# Patient Record
Sex: MALE | Race: BLACK OR AFRICAN AMERICAN | NOT HISPANIC OR LATINO | Employment: OTHER | URBAN - METROPOLITAN AREA
[De-identification: names, ages, dates, MRNs, and addresses within clinical notes are randomized per-mention and may not be internally consistent; named-entity substitution may affect disease eponyms.]

---

## 2017-10-02 VITALS
WEIGHT: 218 LBS | HEART RATE: 73 BPM | SYSTOLIC BLOOD PRESSURE: 138 MMHG | BODY MASS INDEX: 29.53 KG/M2 | HEIGHT: 72 IN | DIASTOLIC BLOOD PRESSURE: 78 MMHG

## 2017-10-02 RX ORDER — CARBOXYMETHYLCELLULOSE SODIUM 5 MG/ML
1 SOLUTION/ DROPS OPHTHALMIC
COMMUNITY

## 2017-10-02 RX ORDER — UREA 500 MG/G
CREAM TOPICAL
COMMUNITY

## 2017-10-02 RX ORDER — DONEPEZIL HYDROCHLORIDE 10 MG/1
0.5 TABLET, FILM COATED ORAL DAILY
COMMUNITY
End: 2018-05-15

## 2017-10-02 RX ORDER — METFORMIN HYDROCHLORIDE 500 MG/1
2 TABLET, FILM COATED, EXTENDED RELEASE ORAL DAILY
COMMUNITY
End: 2018-05-15

## 2017-10-02 RX ORDER — INSULIN GLARGINE 100 [IU]/ML
25 INJECTION, SOLUTION SUBCUTANEOUS DAILY
COMMUNITY
End: 2022-04-14

## 2017-10-02 RX ORDER — LISINOPRIL 10 MG/1
0.5 TABLET ORAL DAILY
COMMUNITY

## 2017-10-03 ENCOUNTER — OFFICE VISIT (OUTPATIENT)
Dept: ORTHOPEDICS | Facility: CLINIC | Age: 72
End: 2017-10-03
Payer: COMMERCIAL

## 2017-10-03 VITALS
SYSTOLIC BLOOD PRESSURE: 110 MMHG | HEIGHT: 72 IN | WEIGHT: 218 LBS | DIASTOLIC BLOOD PRESSURE: 61 MMHG | HEART RATE: 58 BPM | BODY MASS INDEX: 29.53 KG/M2

## 2017-10-03 DIAGNOSIS — G56.03 CARPAL TUNNEL SYNDROME, BILATERAL: Primary | ICD-10-CM

## 2017-10-03 DIAGNOSIS — M77.12 LATERAL EPICONDYLITIS OF LEFT ELBOW: ICD-10-CM

## 2017-10-03 PROBLEM — G56.02 CARPAL TUNNEL SYNDROME ON LEFT: Status: ACTIVE | Noted: 2017-10-03

## 2017-10-03 PROCEDURE — 99213 OFFICE O/P EST LOW 20 MIN: CPT | Mod: ,,, | Performed by: ORTHOPAEDIC SURGERY

## 2017-10-03 NOTE — LETTER
October 3, 2017      [facility]  [address] [address]           [facility] - Orthopedic Surgery  [address]  [address]  [place] [address]  Phone: [phone]  Fax: [phone]          Patient: Senthil Chandler   MR Number: [id number]   YOB: 1945   Date of Visit: 10/3/2017       Dear [facility]:    Thank you for referring Senthil Chandler to me for evaluation. Attached you will find relevant portions of my assessment and plan of care.    If you have questions, please do not hesitate to call me. I look forward to following Senthil Chandler along with you.    Sincerely,    Anoop Tracy Jr., MD    Enclosure  CC:  No Recipients    If you would like to receive this communication electronically, please contact [email or web address] or [phone] to request more information on [employer] Link access.    For providers and/or their staff who would like to refer a patient to [facility], please contact us through our one-stop-shop provider referral line, [facility], at [phone].    If you feel you have received this communication in error or would no longer like to receive these types of communications, please e-mail [email or web address]
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
## 2017-10-05 DIAGNOSIS — G56.02 CARPAL TUNNEL SYNDROME OF LEFT WRIST: Primary | ICD-10-CM

## 2017-10-17 ENCOUNTER — OFFICE VISIT (OUTPATIENT)
Dept: ORTHOPEDICS | Facility: CLINIC | Age: 72
End: 2017-10-17
Payer: COMMERCIAL

## 2017-10-17 VITALS
BODY MASS INDEX: 29.53 KG/M2 | SYSTOLIC BLOOD PRESSURE: 123 MMHG | HEIGHT: 72 IN | DIASTOLIC BLOOD PRESSURE: 64 MMHG | HEART RATE: 58 BPM | WEIGHT: 218 LBS

## 2017-10-17 DIAGNOSIS — M65.312 TRIGGER THUMB OF LEFT HAND: ICD-10-CM

## 2017-10-17 DIAGNOSIS — G56.02 CARPAL TUNNEL SYNDROME OF LEFT WRIST: Primary | ICD-10-CM

## 2017-10-17 LAB
ALBUMIN SERPL-MCNC: 4.4 G/DL (ref 3.1–4.7)
ALP SERPL-CCNC: 102 IU/L (ref 40–104)
ALT (SGPT): 35 IU/L (ref 3–33)
AST SERPL-CCNC: 28 IU/L (ref 10–40)
BILIRUB SERPL-MCNC: 0.9 MG/DL (ref 0.3–1)
BUN SERPL-MCNC: 9 MG/DL (ref 8–20)
CALCIUM SERPL-MCNC: 10.6 MG/DL (ref 7.7–10.4)
CHLORIDE: 100 MMOL/L (ref 98–110)
CO2 SERPL-SCNC: 31 MMOL/L (ref 22.8–31.6)
CREATININE: 1.1 MG/DL (ref 0.6–1.4)
GLUCOSE: 288 MG/DL (ref 70–99)
HCT VFR BLD AUTO: 41.7 % (ref 39–55)
HGB BLD-MCNC: 13.9 G/DL (ref 14–16)
MCH RBC QN AUTO: 33.4 PG (ref 25–35)
MCHC RBC AUTO-ENTMCNC: 33.3 G/DL (ref 31–36)
MCV RBC AUTO: 100.2 FL (ref 80–100)
NUCLEATED RBCS: 0 %
PLATELET # BLD AUTO: 234 K/UL (ref 140–440)
POTASSIUM SERPL-SCNC: 5.6 MMOL/L (ref 3.5–5)
PROT SERPL-MCNC: 7.4 G/DL (ref 6–8.2)
RBC # BLD AUTO: 4.16 M/UL (ref 4.3–5.9)
SODIUM: 140 MMOL/L (ref 134–144)
WBC # BLD AUTO: 4.1 K/UL (ref 5–10)

## 2017-10-17 PROCEDURE — 99499 UNLISTED E&M SERVICE: CPT | Mod: ,,, | Performed by: ORTHOPAEDIC SURGERY

## 2017-10-17 RX ORDER — HYDROCODONE BITARTRATE AND ACETAMINOPHEN 7.5; 325 MG/1; MG/1
1 TABLET ORAL EVERY 6 HOURS PRN
Qty: 40 TABLET | Refills: 0 | Status: SHIPPED | OUTPATIENT
Start: 2017-10-17 | End: 2018-05-15

## 2017-10-17 NOTE — PROGRESS NOTES
Subjective:       Chief Complaint    Chief Complaint   Patient presents with    Pre-op Exam     DOS will be on 10/23/2017, left carpal tunnel syndrome.  The purpose is to relieve the pain of the left carpal tunnel syndrome.       HPI  Senthil Chandler is a 71 y.o.  male who presents Preop for carpal tunnel release, left wrist. He just revealed that he has bilateral trigger thumbs. We will try to get approval for release of his left thumb triggering before his scheduled surgery on 10/23/2017. If not, we will proceed with a carpal tunnel release      Past History  Past Medical History:   Diagnosis Date    Adjustment disorder with anxious mood     Angiodysplasia     Arthritis     Cancer 1990    prostate     Carcinoma in situ of prostate     Carpal tunnel syndrome on left     CKD (chronic kidney disease) stage 1, GFR 90 ml/min or greater     Cubital tunnel syndrome on left     Depressive disorder     Deviated nasal septum     Diabetes mellitus with ophthalmic complication     Diabetes mellitus, type 2     GERD (gastroesophageal reflux disease)     Hx of colonic polyp     Hypercholesterolemia     Hyperpotassemia     Hypertension     Iron deficiency anemia     Kidney disease     focal segmental glomerulosclerosis    Lumbago     Malignant neoplasm of colon     Nephrotic syndrome     Neuropathy     Primary osteoarthritis of left elbow     PTSD (post-traumatic stress disorder)     PVD (peripheral vascular disease)     Spinal stenosis, lumbar region without neurogenic claudication      Past Surgical History:   Procedure Laterality Date    COLONOSCOPY N/A 8/12/2016    Procedure: COLONOSCOPY;  Surgeon: Carlos Roman MD;  Location: [facility];  Service: Endoscopy;  Laterality: N/A;    NASAL SEPTUM SURGERY      PROSTATE SURGERY  1990's     Social History     Social History    Marital status: Single     Spouse name: N/A    Number of children: N/A    Years of education: N/A     Occupational  History    Not on file.     Social History Main Topics    Smoking status: Heavy Tobacco Smoker     Packs/day: 1.00     Types: Cigarettes    Smokeless tobacco: Never Used    Alcohol use No    Drug use: No    Sexual activity: Not on file     Other Topics Concern    Not on file     Social History Narrative    No narrative on file         Medications  Current Outpatient Prescriptions   Medication Sig    artificial saliva, cmce-lytes, SprP Take by mouth. 1 tsp. Daily prn dry mouth    atorvastatin (LIPITOR) 20 MG tablet Take 10 mg by mouth once daily.    bicalutamide (CASODEX) 50 MG Tab Take 50 mg by mouth once daily.    carboxymethylcellulose (REFRESH PLUS) 0.5 % Dpet Place 1 drop into both eyes 5 (five) times daily.    cholecalciferol, vitamin D3, 2,000 unit Tab Take by mouth once daily.    donepezil (ARICEPT) 10 MG tablet Take 0.5 tablets by mouth once daily.    fluticasone 50 mcg/actuation DsDv Inhale 2 sprays into the lungs once daily.    gabapentin (NEURONTIN) 400 MG capsule Take 400 mg by mouth 3 (three) times daily. 2 qa/ 2 qnoon/ 3 qpm    hydrochlorothiazide (HYDRODIURIL) 25 MG tablet Take 25 mg by mouth once daily.    insulin glargine (LANTUS) 100 unit/mL injection Inject 15 Units into the skin once daily.    labetalol (NORMODYNE) 300 MG tablet Take 300 mg by mouth 2 (two) times daily.    lisinopril 10 MG tablet Take 0.5 tablets by mouth once daily.    saxagliptin (ONGLYZA) 5 mg Tab tablet Take by mouth once daily.    trazodone (DESYREL) 100 MG tablet Take 150 mg by mouth every evening.     urea 50 % Crea Apply bid    venlafaxine (EFFEXOR) 100 MG Tab Take 100 mg by mouth 3 (three) times daily.    hydrocodone-acetaminophen 7.5-325mg (NORCO) 7.5-325 mg per tablet Take 1 tablet by mouth every 6 (six) hours as needed for Pain.    metformin (GLUMETZA) 500 MG (MOD) 24 hr tablet Take 2 tablets by mouth once daily.     No current facility-administered medications for this visit.         Allergies  Review of patient's allergies indicates:  No Known Allergies      Review of Systems     Constitutional: Negative    HENT: Negative  Eyes: Negative  Respiratory: Negative  Cardiovascular: Negative  Musculoskeletal: HPI  Skin: Negative  Neurological: Negative  Hematological: Negative  Endocrine: Negative      Physical Exam    Vitals:    10/17/17 0856   BP: 123/64   Pulse: (!) 58     Physical Examination:Left hand/wrist-markedly positive Tinel sign with tingling in the median nerve distribution fingers. Positive wrist flexion test. Tender MCP joint of the thumb. Patient states he wakes up with his thumb triggered or locked in flexion. He does have nodular tendinitis with popping. Not as painful as it usually is. According to the patient     Skin-Clear  General appearance -  well appearing, and in no distress  Mental status - awake  Neck - supple  Chest -  symmetric air entry  Heart - normal rate   Abdomen - soft      Assessment/Plan   Carpal tunnel syndrome of left wrist    Trigger thumb of left hand    Other orders  -     hydrocodone-acetaminophen 7.5-325mg (NORCO) 7.5-325 mg per tablet; Take 1 tablet by mouth every 6 (six) hours as needed for Pain.  Dispense: 40 tablet; Refill: 0    12 point review of system reveals shortness of breath and chronic cough for 50 years of smoking.        This note was dictated using voice recognition software and may contain grammatical errors.

## 2017-10-17 NOTE — LETTER
October 17, 2017      [facility]  [address] [address]           [facility] - Orthopedic Surgery  [address]  [address]  [place] [address]  Phone: [phone]  Fax: [phone]          Patient: Senthil Chandler   MR Number: [id number]   YOB: 1945   Date of Visit: 10/17/2017       Dear [facility]:    Thank you for referring Senthil Chandler to me for evaluation. Attached you will find relevant portions of my assessment and plan of care.    If you have questions, please do not hesitate to call me. I look forward to following Senthil Chandler along with you.    Sincerely,    Anoop Tracy Jr., MD    Enclosure  CC:  No Recipients    If you would like to receive this communication electronically, please contact [email or web address] or [phone] to request more information on [employer] Link access.    For providers and/or their staff who would like to refer a patient to [facility], please contact us through our one-stop-shop provider referral line, [facility], at [phone].    If you feel you have received this communication in error or would no longer like to receive these types of communications, please e-mail [email or web address]

## 2017-10-23 LAB
BUN SERPL-MCNC: 14 MG/DL (ref 8–20)
CALCIUM SERPL-MCNC: 10.5 MG/DL (ref 7.7–10.4)
CHLORIDE: 101 MMOL/L (ref 98–110)
CO2 SERPL-SCNC: 28 MMOL/L (ref 22.8–31.6)
CREATININE: 1.05 MG/DL (ref 0.6–1.4)
GLUCOSE SERPL-MCNC: 160 MG/DL (ref 70–99)
GLUCOSE: 242 MG/DL (ref 70–99)
POTASSIUM SERPL-SCNC: 3.8 MMOL/L (ref 3.5–5)
SODIUM: 138 MMOL/L (ref 134–144)

## 2017-10-25 ENCOUNTER — NURSE TRIAGE (OUTPATIENT)
Dept: ORTHOPEDICS | Facility: CLINIC | Age: 72
End: 2017-10-25

## 2017-10-25 NOTE — TELEPHONE ENCOUNTER
Patient's bandaging and splint was removed.  The bandaging and splint appeared to be tight due to the visible markings on the patient's skin.  A large bandaid was placed over the patient's incision area.  Patient was advised to keep the area dry and covered until his appointment on 10/31/17.  He verbalized understanding of these instructions.

## 2017-10-31 ENCOUNTER — OFFICE VISIT (OUTPATIENT)
Dept: ORTHOPEDICS | Facility: CLINIC | Age: 72
End: 2017-10-31
Payer: COMMERCIAL

## 2017-10-31 VITALS
SYSTOLIC BLOOD PRESSURE: 102 MMHG | BODY MASS INDEX: 29.53 KG/M2 | WEIGHT: 218 LBS | HEIGHT: 72 IN | HEART RATE: 64 BPM | DIASTOLIC BLOOD PRESSURE: 50 MMHG

## 2017-10-31 DIAGNOSIS — Z98.890 POST-OPERATIVE STATE: ICD-10-CM

## 2017-10-31 DIAGNOSIS — G56.02 CARPAL TUNNEL SYNDROME OF LEFT WRIST: Primary | ICD-10-CM

## 2017-10-31 PROCEDURE — 99024 POSTOP FOLLOW-UP VISIT: CPT | Mod: ,,, | Performed by: ORTHOPAEDIC SURGERY

## 2017-10-31 NOTE — PROGRESS NOTES
Subjective:       Chief Complaint    Chief Complaint   Patient presents with    Post-op Evaluation     8 days, left carpal tunnel release.  Patient has some pain and soreness but overall is doing well.  Still has decreased sensations.       HPI  Senthil Chandler is a 72 y.o.  male who presents Follow-up on carpal tunnel release. Doing very well. Took off his brace and is much more comfortable.      Past History  Past Medical History:   Diagnosis Date    Adjustment disorder with anxious mood     Angiodysplasia     Arthritis     Cancer 1990    prostate     Carcinoma in situ of prostate     Carpal tunnel syndrome on left     CKD (chronic kidney disease) stage 1, GFR 90 ml/min or greater     Cubital tunnel syndrome on left     Depressive disorder     Deviated nasal septum     Diabetes mellitus with ophthalmic complication     Diabetes mellitus, type 2     GERD (gastroesophageal reflux disease)     Hx of colonic polyp     Hypercholesterolemia     Hyperpotassemia     Hypertension     Iron deficiency anemia     Kidney disease     focal segmental glomerulosclerosis    Lumbago     Malignant neoplasm of colon     Nephrotic syndrome     Neuropathy     Primary osteoarthritis of left elbow     PTSD (post-traumatic stress disorder)     PVD (peripheral vascular disease)     Spinal stenosis, lumbar region without neurogenic claudication      Past Surgical History:   Procedure Laterality Date    CARPAL TUNNEL RELEASE Left 10/23/2017    COLONOSCOPY N/A 8/12/2016    Procedure: COLONOSCOPY;  Surgeon: Carlos Roman MD;  Location: [facility];  Service: Endoscopy;  Laterality: N/A;    NASAL SEPTUM SURGERY      PROSTATE SURGERY  1990's     Social History     Social History    Marital status: Single     Spouse name: N/A    Number of children: N/A    Years of education: N/A     Occupational History    Not on file.     Social History Main Topics    Smoking status: Heavy Tobacco Smoker     Packs/day:  1.00     Types: Cigarettes    Smokeless tobacco: Never Used    Alcohol use No    Drug use: No    Sexual activity: Not on file     Other Topics Concern    Not on file     Social History Narrative    No narrative on file         Medications  Current Outpatient Prescriptions   Medication Sig    artificial saliva, cmce-lytes, SprP Take by mouth. 1 tsp. Daily prn dry mouth    atorvastatin (LIPITOR) 20 MG tablet Take 10 mg by mouth once daily.    bicalutamide (CASODEX) 50 MG Tab Take 50 mg by mouth once daily.    carboxymethylcellulose (REFRESH PLUS) 0.5 % Dpet Place 1 drop into both eyes 5 (five) times daily.    cholecalciferol, vitamin D3, 2,000 unit Tab Take by mouth once daily.    donepezil (ARICEPT) 10 MG tablet Take 0.5 tablets by mouth once daily.    fluticasone 50 mcg/actuation DsDv Inhale 2 sprays into the lungs once daily.    gabapentin (NEURONTIN) 400 MG capsule Take 400 mg by mouth 3 (three) times daily. 2 qa/ 2 qnoon/ 3 qpm    hydrochlorothiazide (HYDRODIURIL) 25 MG tablet Take 25 mg by mouth once daily.    hydrocodone-acetaminophen 7.5-325mg (NORCO) 7.5-325 mg per tablet Take 1 tablet by mouth every 6 (six) hours as needed for Pain.    insulin glargine (LANTUS) 100 unit/mL injection Inject 15 Units into the skin once daily.    labetalol (NORMODYNE) 300 MG tablet Take 300 mg by mouth 2 (two) times daily.    lisinopril 10 MG tablet Take 0.5 tablets by mouth once daily.    metformin (GLUMETZA) 500 MG (MOD) 24 hr tablet Take 2 tablets by mouth once daily.    saxagliptin (ONGLYZA) 5 mg Tab tablet Take by mouth once daily.    trazodone (DESYREL) 100 MG tablet Take 150 mg by mouth every evening.     urea 50 % Crea Apply bid    venlafaxine (EFFEXOR) 100 MG Tab Take 100 mg by mouth 3 (three) times daily.     No current facility-administered medications for this visit.        Allergies  Review of patient's allergies indicates:  No Known Allergies      Review of Systems     Constitutional:  Negative    HENT: Negative  Eyes: Negative  Respiratory: Negative  Cardiovascular: Negative  Musculoskeletal: HPI  Skin: Negative  Neurological: Negative  Hematological: Negative  Endocrine: Negative      Physical Exam    Vitals:    10/31/17 1107   BP: (!) 102/50   Pulse: 64     Physical Examination:Left wrist shows it dry. Incision was sutures present. Mild swelling. Able to make a fist. Good motion at the wrist.     Skin-  General appearance -  well appearing, and in no distress  Mental status - awake  Neck - supple  Chest -  symmetric air entry  Heart - normal rate   Abdomen - soft      Assessment/Plan   Carpal tunnel syndrome of left wrist    Post-operative state       Suture removal next visit. Okay to wet. May use ice. Avoid pressure on the heel of the palm.      This note was dictated using voice recognition software and may contain grammatical errors.

## 2017-11-08 ENCOUNTER — OFFICE VISIT (OUTPATIENT)
Dept: ORTHOPEDICS | Facility: CLINIC | Age: 72
End: 2017-11-08
Payer: COMMERCIAL

## 2017-11-08 VITALS
BODY MASS INDEX: 29.53 KG/M2 | DIASTOLIC BLOOD PRESSURE: 58 MMHG | SYSTOLIC BLOOD PRESSURE: 100 MMHG | HEART RATE: 68 BPM | WEIGHT: 218 LBS | HEIGHT: 72 IN

## 2017-11-08 DIAGNOSIS — Z98.890 POST-OPERATIVE STATE: ICD-10-CM

## 2017-11-08 DIAGNOSIS — G56.02 CARPAL TUNNEL SYNDROME OF LEFT WRIST: Primary | ICD-10-CM

## 2017-11-08 PROCEDURE — 99024 POSTOP FOLLOW-UP VISIT: CPT | Mod: ,,, | Performed by: ORTHOPAEDIC SURGERY

## 2017-11-08 NOTE — PROGRESS NOTES
Subjective:       Chief Complaint    Chief Complaint   Patient presents with    Post-op Evaluation     2 weeks & 2 days, left wrist.  DOS: 10/23/17, left carpal tunnel release.  Patient reports his fingers are still numb and still has some soreness.         HPI  Senthil Chandler is a 72 y.o.  male who presents Postop carpal tunnel surgery, left wrist. He is doing very well.      Past History  Past Medical History:   Diagnosis Date    Adjustment disorder with anxious mood     Angiodysplasia     Arthritis     Cancer 1990    prostate     Carcinoma in situ of prostate     Carpal tunnel syndrome on left     CKD (chronic kidney disease) stage 1, GFR 90 ml/min or greater     Cubital tunnel syndrome on left     Depressive disorder     Deviated nasal septum     Diabetes mellitus with ophthalmic complication     Diabetes mellitus, type 2     GERD (gastroesophageal reflux disease)     Hx of colonic polyp     Hypercholesterolemia     Hyperpotassemia     Hypertension     Iron deficiency anemia     Kidney disease     focal segmental glomerulosclerosis    Lumbago     Malignant neoplasm of colon     Nephrotic syndrome     Neuropathy     Primary osteoarthritis of left elbow     PTSD (post-traumatic stress disorder)     PVD (peripheral vascular disease)     Spinal stenosis, lumbar region without neurogenic claudication      Past Surgical History:   Procedure Laterality Date    CARPAL TUNNEL RELEASE Left 10/23/2017    COLONOSCOPY N/A 8/12/2016    Procedure: COLONOSCOPY;  Surgeon: Carlos Roman MD;  Location: [facility];  Service: Endoscopy;  Laterality: N/A;    NASAL SEPTUM SURGERY      PROSTATE SURGERY  1990's     Social History     Social History    Marital status: Single     Spouse name: N/A    Number of children: N/A    Years of education: N/A     Occupational History    Not on file.     Social History Main Topics    Smoking status: Heavy Tobacco Smoker     Packs/day: 1.00     Types:  Cigarettes    Smokeless tobacco: Never Used    Alcohol use No    Drug use: No    Sexual activity: Not on file     Other Topics Concern    Not on file     Social History Narrative    No narrative on file         Medications  Current Outpatient Prescriptions   Medication Sig    artificial saliva, cmce-lytes, SprP Take by mouth. 1 tsp. Daily prn dry mouth    atorvastatin (LIPITOR) 20 MG tablet Take 10 mg by mouth once daily.    bicalutamide (CASODEX) 50 MG Tab Take 50 mg by mouth once daily.    carboxymethylcellulose (REFRESH PLUS) 0.5 % Dpet Place 1 drop into both eyes 5 (five) times daily.    cholecalciferol, vitamin D3, 2,000 unit Tab Take by mouth once daily.    donepezil (ARICEPT) 10 MG tablet Take 0.5 tablets by mouth once daily.    fluticasone 50 mcg/actuation DsDv Inhale 2 sprays into the lungs once daily.    gabapentin (NEURONTIN) 400 MG capsule Take 400 mg by mouth 3 (three) times daily. 2 qa/ 2 qnoon/ 3 qpm    hydrochlorothiazide (HYDRODIURIL) 25 MG tablet Take 25 mg by mouth once daily.    hydrocodone-acetaminophen 7.5-325mg (NORCO) 7.5-325 mg per tablet Take 1 tablet by mouth every 6 (six) hours as needed for Pain.    insulin glargine (LANTUS) 100 unit/mL injection Inject 15 Units into the skin once daily.    labetalol (NORMODYNE) 300 MG tablet Take 300 mg by mouth 2 (two) times daily.    lisinopril 10 MG tablet Take 0.5 tablets by mouth once daily.    metformin (GLUMETZA) 500 MG (MOD) 24 hr tablet Take 2 tablets by mouth once daily.    saxagliptin (ONGLYZA) 5 mg Tab tablet Take by mouth once daily.    trazodone (DESYREL) 100 MG tablet Take 150 mg by mouth every evening.     urea 50 % Crea Apply bid    venlafaxine (EFFEXOR) 100 MG Tab Take 100 mg by mouth 3 (three) times daily.     No current facility-administered medications for this visit.        Allergies  Review of patient's allergies indicates:  No Known Allergies      Review of Systems     Constitutional: Negative    HENT:  Negative  Eyes: Negative  Respiratory: Negative  Cardiovascular: Negative  Musculoskeletal: HPI  Skin: Negative  Neurological: Negative  Hematological: Negative  Endocrine: Negative      Physical Exam    Vitals:    11/08/17 1054   BP: (!) 100/58   Pulse: 68     Physical Examination:Left wrist examination reveals healed incision. Sutures removed. Range of motion in his wrist.     Skin-   General appearance -  well appearing, and in no distress  Mental status - awake  Neck - supple  Chest -  symmetric air entry  Heart - normal rate   Abdomen - soft      Assessment/Plan   Carpal tunnel syndrome of left wrist    Post-operative state      All questions answered. Patient advised that the soreness at the heel of his palm takes 4 months at least to clear up.      This note was dictated using voice recognition software and may contain grammatical errors.

## 2017-12-20 ENCOUNTER — OFFICE VISIT (OUTPATIENT)
Dept: ORTHOPEDICS | Facility: CLINIC | Age: 72
End: 2017-12-20
Payer: COMMERCIAL

## 2017-12-20 VITALS
HEIGHT: 72 IN | DIASTOLIC BLOOD PRESSURE: 60 MMHG | SYSTOLIC BLOOD PRESSURE: 122 MMHG | BODY MASS INDEX: 29.53 KG/M2 | WEIGHT: 218 LBS | HEART RATE: 76 BPM

## 2017-12-20 DIAGNOSIS — Z98.890 POST-OPERATIVE STATE: Primary | ICD-10-CM

## 2017-12-20 DIAGNOSIS — G56.22 CUBITAL TUNNEL SYNDROME ON LEFT: ICD-10-CM

## 2017-12-20 DIAGNOSIS — G56.02 CARPAL TUNNEL SYNDROME OF LEFT WRIST: ICD-10-CM

## 2017-12-20 PROCEDURE — 99024 POSTOP FOLLOW-UP VISIT: CPT | Mod: ,,, | Performed by: ORTHOPAEDIC SURGERY

## 2017-12-20 NOTE — PROGRESS NOTES
Subjective:       Chief Complaint    Chief Complaint   Patient presents with    Post-op Evaluation     8 weeks & 2 days, left wrist.  DOS: 10/23/17, left carpal tunnel release.  Patient states his hand doesn't feel any different than before surgery.  He believes the pain/issue is coming from his elbow.  He has a refferal on file for his left elbow.       JESSI Chandler is a 72 y.o.  male who presents With worsening of his ulnar neuropathy at the left elbow.      Past History  Past Medical History:   Diagnosis Date    Adjustment disorder with anxious mood     Angiodysplasia     Arthritis     Cancer 1990    prostate     Carcinoma in situ of prostate     Carpal tunnel syndrome on left     CKD (chronic kidney disease) stage 1, GFR 90 ml/min or greater     Cubital tunnel syndrome on left     Depressive disorder     Deviated nasal septum     Diabetes mellitus with ophthalmic complication     Diabetes mellitus, type 2     GERD (gastroesophageal reflux disease)     Hx of colonic polyp     Hypercholesterolemia     Hyperpotassemia     Hypertension     Iron deficiency anemia     Kidney disease     focal segmental glomerulosclerosis    Lumbago     Malignant neoplasm of colon     Nephrotic syndrome     Neuropathy     Primary osteoarthritis of left elbow     PTSD (post-traumatic stress disorder)     PVD (peripheral vascular disease)     Spinal stenosis, lumbar region without neurogenic claudication      Past Surgical History:   Procedure Laterality Date    CARPAL TUNNEL RELEASE Left 10/23/2017    COLONOSCOPY N/A 8/12/2016    Procedure: COLONOSCOPY;  Surgeon: Carlos Roman MD;  Location: [facility];  Service: Endoscopy;  Laterality: N/A;    NASAL SEPTUM SURGERY      PROSTATE SURGERY  1990's     Social History     Social History    Marital status: Single     Spouse name: N/A    Number of children: N/A    Years of education: N/A     Occupational History    Not on file.     Social  History Main Topics    Smoking status: Heavy Tobacco Smoker     Packs/day: 1.00     Types: Cigarettes    Smokeless tobacco: Never Used    Alcohol use No    Drug use: No    Sexual activity: Not on file     Other Topics Concern    Not on file     Social History Narrative    No narrative on file         Medications  Current Outpatient Prescriptions   Medication Sig    artificial saliva, cmce-lytes, SprP Take by mouth. 1 tsp. Daily prn dry mouth    atorvastatin (LIPITOR) 20 MG tablet Take 10 mg by mouth once daily.    bicalutamide (CASODEX) 50 MG Tab Take 50 mg by mouth once daily.    carboxymethylcellulose (REFRESH PLUS) 0.5 % Dpet Place 1 drop into both eyes 5 (five) times daily.    cholecalciferol, vitamin D3, 2,000 unit Tab Take by mouth once daily.    donepezil (ARICEPT) 10 MG tablet Take 0.5 tablets by mouth once daily.    fluticasone 50 mcg/actuation DsDv Inhale 2 sprays into the lungs once daily.    gabapentin (NEURONTIN) 400 MG capsule Take 400 mg by mouth 3 (three) times daily. 2 qa/ 2 qnoon/ 3 qpm    hydrochlorothiazide (HYDRODIURIL) 25 MG tablet Take 25 mg by mouth once daily.    hydrocodone-acetaminophen 7.5-325mg (NORCO) 7.5-325 mg per tablet Take 1 tablet by mouth every 6 (six) hours as needed for Pain.    insulin glargine (LANTUS) 100 unit/mL injection Inject 15 Units into the skin once daily.    labetalol (NORMODYNE) 300 MG tablet Take 300 mg by mouth 2 (two) times daily.    lisinopril 10 MG tablet Take 0.5 tablets by mouth once daily.    metformin (GLUMETZA) 500 MG (MOD) 24 hr tablet Take 2 tablets by mouth once daily.    saxagliptin (ONGLYZA) 5 mg Tab tablet Take by mouth once daily.    trazodone (DESYREL) 100 MG tablet Take 150 mg by mouth every evening.     urea 50 % Crea Apply bid    venlafaxine (EFFEXOR) 100 MG Tab Take 100 mg by mouth 3 (three) times daily.     No current facility-administered medications for this visit.        Allergies  Review of patient's allergies  indicates:  No Known Allergies      Review of Systems     Constitutional: Negative    HENT: Negative  Eyes: Negative  Respiratory: Negative  Cardiovascular: Negative  Musculoskeletal: HPI  Skin: Negative  Neurological: Negative  Hematological: Negative  Endocrine: Negative      Physical Exam    Vitals:    12/20/17 0800   BP: 122/60   Pulse: 76     Physical Examination:Examination left elbow reveals a full range of motion with tenderness at the cubital tunnel, positive Tinel sign. No bit of weakness at the abductor digiti quinti. First dorsal interosseous has excellent strength. Carpal tunnel incision is well-healed.     Skin-clear  General appearance -  well appearing, and in no distress  Mental status - awake  Neck - supple  Chest -  symmetric air entry  Heart - normal rate   Abdomen - soft      Assessment/Plan   Post-operative state    Cubital tunnel syndrome on left    Carpal tunnel syndrome of left wrist      Patient is going to need an anterior transposition of his ulnar nerve in the left elbow. Extensive explanation and discussion as to the causes of his neuropathy involving his median and elbow ulnar nerve. This was done preop, postop and we continued to remind him of the multiple factors involved in the numbness in his left hand.      This note was dictated using voice recognition software and may contain grammatical errors.

## 2018-01-19 ENCOUNTER — OFFICE VISIT (OUTPATIENT)
Dept: ORTHOPEDICS | Facility: CLINIC | Age: 73
End: 2018-01-19
Payer: COMMERCIAL

## 2018-01-19 VITALS
DIASTOLIC BLOOD PRESSURE: 86 MMHG | SYSTOLIC BLOOD PRESSURE: 150 MMHG | WEIGHT: 218 LBS | HEART RATE: 68 BPM | BODY MASS INDEX: 29.53 KG/M2 | HEIGHT: 72 IN

## 2018-01-19 DIAGNOSIS — G56.02 CARPAL TUNNEL SYNDROME OF LEFT WRIST: ICD-10-CM

## 2018-01-19 DIAGNOSIS — Z98.890 POST-OPERATIVE STATE: ICD-10-CM

## 2018-01-19 DIAGNOSIS — M65.312 TRIGGER THUMB OF LEFT HAND: ICD-10-CM

## 2018-01-19 DIAGNOSIS — G56.22 CUBITAL TUNNEL SYNDROME, LEFT: Primary | ICD-10-CM

## 2018-01-19 PROCEDURE — 99212 OFFICE O/P EST SF 10 MIN: CPT | Mod: 24,,, | Performed by: ORTHOPAEDIC SURGERY

## 2018-01-19 NOTE — PROGRESS NOTES
Subjective:       Chief Complaint    Chief Complaint   Patient presents with    Post-op Evaluation     Post op 12wks and 4 days. DOS: 10/23/17, left carpal tunnel release. Patient states that left wrist is better. He still does not have a good  with his hand. No pain.        HPI  Senthil Chandler is a 72 y.o.  male who presents  Follow-up on carpal tunnel release, left wrist. Overall improving. Another concern he has this ongoing problems with left trigger thumb. Surgical release is indicated. To be cleared for this. He also has an issue with the ulnar nerve, medial side, left elbow. He will need cubital tunnel studies in the left elbow. (Ulnar nerve conduction studies)      Past History  Past Medical History:   Diagnosis Date    Adjustment disorder with anxious mood     Angiodysplasia     Arthritis     Cancer 1990    prostate     Carcinoma in situ of prostate     Carpal tunnel syndrome on left     CKD (chronic kidney disease) stage 1, GFR 90 ml/min or greater     Cubital tunnel syndrome on left     Depressive disorder     Deviated nasal septum     Diabetes mellitus with ophthalmic complication     Diabetes mellitus, type 2     GERD (gastroesophageal reflux disease)     Hx of colonic polyp     Hypercholesterolemia     Hyperpotassemia     Hypertension     Iron deficiency anemia     Kidney disease     focal segmental glomerulosclerosis    Lumbago     Malignant neoplasm of colon     Nephrotic syndrome     Neuropathy     Primary osteoarthritis of left elbow     PTSD (post-traumatic stress disorder)     PVD (peripheral vascular disease)     Spinal stenosis, lumbar region without neurogenic claudication      Past Surgical History:   Procedure Laterality Date    CARPAL TUNNEL RELEASE Left 10/23/2017    COLONOSCOPY N/A 8/12/2016    Procedure: COLONOSCOPY;  Surgeon: Carlos Roman MD;  Location: [facility];  Service: Endoscopy;  Laterality: N/A;    NASAL SEPTUM SURGERY      PROSTATE  SURGERY  1990's     Social History     Social History    Marital status: Single     Spouse name: N/A    Number of children: N/A    Years of education: N/A     Occupational History    Not on file.     Social History Main Topics    Smoking status: Heavy Tobacco Smoker     Packs/day: 1.00     Types: Cigarettes    Smokeless tobacco: Never Used    Alcohol use No    Drug use: No    Sexual activity: Not on file     Other Topics Concern    Not on file     Social History Narrative    No narrative on file         Medications  Current Outpatient Prescriptions   Medication Sig    artificial saliva, cmce-lytes, SprP Take by mouth. 1 tsp. Daily prn dry mouth    atorvastatin (LIPITOR) 20 MG tablet Take 10 mg by mouth once daily.    bicalutamide (CASODEX) 50 MG Tab Take 50 mg by mouth once daily.    carboxymethylcellulose (REFRESH PLUS) 0.5 % Dpet Place 1 drop into both eyes 5 (five) times daily.    cholecalciferol, vitamin D3, 2,000 unit Tab Take by mouth once daily.    donepezil (ARICEPT) 10 MG tablet Take 0.5 tablets by mouth once daily.    fluticasone 50 mcg/actuation DsDv Inhale 2 sprays into the lungs once daily.    gabapentin (NEURONTIN) 400 MG capsule Take 400 mg by mouth 3 (three) times daily. 2 qa/ 2 qnoon/ 3 qpm    hydrochlorothiazide (HYDRODIURIL) 25 MG tablet Take 25 mg by mouth once daily.    hydrocodone-acetaminophen 7.5-325mg (NORCO) 7.5-325 mg per tablet Take 1 tablet by mouth every 6 (six) hours as needed for Pain.    insulin glargine (LANTUS) 100 unit/mL injection Inject 15 Units into the skin once daily.    labetalol (NORMODYNE) 300 MG tablet Take 300 mg by mouth 2 (two) times daily.    lisinopril 10 MG tablet Take 0.5 tablets by mouth once daily.    metformin (GLUMETZA) 500 MG (MOD) 24 hr tablet Take 2 tablets by mouth once daily.    saxagliptin (ONGLYZA) 5 mg Tab tablet Take by mouth once daily.    trazodone (DESYREL) 100 MG tablet Take 150 mg by mouth every evening.     urea 50 %  Crea Apply bid    venlafaxine (EFFEXOR) 100 MG Tab Take 100 mg by mouth 3 (three) times daily.     No current facility-administered medications for this visit.        Allergies  Review of patient's allergies indicates:  No Known Allergies      Review of Systems     Constitutional: Negative    HENT: Negative  Eyes: Negative  Respiratory: Negative  Cardiovascular: Negative  Musculoskeletal: HPI  Skin: Negative  Neurological: Negative  Hematological: Negative  Endocrine: Negative      Physical Exam    Vitals:    01/19/18 0818   BP: (!) 150/86   Pulse: 68     Physical Examination:Tinel sign is negative at the cubital tunnel, but the nerve is sensitive at the ulnar groove, left elbow. Cannot describe tingling in the fourth and fifth fingers, but he says discomfort is on the ulnar side of the elbow all the way into his hand on the ulnar side.     Skin-  General appearance -  well appearing, and in no distress  Mental status - awake  Neck - supple  Chest -  symmetric air entry  Heart - normal rate   Abdomen - soft      Assessment/Plan   Post-operative state    Carpal tunnel syndrome of left wrist      He needs clearance for trigger thumb release on the left and he needs to have nerve conduction studies of his ulnar nerve, left elbow.      This note was dictated using voice recognition software and may contain grammatical errors.

## 2018-01-19 NOTE — LETTER
January 19, 2018      [facility]  [address] [address]           [place] - Orthopedic Surgery  [address]  [address]  [facility] [address]  Phone: [phone]  Fax: [phone]          Patient: Senthil Chandler   MR Number: [id number]   YOB: 1945   Date of Visit: 1/19/2018       Dear [facility]:    Thank you for referring Senthil Chandler to me for evaluation. Attached you will find relevant portions of my assessment and plan of care.    If you have questions, please do not hesitate to call me. I look forward to following Senthil Chandler along with you.    Sincerely,    Anoop [place] Jr., MD    Enclosure  CC:  No Recipients    If you would like to receive this communication electronically, please contact [email or web address] or [phone] to request more information on [employer] Link access.    For providers and/or their staff who would like to refer a patient to [facility], please contact us through our one-stop-shop provider referral line, [facility], at [phone].    If you feel you have received this communication in error or would no longer like to receive these types of communications, please e-mail [email or web address]

## 2018-05-15 ENCOUNTER — OFFICE VISIT (OUTPATIENT)
Dept: ORTHOPEDICS | Facility: CLINIC | Age: 73
End: 2018-05-15
Payer: COMMERCIAL

## 2018-05-15 VITALS
SYSTOLIC BLOOD PRESSURE: 130 MMHG | DIASTOLIC BLOOD PRESSURE: 90 MMHG | HEIGHT: 72 IN | WEIGHT: 218 LBS | BODY MASS INDEX: 29.53 KG/M2

## 2018-05-15 DIAGNOSIS — G56.22 CUBITAL TUNNEL SYNDROME, LEFT: Primary | ICD-10-CM

## 2018-05-15 DIAGNOSIS — M19.022 PRIMARY OSTEOARTHRITIS OF LEFT ELBOW: ICD-10-CM

## 2018-05-15 PROCEDURE — 20605 DRAIN/INJ JOINT/BURSA W/O US: CPT | Mod: LT,,, | Performed by: ORTHOPAEDIC SURGERY

## 2018-05-15 PROCEDURE — 99214 OFFICE O/P EST MOD 30 MIN: CPT | Mod: 25,,, | Performed by: ORTHOPAEDIC SURGERY

## 2018-05-15 PROCEDURE — 73070 X-RAY EXAM OF ELBOW: CPT | Mod: FY,LT,, | Performed by: ORTHOPAEDIC SURGERY
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
## 2018-05-15 NOTE — LETTER
May 15, 2018      Marilu Thomas MD  [address]  [address]  [facility]  [place] [address]           [facility] - Orthopedic Surgery  [address]  [address]  [place] [address]  Phone: [phone]  Fax: [phone]          Patient: Senthil Chandler   MR Number: [id number]   YOB: 1945   Date of Visit: 5/15/2018       Dear Dr. Marilu Thomas:    Thank you for referring Senthil Chandler to me for evaluation. Attached you will find relevant portions of my assessment and plan of care.    If you have questions, please do not hesitate to call me. I look forward to following Senthil Chandler along with you.    Sincerely,    Anoop [place] Jr., MD    Enclosure  CC:  No Recipients    If you would like to receive this communication electronically, please contact [email or web address] or [phone] to request more information on [employer] Link access.    For providers and/or their staff who would like to refer a patient to [facility], please contact us through our one-stop-shop provider referral line, [facility], at [phone].    If you feel you have received this communication in error or would no longer like to receive these types of communications, please e-mail [email or web address]

## 2018-05-15 NOTE — PROCEDURES
Intermediate Joint Aspiration/Injection  Date/Time: 5/15/2018 4:04 PM  Performed by: PETER CESPEDES JR  Authorized by: PETER CESPEDES JR     Consent Done?:  Yes (Verbal)  Indications:  Joint swelling and pain  Site marked: The procedure site was marked      Location:  Elbow (Radiohumeral joint)  Site:  L elbow  Prep: Patient was prepped and draped in usual sterile fashion    Needle size:  25 G  Approach:  Posterolateral  Medications:  40 mg triamcinolone hexacetonide 20 mg/mL  Patient tolerance:  Patient tolerated the procedure well with no immediate complications

## 2018-06-26 ENCOUNTER — OFFICE VISIT (OUTPATIENT)
Dept: ORTHOPEDICS | Facility: CLINIC | Age: 73
End: 2018-06-26
Payer: COMMERCIAL

## 2018-06-26 VITALS
HEART RATE: 81 BPM | BODY MASS INDEX: 29.53 KG/M2 | WEIGHT: 218 LBS | DIASTOLIC BLOOD PRESSURE: 68 MMHG | HEIGHT: 72 IN | SYSTOLIC BLOOD PRESSURE: 118 MMHG

## 2018-06-26 DIAGNOSIS — G56.22 CUBITAL TUNNEL SYNDROME ON LEFT: ICD-10-CM

## 2018-06-26 DIAGNOSIS — M19.022 PRIMARY OSTEOARTHRITIS OF LEFT ELBOW: Primary | ICD-10-CM

## 2018-06-26 PROCEDURE — 99213 OFFICE O/P EST LOW 20 MIN: CPT | Mod: ,,, | Performed by: ORTHOPAEDIC SURGERY

## 2018-06-26 NOTE — LETTER
June 28, 2018      Marilu Thomas MD  [address]  [address]  [facility]  [place] [address]           [facility] - Orthopedic Surgery  [address]  [address]  [place] [address]  Phone: [phone]  Fax: [phone]          Patient: Senthil Chandler   MR Number: [id number]   YOB: 1945   Date of Visit: 6/26/2018       Dear Dr. Marilu Thomas:    Thank you for referring Senthil Chandler to me for evaluation. Attached you will find relevant portions of my assessment and plan of care.    If you have questions, please do not hesitate to call me. I look forward to following Senthil Chandler along with you.    Sincerely,    Anoop [place] Jr., MD    Enclosure  CC:  No Recipients    If you would like to receive this communication electronically, please contact [email or web address] or [phone] to request more information on [employer] Link access.    For providers and/or their staff who would like to refer a patient to [facility], please contact us through our one-stop-shop provider referral line, [facility], at [phone].    If you feel you have received this communication in error or would no longer like to receive these types of communications, please e-mail [email or web address]

## 2018-06-28 NOTE — PROGRESS NOTES
Subjective:       Chief Complaint    Chief Complaint   Patient presents with    Follow-up     Chronic left elbow pain.  Patient reports his elbow has been better since last visits which he attributes to the injection given at his last injection.  He noticed when sitting when sitting in a chair like the ones here he gets numbness from the elbow down.  [facility] sent him to a hand specialist since his last visit who told him he had arthritis.       HPI  Senthil Chandler is a 72 y.o.  male who presents Follow-up on carpal tunnel release, left wrist. He also has some osteoarthritis of the left elbow. Some good relief from the injection.      Past History  Past Medical History:   Diagnosis Date    Adjustment disorder with anxious mood     Angiodysplasia     Arthritis     Cancer 1990    prostate     Carcinoma in situ of prostate     Carpal tunnel syndrome on left     CKD (chronic kidney disease) stage 1, GFR 90 ml/min or greater     Cubital tunnel syndrome on left     Depressive disorder     Deviated nasal septum     Diabetes mellitus with ophthalmic complication     Diabetes mellitus, type 2     GERD (gastroesophageal reflux disease)     Hx of colonic polyp     Hypercholesterolemia     Hyperpotassemia     Hypertension     Iron deficiency anemia     Kidney disease     focal segmental glomerulosclerosis    Lumbago     Malignant neoplasm of colon     Nephrotic syndrome     Neuropathy     Primary osteoarthritis of left elbow     PTSD (post-traumatic stress disorder)     PVD (peripheral vascular disease)     Spinal stenosis, lumbar region without neurogenic claudication      Past Surgical History:   Procedure Laterality Date    CARPAL TUNNEL RELEASE Left 10/23/2017    COLONOSCOPY N/A 8/12/2016    Procedure: COLONOSCOPY;  Surgeon: Carlos Roman MD;  Location[facility];  Service: Endoscopy;  Laterality: N/A;    NASAL SEPTUM SURGERY      PROSTATE SURGERY  1990's     Social History     Social  History    Marital status: Single     Spouse name: N/A    Number of children: N/A    Years of education: N/A     Occupational History    Not on file.     Social History Main Topics    Smoking status: Heavy Tobacco Smoker     Packs/day: 1.00     Types: Cigarettes    Smokeless tobacco: Never Used    Alcohol use No    Drug use: No    Sexual activity: Not on file     Other Topics Concern    Not on file     Social History Narrative    No narrative on file         Medications  Current Outpatient Prescriptions   Medication Sig    artificial saliva, cmce-lytes, SprP Take by mouth. 1 tsp. Daily prn dry mouth    atorvastatin (LIPITOR) 20 MG tablet Take 10 mg by mouth once daily.    bicalutamide (CASODEX) 50 MG Tab Take 50 mg by mouth once daily.    carboxymethylcellulose (REFRESH PLUS) 0.5 % Dpet Place 1 drop into both eyes 5 (five) times daily.    cholecalciferol, vitamin D3, 2,000 unit Tab Take by mouth once daily.    fluticasone 50 mcg/actuation DsDv Inhale 2 sprays into the lungs once daily.    gabapentin (NEURONTIN) 400 MG capsule Take 400 mg by mouth 3 (three) times daily. 2 qa/ 2 qnoon/ 3 qpm    hydrochlorothiazide (HYDRODIURIL) 25 MG tablet Take 25 mg by mouth once daily.    insulin glargine (LANTUS) 100 unit/mL injection Inject into the skin once daily.     labetalol (NORMODYNE) 300 MG tablet Take 300 mg by mouth 2 (two) times daily.    lisinopril 10 MG tablet Take 0.5 tablets by mouth once daily.    saxagliptin (ONGLYZA) 5 mg Tab tablet Take by mouth once daily.    trazodone (DESYREL) 100 MG tablet Take 150 mg by mouth every evening.     urea 50 % Crea Apply bid    venlafaxine (EFFEXOR) 100 MG Tab Take 100 mg by mouth 3 (three) times daily.     No current facility-administered medications for this visit.        Allergies  Review of patient's allergies indicates:  No Known Allergies      Review of Systems     Constitutional: Negative    HENT: Negative  Eyes: Negative  Respiratory:  Negative  Cardiovascular: Negative  Musculoskeletal: HPI  Skin: Negative  Neurological: Negative  Hematological: Negative  Endocrine: Negative      Physical Exam    Vitals:    06/26/18 1017   BP: 118/68   Pulse: 81     Physical Examination:He has good  strength in the left hand. Good range of motion in the wrist. The radiohumeral joint at the elbows, low, but crepitance. Nontender. Is decreased forearm supination injury. Motion 0-120°. Is also nontender at the cubital tunnel at the left elbow. Ulnar intrinsics are working well.     Skin-clear  General appearance -  well appearing, and in no distress  Mental status - awake  Neck - supple  Chest -  symmetric air entry  Heart - normal rate   Abdomen - soft      Assessment/Plan   Primary osteoarthritis of left elbow    Cubital tunnel syndrome on left      Is to avoid total elbow arthroplasty as long as he can. Or exercises discussed. Advised to stay away from anti-inflammatory medication.      This note was dictated using voice recognition software and may contain grammatical errors.

## 2019-02-12 ENCOUNTER — OFFICE VISIT (OUTPATIENT)
Dept: GASTROENTEROLOGY | Facility: CLINIC | Age: 74
End: 2019-02-12
Payer: OTHER GOVERNMENT

## 2019-02-12 VITALS
WEIGHT: 239 LBS | SYSTOLIC BLOOD PRESSURE: 127 MMHG | HEART RATE: 65 BPM | DIASTOLIC BLOOD PRESSURE: 64 MMHG | BODY MASS INDEX: 32.41 KG/M2

## 2019-02-12 DIAGNOSIS — R05.9 COUGH: ICD-10-CM

## 2019-02-12 DIAGNOSIS — K92.1 HEMATOCHEZIA: Primary | ICD-10-CM

## 2019-02-12 DIAGNOSIS — Z87.19 HISTORY OF HEMORRHOIDS: ICD-10-CM

## 2019-02-12 DIAGNOSIS — R19.7 DIARRHEA, UNSPECIFIED TYPE: ICD-10-CM

## 2019-02-12 DIAGNOSIS — Z87.19 HISTORY OF HIATAL HERNIA: ICD-10-CM

## 2019-02-12 DIAGNOSIS — R15.9 INCONTINENCE OF FECES, UNSPECIFIED FECAL INCONTINENCE TYPE: ICD-10-CM

## 2019-02-12 DIAGNOSIS — R10.84 GENERALIZED ABDOMINAL PAIN: ICD-10-CM

## 2019-02-12 DIAGNOSIS — R06.2 WHEEZING: ICD-10-CM

## 2019-02-12 DIAGNOSIS — D50.9 IRON DEFICIENCY ANEMIA, UNSPECIFIED IRON DEFICIENCY ANEMIA TYPE: ICD-10-CM

## 2019-02-12 DIAGNOSIS — R14.2 ERUCTATION: ICD-10-CM

## 2019-02-12 PROCEDURE — 99214 OFFICE O/P EST MOD 30 MIN: CPT | Mod: PBBFAC,PO | Performed by: NURSE PRACTITIONER

## 2019-02-12 PROCEDURE — 99999 PR PBB SHADOW E&M-EST. PATIENT-LVL IV: CPT | Mod: PBBFAC,,, | Performed by: NURSE PRACTITIONER

## 2019-02-12 PROCEDURE — 99214 PR OFFICE/OUTPT VISIT, EST, LEVL IV, 30-39 MIN: ICD-10-PCS | Mod: S$PBB,,, | Performed by: NURSE PRACTITIONER

## 2019-02-12 PROCEDURE — 99214 OFFICE O/P EST MOD 30 MIN: CPT | Mod: S$PBB,,, | Performed by: NURSE PRACTITIONER

## 2019-02-12 PROCEDURE — 99999 PR PBB SHADOW E&M-EST. PATIENT-LVL IV: ICD-10-PCS | Mod: PBBFAC,,, | Performed by: NURSE PRACTITIONER

## 2019-02-12 RX ORDER — PANTOPRAZOLE SODIUM 40 MG/1
40 TABLET, DELAYED RELEASE ORAL DAILY
Qty: 30 TABLET | Refills: 6 | Status: SHIPPED | OUTPATIENT
Start: 2019-02-12 | End: 2022-04-14 | Stop reason: SDUPTHER

## 2019-02-12 NOTE — PATIENT INSTRUCTIONS
Anemia  Anemia is a condition that occurs when your body does not have enough healthy red blood cells (RBCs). RBCs are the parts of your blood that carry oxygen throughout your body. A protein called hemoglobin allows your RBCs to absorb and release oxygen. Without enough RBCs or hemoglobin, your body doesn't get enough oxygen. Symptoms of anemia may then occur.    What are the symptoms of anemia?  Some people with anemia have no symptoms. But most people have symptoms that range from mild to severe. These can include:  · Tiredness (fatigue)  · Weakness  · Pale skin  · Shortness of breath  · Dizziness or fainting  · Rapid heartbeat  · Trouble doing normal amounts of activity  · Jaundice (yellowing of your eyes, skin, or mouth; dark urine)  What causes anemia?  Anemia can occur when your body:  · Loses too much blood  · Does not make enough RBCs  · Destroys your RBCs at a faster rate than it can replace them  · Does not make a normal amount of hemoglobin in your RBCs  These problems can occur for many reasons, including:  · A condition that you are born with (congenital or inherited), such as sickle cell disease or thalassemia  · Heavy bleeding for any reason, including injury, surgery, childbirth, or even heavy menstrual periods  · Being low in certain nutrients, such as iron, folate, or vitamin B12, possibly from a poor diet or a condition like celiac disease or Crohn's disease  · Certain chronic conditions like diabetes, arthritis, or kidney disease  · Certain chronic infections like tuberculosis or HIV  · Exposure to certain medicines, such as those used for chemotherapy  There are different types of anemia. Your healthcare provider can tell you more about the type of anemia you have and what may have caused it.  How is anemia diagnosed?  To diagnose anemia, your healthcare provider orders blood tests. These can include:  · Complete blood cell count (CBC). This test measures the amounts of the different types  of blood cells.  · Blood smear. This test checks the size and shape of your blood cells. To do the test, a drop of your blood is viewed under a microscope. A stain is used to make the blood cells easier to see.  · Iron studies. These tests measure the amount of iron in your blood. Your body needs iron to make hemoglobin in your RBCs.  · Vitamin B12 and folate studies. These tests check for some of the components that help give RBCs a normal size and shape.  · Reticulocyte count. This test measures the amount of new RBCs that your bone marrow makes.  · Hemoglobin electrophoresis. This test checks for problems with your hemoglobin in RBCs.  How is anemia treated?  Treatment for anemia is based on the type of anemia, its cause, and the severity of your symptoms. Treatments may include:  · Diet changes. This involves increasing the amount of certain nutrients in your diet, such as iron, vitamin B12, or folate. Your healthcare provider may also prescribe nutrient supplements.  · Medicines. Certain medicines treat the cause of your anemia. Others help build new RBCs or relieve symptoms. If a medicine is the cause of your anemia, you may need to stop or change it.  · Blood transfusions. Replacing some of your blood can increase the number of healthy RBCs in your body.  · Surgery. In some cases, your doctor may do surgery to treat the underlying cause of anemia. If you need surgery, your healthcare provider will explain the procedure and outline the risks and benefits for you.  What are the long-term concerns?  If you have a certain type of anemia, you can expect a full recovery after treatment. If you have other types of anemia (especially a type you're born with), you will need to manage it for life. Your doctor can tell you more.  Date Last Reviewed: 12/1/2016  © 8582-8964 [employer]. [address] [place] [address], [place] [address]. All rights reserved. This information is not intended as a substitute for  professional medical care. Always follow your healthcare professional's instructions.        Discharge Instructions: Eating a High-Fiber Diet  Your health care provider has prescribed a high-fiber diet for you. Fiber is what gives strength and structure to plants. Most grains, beans, vegetables, and fruits contain fiber. Foods rich in fiber are often low in calories and fat, but they fill you up more. These foods may also reduce the risk of certain health problems.  There are two types of fiber:  · Insoluble fiber. This is found in whole grains, cereals, and certain fruits and vegetables (such as apple skins, corn, and beans). Insoluble fiber is made up mainly of plant cell walls. It may prevent constipation and reduce the risk of certain types of cancer.  · Soluble fiber. This type of fiber is found in oats, beans, nuts, and certain fruits and vegetables (such as strawberries and peas). Soluble fiber turns to gel in the digestive system, slowing the movement of the digestive tract. It helps control blood sugar levels and can reduce cholesterol, which may help lower the risk of heart disease. Soluble fiber can also help control appetite.     Home care  · Know how much fiber you need a day. The recommended daily amount of fiber is 25 grams for women and 38 grams for men. After age 50, daily fiber needs drop to 21 grams for women and 30 grams for men.  · Ask your doctor about a fiber supplement. (Always take fiber supplements with a large glass of water.)  · Keep track of how much fiber you eat.  · Eat a variety of foods high in fiber.  · Learn to read and understand food labels.  · Ask your healthcare provider how much water you should be drinking.  · Look for these high-fiber foods:  ¨ Whole-grain breads and cereals  § 6 ounces a day give you about 18 grams of fiber (1 ounce is equal to 1 slice of bread, 1 cup of dry cereal, or 1/2 cup of cooked rice).  § Include wheat and oat bran cereals, whole-wheat muffins or  toast, and corn tortillas in your meals.  ¨ Fruits   § 2 cups a day give you about 8 grams of fiber.  § Apples, oranges, strawberries, pears, and bananas are good sources.  § Fruit juice does not contain as much fiber as the fruit it was made from.  ¨ Vegetables  § 2½ cups a day give you about 11 grams of fiber. Add asparagus, carrots, broccoli, peas, and corn to your meals.  ¨ Legumes  § 1/4 cup a day (in place of meat) gives you about 4 grams of fiber. Try navy beans, lentils, chickpeas, and soybeans.  ¨ Seeds   § A small handful of seeds gives you about 3 grams of fiber. Try sunflower seeds.  Follow-up  Make a follow-up appointment with a nutritionist as directed by our staff.  Date Last Reviewed: 6/1/2015  © 5597-3656 "[employer]. [address] [place] [address]. All rights reserved. This information is not intended as a substitute for professional medical care. Always follow your healthcare professional's instructions.        Hemorrhoids    Hemorrhoids are swollen and inflamed veins inside the rectum and near the anus. The rectum is the last several inches of the colon. The anus is the passage between the rectum and the outside of the body.  Causes  The veins can become swollen due to increased pressure in them. This is most often caused by:  · Chronic constipation or diarrhea  · Straining when having a bowel movement  · Sitting too long on the toilet  · A low-fiber diet  · Pregnancy  Symptoms  · Bleeding from the rectum (this may be noticeable after bowel movements)  · Lump near the anus  · Itching around the anus  · Pain around the anus  There are different types of hemorrhoids. Depending on the type you have and the severity, you may be able to treat yourself at home. In some cases, a procedure may be the best treatment option. Your healthcare provider can tell you more about this, if needed.  Home care  General care  · To get relief from pain or itching, try:  ¨ Topical products.  Your healthcare provider may prescribe or recommend creams, ointments, or pads that can be applied to the hemorrhoid. Use these exactly as directed.  ¨ Medicines. Your healthcare provider may recommend stool softeners, suppositories, or laxatives to help manage constipation. Use these exactly as directed.  ¨ Sitz baths. A sitz bath involves sitting in a few inches of warm bath water. Be careful not to make the water so hot that you burn yourself--test it before sitting in it. Soak for about 10 to 15 minutes a few times a day. This may help relieve pain.  Tips to help prevent hemorrhoids  · Eat more fiber. Fiber adds bulk to stool and absorbs water as it moves through your colon. This makes stool softer and easier to pass.  ¨ Increase the fiber in your diet with more fiber-rich foods. These include fresh fruit, vegetables, and whole grains.  ¨ Take a fiber supplement or bulking agent, if advised to by your provider. These include products such as psyllium or methylcellulose.  · Drink plenty of water, if directed to by your provider. This can help keep stool soft.  · Be more active. Frequent exercise aids digestion and helps prevent constipation. It may also help make bowel movements more regular.  · Dont strain during bowel movements. This can make hemorrhoids more likely. Also, dont sit on the toilet for long periods of time.  Follow-up care  Follow up with your healthcare provider, or as advised. If a culture or imaging tests were done, you will be notified of the results when they are ready. This may take a few days or longer.  When to seek medical advice  Call your healthcare provider right away if any of these occur:  · Increased bleeding from the rectum  · Increased pain around the rectum or anus  · Weakness or dizziness  Call 911  Call 911 or return to the emergency department right away if any of these occur:  · Trouble breathing or swallowing  · Fainting or loss of consciousness  · Unusually fast heart  rate  · Vomiting blood  · Large amounts of blood in stool  Date Last Reviewed: 6/22/2015  © 5226-5838 [employer]. [address] [place] [address], [place] [address]. All rights reserved. This information is not intended as a substitute for professional medical care. Always follow your healthcare professional's instructions.        Excess Gas  Certain foods produce gas when digested. In some people, these foods make an excessive amount of gas. This may cause bloating, burping, or increased gas passing through the rectum (flatulence).     Foods that cause gas  The following foods are more likely to cause this problem. Limit them, or remove them from your diet:  · Broccoli  · Cauliflower  · [place] sprouts  · Cabbage  · Cooked dried beans  · Fizzy (carbonated) drinks, such as sparkling water, soda, beer, and champagne  Other causes  Other causes of excess gas include:  · Eating too fast or talking while you chew. This may cause you to swallow air. This increases the amount of gas in your stomach. And it may make your symptoms worse. Chew each mouthful completely before you swallow. Take your time.  · Chewing on gum or sucking on hard candy. These cause you to swallow more often. And some of what you are swallowing is air. This leads to more gas in your stomach. Avoid chewing gum and hard candy.  · Overeating. This may increase the feeling of being bloated and cause more gas. When you are full, stop eating.   · Being constipated. This can increase the amount of normal intestinal gas. Avoid constipation by getting more fiber in your diet. Good sources of fiber include whole-grain cereal, fresh vegetables (except those in the above list), and fresh fruits. High-fiber foods absorb water and carry it out of the body. When adding more fiber to your diet, you also need to drink more water. You should drink at least 8, 8-ounce glasses of water (2 quarts) per day.  Date Last Reviewed: 8/1/2016  © 4712-1342 [employer], [facility]. [address] [place] [address], [place] [address]. All rights reserved. This information is not intended as a substitute for professional medical care. Always follow your healthcare professional's instructions.

## 2019-02-12 NOTE — LETTER
February 12, 2019      [facility]  [address] [address]           [facility]  [address]  [facility] [address]  Phone: [phone]          Patient: Senthil Chandler   MR Number: [id number]   YOB: 1945   Date of Visit: 2/12/2019       Dear [facility]:    Thank you for referring Senthil Chandler to me for evaluation. Attached you will find relevant portions of my assessment and plan of care.    If you have questions, please do not hesitate to call me. I look forward to following Senthil Chandler along with you.    Sincerely,    Ann Wisdom, [facility]    Enclosure  CC:  No Recipients    If you would like to receive this communication electronically, please contact [email or web address] or [phone] to request more information on [facility] access.    For providers and/or their staff who would like to refer a patient to [facility], please contact us through our one-stop-shop provider referral line, [facility], at [phone].    If you feel you have received this communication in error or would no longer like to receive these types of communications, please e-mail [email or web address]

## 2019-02-12 NOTE — PROGRESS NOTES
Subjective:       Patient ID: Senthil Chandler is a 73 y.o. male Body mass index is 32.41 kg/m².    Chief Complaint: GI Problem (Rectal bleeding, Incontinence of feces, HECTOR, bloating/gas)    This patient is new to me. Established with Dr. Coleman.  Referring Provider: [facility] for EGD & colon[facility]    [facility]; prior medical records pertaining to consul not found (no lab results or visit note available).      GI Problem   The primary symptoms include abdominal pain, diarrhea (chronic at least for the past year if not longer; improved since they adjusted his diabetes medications) and hematochezia (CHIEF COMPLAINT). Primary symptoms do not include fever, weight loss, fatigue, nausea, vomiting, melena, hematemesis or dysuria. The illness began more than 7 days ago.   The abdominal pain began more than 2 days ago (started ~7/2018). The abdominal pain has been unchanged since its onset. The abdominal pain is generalized (described as pressure/bloating). The severity of the abdominal pain is 0/10 (currently). The abdominal pain is relieved by nothing (denies any aggravating factors).   The hematochezia began more than 1 week ago (started ~ late 2017). Frequency: almost daily, had 2 weeks without bleeding recently; bright to dark red blood of small to large amount on tissue, in bowl, in stool. The hematochezia is a chronic problem.   The illness is also significant for bloating. The illness does not include chills, anorexia, dysphagia, odynophagia or constipation. Associated symptoms comments: Bowel movements of occasional firm stool to mostly soft to watey stool, occurs 5-10 times a day, wakes him up from sleeping; FECAL INCONTINENCE; reports he gets iron infusions for history of iron deficiency anemia  Frequent flatulence. Significant associated medical issues include GERD (started ~1/2019, frequent belching, improving). Associated medical issues do not include  inflammatory bowel disease.     Review of Systems   Constitutional: Negative for appetite change, chills, fatigue, fever and weight loss.   HENT: Negative for sore throat and trouble swallowing.    Respiratory: Positive for cough (started last week). Negative for choking and shortness of breath.    Cardiovascular: Negative for chest pain.   Gastrointestinal: Positive for abdominal pain, bloating, diarrhea (chronic at least for the past year if not longer; improved since they adjusted his diabetes medications) and hematochezia (CHIEF COMPLAINT). Negative for anal bleeding, anorexia, blood in stool, constipation, dysphagia, hematemesis, melena, nausea, rectal pain and vomiting.   Genitourinary: Negative for difficulty urinating, dysuria and flank pain.   Neurological: Negative for weakness.       Past Medical History:   Diagnosis Date    Adjustment disorder with anxious mood     Angiodysplasia     Arthritis     Cancer 1990    prostate     Carcinoma in situ of prostate     Carpal tunnel syndrome on left     CKD (chronic kidney disease) stage 1, GFR 90 ml/min or greater     Colon polyp     Cubital tunnel syndrome on left     Depressive disorder     Deviated nasal septum     Diabetes mellitus with ophthalmic complication     Diabetes mellitus, type 2     GERD (gastroesophageal reflux disease)     Hypercholesterolemia     Hyperpotassemia     Hypertension     Iron deficiency anemia     Kidney disease     focal segmental glomerulosclerosis    Lumbago     Malignant neoplasm of colon     Nephrotic syndrome     Neuropathy     Primary osteoarthritis of left elbow     PTSD (post-traumatic stress disorder)     PVD (peripheral vascular disease)     Spinal stenosis, lumbar region without neurogenic claudication      Past Surgical History:   Procedure Laterality Date    CARPAL TUNNEL RELEASE Left 10/23/2017    COLONOSCOPY N/A 8/12/2016    Performed by [facility] at [facility]     ESOPHAGOGASTRODUODENOSCOPY (EGD) N/A 8/12/2016    [facility]    NASAL SEPTUM SURGERY      PROCTECTOMY      PROSTATE SURGERY  1990's    UPPER GASTROINTESTINAL ENDOSCOPY  08/12/2016    Dr. Roman     Family History   Problem Relation Age of Onset    Leukemia Mother     Leukemia Father     Colon cancer Neg Hx     Crohn's disease Neg Hx     Ulcerative colitis Neg Hx     Stomach cancer Neg Hx     Esophageal cancer Neg Hx      Wt Readings from Last 10 Encounters:   02/12/19 108.4 kg (238 lb 15.7 oz)   06/26/18 98.9 kg (218 lb)   05/15/18 98.9 kg (218 lb)   01/19/18 98.9 kg (218 lb)   12/20/17 98.9 kg (218 lb)   11/08/17 98.9 kg (218 lb)   10/31/17 98.9 kg (218 lb)   10/17/17 98.9 kg (218 lb)   10/03/17 98.9 kg (218 lb)   03/17/17 98.9 kg (218 lb)     Lab Results   Component Value Date    WBC 4.1 (L) 10/17/2017    HGB 13.9 (L) 10/17/2017    HCT 41.7 10/17/2017    .2 (H) 10/17/2017     10/17/2017     CMP  Sodium   Date Value Ref Range Status   10/23/2017 138 134 - 144 mmol/L      Potassium   Date Value Ref Range Status   10/23/2017 3.8 3.5 - 5.0 mmol/L      Chloride   Date Value Ref Range Status   10/23/2017 101 98 - 110 mmol/L      CO2   Date Value Ref Range Status   10/23/2017 28.0 22.8 - 31.6 mmol/L      Glucose   Date Value Ref Range Status   10/23/2017 242 (H) 70 - 99 mg/dL      BUN, Bld   Date Value Ref Range Status   10/23/2017 14 8 - 20 mg/dL      Creatinine   Date Value Ref Range Status   10/23/2017 1.05 0.60 - 1.40 mg/dL      Calcium   Date Value Ref Range Status   10/23/2017 10.5 (H) 7.7 - 10.4 mg/dL      Total Protein   Date Value Ref Range Status   10/17/2017 7.4 6.0 - 8.2 g/dL      Albumin   Date Value Ref Range Status   10/17/2017 4.4 3.1 - 4.7 g/dL      Total Bilirubin   Date Value Ref Range Status   10/17/2017 0.9 0.3 - 1.0 mg/dL      Alkaline Phosphatase   Date Value Ref Range Status   10/17/2017 102 40 - 104 IU/L      AST   Date Value Ref Range  "Status   10/17/2017 28 10 - 40 IU/L      ALT   Date Value Ref Range Status   07/15/2016 25 10 - 44 U/L Final     Anion Gap   Date Value Ref Range Status   07/15/2016 12 8 - 16 mmol/L Final     eGFR if    Date Value Ref Range Status   07/15/2016 50 (A) >60 mL/min/1.73 m^2 Final     eGFR if non    Date Value Ref Range Status   07/15/2016 43 (A) >60 mL/min/1.73 m^2 Final     Comment:     Calculation used to obtain the estimated glomerular filtration  rate (eGFR) is the CKD-EPI equation. Since race is unknown   in our information system, the eGFR values for   -American and Non--American patients are given   for each creatinine result.       8/12/16 EGD was reviewed and procedure report states:   "Findings:       The examined esophagus was normal.       The Z-line was regular and was found 40 cm from the incisors.       A medium-sized hiatus hernia was present.       Diffuse moderate inflammation with hemorrhage characterized by        adherent blood, congestion (edema), erosions and erythema was found        in the gastric body and in the gastric antrum. Biopsies were taken        with a cold forceps for histology.       The examined duodenum was normal. Biopsies for histology were taken        with a cold forceps for for evaluation of celiac disease.  Impression:          - Normal esophagus.                       - Z-line regular, 40 cm from the incisors.                       - Medium-sized hiatus hernia.                       - Gastritis with hemorrhage. Biopsied.                       - Normal examined duodenum. Biopsied.  Recommendation:      - Patient has a contact number available for                        emergencies. The signs and symptoms of potential                        delayed complications were discussed with the                        patient. Return to normal activities tomorrow.                        Written discharge instructions were provided to the " "                       patient.                       - Resume previous diet.                       - Continue present medications.                       - No aspirin, ibuprofen, naproxen, or other                        non-steroidal anti-inflammatory drugs.                       - Await pathology results.                       - Discharge patient to home (ambulatory).                       - Follow an antireflux regimen.                       - Use Protonix (pantoprazole) 40 mg PO daily.                       - Return to referring physician.                       - Perform a colonoscopy today.".  Biopsy results:   "FINAL PATHOLOGIC DIAGNOSIS  1. Duodenum, biopsy:  Unremarkable small bowel mucosa with adequate villi.  Negative for active inflammation.  Negative for dysplasia.  2. Stomach, antrum/body, biopsy:  Chronic nonactive gastritis.  NEGATIVE for H. pylori species by immunostain with appropriately reactive controls.  Negative for intestinal metaplasia and dysplasia."    8/12/16 Colonoscopy was reviewed and procedure report states:   "Findings:       The perianal examination was normal.       Non-bleeding internal hemorrhoids were found during retroflexion.        The hemorrhoids were medium-sized.       Multiple small and large-mouthed diverticula were found in the        entire colon.       A few ulcers were found in the cecum. No bleeding was present.        Biopsies were taken with a cold forceps for histology.       The rectum appeared normal. No evidence of gross mucosal lesion.       The terminal ileum appeared normal.  Impression:          - Non-bleeding internal hemorrhoids.                       - Diverticulosis in the entire examined colon.                       - A few ulcers in the cecum. Biopsied.                       - The rectum is normal.                       - The examined portion of the ileum was normal.  Recommendation:      - Patient has a contact number available for                    " "    emergencies. The signs and symptoms of potential                        delayed complications were discussed with the                        patient. Return to normal activities tomorrow.                        Written discharge instructions were provided to the                        patient.                       - High fiber diet.                       - Continue present medications.                       - No aspirin, ibuprofen, naproxen, or other                        non-steroidal anti-inflammatory drugs.                       - Await pathology results.                       - Repeat colonoscopy in 5 years for surveillance as                        patient had recent colonoscopy at outside facility                        without polyps noted per the patient.                       - Discharge patient to home (ambulatory).                       - Return to referring physician. Patient needs                        intervention by Urology for prostate lesion noted on                        CT.".  Biopsy results:   "3. Ulcer, cecum, biopsy:  Acute colitis and ulcer bed.  Negative for microorganisms and bile inclusions by routine stain.  Negative for dysplasia on multiple deeper levels."  Objective:      Physical Exam   Constitutional: He is oriented to person, place, and time. He appears well-developed and well-nourished. No distress.   HENT:   Mouth/Throat: Oropharynx is clear and moist and mucous membranes are normal. No oral lesions. No oropharyngeal exudate.   Eyes: Conjunctivae are normal. Pupils are equal, round, and reactive to light. No scleral icterus.   Cardiovascular: Normal rate.   Pulmonary/Chest: Effort normal. No respiratory distress. He has wheezes.   Abdominal: Soft. Normal appearance and bowel sounds are normal. He exhibits no distension, no abdominal bruit and no mass. There is generalized tenderness (mild). There is no rigidity, no rebound, no guarding, no tenderness at McBurney's point and " negative Rai's sign.   Patient declined rectal exam.   Neurological: He is alert and oriented to person, place, and time.   Skin: Skin is warm and dry. No rash noted. He is not diaphoretic. No erythema. No pallor.   Non-jaundiced   Psychiatric: He has a normal mood and affect. His behavior is normal. Judgment and thought content normal.   Nursing note and vitals reviewed.      Assessment:       1. Hematochezia    2. Iron deficiency anemia, unspecified iron deficiency anemia type    3. Incontinence of feces, unspecified fecal incontinence type    4. History of hemorrhoids    5. Diarrhea, unspecified type    6. Generalized abdominal pain    7. Cough    8. Eructation    9. History of hiatal hernia    10. Wheezing        Plan:       Hematochezia  -     CBC auto differential; Future; Expected date: 02/12/2019  - discussed about different etiologies that can cause rectal bleeding, such as diverticulosis, polyps, colon mass, colon inflammation or infection, anal fissure or hemorrhoids.   - schedule Colonoscopy, discussed procedure with the patient, verbalized understanding   - You may resume normal activity as long as you feel well.  - Avoid/minimize aspirin and anti-inflammatory drugs such as ibuprofen (Advil, Motrin) and naproxen (Aleve and Naprosyn).  - Avoid alcohol.    Iron deficiency anemia, unspecified iron deficiency anemia type  -     Iron and TIBC; Future; Expected date: 02/12/2019  -     Ferritin; Future; Expected date: 02/12/2019  -     CBC auto differential; Future; Expected date: 02/12/2019  - schedule EGD, discussed procedure with patient, patient verbalized understanding  - schedule Colonoscopy, discussed procedure with the patient, patient verbalized understanding  - discussed with patient the different ways that anemia occurs: blood loss (such as from the gi tract), the body is not making enough, or the body is breaking down the rbcs too quickly; recommend EGD and colonoscopy to further evaluate gi  tract for possible blood loss and pending results of endoscopies, possible UGI with Small Bowel Follow Through/video capsule study  -follow-up with PCP and/or hematology for continued evaluation and management    Incontinence of feces, unspecified fecal incontinence type  -     X-Ray Abdomen Flat And Erect; Future; Expected date: 02/12/2019  - recommend high fiber diet to help bulk up the stool, especially soluble fiber since this can help bulk up the stool consistency and may help to slow down how fast the stool goes through the colon and can prevent diarrhea  - schedule Colonoscopy, discussed procedure with the patient, patient verbalized understanding  - possible referral to general surgery, if symptoms persist despite use of above recommendations    History of hemorrhoids  - avoid constipation and straining with bowel movements; try using an OTC stool softener as directed and increase fiber in diet (20-30 grams daily)/OTC fiber supplement such as metamucil (take as directed)  - recommend SITZ baths  - possible referral to general surgery if symptoms persist    Diarrhea, unspecified type  -     X-Ray Abdomen Flat And Erect; Future; Expected date: 02/12/2019  -     IgA; Future; Expected date: 02/12/2019  -     Tissue transglutaminase, IgA; Future; Expected date: 02/12/2019  -     Stool Exam-Ova,Cysts,Parasites; Future; Expected date: 02/12/2019  -     Fecal fat, qualitative; Future; Expected date: 02/12/2019  -     Giardia / Cryptosporidum, EIA; Future; Expected date: 02/12/2019  -     Occult blood x 1, stool; Future; Expected date: 02/12/2019  -     pH, stool; Future; Expected date: 02/12/2019  -     Rotavirus antigen, stool; Future; Expected date: 02/12/2019  -     WBC, Stool; Future; Expected date: 02/12/2019  -     Stool culture; Future; Expected date: 02/12/2019  -     Clostridium difficile EIA; Future; Expected date: 02/12/2019  -     Adenovirus Molecular Detection, PCR, Non-Blood Stool; Future; Expected  date: 02/12/2019  - schedule Colonoscopy, discussed procedure with the patient, patient verbalized understanding  - recommended increase fiber in diet, especially soluble fiber since this can help bulk up the stool consistency and may help to slow down how fast the stool goes through the colon and can prevent diarrhea  - recommended OTC probiotic, such as FLORASTOR or Culturelle, as directed  - avoid lactose    Generalized abdominal pain  -     X-Ray Abdomen Flat And Erect; Future; Expected date: 02/12/2019  - schedule EGD, discussed procedure with patient, patient verbalized understanding  - schedule Colonoscopy, discussed procedure with the patient, patient verbalized understanding  - Possible CT scan pending results of testing and if symptoms persist    Cough & Wheezing  Recommend follow-up with Primary Care Provider/URGENT CARE for continued evaluation and management.    Eructation  -  START   pantoprazole (PROTONIX) 40 MG tablet; Take 1 tablet (40 mg total) by mouth once daily. Before breakfast  Dispense: 30 tablet; Refill: 6  - recommended OTC simethicone as directed, such as Phazyme or Gas-x  -discussed with patient about low gas diet: Reduce or eliminate these foods from your diet: Broccoli, Cauliflower, Biggers sprouts, Cabbage, Cooked dried beans, Carbonated beverages (sparkling water, soda, beer, champagne)  Other Causes Of Excess Gas Include:   1) EATING TOO FAST or TALKING WHILE YOU CHEW may cause you to swallow air. This increases the amount of gas in the stomach and may worsen your symptoms.  --> Chew each mouthful completely before swallowing. Take your time.  2) OVEREATING may increase the feeling of being bloated and cause more gas.  --> When you are full, stop eating.  3) CONSTIPATION can increase the amount of normal intestinal gas.  --> Avoid constipation by increasing the amount of fiber in your diet by including whole cereal grains, fresh vegetables (except those in the above list) and fresh  fruits. High-fiber foods absorb water and carry it out of the body. When increasing the amount of fiber in your diet, you also need to increase the amount of water that you drink. You should drink at least eight 8-ounce glasses of water (two quarts) per day.  - schedule EGD, discussed procedure with patient, patient verbalized understanding    History of hiatal hernia  -   START  pantoprazole (PROTONIX) 40 MG tablet; Take 1 tablet (40 mg total) by mouth once daily. Before breakfast  Dispense: 30 tablet; Refill: 6  - schedule EGD, discussed procedure with patient, patient verbalized understanding  -discussed diagnosis with patient & that it is usually managed by controlling reflux symptoms, surgery is an option, but usually performed if reflux is uncontrolled by medication management and lifestyle/dietary modifications; if symptoms persist despite medication management and lifestyle/dietary modifications, we can refer to general surgery to consult about surgical options, patient verbalized understanding    Follow-up in about 1 month (around 3/12/2019), or if symptoms worsen or fail to improve.      If no improvement in symptoms or symptoms worsen, call/follow-up at clinic or go to ER.

## 2019-02-12 NOTE — H&P (VIEW-ONLY)
Subjective:       Patient ID: Senthil Chandler is a 73 y.o. male Body mass index is 32.41 kg/m².    Chief Complaint: GI Problem (Rectal bleeding, Incontinence of feces, HECTOR, bloating/gas)    This patient is new to me. Established with Dr. Coleman.  Referring Provider: [facility] for EGD & colonoscopy    [facility]; prior medical records pertaining to consul not found (no lab results or visit note available).      GI Problem   The primary symptoms include abdominal pain, diarrhea (chronic at least for the past year if not longer; improved since they adjusted his diabetes medications) and hematochezia (CHIEF COMPLAINT). Primary symptoms do not include fever, weight loss, fatigue, nausea, vomiting, melena, hematemesis or dysuria. The illness began more than 7 days ago.   The abdominal pain began more than 2 days ago (started ~7/2018). The abdominal pain has been unchanged since its onset. The abdominal pain is generalized (described as pressure/bloating). The severity of the abdominal pain is 0/10 (currently). The abdominal pain is relieved by nothing (denies any aggravating factors).   The hematochezia began more than 1 week ago (started ~ late 2017). Frequency: almost daily, had 2 weeks without bleeding recently; bright to dark red blood of small to large amount on tissue, in bowl, in stool. The hematochezia is a chronic problem.   The illness is also significant for bloating. The illness does not include chills, anorexia, dysphagia, odynophagia or constipation. Associated symptoms comments: Bowel movements of occasional firm stool to mostly soft to watey stool, occurs 5-10 times a day, wakes him up from sleeping; FECAL INCONTINENCE; reports he gets iron infusions for history of iron deficiency anemia  Frequent flatulence. Significant associated medical issues include GERD (started ~1/2019, frequent belching, improving). Associated medical issues do not include  inflammatory bowel disease.     Review of Systems   Constitutional: Negative for appetite change, chills, fatigue, fever and weight loss.   HENT: Negative for sore throat and trouble swallowing.    Respiratory: Positive for cough (started last week). Negative for choking and shortness of breath.    Cardiovascular: Negative for chest pain.   Gastrointestinal: Positive for abdominal pain, bloating, diarrhea (chronic at least for the past year if not longer; improved since they adjusted his diabetes medications) and hematochezia (CHIEF COMPLAINT). Negative for anal bleeding, anorexia, blood in stool, constipation, dysphagia, hematemesis, melena, nausea, rectal pain and vomiting.   Genitourinary: Negative for difficulty urinating, dysuria and flank pain.   Neurological: Negative for weakness.       Past Medical History:   Diagnosis Date    Adjustment disorder with anxious mood     Angiodysplasia     Arthritis     Cancer 1990    prostate     Carcinoma in situ of prostate     Carpal tunnel syndrome on left     CKD (chronic kidney disease) stage 1, GFR 90 ml/min or greater     Colon polyp     Cubital tunnel syndrome on left     Depressive disorder     Deviated nasal septum     Diabetes mellitus with ophthalmic complication     Diabetes mellitus, type 2     GERD (gastroesophageal reflux disease)     Hypercholesterolemia     Hyperpotassemia     Hypertension     Iron deficiency anemia     Kidney disease     focal segmental glomerulosclerosis    Lumbago     Malignant neoplasm of colon     Nephrotic syndrome     Neuropathy     Primary osteoarthritis of left elbow     PTSD (post-traumatic stress disorder)     PVD (peripheral vascular disease)     Spinal stenosis, lumbar region without neurogenic claudication      Past Surgical History:   Procedure Laterality Date    CARPAL TUNNEL RELEASE Left 10/23/2017    COLONOSCOPY N/A 8/12/2016    [facility]     ESOPHAGOGASTRODUODENOSCOPY (EGD) N/A 8/12/2016    [facility]    NASAL SEPTUM SURGERY      PROCTECTOMY      PROSTATE SURGERY  1990's    UPPER GASTROINTESTINAL ENDOSCOPY  08/12/2016    Dr. Roman     Family History   Problem Relation Age of Onset    Leukemia Mother     Leukemia Father     Colon cancer Neg Hx     Crohn's disease Neg Hx     Ulcerative colitis Neg Hx     Stomach cancer Neg Hx     Esophageal cancer Neg Hx      Wt Readings from Last 10 Encounters:   02/12/19 108.4 kg (238 lb 15.7 oz)   06/26/18 98.9 kg (218 lb)   05/15/18 98.9 kg (218 lb)   01/19/18 98.9 kg (218 lb)   12/20/17 98.9 kg (218 lb)   11/08/17 98.9 kg (218 lb)   10/31/17 98.9 kg (218 lb)   10/17/17 98.9 kg (218 lb)   10/03/17 98.9 kg (218 lb)   03/17/17 98.9 kg (218 lb)     Lab Results   Component Value Date    WBC 4.1 (L) 10/17/2017    HGB 13.9 (L) 10/17/2017    HCT 41.7 10/17/2017    .2 (H) 10/17/2017     10/17/2017     CMP  Sodium   Date Value Ref Range Status   10/23/2017 138 134 - 144 mmol/L      Potassium   Date Value Ref Range Status   10/23/2017 3.8 3.5 - 5.0 mmol/L      Chloride   Date Value Ref Range Status   10/23/2017 101 98 - 110 mmol/L      CO2   Date Value Ref Range Status   10/23/2017 28.0 22.8 - 31.6 mmol/L      Glucose   Date Value Ref Range Status   10/23/2017 242 (H) 70 - 99 mg/dL      BUN, Bld   Date Value Ref Range Status   10/23/2017 14 8 - 20 mg/dL      Creatinine   Date Value Ref Range Status   10/23/2017 1.05 0.60 - 1.40 mg/dL      Calcium   Date Value Ref Range Status   10/23/2017 10.5 (H) 7.7 - 10.4 mg/dL      Total Protein   Date Value Ref Range Status   10/17/2017 7.4 6.0 - 8.2 g/dL      Albumin   Date Value Ref Range Status   10/17/2017 4.4 3.1 - 4.7 g/dL      Total Bilirubin   Date Value Ref Range Status   10/17/2017 0.9 0.3 - 1.0 mg/dL      Alkaline Phosphatase   Date Value Ref Range Status   10/17/2017 102 40 - 104 IU/L      AST   Date Value Ref Range  "Status   10/17/2017 28 10 - 40 IU/L      ALT   Date Value Ref Range Status   07/15/2016 25 10 - 44 U/L Final     Anion Gap   Date Value Ref Range Status   07/15/2016 12 8 - 16 mmol/L Final     eGFR if    Date Value Ref Range Status   07/15/2016 50 (A) >60 mL/min/1.73 m^2 Final     eGFR if non    Date Value Ref Range Status   07/15/2016 43 (A) >60 mL/min/1.73 m^2 Final     Comment:     Calculation used to obtain the estimated glomerular filtration  rate (eGFR) is the CKD-EPI equation. Since race is unknown   in our information system, the eGFR values for   -American and Non--American patients are given   for each creatinine result.       8/12/16 EGD was reviewed and procedure report states:   "Findings:       The examined esophagus was normal.       The Z-line was regular and was found 40 cm from the incisors.       A medium-sized hiatus hernia was present.       Diffuse moderate inflammation with hemorrhage characterized by        adherent blood, congestion (edema), erosions and erythema was found        in the gastric body and in the gastric antrum. Biopsies were taken        with a cold forceps for histology.       The examined duodenum was normal. Biopsies for histology were taken        with a cold forceps for for evaluation of celiac disease.  Impression:          - Normal esophagus.                       - Z-line regular, 40 cm from the incisors.                       - Medium-sized hiatus hernia.                       - Gastritis with hemorrhage. Biopsied.                       - Normal examined duodenum. Biopsied.  Recommendation:      - Patient has a contact number available for                        emergencies. The signs and symptoms of potential                        delayed complications were discussed with the                        patient. Return to normal activities tomorrow.                        Written discharge instructions were provided to the " "                       patient.                       - Resume previous diet.                       - Continue present medications.                       - No aspirin, ibuprofen, naproxen, or other                        non-steroidal anti-inflammatory drugs.                       - Await pathology results.                       - Discharge patient to home (ambulatory).                       - Follow an antireflux regimen.                       - Use Protonix (pantoprazole) 40 mg PO daily.                       - Return to referring physician.                       - Perform a colonoscopy today.".  Biopsy results:   "FINAL PATHOLOGIC DIAGNOSIS  1. Duodenum, biopsy:  Unremarkable small bowel mucosa with adequate villi.  Negative for active inflammation.  Negative for dysplasia.  2. Stomach, antrum/body, biopsy:  Chronic nonactive gastritis.  NEGATIVE for H. pylori species by immunostain with appropriately reactive controls.  Negative for intestinal metaplasia and dysplasia."    8/12/16 Colonoscopy was reviewed and procedure report states:   "Findings:       The perianal examination was normal.       Non-bleeding internal hemorrhoids were found during retroflexion.        The hemorrhoids were medium-sized.       Multiple small and large-mouthed diverticula were found in the        entire colon.       A few ulcers were found in the cecum. No bleeding was present.        Biopsies were taken with a cold forceps for histology.       The rectum appeared normal. No evidence of gross mucosal lesion.       The terminal ileum appeared normal.  Impression:          - Non-bleeding internal hemorrhoids.                       - Diverticulosis in the entire examined colon.                       - A few ulcers in the cecum. Biopsied.                       - The rectum is normal.                       - The examined portion of the ileum was normal.  Recommendation:      - Patient has a contact number available for                    " "    emergencies. The signs and symptoms of potential                        delayed complications were discussed with the                        patient. Return to normal activities tomorrow.                        Written discharge instructions were provided to the                        patient.                       - High fiber diet.                       - Continue present medications.                       - No aspirin, ibuprofen, naproxen, or other                        non-steroidal anti-inflammatory drugs.                       - Await pathology results.                       - Repeat colonoscopy in 5 years for surveillance as                        patient had recent colonoscopy at outside facility                        without polyps noted per the patient.                       - Discharge patient to home (ambulatory).                       - Return to referring physician. Patient needs                        intervention by Urology for prostate lesion noted on                        CT.".  Biopsy results:   "3. Ulcer, cecum, biopsy:  Acute colitis and ulcer bed.  Negative for microorganisms and bile inclusions by routine stain.  Negative for dysplasia on multiple deeper levels."  Objective:      Physical Exam   Constitutional: He is oriented to person, place, and time. He appears well-developed and well-nourished. No distress.   HENT:   Mouth/Throat: Oropharynx is clear and moist and mucous membranes are normal. No oral lesions. No oropharyngeal exudate.   Eyes: Conjunctivae are normal. Pupils are equal, round, and reactive to light. No scleral icterus.   Cardiovascular: Normal rate.   Pulmonary/Chest: Effort normal. No respiratory distress. He has wheezes.   Abdominal: Soft. Normal appearance and bowel sounds are normal. He exhibits no distension, no abdominal bruit and no mass. There is generalized tenderness (mild). There is no rigidity, no rebound, no guarding, no tenderness at McBurney's point and " negative Rai's sign.   Patient declined rectal exam.   Neurological: He is alert and oriented to person, place, and time.   Skin: Skin is warm and dry. No rash noted. He is not diaphoretic. No erythema. No pallor.   Non-jaundiced   Psychiatric: He has a normal mood and affect. His behavior is normal. Judgment and thought content normal.   Nursing note and vitals reviewed.      Assessment:       1. Hematochezia    2. Iron deficiency anemia, unspecified iron deficiency anemia type    3. Incontinence of feces, unspecified fecal incontinence type    4. History of hemorrhoids    5. Diarrhea, unspecified type    6. Generalized abdominal pain    7. Cough    8. Eructation    9. History of hiatal hernia    10. Wheezing        Plan:       Hematochezia  -     CBC auto differential; Future; Expected date: 02/12/2019  - discussed about different etiologies that can cause rectal bleeding, such as diverticulosis, polyps, colon mass, colon inflammation or infection, anal fissure or hemorrhoids.   - schedule Colonoscopy, discussed procedure with the patient, verbalized understanding   - You may resume normal activity as long as you feel well.  - Avoid/minimize aspirin and anti-inflammatory drugs such as ibuprofen (Advil, Motrin) and naproxen (Aleve and Naprosyn).  - Avoid alcohol.    Iron deficiency anemia, unspecified iron deficiency anemia type  -     Iron and TIBC; Future; Expected date: 02/12/2019  -     Ferritin; Future; Expected date: 02/12/2019  -     CBC auto differential; Future; Expected date: 02/12/2019  - schedule EGD, discussed procedure with patient, patient verbalized understanding  - schedule Colonoscopy, discussed procedure with the patient, patient verbalized understanding  - discussed with patient the different ways that anemia occurs: blood loss (such as from the gi tract), the body is not making enough, or the body is breaking down the rbcs too quickly; recommend EGD and colonoscopy to further evaluate gi  tract for possible blood loss and pending results of endoscopies, possible UGI with Small Bowel Follow Through/video capsule study  -follow-up with PCP and/or hematology for continued evaluation and management    Incontinence of feces, unspecified fecal incontinence type  -     X-Ray Abdomen Flat And Erect; Future; Expected date: 02/12/2019  - recommend high fiber diet to help bulk up the stool, especially soluble fiber since this can help bulk up the stool consistency and may help to slow down how fast the stool goes through the colon and can prevent diarrhea  - schedule Colonoscopy, discussed procedure with the patient, patient verbalized understanding  - possible referral to general surgery, if symptoms persist despite use of above recommendations    History of hemorrhoids  - avoid constipation and straining with bowel movements; try using an OTC stool softener as directed and increase fiber in diet (20-30 grams daily)/OTC fiber supplement such as metamucil (take as directed)  - recommend SITZ baths  - possible referral to general surgery if symptoms persist    Diarrhea, unspecified type  -     X-Ray Abdomen Flat And Erect; Future; Expected date: 02/12/2019  -     IgA; Future; Expected date: 02/12/2019  -     Tissue transglutaminase, IgA; Future; Expected date: 02/12/2019  -     Stool Exam-Ova,Cysts,Parasites; Future; Expected date: 02/12/2019  -     Fecal fat, qualitative; Future; Expected date: 02/12/2019  -     Giardia / Cryptosporidum, EIA; Future; Expected date: 02/12/2019  -     Occult blood x 1, stool; Future; Expected date: 02/12/2019  -     pH, stool; Future; Expected date: 02/12/2019  -     Rotavirus antigen, stool; Future; Expected date: 02/12/2019  -     WBC, Stool; Future; Expected date: 02/12/2019  -     Stool culture; Future; Expected date: 02/12/2019  -     Clostridium difficile EIA; Future; Expected date: 02/12/2019  -     Adenovirus Molecular Detection, PCR, Non-Blood Stool; Future; Expected  date: 02/12/2019  - schedule Colonoscopy, discussed procedure with the patient, patient verbalized understanding  - recommended increase fiber in diet, especially soluble fiber since this can help bulk up the stool consistency and may help to slow down how fast the stool goes through the colon and can prevent diarrhea  - recommended OTC probiotic, such as FLORASTOR or Culturelle, as directed  - avoid lactose    Generalized abdominal pain  -     X-Ray Abdomen Flat And Erect; Future; Expected date: 02/12/2019  - schedule EGD, discussed procedure with patient, patient verbalized understanding  - schedule Colonoscopy, discussed procedure with the patient, patient verbalized understanding  - Possible CT scan pending results of testing and if symptoms persist    Cough & Wheezing  Recommend follow-up with Primary Care Provider/URGENT CARE for continued evaluation and management.    Eructation  -  START   pantoprazole (PROTONIX) 40 MG tablet; Take 1 tablet (40 mg total) by mouth once daily. Before breakfast  Dispense: 30 tablet; Refill: 6  - recommended OTC simethicone as directed, such as Phazyme or Gas-x  -discussed with patient about low gas diet: Reduce or eliminate these foods from your diet: Broccoli, Cauliflower, [place] sprouts, Cabbage, Cooked dried beans, Carbonated beverages (sparkling water, soda, beer, champagne)  Other Causes Of Excess Gas Include:   1) EATING TOO FAST or TALKING WHILE YOU CHEW may cause you to swallow air. This increases the amount of gas in the stomach and may worsen your symptoms.  --> Chew each mouthful completely before swallowing. Take your time.  2) OVEREATING may increase the feeling of being bloated and cause more gas.  --> When you are full, stop eating.  3) CONSTIPATION can increase the amount of normal intestinal gas.  --> Avoid constipation by increasing the amount of fiber in your diet by including whole cereal grains, fresh vegetables (except those in the above list) and fresh  fruits. High-fiber foods absorb water and carry it out of the body. When increasing the amount of fiber in your diet, you also need to increase the amount of water that you drink. You should drink at least eight 8-ounce glasses of water (two quarts) per day.  - schedule EGD, discussed procedure with patient, patient verbalized understanding    History of hiatal hernia  -   START  pantoprazole (PROTONIX) 40 MG tablet; Take 1 tablet (40 mg total) by mouth once daily. Before breakfast  Dispense: 30 tablet; Refill: 6  - schedule EGD, discussed procedure with patient, patient verbalized understanding  -discussed diagnosis with patient & that it is usually managed by controlling reflux symptoms, surgery is an option, but usually performed if reflux is uncontrolled by medication management and lifestyle/dietary modifications; if symptoms persist despite medication management and lifestyle/dietary modifications, we can refer to general surgery to consult about surgical options, patient verbalized understanding    Follow-up in about 1 month (around 3/12/2019), or if symptoms worsen or fail to improve.      If no improvement in symptoms or symptoms worsen, call/follow-up at clinic or go to ER.

## 2019-02-14 ENCOUNTER — HOSPITAL ENCOUNTER (OUTPATIENT)
Dept: RADIOLOGY | Facility: HOSPITAL | Age: 74
Discharge: HOME | End: 2019-02-14
Attending: NURSE PRACTITIONER
Payer: OTHER GOVERNMENT

## 2019-02-14 DIAGNOSIS — R15.9 INCONTINENCE OF FECES, UNSPECIFIED FECAL INCONTINENCE TYPE: ICD-10-CM

## 2019-02-14 DIAGNOSIS — R10.84 GENERALIZED ABDOMINAL PAIN: ICD-10-CM

## 2019-02-14 DIAGNOSIS — R19.7 DIARRHEA, UNSPECIFIED TYPE: ICD-10-CM

## 2019-02-14 PROCEDURE — 74019 RADEX ABDOMEN 2 VIEWS: CPT | Mod: 26,,, | Performed by: RADIOLOGY

## 2019-02-14 PROCEDURE — 74019 RADEX ABDOMEN 2 VIEWS: CPT | Mod: TC,FY

## 2019-02-14 PROCEDURE — 74019 XR ABDOMEN FLAT AND ERECT: ICD-10-PCS | Mod: 26,,, | Performed by: RADIOLOGY

## 2019-02-15 ENCOUNTER — TELEPHONE (OUTPATIENT)
Dept: GASTROENTEROLOGY | Facility: CLINIC | Age: 74
End: 2019-02-15

## 2019-02-15 DIAGNOSIS — R93.5 ABNORMAL X-RAY OF ABDOMEN: Primary | ICD-10-CM

## 2019-02-15 NOTE — TELEPHONE ENCOUNTER
Spoke with pt and informed of results and recommendations per Renae Wisdom NP. Pt verbalized understanding.

## 2019-02-15 NOTE — TELEPHONE ENCOUNTER
Please call to inform & review the results with the patient- radiology report of the Abdominal x-ray showed increased bowel gas & liquid stool in the colon (suggestive of diarrhea). Other findings showed calcifications in RUQ which can indicate gallstones. Recommend ultrasound to further evaluate.    Continue with previous recommendations. If no improvement in symptoms or symptoms worsen, call/follow-up at clinic or go to ER.  Please release results to patient's mychart once you have discussed results and recommendations with patient.  Thanks,  Ann AGUAYO

## 2019-02-20 ENCOUNTER — ANESTHESIA (OUTPATIENT)
Dept: ENDOSCOPY | Facility: HOSPITAL | Age: 74
End: 2019-02-20
Payer: OTHER GOVERNMENT

## 2019-02-20 ENCOUNTER — ANESTHESIA EVENT (OUTPATIENT)
Dept: ENDOSCOPY | Facility: HOSPITAL | Age: 74
End: 2019-02-20
Payer: OTHER GOVERNMENT

## 2019-02-20 ENCOUNTER — HOSPITAL ENCOUNTER (OUTPATIENT)
Facility: HOSPITAL | Age: 74
Discharge: HOME | End: 2019-02-20
Attending: INTERNAL MEDICINE | Admitting: INTERNAL MEDICINE
Payer: OTHER GOVERNMENT

## 2019-02-20 ENCOUNTER — TELEPHONE (OUTPATIENT)
Dept: GASTROENTEROLOGY | Facility: CLINIC | Age: 74
End: 2019-02-20

## 2019-02-20 VITALS
SYSTOLIC BLOOD PRESSURE: 160 MMHG | OXYGEN SATURATION: 95 % | WEIGHT: 235 LBS | HEART RATE: 74 BPM | DIASTOLIC BLOOD PRESSURE: 85 MMHG | HEIGHT: 72 IN | TEMPERATURE: 98 F | RESPIRATION RATE: 16 BRPM | BODY MASS INDEX: 31.83 KG/M2

## 2019-02-20 DIAGNOSIS — D50.9 IDA (IRON DEFICIENCY ANEMIA): ICD-10-CM

## 2019-02-20 DIAGNOSIS — D49.0 COLORECTAL NEOPLASM: ICD-10-CM

## 2019-02-20 DIAGNOSIS — K63.89 COLONIC MASS: Primary | ICD-10-CM

## 2019-02-20 LAB
CEA SERPL-MCNC: 1.8 NG/ML
POCT GLUCOSE: 83 MG/DL (ref 70–110)

## 2019-02-20 PROCEDURE — 45381 PR COLONOSCPY,FLEX,W/DIR SUBMUC INJECT: ICD-10-PCS | Mod: 51,,, | Performed by: INTERNAL MEDICINE

## 2019-02-20 PROCEDURE — 43239 PR EGD, FLEX, W/BIOPSY, SGL/MULTI: ICD-10-PCS | Mod: 51,,, | Performed by: INTERNAL MEDICINE

## 2019-02-20 PROCEDURE — D9220A PRA ANESTHESIA: ICD-10-PCS | Mod: ,,, | Performed by: ANESTHESIOLOGY

## 2019-02-20 PROCEDURE — 25000003 PHARM REV CODE 250: Performed by: NURSE ANESTHETIST, CERTIFIED REGISTERED

## 2019-02-20 PROCEDURE — 88305 TISSUE EXAM BY PATHOLOGIST: CPT | Mod: 26,,, | Performed by: PATHOLOGY

## 2019-02-20 PROCEDURE — 88305 TISSUE SPECIMEN TO PATHOLOGY - SURGERY: ICD-10-PCS | Mod: 26,,, | Performed by: PATHOLOGY

## 2019-02-20 PROCEDURE — 45382 COLONOSCOPY W/CONTROL BLEED: CPT | Performed by: INTERNAL MEDICINE

## 2019-02-20 PROCEDURE — 45381 COLONOSCOPY SUBMUCOUS NJX: CPT | Mod: 51,,, | Performed by: INTERNAL MEDICINE

## 2019-02-20 PROCEDURE — 45380 COLONOSCOPY AND BIOPSY: CPT | Performed by: INTERNAL MEDICINE

## 2019-02-20 PROCEDURE — 27202087 HC PROBE, APC: Performed by: INTERNAL MEDICINE

## 2019-02-20 PROCEDURE — 82378 CARCINOEMBRYONIC ANTIGEN: CPT

## 2019-02-20 PROCEDURE — 00813 ANES UPR LWR GI NDSC PX: CPT | Performed by: INTERNAL MEDICINE

## 2019-02-20 PROCEDURE — 45380 PR COLONOSCOPY,BIOPSY: ICD-10-PCS | Mod: ,,, | Performed by: INTERNAL MEDICINE

## 2019-02-20 PROCEDURE — D9220A PRA ANESTHESIA: Mod: ,,, | Performed by: ANESTHESIOLOGY

## 2019-02-20 PROCEDURE — 37000008 HC ANESTHESIA 1ST 15 MINUTES: Performed by: INTERNAL MEDICINE

## 2019-02-20 PROCEDURE — 27201028 HC NEEDLE, SCLERO: Performed by: INTERNAL MEDICINE

## 2019-02-20 PROCEDURE — 63600175 PHARM REV CODE 636 W HCPCS: Performed by: NURSE ANESTHETIST, CERTIFIED REGISTERED

## 2019-02-20 PROCEDURE — 88305 TISSUE EXAM BY PATHOLOGIST: CPT | Performed by: PATHOLOGY

## 2019-02-20 PROCEDURE — 36415 COLL VENOUS BLD VENIPUNCTURE: CPT

## 2019-02-20 PROCEDURE — 45380 COLONOSCOPY AND BIOPSY: CPT | Mod: ,,, | Performed by: INTERNAL MEDICINE

## 2019-02-20 PROCEDURE — 82962 GLUCOSE BLOOD TEST: CPT | Performed by: INTERNAL MEDICINE

## 2019-02-20 PROCEDURE — 45382 PR COLONOSCOPY,FLEX,W/CONTROL, BLEEDING: ICD-10-PCS | Mod: 22,59,, | Performed by: INTERNAL MEDICINE

## 2019-02-20 PROCEDURE — 43239 EGD BIOPSY SINGLE/MULTIPLE: CPT | Mod: 51,,, | Performed by: INTERNAL MEDICINE

## 2019-02-20 PROCEDURE — 25000003 PHARM REV CODE 250: Performed by: INTERNAL MEDICINE

## 2019-02-20 PROCEDURE — 37000009 HC ANESTHESIA EA ADD 15 MINS: Performed by: INTERNAL MEDICINE

## 2019-02-20 PROCEDURE — 27201012 HC FORCEPS, HOT/COLD, DISP: Performed by: INTERNAL MEDICINE

## 2019-02-20 PROCEDURE — 45382 COLONOSCOPY W/CONTROL BLEED: CPT | Mod: 22,59,, | Performed by: INTERNAL MEDICINE

## 2019-02-20 PROCEDURE — 43239 EGD BIOPSY SINGLE/MULTIPLE: CPT | Performed by: INTERNAL MEDICINE

## 2019-02-20 PROCEDURE — 45381 COLONOSCOPY SUBMUCOUS NJX: CPT | Performed by: INTERNAL MEDICINE

## 2019-02-20 RX ORDER — PROPOFOL 10 MG/ML
INJECTION, EMULSION INTRAVENOUS
Status: COMPLETED
Start: 2019-02-20 | End: 2019-02-20

## 2019-02-20 RX ORDER — LIDOCAINE HCL/PF 100 MG/5ML
SYRINGE (ML) INTRAVENOUS
Status: DISCONTINUED | OUTPATIENT
Start: 2019-02-20 | End: 2019-02-20

## 2019-02-20 RX ORDER — GLYCOPYRROLATE 0.2 MG/ML
INJECTION INTRAMUSCULAR; INTRAVENOUS
Status: DISCONTINUED | OUTPATIENT
Start: 2019-02-20 | End: 2019-02-20

## 2019-02-20 RX ORDER — LIDOCAINE HYDROCHLORIDE 20 MG/ML
INJECTION, SOLUTION EPIDURAL; INFILTRATION; INTRACAUDAL; PERINEURAL
Status: DISCONTINUED
Start: 2019-02-20 | End: 2019-02-20 | Stop reason: HOSPADM

## 2019-02-20 RX ORDER — SODIUM CHLORIDE 9 MG/ML
INJECTION, SOLUTION INTRAVENOUS CONTINUOUS
Status: DISCONTINUED | OUTPATIENT
Start: 2019-02-20 | End: 2019-02-20 | Stop reason: HOSPADM

## 2019-02-20 RX ORDER — PROPOFOL 10 MG/ML
INJECTION, EMULSION INTRAVENOUS
Status: DISCONTINUED | OUTPATIENT
Start: 2019-02-20 | End: 2019-02-20

## 2019-02-20 RX ORDER — GLYCOPYRROLATE 0.2 MG/ML
INJECTION INTRAMUSCULAR; INTRAVENOUS
Status: COMPLETED
Start: 2019-02-20 | End: 2019-02-20

## 2019-02-20 RX ADMIN — PROPOFOL 50 MG: 10 INJECTION, EMULSION INTRAVENOUS at 10:02

## 2019-02-20 RX ADMIN — PROPOFOL 50 MG: 10 INJECTION, EMULSION INTRAVENOUS at 11:02

## 2019-02-20 RX ADMIN — PROPOFOL 100 MG: 10 INJECTION, EMULSION INTRAVENOUS at 10:02

## 2019-02-20 RX ADMIN — SODIUM CHLORIDE 1000 ML: 0.9 INJECTION, SOLUTION INTRAVENOUS at 09:02

## 2019-02-20 RX ADMIN — SODIUM CHLORIDE: 0.9 INJECTION, SOLUTION INTRAVENOUS at 10:02

## 2019-02-20 RX ADMIN — LIDOCAINE HYDROCHLORIDE 50 MG: 20 INJECTION, SOLUTION INTRAVENOUS at 10:02

## 2019-02-20 RX ADMIN — GLYCOPYRROLATE 0.2 MG: 0.2 INJECTION, SOLUTION INTRAMUSCULAR; INTRAVENOUS at 10:02

## 2019-02-20 NOTE — ANESTHESIA POSTPROCEDURE EVALUATION
Anesthesia Post Evaluation    Patient: Senthil Chandler    Procedure(s) Performed: Procedure(s) (LRB):  EGD (ESOPHAGOGASTRODUODENOSCOPY) (N/A)  COLONOSCOPY (N/A)    Final Anesthesia Type: general  Patient location during evaluation: PACU  Patient participation: Yes- Able to Participate  Level of consciousness: awake and alert and oriented  Post-procedure vital signs: reviewed and stable  Pain management: adequate  Airway patency: patent  PONV status at discharge: No PONV  Anesthetic complications: no      Cardiovascular status: blood pressure returned to baseline and stable  Respiratory status: unassisted and spontaneous ventilation  Hydration status: euvolemic  Follow-up not needed.        Visit Vitals  BP (!) 148/78   Pulse 76   Temp 36.6 °C (97.9 °F) (Oral)   Resp (!) 22   Ht 6' (1.829 m)   Wt 106.6 kg (235 lb)   SpO2 95%   BMI 31.87 kg/m²       Pain/Gilda Score: Gilda Score: 10 (2/20/2019 12:05 PM)

## 2019-02-20 NOTE — ANESTHESIA PREPROCEDURE EVALUATION
02/20/2019  Senthil Chandler is a 73 y.o., male.    Anesthesia Evaluation    I have reviewed the Patient Summary Reports.    I have reviewed the Nursing Notes.   I have reviewed the Medications.     Review of Systems  Anesthesia Hx:  No problems with previous Anesthesia    Social:  Smoker    Hematology/Oncology:         -- Cancer in past history:   Cardiovascular:   Hypertension, well controlled PVD hyperlipidemia    Pulmonary:  Pulmonary Normal    Renal/:   Chronic Renal Disease (nephrotic syndrome), CRI    Hepatic/GI:   GERD    Musculoskeletal:   Arthritis     Neurological:   Neuromuscular Disease,   Peripheral Neuropathy    Endocrine:   Diabetes, well controlled, type 2    Psych:   Psychiatric History (PTSD) depression          Physical Exam  General:  Well nourished    Airway/Jaw/Neck:  Airway Findings: Mouth Opening: Normal Tongue: Normal  General Airway Assessment: Adult  Oropharynx Findings:  Mallampati: II  Jaw/Neck Findings:  Neck ROM: Normal ROM     Eyes/Ears/Nose:  Eyes/Ears/Nose Findings:    Dental:  Dental Findings:   Chest/Lungs:  Chest/Lungs Findings: Normal Respiratory Rate     Heart/Vascular:  Heart Findings: Rate: Normal  Rhythm: Regular Rhythm        Mental Status:  Mental Status Findings:  Cooperative, Alert and Oriented         Anesthesia Plan  Type of Anesthesia, risks & benefits discussed:  Anesthesia Type:  general  Patient's Preference:   Intra-op Monitoring Plan: standard ASA monitors  Intra-op Monitoring Plan Comments:   Post Op Pain Control Plan: multimodal analgesia  Post Op Pain Control Plan Comments:   Induction:   IV  Beta Blocker:  Patient is on a Beta-Blocker and has received one dose within the past 24 hours (No further documentation required).       Informed Consent: Patient understands risks and agrees with Anesthesia plan.  Questions answered. Anesthesia consent signed  with patient.  ASA Score: 3     Day of Surgery Review of History & Physical:  There are no significant changes.   H&P completed by Anesthesiologist.       Ready For Surgery From Anesthesia Perspective.

## 2019-02-20 NOTE — OR NURSING
Sedation, oxygen delivery, and monitoring per anesthesia for endoscopic procedure.    Have you had a colonoscopy LESS THAN 3 years ago?   * If YES, answer these questions*:  Yes   1. Did patient have a prior colonic polyp in a previous surveillance/diagnostic colonoscopy and is 18 years or older on date of encounter?  No    2. Documentation of < 3 year interval since the patients last colonoscopy due to medical reasons (eg., last colonoscopy incomplete, last colonoscopy had inadequate prep, piecemeal removal of adenomas, or last colonoscopy found > 10 adenomas) ?   HECTOR

## 2019-02-20 NOTE — PLAN OF CARE
Spoke with Maru in scheduling to make an appt for a CT scan per Dr. Roman's order. Pt is scheduled for Feb 27 th at 1100 but pt needs to be here for 0930 and to fast 4 hours before. Updated pt on this and he verbalized understanding. Written on AVS.

## 2019-02-20 NOTE — [employer] PATIENT INSTRUCTIONS
Discharge Summary/Instructions after an Endoscopic Procedure  Patient Name: Senthil Chandler  Patient MRN: [id number]  Patient YOB: 1945  Wednesday, February 20, 2019  Carlos Coleman MD  RESTRICTIONS:  During your procedure today, you received medications for sedation.  These   medications may affect your judgment, balance and coordination.  Therefore,   for 24 hours, you have the following restrictions:   - DO NOT drive a car, operate machinery, make legal/financial decisions,   sign important papers or drink alcohol.    ACTIVITY:  Today: no heavy lifting, straining or running due to procedural   sedation/anesthesia.  The following day: return to full activity including work.  DIET:  Eat and drink normally unless instructed otherwise.     TREATMENT FOR COMMON SIDE EFFECTS:  - Mild abdominal pain, nausea, belching, bloating or excessive gas:  rest,   eat lightly and use a heating pad.  - Sore Throat: treat with throat lozenges and/or gargle with warm salt   water.  - Because air was used during the procedure, expelling large amounts of air   from your rectum or belching is normal.  - If a bowel prep was taken, you may not have a bowel movement for 1-3 days.    This is normal.  SYMPTOMS TO WATCH FOR AND REPORT TO YOUR PHYSICIAN:  1. Abdominal pain or bloating, other than gas cramps.  2. Chest pain.  3. Back pain.  4. Signs of infection such as: chills or fever occurring within 24 hours   after the procedure.  5. Rectal bleeding, which would show as bright red, maroon, or black stools.   (A tablespoon of blood from the rectum is not serious, especially if   hemorrhoids are present.)  6. Vomiting.  7. Weakness or dizziness.  GO DIRECTLY TO THE NEAREST EMERGENCY ROOM IF YOU HAVE ANY OF THE FOLLOWING:      Difficulty breathing              Chills and/or fever over 101 F   Persistent vomiting and/or vomiting blood   Severe abdominal pain   Severe chest pain   Black, tarry stools   Bleeding- more than one  tablespoon   Any other symptom or condition that you feel may need urgent attention  Your doctor recommends these additional instructions:  If any biopsies were taken, your doctors clinic will contact you in 1 to 2   weeks with any results.  - Patient has a contact number available for emergencies.  The signs and   symptoms of potential delayed complications were discussed with the   patient.  Return to normal activities tomorrow.  Written discharge   instructions were provided to the patient.   - High fiber diet.   - Continue present medications.   - Await pathology results.   - Repeat colonoscopy (date not yet determined) for surveillance.   - No aspirin, ibuprofen, naproxen, or other non-steroidal anti-inflammatory   drugs.   - Discharge patient to home (ambulatory).   - Check CEA today.   - Perform a CT scan (computed tomography) of abdomen with contrast and   pelvis with contrast at appointment to be scheduled.   - Return to my office in 2 weeks.  For questions, problems or results please call your physician - Carlos Coleman MD at Work:  [phone].  [facility], EMERGENCY ROOM PHONE NUMBER: [phone]  IF A COMPLICATION OR EMERGENCY SITUATION ARISES AND YOU ARE UNABLE TO REACH   YOUR PHYSICIAN - GO DIRECTLY TO THE EMERGENCY ROOM.  Carlos Coleman MD  2/20/2019 11:44:53 AM  This report has been verified and signed electronically.  [employer]

## 2019-02-20 NOTE — INTERVAL H&P NOTE
The patient has been examined and the H&P has been reviewed:    I concur with the findings and no changes have occurred since H&P was written.    Anesthesia/Surgery risks, benefits and alternative options discussed and understood by patient/family.          Active Hospital Problems    Diagnosis  POA    HECTOR (iron deficiency anemia) [D50.9]  Yes      Resolved Hospital Problems   No resolved problems to display.

## 2019-02-20 NOTE — DISCHARGE INSTRUCTIONS
Understanding Colon and Rectal Polyps    The colon (also called the large intestine) is a muscular tube that forms the last part of the digestive tract. It absorbs water and stores food waste. The colon is about 4 to 6 feet long. The rectum is the last 6 inches of the colon. The colon and rectum have a smooth lining composed of millions of cells. Changes in these cells can lead to growths in the colon that can become cancerous and should be removed. Multiple tests are available to screen for colon cancer, but the colonoscopy is the most recommended test. During colonoscopy, these polyps can be removed. How often you need this test depends on many things including your condition, your family history, symptoms, and what the findings were at the previous colonoscopy.   When the colon lining changes  Changes that happen in the cells that line the colon or rectum can lead to growths called polyps. Over a period of years, polyps can turn cancerous. Removing polyps early may prevent cancer from ever forming.  Polyps  Polyps are fleshy clumps of tissue that form on the lining of the colon or rectum. Small polyps are usually benign (not cancerous). However, over time, cells in a polyp can change and become cancerous. Certain types of polyps known as adenomatous polyps are premalignant. The risk for invasive cancer increases with the size of the polyp and certain cell and gene features. This means that they can become cancerous if they're not removed. Hyperplastic polyps are benign. They can grow quite large and not turn cancerous.   Cancer  Almost all colorectal cancers start when polyp cells begin growing abnormally. As a cancerous tumor grows, it may involve more and more of the colon or rectum. In time, cancer can also grow beyond the colon or rectum and spread to nearby organs or to glands called lymph nodes. The cells can also travel to other parts of the body. This is known as metastasis. The earlier a cancerous  tumor is removed, the better the chance of preventing its spread.    Date Last Reviewed: 8/1/2016  © 5652-0892 [employer]. [address] [place] [address], [place] [address]. All rights reserved. This information is not intended as a substitute for professional medical care. Always follow your healthcare professional's instructions.        Hemorrhoids    Hemorrhoids are swollen and inflamed veins inside the rectum and near the anus. The rectum is the last several inches of the colon. The anus is the passage between the rectum and the outside of the body.  Causes  The veins can become swollen due to increased pressure in them. This is most often caused by:  · Chronic constipation or diarrhea  · Straining when having a bowel movement  · Sitting too long on the toilet  · A low-fiber diet  · Pregnancy  Symptoms  · Bleeding from the rectum (this may be noticeable after bowel movements)  · Lump near the anus  · Itching around the anus  · Pain around the anus  There are different types of hemorrhoids. Depending on the type you have and the severity, you may be able to treat yourself at home. In some cases, a procedure may be the best treatment option. Your healthcare provider can tell you more about this, if needed.  Home care  General care  · To get relief from pain or itching, try:  ¨ Topical products. Your healthcare provider may prescribe or recommend creams, ointments, or pads that can be applied to the hemorrhoid. Use these exactly as directed.  ¨ Medicines. Your healthcare provider may recommend stool softeners, suppositories, or laxatives to help manage constipation. Use these exactly as directed.  ¨ Sitz baths. A sitz bath involves sitting in a few inches of warm bath water. Be careful not to make the water so hot that you burn yourself--test it before sitting in it. Soak for about 10 to 15 minutes a few times a day. This may help relieve pain.  Tips to help prevent hemorrhoids  · Eat more fiber. Fiber adds  bulk to stool and absorbs water as it moves through your colon. This makes stool softer and easier to pass.  ¨ Increase the fiber in your diet with more fiber-rich foods. These include fresh fruit, vegetables, and whole grains.  ¨ Take a fiber supplement or bulking agent, if advised to by your provider. These include products such as psyllium or methylcellulose.  · Drink plenty of water, if directed to by your provider. This can help keep stool soft.  · Be more active. Frequent exercise aids digestion and helps prevent constipation. It may also help make bowel movements more regular.  · Dont strain during bowel movements. This can make hemorrhoids more likely. Also, dont sit on the toilet for long periods of time.  Follow-up care  Follow up with your healthcare provider, or as advised. If a culture or imaging tests were done, you will be notified of the results when they are ready. This may take a few days or longer.  When to seek medical advice  Call your healthcare provider right away if any of these occur:  · Increased bleeding from the rectum  · Increased pain around the rectum or anus  · Weakness or dizziness  Call 911  Call 911 or return to the emergency department right away if any of these occur:  · Trouble breathing or swallowing  · Fainting or loss of consciousness  · Unusually fast heart rate  · Vomiting blood  · Large amounts of blood in stool  Date Last Reviewed: 6/22/2015 © 2000-2017 [employer]. [address] [place] [address]. All rights reserved. This information is not intended as a substitute for professional medical care. Always follow your healthcare professional's instructions.        What Is a Hiatal Hernia?    Hiatal hernia is when the area where the stomach and esophagus meet bulges up through the diaphragm into the chest cavity. In some cases, part of the stomach may bulge above the diaphragm. Stomach acid may move up into the esophagus and cause symptoms. The  symptoms are often blamed on gastroesophageal reflux disease (GERD). You may only know about the hernia when it shows up on an X-ray taken for other reasons.   What you may feel  The hiatus is a normal hole in the diaphragm. The esophagus passes through this hole and leads to the stomach. In some cases, part of the stomach may bulge above the diaphragm. This bulge is called a hernia. Stomach acid may move up into the esophagus and cause symptoms.  When you eat, the muscle at the hiatus relaxes to allow food to pass into the stomach. It tightens again to keep food and digestive acids in the stomach.  Many people with hiatal hernias have mild symptoms. You may notice the following GERD symptoms:  · Heartburn or other chest discomfort  · A feeling of chest fullness after a meal  · Frequent burping  · Acid taste in the mouth  · Trouble swallowing  Treating symptoms  If you have been diagnosed with hiatal hernia, these suggestions may help improve symptoms:  · Lose excess weight. Extra weight puts pressure on the stomach and esophagus.  · Dont lie down after eating. Sit up for at least an hour after eating. Lying down after eating can increase symptoms.  · Avoid certain foods and drinks. These include fatty foods, chocolate, coffee, mint, and other foods that cause symptoms for you.  · Dont smoke or drink alcohol. These can worsen symptoms.  · Look at your medicines. Discuss your medicines with your healthcare provider. Many medicines can cause symptoms.  · Consider an antacid medicine. Ask your healthcare provider about over-the-counter and prescription medicines that may help.  · Ask about surgery, if needed. Surgery is a treatment choice for some people. Your healthcare provider can determine if surgery is an option for you.    Date Last Reviewed: 10/1/2016  © 8782-0812 [employer]. [address] [place] [address], [place] [address]. All rights reserved. This information is not intended as a substitute for  professional medical care. Always follow your healthcare professional's instructions.        Gastritis (Adult)    Gastritis is inflammation and irritation of the stomach lining. It can be present for a short time (acute) or be long lasting (chronic). Gastritis is often caused by infection with bacteria called H pylori. More than a third of people in the [place] have this bacteria in their bodies. In many cases, H pylori causes no problems or symptoms. In some people, though, the infection irritates the stomach lining and causes gastritis. Other causes of stomach irritation include drinking alcohol or taking pain-relieving medicines called NSAIDs (such as aspirin or ibuprofen).   Symptoms of gastritis can include:  · Abdominal pain or bloating  · Loss of appetite  · Nausea or vomiting  · Vomiting blood or having black stools  · Feeling more tired than usual  An inflamed and irritated stomach lining is more likely to develop a sore called an ulcer. To help prevent this, gastritis should be treated.  Home care  If needed, medicines may be prescribed. If you have H pylori infection, treating it will likely relieve your symptoms. Other changes can help reduce stomach irritation and help it heal.  · If you have been prescribed medicines for H pylori infection, take them as directed. Take all of the medicine until it is finished or your healthcare provider tells you to stop, even if you feel better.  · Your healthcare provider may recommend avoiding NSAIDs. If you take daily aspirin for your heart or other medical reasons, do not stop without talking to your healthcare provider first.  · Avoid drinking alcohol.  · Stop smoking. Smoking can irritate the stomach and delay healing. As much as possible, stay away from second hand smoke.  Follow-up care  Follow up with your healthcare provider, or as advised by our staff. Testing may be needed to check for inflammation or an ulcer.  When to seek medical advice  Call your healthcare  provider for any of the following:  · Stomach pain that gets worse or moves to the lower right abdomen (appendix area)  · Chest pain that appears or gets worse, or spreads to the back, neck, shoulder, or arm  · Frequent vomiting (cant keep down liquids)  · Blood in the stool or vomit (red or black in color)  · Feeling weak or dizzy  · Fever of 100.4ºF (38ºC) or higher, or as directed by your healthcare provider  Date Last Reviewed: 6/22/2015  © 9732-5214 "[employer]. [address] [place] [address]. All rights reserved. This information is not intended as a substitute for professional medical care. Always follow your healthcare professional's instructions.        Upper GI Endoscopy     During endoscopy, a long, flexible tube is used to view the inside of your upper GI tract.      Upper GI endoscopy allows your healthcare provider to look directly into the beginning of your gastrointestinal (GI) tract. The esophagus, stomach, and duodenum (the first part of the small intestine) make up the upper GI tract.   Before the exam  Follow these and any other instructions you are given before your endoscopy. If you dont follow the healthcare providers instructions carefully, the test may need to be canceled or done over:  · Don't eat or drink anything after midnight the night before your exam. If your exam is in the afternoon, drink only clear liquids in the morning. Don't eat or drink anything for 8 hours before the exam. In some cases, you may be able to take medicines with sips of water until 2 hours before the procedure. Speak with your healthcare provider about this.   · Bring your X-rays and any other test results you have.  · Because you will be sedated, arrange for an adult to drive you home after the exam.  · Tell your healthcare provider before the exam if you are taking any medicines or have any medical problems.  The procedure  Here is what to expect:  · You will lie on the endoscopy  table. Usually patients lie on the left side.  · You will be monitored and given oxygen.  · Your throat may be numbed with a spray or gargle. You are given medicine through an intravenous (IV) line that will help you relax and remain comfortable. You may be awake or asleep during the procedure.  · The healthcare provider will put the endoscope in your mouth and down your esophagus. It is thinner than most pieces of food that you swallow. It will not affect your breathing. The medicine helps keep you from gagging.  · Air is put into your GI tract to expand it. It can make you burp.  · During the procedure, the healthcare provider can take biopsies (tissue samples), remove abnormalities, such as polyps, or treat abnormalities through a variety of devices placed through the endoscope. You will not feel this.   · The endoscope carries images of your upper GI tract to a video screen. If you are awake, you may be able to look at the images.  · After the procedure is done, you will rest for a time. An adult must drive you home.  When to call your healthcare provider  Contact your healthcare provider if you have:  · Black or tarry stools, or blood in your stool  · Fever  · Pain in your belly that does not go away  · Nausea and vomiting, or vomiting blood   Date Last Reviewed: 7/1/2016  © 3824-9323 [employer]. [address] [place] [address], [place] [address]. All rights reserved. This information is not intended as a substitute for professional medical care. Always follow your healthcare professional's instructions.        Colonoscopy     A camera attached to a flexible tube with a viewing lens is used to take video pictures.     Colonoscopy is a test to view the inside of your lower digestive tract (colon and rectum). Sometimes it can show the last part of the small intestine (ileum). During the test, small pieces of tissue may be removed for testing. This is called a biopsy. Small growths, such as polyps, may also  be removed.   Why is colonoscopy done?  The test is done to help look for colon cancer. And it can help find the source of abdominal pain, bleeding, and changes in bowel habits. It may be needed once a year, depending on factors such as your:  · Age  · Health history  · Family health history  · Symptoms  · Results from any prior colonoscopy  Risks and possible complications  These include:  · Bleeding               · A puncture or tear in the colon   · Risks of anesthesia  · A cancer lesion not being seen  Getting ready   To prepare for the test:  · Talk with your healthcare provider about the risks of the test (see below). Also ask your healthcare provider about alternatives to the test.  · Tell your healthcare provider about any medicines you take. Also tell him or her about any health conditions you may have.  · Make sure your rectum and colon are empty for the test. Follow the diet and bowel prep instructions exactly. If you dont, the test may need to be rescheduled.  · Plan for a friend or family member to drive you home after the test.     Colonoscopy provides an inside view of the entire colon.     You may discuss the results with your doctor right away or at a future visit.  During the test   The test is usually done in the hospital on an outpatient basis. This means you go home the same day. The procedure takes about 30 minutes. During that time:  · You are given relaxing (sedating) medicine through an IV line. You may be drowsy, or fully asleep.  · The healthcare provider will first give you a physical exam to check for anal and rectal problems.  · Then the anus is lubricated and the scope inserted.  · If you are awake, you may have a feeling similar to needing to have a bowel movement. You may also feel pressure as air is pumped into the colon. Its OK to pass gas during the procedure.  · Biopsy, polyp removal, or other treatments may be done during the test.  After the test   You may have gas right  "after the test. It can help to try to pass it to help prevent later bloating. Your healthcare provider may discuss the results with you right away. Or you may need to schedule a follow-up visit to talk about the results. After the test, you can go back to your normal eating and other activities. You may be tired from the sedation and need to rest for a few hours.  Date Last Reviewed: 11/1/2016  © 3513-7373 [employer]. [address] [place] [address], [place] [address]. All rights reserved. This information is not intended as a substitute for professional medical care. Always follow your healthcare professional's instructions.      Discharge Instructions: After Your Surgery/Procedure  Youve just had surgery. During surgery you were given medicine called anesthesia to keep you relaxed and free of pain. After surgery you may have some pain or nausea. This is common. Here are some tips for feeling better and getting well after surgery.     Stay on schedule with your medication.   Going home  Your doctor or nurse will show you how to take care of yourself when you go home. He or she will also answer your questions. Have an adult family member or friend drive you home.      For your safety we recommend these precaution for the first 24 hours after your procedure:  · Do not drive or use heavy equipment.  · Do not make important decisions or sign legal papers.  · Do not drink alcohol.  · Have someone stay with you, if needed. He or she can watch for problems and help keep you safe.  · Your concentration, balance, coordination, and judgement may be impaired for many hours after anesthesia.  Use caution when ambulating or standing up.     · You may feel weak and "washed out" after anesthesia and surgery.      Subtle residual effects of general anesthesia or sedation with regional / local anesthesia can last more than 24 hours.  Rest for the remainder of the day or longer if your Doctor/Surgeon has advised you to do so. "  Although you may feel normal within the first 24 hours, your reflexes and mental ability may be impaired without you realizing it.  You may feel dizzy, lightheaded or sleepy for 24 hours or longer.      Be sure to go to all follow-up visits with your doctor. And rest after your surgery for as long as your doctor tells you to.  Coping with pain  If you have pain after surgery, pain medicine will help you feel better. Take it as told, before pain becomes severe. Also, ask your doctor or pharmacist about other ways to control pain. This might be with heat, ice, or relaxation. And follow any other instructions your surgeon or nurse gives you.  Tips for taking pain medicine  To get the best relief possible, remember these points:  · Pain medicines can upset your stomach. Taking them with a little food may help.  · Most pain relievers taken by mouth need at least 20 to 30 minutes to start to work.  · Taking medicine on a schedule can help you remember to take it. Try to time your medicine so that you can take it before starting an activity. This might be before you get dressed, go for a walk, or sit down for dinner.  · Constipation is a common side effect of pain medicines. Call your doctor before taking any medicines such as laxatives or stool softeners to help ease constipation. Also ask if you should skip any foods. Drinking lots of fluids and eating foods such as fruits and vegetables that are high in fiber can also help. Remember, do not take laxatives unless your surgeon has prescribed them.  · Drinking alcohol and taking pain medicine can cause dizziness and slow your breathing. It can even be deadly. Do not drink alcohol while taking pain medicine.  · Pain medicine can make you react more slowly to things. Do not drive or run machinery while taking pain medicine.  Your health care provider may tell you to take acetaminophen to help ease your pain. Ask him or her how much you are supposed to take each day.  Acetaminophen or other pain relievers may interact with your prescription medicines or other over-the-counter (OTC) drugs. Some prescription medicines have acetaminophen and other ingredients. Using both prescription and OTC acetaminophen for pain can cause you to overdose. Read the labels on your OTC medicines with care. This will help you to clearly know the list of ingredients, how much to take, and any warnings. It may also help you not take too much acetaminophen. If you have questions or do not understand the information, ask your pharmacist or health care provider to explain it to you before you take the OTC medicine.  Managing nausea  Some people have an upset stomach after surgery. This is often because of anesthesia, pain, or pain medicine, or the stress of surgery. These tips will help you handle nausea and eat healthy foods as you get better. If you were on a special food plan before surgery, ask your doctor if you should follow it while you get better. These tips may help:  · Do not push yourself to eat. Your body will tell you when to eat and how much.  · Start off with clear liquids and soup. They are easier to digest.  · Next try semi-solid foods, such as mashed potatoes, applesauce, and gelatin, as you feel ready.  · Slowly move to solid foods. Dont eat fatty, rich, or spicy foods at first.  · Do not force yourself to have 3 large meals a day. Instead eat smaller amounts more often.  · Take pain medicines with a small amount of solid food, such as crackers or toast, to avoid nausea.     Call your surgeon if  · You still have pain an hour after taking medicine. The medicine may not be strong enough.  · You feel too sleepy, dizzy, or groggy. The medicine may be too strong.  · You have side effects like nausea, vomiting, or skin changes, such as rash, itching, or hives.       If you have obstructive sleep apnea  You were given anesthesia medicine during surgery to keep you comfortable and free of  pain. After surgery, you may have more apnea spells because of this medicine and other medicines you were given. The spells may last longer than usual.   At home:  · Keep using the continuous positive airway pressure (CPAP) device when you sleep. Unless your health care provider tells you not to, use it when you sleep, day or night. CPAP is a common device used to treat obstructive sleep apnea.  · Talk with your provider before taking any pain medicine, muscle relaxants, or sedatives. Your provider will tell you about the possible dangers of taking these medicines.  © 2001-4541 [employer]. [address] [place] [address], [place] [address]. All rights reserved. This information is not intended as a substitute for professional medical care. Always follow your healthcare professional's instructions.

## 2019-02-20 NOTE — [employer]ATION PATIENT INSTRUCTIONS
Discharge Summary/Instructions after an Endoscopic Procedure  Patient Name: Senthil Chandler  Patient MRN: [id number]  Patient YOB: 1945  Wednesday, February 20, 2019  Carlos Coleman MD  RESTRICTIONS:  During your procedure today, you received medications for sedation.  These   medications may affect your judgment, balance and coordination.  Therefore,   for 24 hours, you have the following restrictions:   - DO NOT drive a car, operate machinery, make legal/financial decisions,   sign important papers or drink alcohol.    ACTIVITY:  Today: no heavy lifting, straining or running due to procedural   sedation/anesthesia.  The following day: return to full activity including work.  DIET:  Eat and drink normally unless instructed otherwise.     TREATMENT FOR COMMON SIDE EFFECTS:  - Mild abdominal pain, nausea, belching, bloating or excessive gas:  rest,   eat lightly and use a heating pad.  - Sore Throat: treat with throat lozenges and/or gargle with warm salt   water.  - Because air was used during the procedure, expelling large amounts of air   from your rectum or belching is normal.  - If a bowel prep was taken, you may not have a bowel movement for 1-3 days.    This is normal.  SYMPTOMS TO WATCH FOR AND REPORT TO YOUR PHYSICIAN:  1. Abdominal pain or bloating, other than gas cramps.  2. Chest pain.  3. Back pain.  4. Signs of infection such as: chills or fever occurring within 24 hours   after the procedure.  5. Rectal bleeding, which would show as bright red, maroon, or black stools.   (A tablespoon of blood from the rectum is not serious, especially if   hemorrhoids are present.)  6. Vomiting.  7. Weakness or dizziness.  GO DIRECTLY TO THE NEAREST EMERGENCY ROOM IF YOU HAVE ANY OF THE FOLLOWING:      Difficulty breathing              Chills and/or fever over 101 F   Persistent vomiting and/or vomiting blood   Severe abdominal pain   Severe chest pain   Black, tarry stools   Bleeding- more than one  tablespoon   Any other symptom or condition that you feel may need urgent attention  Your doctor recommends these additional instructions:  If any biopsies were taken, your doctors clinic will contact you in 1 to 2   weeks with any results.  - Patient has a contact number available for emergencies.  The signs and   symptoms of potential delayed complications were discussed with the   patient.  Return to normal activities tomorrow.  Written discharge   instructions were provided to the patient.   - Resume previous diet.   - Continue present medications.   - No aspirin, ibuprofen, naproxen, or other non-steroidal anti-inflammatory   drugs.   - Await pathology results.   - Discharge patient to home (ambulatory).   - Perform a colonoscopy today.   - Return to nurse practitioner in 6 weeks.  For questions, problems or results please call your physician - Carlos Coleman MD at Work:  [phone].  [facility], EMERGENCY ROOM PHONE NUMBER: [phone]  IF A COMPLICATION OR EMERGENCY SITUATION ARISES AND YOU ARE UNABLE TO REACH   YOUR PHYSICIAN - GO DIRECTLY TO THE EMERGENCY ROOM.  Carlos Coleman MD  2/20/2019 10:27:40 AM  This report has been verified and signed electronically.  [employer]

## 2019-02-20 NOTE — TRANSFER OF CARE
Anesthesia Transfer of Care Note    Patient: Senthil Chandler    Procedure(s) Performed: Procedure(s) (LRB):  EGD (ESOPHAGOGASTRODUODENOSCOPY) (N/A)  COLONOSCOPY (N/A)    Patient location: GI    Anesthesia Type: general    Transport from OR: Transported from OR on 2-3 L/min O2 by NC with adequate spontaneous ventilation    Post pain: adequate analgesia    Post assessment: no apparent anesthetic complications and tolerated procedure well    Post vital signs: stable    Level of consciousness: sedated and responds to stimulation    Nausea/Vomiting: no nausea/vomiting    Complications: none    Transfer of care protocol was followed      Last vitals:   Visit Vitals  BP (!) 143/76 (BP Location: Left arm, Patient Position: Lying)   Pulse 72   Temp 36.6 °C (97.9 °F) (Oral)   Resp 15   Ht 6' (1.829 m)   Wt 106.6 kg (235 lb)   SpO2 96%   BMI 31.87 kg/m²

## 2019-02-20 NOTE — PLAN OF CARE
Dr. Roman speaking to pt and pt's niece. Discharge instructions given to pt and pt's niece. Both verbalized understanding. Ready for d/c after labs have been drawn.

## 2019-02-21 ENCOUNTER — TELEPHONE (OUTPATIENT)
Dept: GASTROENTEROLOGY | Facility: CLINIC | Age: 74
End: 2019-02-21

## 2019-02-21 ENCOUNTER — HOSPITAL ENCOUNTER (EMERGENCY)
Facility: HOSPITAL | Age: 74
Discharge: HOME | End: 2019-02-21
Attending: EMERGENCY MEDICINE
Payer: OTHER GOVERNMENT

## 2019-02-21 VITALS — DIASTOLIC BLOOD PRESSURE: 86 MMHG | SYSTOLIC BLOOD PRESSURE: 195 MMHG | HEART RATE: 68 BPM | OXYGEN SATURATION: 93 %

## 2019-02-21 DIAGNOSIS — E16.2 HYPOGLYCEMIA: Primary | ICD-10-CM

## 2019-02-21 LAB
ALBUMIN SERPL BCP-MCNC: 3.8 G/DL
ALP SERPL-CCNC: 120 U/L
ALT SERPL W/O P-5'-P-CCNC: 45 U/L
ANION GAP SERPL CALC-SCNC: 9 MMOL/L
AST SERPL-CCNC: 28 U/L
BILIRUB SERPL-MCNC: 0.8 MG/DL
BUN SERPL-MCNC: 11 MG/DL
CALCIUM SERPL-MCNC: 9.8 MG/DL
CHLORIDE SERPL-SCNC: 105 MMOL/L
CO2 SERPL-SCNC: 25 MMOL/L
CREAT SERPL-MCNC: 1.1 MG/DL
EST. GFR  (AFRICAN AMERICAN): >60 ML/MIN/1.73 M^2
EST. GFR  (NON AFRICAN AMERICAN): >60 ML/MIN/1.73 M^2
GLUCOSE SERPL-MCNC: 154 MG/DL (ref 70–110)
GLUCOSE SERPL-MCNC: 156 MG/DL
POCT GLUCOSE: 154 MG/DL (ref 70–110)
POTASSIUM SERPL-SCNC: 4.5 MMOL/L
PROT SERPL-MCNC: 6.5 G/DL
SODIUM SERPL-SCNC: 139 MMOL/L

## 2019-02-21 PROCEDURE — 99283 EMERGENCY DEPT VISIT LOW MDM: CPT | Mod: 25

## 2019-02-21 PROCEDURE — 82962 GLUCOSE BLOOD TEST: CPT

## 2019-02-21 PROCEDURE — 36415 COLL VENOUS BLD VENIPUNCTURE: CPT

## 2019-02-21 PROCEDURE — 80053 COMPREHEN METABOLIC PANEL: CPT

## 2019-02-21 NOTE — ED NOTES
Care assumed. Pt in room 8 for evaluation of hypoglycemia.  Pt currently awake, alert and oriented eating lunch tray. Awaiting further orders and dispo. Will continue to monitor.

## 2019-02-21 NOTE — TELEPHONE ENCOUNTER
Please call to inform & review the results with the patient- blood work showed negative screening for celiac; normal iron level; blood counts showed mild decrease in RBCs (4.39) and normal hemoglobin & hematocrit levels. Recommend follow-up with Primary Care Provider for continued evaluation and management of history of anemia.  Stool studies showed positive occult blood; otherwise, negative/normal results. Patient had EGD & colonoscopy done yesterday.    Continue with previous recommendations. If no improvement in symptoms or symptoms worsen, call/follow-up at clinic or go to ER.  Please release results to patient's mychart once you have discussed results and recommendations with patient.  Thanks,  Ann AGUAYO

## 2019-02-21 NOTE — TELEPHONE ENCOUNTER
"Spoke with pt and informed of results and recommendations per Renae Wisdom NP. Pt seemed very agitated in stating he "didn't know what to do anymore." I spoke with pt until he was calm and then pt verbalized understanding.   "

## 2019-02-21 NOTE — ED PROVIDER NOTES
"Encounter Date: 2/21/2019    SCRIBE #1 NOTE: I, Benja Villanueva, am scribing for, and in the presence of, Dr. Santiago.       History     Chief Complaint   Patient presents with    Hypoglycemia     EMS found pt under the table aftertaking insulin with cbg 56.  EMS admin glucagon and cbg 90. Pt nowawake and alert.       Time seen by provider: 1:02 PM on 02/21/2019    Senthil Chandler is a 73 y.o. male with known colon cancer and a PMHx of IDDM and CKD who presents to the ED via EMS with the onset of low blood sugar. The pt states that he woke up feeling fine, but shortly after he became increasingly stressed out and noticed himself "getting out of it." Associated sx include confusion. He explains that he realized something was wrong when he started knocking things over. The pt adds that when he took his morning blood sugar it was 80 and that he did not eat breakfast. He recently had a colonoscopy done yesterday. The patient denies any other symptoms at this time. No known drug allergies noted.      The history is provided by the patient.     Review of patient's allergies indicates:  No Known Allergies  Past Medical History:   Diagnosis Date    Adjustment disorder with anxious mood     Angiodysplasia     Arthritis     Cancer 1990    prostate     Carcinoma in situ of prostate     Carpal tunnel syndrome on left     CKD (chronic kidney disease) stage 1, GFR 90 ml/min or greater     Colon polyp     Cubital tunnel syndrome on left     Depressive disorder     Deviated nasal septum     Diabetes mellitus with ophthalmic complication     Diabetes mellitus, type 2     GERD (gastroesophageal reflux disease)     Hypercholesterolemia     Hyperpotassemia     Hypertension     Iron deficiency anemia     Kidney disease     focal segmental glomerulosclerosis    Lumbago     Malignant neoplasm of colon     Nephrotic syndrome     Neuropathy     Primary osteoarthritis of left elbow     PTSD (post-traumatic stress " disorder)     PVD (peripheral vascular disease)     Spinal stenosis, lumbar region without neurogenic claudication      Past Surgical History:   Procedure Laterality Date    CARPAL TUNNEL RELEASE Left 10/23/2017    COLONOSCOPY N/A 2/20/2019    [facility]    COLONOSCOPY N/A 8/12/2016    [facility]    EGD (ESOPHAGOGASTRODUODENOSCOPY) N/A 2/20/2019    [facility]    ESOPHAGOGASTRODUODENOSCOPY (EGD) N/A 8/12/2016    [facility] by [facility] at [facility] END[facility]    NASAL SEPTUM SURGERY      PROCTECTOMY      PROSTATE SURGERY  1990's    UPPER GASTROINTESTINAL ENDOSCOPY  08/12/2016    Dr. Roman     Family History   Problem Relation Age of Onset    Leukemia Mother     Leukemia Father     Colon cancer Neg Hx     Crohn's disease Neg Hx     Ulcerative colitis Neg Hx     Stomach cancer Neg Hx     Esophageal cancer Neg Hx      Social History     Tobacco Use    Smoking status: Heavy Tobacco Smoker     Packs/day: 1.00     Types: Cigarettes    Smokeless tobacco: Never Used   Substance Use Topics    Alcohol use: Yes     Comment: occasional- maybe one glass of wine a month    Drug use: No     Review of Systems   Constitutional: Negative for fever.        Positive for low blood sugar.    HENT: Negative for sore throat.    Respiratory: Negative for shortness of breath.    Cardiovascular: Negative for chest pain.   Gastrointestinal: Negative for nausea.   Genitourinary: Negative for dysuria.   Musculoskeletal: Negative for back pain.   Skin: Negative for rash.   Neurological: Negative for weakness.   Hematological: Does not bruise/bleed easily.   Psychiatric/Behavioral: Positive for confusion.       Physical Exam     Initial Vitals   BP Pulse Resp Temp SpO2   -- -- -- -- --      MAP       --         Physical Exam    Nursing note and vitals reviewed.  Constitutional: He appears well-developed and  well-nourished. He is not diaphoretic. No distress.   HENT:   Head: Normocephalic and atraumatic.   Mouth/Throat: Oropharynx is clear and moist.   Eyes: Conjunctivae are normal.   Neck: Neck supple.   Cardiovascular: Normal rate, regular rhythm, normal heart sounds and intact distal pulses. Exam reveals no gallop and no friction rub.    No murmur heard.  Pulmonary/Chest: Breath sounds normal. He has no wheezes. He has no rhonchi. He has no rales.   Abdominal: Soft. He exhibits no distension. There is no tenderness. There is no guarding.   Musculoskeletal: Normal range of motion.   Neurological: He is alert and oriented to person, place, and time. He has normal strength. No cranial nerve deficit or sensory deficit.   Skin: Capillary refill takes less than 2 seconds. No rash noted. No erythema.   Psychiatric: He has a normal mood and affect. Thought content normal.         ED Course   Procedures  Labs Reviewed   COMPREHENSIVE METABOLIC PANEL          Imaging Results    None          Medical Decision Making:   History:   Old Medical Records: I decided to obtain old medical records.  Clinical Tests:   Lab Tests: Ordered and Reviewed            Scribe Attestation:   Scribe #1: I performed the above scribed service and the documentation accurately describes the services I performed. I attest to the accuracy of the note.    I, Dr. Nate Santiago, personally performed the services described in this documentation. All medical record entries made by the scribe were at my direction and in my presence.  I have reviewed the chart and agree that the record reflects my personal performance and is accurate and complete. Nate Santiago MD.  3:29 PM 02/21/2019    Senthil Chandler is a 73 y.o. male presenting with transient hypoglycemia corrected by EMS and not recurrent here after eating.  Patient reports he took his normal insulin but then became distracted by looking at his bills rather than eating his normal breakfast.   This likely lead to hypoglycemia that was ultimately corrected.  I doubt other emergent, life-threatening precipitating processes.  CMP reviewed.  No sign of other precipitating infectious metabolic process.  I do not think he requires prolonged observation.  He remains normal here.  He has no complaints with low suspicion for acute intracranial process such as hemorrhage.  I do not think brain imaging or other workup is necessary.  He is instructed to have family or friends observe at home and frequently check glucose with close outpatient follow-up.  Importance of regular meals with taking insulin also discussed.  Return precautions reviewed.        ED Course as of Feb 21 1517   Thu Feb 21, 2019   1500 Patient remains lucid and asymptomatic.  Glucose 154 per Jaymie Haddad RN.  [MR]      ED Course User Index  [MR] Nate Santiago MD     Clinical Impression:   No diagnosis found.      Disposition:   Disposition: Discharged  Condition: Stable                        Nate Santiago MD  02/21/19 1530

## 2019-02-27 ENCOUNTER — HOSPITAL ENCOUNTER (OUTPATIENT)
Dept: RADIOLOGY | Facility: HOSPITAL | Age: 74
Discharge: HOME | End: 2019-02-27
Attending: INTERNAL MEDICINE
Payer: OTHER GOVERNMENT

## 2019-02-27 DIAGNOSIS — D49.0 COLORECTAL NEOPLASM: ICD-10-CM

## 2019-02-27 PROCEDURE — 74177 CT ABD & PELVIS W/CONTRAST: CPT | Mod: 26,,, | Performed by: RADIOLOGY

## 2019-02-27 PROCEDURE — 74177 CT ABD & PELVIS W/CONTRAST: CPT | Mod: TC

## 2019-02-27 PROCEDURE — 74177 CT ABDOMEN PELVIS WITH CONTRAST: ICD-10-PCS | Mod: 26,,, | Performed by: RADIOLOGY

## 2019-02-27 PROCEDURE — 25500020 PHARM REV CODE 255: Performed by: INTERNAL MEDICINE

## 2019-02-27 RX ADMIN — IOHEXOL 30 ML: 350 INJECTION, SOLUTION INTRAVENOUS at 10:02

## 2019-02-27 RX ADMIN — IOHEXOL 100 ML: 350 INJECTION, SOLUTION INTRAVENOUS at 10:02

## 2019-03-01 ENCOUNTER — TELEPHONE (OUTPATIENT)
Dept: GASTROENTEROLOGY | Facility: CLINIC | Age: 74
End: 2019-03-01

## 2019-03-01 NOTE — TELEPHONE ENCOUNTER
Called patient to discuss results of pathology.  He states that he understands and that he has appointment scheduled with colorectal surgery.  Encouraged him to keep follow up.

## 2019-03-12 ENCOUNTER — OFFICE VISIT (OUTPATIENT)
Dept: SURGERY | Facility: CLINIC | Age: 74
End: 2019-03-12
Payer: OTHER GOVERNMENT

## 2019-03-12 VITALS
WEIGHT: 233.69 LBS | DIASTOLIC BLOOD PRESSURE: 59 MMHG | SYSTOLIC BLOOD PRESSURE: 129 MMHG | TEMPERATURE: 98 F | BODY MASS INDEX: 31.69 KG/M2 | HEART RATE: 75 BPM

## 2019-03-12 DIAGNOSIS — C18.3 MALIGNANT NEOPLASM OF HEPATIC FLEXURE: Primary | ICD-10-CM

## 2019-03-12 PROCEDURE — 99213 OFFICE O/P EST LOW 20 MIN: CPT | Mod: PBBFAC,PO | Performed by: SURGERY

## 2019-03-12 PROCEDURE — 99999 PR PBB SHADOW E&M-EST. PATIENT-LVL III: ICD-10-PCS | Mod: PBBFAC,,, | Performed by: SURGERY

## 2019-03-12 PROCEDURE — 99204 OFFICE O/P NEW MOD 45 MIN: CPT | Mod: S$PBB,,, | Performed by: SURGERY

## 2019-03-12 PROCEDURE — 99204 PR OFFICE/OUTPT VISIT, NEW, LEVL IV, 45-59 MIN: ICD-10-PCS | Mod: S$PBB,,, | Performed by: SURGERY

## 2019-03-12 PROCEDURE — 99999 PR PBB SHADOW E&M-EST. PATIENT-LVL III: CPT | Mod: PBBFAC,,, | Performed by: SURGERY

## 2019-03-12 RX ORDER — POLYETHYLENE GLYCOL-3350 AND ELECTROLYTES 236; 6.74; 5.86; 2.97; 22.74 G/274.31G; G/274.31G; G/274.31G; G/274.31G; G/274.31G
POWDER, FOR SOLUTION ORAL
Refills: 0 | COMMUNITY
Start: 2019-02-12

## 2019-03-12 NOTE — Clinical Note
I saw your patient, Senthil Chandler in the office.  Attached are my findings and plan.  Thank you for referring him to my office and if you have any questions please do not hesitate to call my cell [phone].Shashank Renee

## 2019-03-12 NOTE — LETTER
March 13, 2019      Carlos Roman MD  [address]  [address]  [place] [address]           [facility] - General Surgery  [address]  [facility] [address]  Phone: [phone]          Patient: Senthil Chandler   MR Number: [id number]   YOB: 1945   Date of Visit: 3/12/2019       Dear Dr. Carlos Roman:    Thank you for referring Senthil Chandler to me for evaluation. Attached you will find relevant portions of my assessment and plan of care.    If you have questions, please do not hesitate to call me. I look forward to following Senthil Chandler along with you.    Sincerely,    Mark Renee MD    Enclosure  CC:  No Recipients    If you would like to receive this communication electronically, please contact [email or web address] or [phone] to request more information on [employer] Link access.    For providers and/or their staff who would like to refer a patient to [facility], please contact us through our one-stop-shop provider referral line, [facility], at [phone].    If you feel you have received this communication in error or would no longer like to receive these types of communications, please e-mail [email or web address]

## 2019-03-12 NOTE — PATIENT INSTRUCTIONS
Surgery is scheduled for 03/25/19 arrival time per the preop nurse.  Preop will call from [phone]  Fasting after midnight  Someone to drive you home      THE PREOP NURSE WILL CALL, SOMETIMES AS LATE AS 4 PM IN THE AFTERNOON THE DAY BEFORE SURGERY.    Bathe the night before and the morning of your procedure with a Chlorhexidine wash such as Hibiclens, can be purchased at most Pharmacy's.    Special Instruction: Bowel Prep is included with this letter, call [facility] if you have any questions or concerns or if you need to reschedule your procedure at [phone].  Colonoscopy is at the [facility].

## 2019-03-13 NOTE — H&P (VIEW-ONLY)
Subjective:       Patient ID: Senthil Chandler is a 73 y.o. male.    Chief Complaint: Consult (colon mass )    HPI  Pleasant 72 yo M referred to my office in consultation from Dr Roman for evaluation of colon mass.  Pt noted to have iron deficiency anemia and had a colonoscopy.  At colonoscopy pt found to have a mass at the hepatic flexure which ha been biopsied and found to be an adenocarcinoma.  Pt denies any abd pain or changes in bowel habits. He notes occasionally seeing some blood with Bm.  No changes in appetite or in weight.  Pt suffers with HTn but is otherwise healthy.  He has no significant past abdominal surgical history. +  Review of Systems   Constitutional: Negative for activity change, appetite change, diaphoresis, fever and unexpected weight change.   HENT: Negative for congestion and ear pain.    Respiratory: Negative for cough, chest tightness and wheezing.    Cardiovascular: Negative for chest pain and palpitations.   Gastrointestinal: Negative for abdominal distention, abdominal pain, anal bleeding, blood in stool, constipation, diarrhea, nausea, rectal pain and vomiting.   Genitourinary: Negative for difficulty urinating, dysuria and hematuria.   Musculoskeletal: Negative for back pain and gait problem.   Skin: Negative for color change and wound.   Neurological: Negative for tremors and seizures.   Hematological: Negative for adenopathy. Does not bruise/bleed easily.   Psychiatric/Behavioral: Negative for agitation and dysphoric mood.       Objective:      Physical Exam   Constitutional: He is oriented to person, place, and time. He appears well-developed and well-nourished.   HENT:   Head: Normocephalic and atraumatic.   Eyes: Pupils are equal, round, and reactive to light. Right eye exhibits no discharge. Left eye exhibits no discharge.   Neck: Normal range of motion. No tracheal deviation present. No thyromegaly present.   Cardiovascular: Normal rate, regular rhythm and normal heart  sounds. Exam reveals no gallop and no friction rub.   No murmur heard.  Pulmonary/Chest: Effort normal and breath sounds normal. He has no wheezes. He exhibits no tenderness.   Abdominal: Soft. He exhibits no distension and no mass. There is no tenderness. There is no rebound and no guarding. No hernia.   Musculoskeletal: Normal range of motion. He exhibits no edema, tenderness or deformity.   Lymphadenopathy:     He has no cervical adenopathy.   Neurological: He is alert and oriented to person, place, and time. Coordination normal.   Skin: Skin is warm. No erythema. No pallor.   Psychiatric: He has a normal mood and affect. His behavior is normal.   Vitals reviewed.        Cscope 2/20                Path:  Adenocarcinoma    CT scan reviewed by me.  No evidence of metastatic disease.  Mass noted at hepatic flexure.  Cholelithiasis    Lab Results   Component Value Date    WBC 4.20 02/14/2019    HGB 14.2 02/14/2019    HCT 43.1 02/14/2019    MCV 98 02/14/2019     02/14/2019     Lab Results   Component Value Date    CEA 1.8 02/20/2019       Assessment:     Colon cancer  No diagnosis found.    Plan:        Lengthy dw/ pt.  I have reviewed with him the staging criteria for colon cancer. I have recommended colon resection to remove the cancer. I have d/w him risks of surgery including but not limited to bleeding, infection, abscess, anastomotic leak, need for further surgery.  He expresses understanding and desires to proceed with surgery.  I have also suggested cholecystectomy at time of surgery given presence of gallstones.  He desires to proceed with this as well.  Informed consent obtained.  Surgery offerd for next week but he desires to delay until 3/25.

## 2019-03-14 RX ORDER — LIDOCAINE HYDROCHLORIDE 10 MG/ML
1 INJECTION, SOLUTION EPIDURAL; INFILTRATION; INTRACAUDAL; PERINEURAL ONCE
Status: CANCELLED | OUTPATIENT
Start: 2019-03-14 | End: 2019-03-14

## 2019-03-14 RX ORDER — METRONIDAZOLE 500 MG/100ML
500 INJECTION, SOLUTION INTRAVENOUS
Status: CANCELLED | OUTPATIENT
Start: 2019-03-14

## 2019-03-14 RX ORDER — SODIUM CHLORIDE 9 MG/ML
INJECTION, SOLUTION INTRAVENOUS CONTINUOUS
Status: CANCELLED | OUTPATIENT
Start: 2019-03-14

## 2019-03-19 ENCOUNTER — TELEPHONE (OUTPATIENT)
Dept: SURGERY | Facility: CLINIC | Age: 74
End: 2019-03-19

## 2019-03-19 NOTE — TELEPHONE ENCOUNTER
----- Message from Aline Mayorga sent at 3/19/2019  3:27 PM CDT -----  Contact: self  Patient is insisting that he find out what time his surgery is on Monday 3/25, he has relatives coming in from [place] so he can't clara to Friday to find out this information.  Please call this kind gentleman back at [phone] or [phone] (Home).  Thanks

## 2019-03-22 ENCOUNTER — HOSPITAL ENCOUNTER (OUTPATIENT)
Dept: PREADMISSION TESTING | Facility: HOSPITAL | Age: 74
Discharge: HOME | DRG: 329 | End: 2019-03-22
Attending: SURGERY
Payer: OTHER GOVERNMENT

## 2019-03-22 ENCOUNTER — ANESTHESIA EVENT (OUTPATIENT)
Dept: SURGERY | Facility: HOSPITAL | Age: 74
DRG: 329 | End: 2019-03-22
Payer: MEDICARE

## 2019-03-22 ENCOUNTER — HOSPITAL ENCOUNTER (OUTPATIENT)
Dept: RADIOLOGY | Facility: HOSPITAL | Age: 74
Discharge: HOME | DRG: 329 | End: 2019-03-22
Attending: SURGERY
Payer: OTHER GOVERNMENT

## 2019-03-22 VITALS — BODY MASS INDEX: 31.83 KG/M2 | HEIGHT: 72 IN | WEIGHT: 235 LBS

## 2019-03-22 DIAGNOSIS — C18.3 MALIGNANT NEOPLASM OF HEPATIC FLEXURE: ICD-10-CM

## 2019-03-22 LAB
ABO + RH BLD: NORMAL
ALBUMIN SERPL BCP-MCNC: 3.8 G/DL
ALP SERPL-CCNC: 117 U/L
ALT SERPL W/O P-5'-P-CCNC: 38 U/L
ANION GAP SERPL CALC-SCNC: 6 MMOL/L
AST SERPL-CCNC: 25 U/L
BASOPHILS # BLD AUTO: 0 K/UL
BASOPHILS NFR BLD: 0.2 %
BILIRUB SERPL-MCNC: 0.5 MG/DL
BLD GP AB SCN CELLS X3 SERPL QL: NORMAL
BUN SERPL-MCNC: 13 MG/DL
CALCIUM SERPL-MCNC: 10.1 MG/DL
CHLORIDE SERPL-SCNC: 105 MMOL/L
CO2 SERPL-SCNC: 27 MMOL/L
CREAT SERPL-MCNC: 1.1 MG/DL
DIFFERENTIAL METHOD: ABNORMAL
EOSINOPHIL # BLD AUTO: 0.1 K/UL
EOSINOPHIL NFR BLD: 5.1 %
ERYTHROCYTE [DISTWIDTH] IN BLOOD BY AUTOMATED COUNT: 13.9 %
EST. GFR  (AFRICAN AMERICAN): >60 ML/MIN/1.73 M^2
EST. GFR  (NON AFRICAN AMERICAN): >60 ML/MIN/1.73 M^2
GLUCOSE SERPL-MCNC: 116 MG/DL
HCT VFR BLD AUTO: 40.4 %
HGB BLD-MCNC: 13.3 G/DL
LYMPHOCYTES # BLD AUTO: 0.9 K/UL
LYMPHOCYTES NFR BLD: 29.7 %
MCH RBC QN AUTO: 32.7 PG
MCHC RBC AUTO-ENTMCNC: 32.9 G/DL
MCV RBC AUTO: 99 FL
MONOCYTES # BLD AUTO: 0.3 K/UL
MONOCYTES NFR BLD: 9.6 %
NEUTROPHILS # BLD AUTO: 1.6 K/UL
NEUTROPHILS NFR BLD: 55.4 %
PLATELET # BLD AUTO: 267 K/UL
PMV BLD AUTO: 6.5 FL
POTASSIUM SERPL-SCNC: 4 MMOL/L
PROT SERPL-MCNC: 6.5 G/DL
RBC # BLD AUTO: 4.07 M/UL
SODIUM SERPL-SCNC: 138 MMOL/L
WBC # BLD AUTO: 2.9 K/UL

## 2019-03-22 PROCEDURE — 93005 ELECTROCARDIOGRAM TRACING: CPT

## 2019-03-22 PROCEDURE — 71046 X-RAY EXAM CHEST 2 VIEWS: CPT | Mod: 26,,, | Performed by: RADIOLOGY

## 2019-03-22 PROCEDURE — 99900104 DSU ONLY-NO CHARGE-EA ADD'L HR (STAT)

## 2019-03-22 PROCEDURE — 93010 EKG 12-LEAD: ICD-10-PCS | Mod: ,,, | Performed by: INTERNAL MEDICINE

## 2019-03-22 PROCEDURE — 71046 XR CHEST PA AND LATERAL PRE-OP: ICD-10-PCS | Mod: 26,,, | Performed by: RADIOLOGY

## 2019-03-22 PROCEDURE — 71046 X-RAY EXAM CHEST 2 VIEWS: CPT | Mod: TC,FY

## 2019-03-22 PROCEDURE — 36415 COLL VENOUS BLD VENIPUNCTURE: CPT

## 2019-03-22 PROCEDURE — 85025 COMPLETE CBC W/AUTO DIFF WBC: CPT

## 2019-03-22 PROCEDURE — 93010 ELECTROCARDIOGRAM REPORT: CPT | Mod: ,,, | Performed by: INTERNAL MEDICINE

## 2019-03-22 PROCEDURE — 80053 COMPREHEN METABOLIC PANEL: CPT

## 2019-03-22 PROCEDURE — 86850 RBC ANTIBODY SCREEN: CPT

## 2019-03-22 PROCEDURE — 99900103 DSU ONLY-NO CHARGE-INITIAL HR (STAT)

## 2019-03-22 NOTE — DISCHARGE INSTRUCTIONS
To confirm, Your doctor has instructed you that surgery is scheduled for:     Please report to [facility], Registration the morning of surgery. You must check-in and receive a wristband before going to your procedure.    Pre-Op will call the afternoon prior to surgery between 1:00 and 6:00 PM with the final arrival time.  Phone number: [phone]    PLEASE NOTE:  The surgery schedule has many variables which may affect the time of your surgery case.  Family members should be available if your surgery time changes.  Plan to be here the day of your procedure between 4-6 hours.    MEDICATIONS:  TAKE ONLY THESE MEDICATIONS WITH A SMALL SIP OF WATER THE MORNING OF YOUR PROCEDURE:  GABAPENTIN, LABETALOL, PANTOPRAZOLE, VENLAFAXINE      DO NOT TAKE THESE MEDICATIONS 5-7 DAYS PRIOR to your procedure or per your surgeon's request: ASPIRIN, ALEVE, ADVIL, IBUPROFEN, FISH OIL VITAMIN E, HERBALS  (May take Tylenol)    ONLY if you are prescribed any types of blood thinners such as:  Aspirin, Coumadin, Plavix, Pradaxa, Xarelto, Aggrenox, Effient, Eliquis, Savasya, Brilinta, or any other, ask your surgeon whether you should stop taking them and how long before surgery you should stop.  You may also need to verify with the prescribing physician if it is ok to stop your medication.      INSTRUCTIONS IMPORTANT!!  · Do not eat or drink anything between midnight and the time of your procedure- this includes gum, mints, and candy.  · Do not smoke or drink alcoholic beverages 24 hours prior to your procedure.  · Shower the night before AND the morning of your procedure with a Chlorhexidine wash such as Hibiclens or Dial antibacterial soap from the neck down.  Do not get it on your face or in your eyes.  You may use your own shampoo and face wash. This helps your skin to be as bacteria free as possible.    · If you wear contact lenses, dentures, hearing aids or glasses, bring a container to put them in during  surgery and give to a family member for safe keeping.  Please leave all jewelry, piercing's and valuables at home.   · DO NOT remove hair from the surgery site.  Do not shave the incision site unless you are given specific instructions to do so.    · ONLY if you have been diagnosed with sleep apnea please bring your C-PAP machine.  · ONLY if you wear home oxygen please bring your portable oxygen tank the day of your procedure.  · ONLY if you have a history of OPEN HEART SURGERY you will need a clearance from your Cardiologist per Anesthesia.      · ONLY for patients requiring bowel prep, written instructions will be given by your doctor's office.  · ONLY if you have a neuro stimulator, please bring the controller with you the morning of surgery  · ONLY if a type and screen test is needed before surgery, please return:  · If your doctor has scheduled you for an overnight stay, bring a small overnight bag with any personal items you need.  · Make arrangements in advance for transportation home by a responsible adult.  It is not safe to drive a vehicle during the 24 hours after anesthesia.      · Visiting hours are 10:00AM to 8:30PM.  For the safety of all patients, visitors under the age of 12 are not allowed above the first floor of the hospital.    · All [facility] facilities and properties are tobacco free.  Smoking is NOT allowed.       If you have any questions about these instructions, call Pre-Op Admit  Nursing at [phone] or the Pre-Op Day Surgery Unit at [phone].

## 2019-03-25 ENCOUNTER — ANESTHESIA (OUTPATIENT)
Dept: SURGERY | Facility: HOSPITAL | Age: 74
DRG: 329 | End: 2019-03-25
Payer: OTHER GOVERNMENT

## 2019-03-25 ENCOUNTER — HOSPITAL ENCOUNTER (INPATIENT)
Facility: HOSPITAL | Age: 74
LOS: 3 days | Discharge: HOME | DRG: 329 | End: 2019-03-28
Attending: SURGERY | Admitting: SURGERY
Payer: OTHER GOVERNMENT

## 2019-03-25 DIAGNOSIS — E11.65 UNCONTROLLED TYPE 2 DIABETES MELLITUS WITH HYPERGLYCEMIA: Primary | ICD-10-CM

## 2019-03-25 DIAGNOSIS — C18.3 MALIGNANT NEOPLASM OF HEPATIC FLEXURE: ICD-10-CM

## 2019-03-25 PROBLEM — E78.5 HYPERLIPEMIA: Status: ACTIVE | Noted: 2019-03-25

## 2019-03-25 PROBLEM — F33.1 MODERATE EPISODE OF RECURRENT MAJOR DEPRESSIVE DISORDER: Status: ACTIVE | Noted: 2019-03-25

## 2019-03-25 PROBLEM — I10 ESSENTIAL HYPERTENSION: Status: ACTIVE | Noted: 2019-03-25

## 2019-03-25 LAB
POCT GLUCOSE: 134 MG/DL (ref 70–110)
POCT GLUCOSE: 166 MG/DL (ref 70–110)

## 2019-03-25 PROCEDURE — 94799 UNLISTED PULMONARY SVC/PX: CPT

## 2019-03-25 PROCEDURE — 44160 PR REMVL COLON & TERM ILEUM W/ILEOCOLOSTOMY: ICD-10-PCS | Mod: ,,, | Performed by: SURGERY

## 2019-03-25 PROCEDURE — 36000712 HC OR TIME LEV V 1ST 15 MIN: Performed by: SURGERY

## 2019-03-25 PROCEDURE — 94761 N-INVAS EAR/PLS OXIMETRY MLT: CPT

## 2019-03-25 PROCEDURE — 63600175 PHARM REV CODE 636 W HCPCS: Performed by: SURGERY

## 2019-03-25 PROCEDURE — 88304 TISSUE SPECIMEN TO PATHOLOGY - SURGERY: ICD-10-PCS | Mod: 26,,, | Performed by: PATHOLOGY

## 2019-03-25 PROCEDURE — 37000009 HC ANESTHESIA EA ADD 15 MINS: Performed by: SURGERY

## 2019-03-25 PROCEDURE — 88309 TISSUE EXAM BY PATHOLOGIST: CPT | Performed by: PATHOLOGY

## 2019-03-25 PROCEDURE — 71000033 HC RECOVERY, INTIAL HOUR: Performed by: SURGERY

## 2019-03-25 PROCEDURE — 88304 TISSUE EXAM BY PATHOLOGIST: CPT | Mod: 26,,, | Performed by: PATHOLOGY

## 2019-03-25 PROCEDURE — 25000003 PHARM REV CODE 250: Performed by: SURGERY

## 2019-03-25 PROCEDURE — D9220A PRA ANESTHESIA: ICD-10-PCS | Mod: ,,, | Performed by: ANESTHESIOLOGY

## 2019-03-25 PROCEDURE — C9290 INJ, BUPIVACAINE LIPOSOME: HCPCS | Performed by: SURGERY

## 2019-03-25 PROCEDURE — 25000003 PHARM REV CODE 250: Performed by: HOSPITALIST

## 2019-03-25 PROCEDURE — 63600175 PHARM REV CODE 636 W HCPCS: Performed by: HOSPITALIST

## 2019-03-25 PROCEDURE — S0030 INJECTION, METRONIDAZOLE: HCPCS | Performed by: SURGERY

## 2019-03-25 PROCEDURE — S4991 NICOTINE PATCH NONLEGEND: HCPCS | Performed by: HOSPITALIST

## 2019-03-25 PROCEDURE — 25000003 PHARM REV CODE 250: Performed by: NURSE ANESTHETIST, CERTIFIED REGISTERED

## 2019-03-25 PROCEDURE — 99900104 DSU ONLY-NO CHARGE-EA ADD'L HR (STAT): Performed by: SURGERY

## 2019-03-25 PROCEDURE — 25000003 PHARM REV CODE 250: Performed by: ANESTHESIOLOGY

## 2019-03-25 PROCEDURE — C1765 ADHESION BARRIER: HCPCS | Performed by: SURGERY

## 2019-03-25 PROCEDURE — 63600175 PHARM REV CODE 636 W HCPCS: Performed by: ANESTHESIOLOGY

## 2019-03-25 PROCEDURE — C1894 INTRO/SHEATH, NON-LASER: HCPCS | Performed by: SURGERY

## 2019-03-25 PROCEDURE — 27201423 OPTIME MED/SURG SUP & DEVICES STERILE SUPPLY: Performed by: SURGERY

## 2019-03-25 PROCEDURE — 27000221 HC OXYGEN, UP TO 24 HOURS

## 2019-03-25 PROCEDURE — 47562 PR LAP,CHOLECYSTECTOMY: ICD-10-PCS | Mod: 51,,, | Performed by: SURGERY

## 2019-03-25 PROCEDURE — 44160 REMOVAL OF COLON: CPT | Mod: ,,, | Performed by: SURGERY

## 2019-03-25 PROCEDURE — 37000008 HC ANESTHESIA 1ST 15 MINUTES: Performed by: SURGERY

## 2019-03-25 PROCEDURE — D9220A PRA ANESTHESIA: Mod: ,,, | Performed by: ANESTHESIOLOGY

## 2019-03-25 PROCEDURE — 12000002 HC ACUTE/MED SURGE SEMI-PRIVATE ROOM

## 2019-03-25 PROCEDURE — 63600175 PHARM REV CODE 636 W HCPCS: Performed by: NURSE ANESTHETIST, CERTIFIED REGISTERED

## 2019-03-25 PROCEDURE — 88309 TISSUE SPECIMEN TO PATHOLOGY - SURGERY: ICD-10-PCS | Mod: 26,,, | Performed by: PATHOLOGY

## 2019-03-25 PROCEDURE — S5571 INSULIN DISPOS PEN 3 ML: HCPCS | Performed by: HOSPITALIST

## 2019-03-25 PROCEDURE — 71000039 HC RECOVERY, EACH ADD'L HOUR: Performed by: SURGERY

## 2019-03-25 PROCEDURE — 99900103 DSU ONLY-NO CHARGE-INITIAL HR (STAT): Performed by: SURGERY

## 2019-03-25 PROCEDURE — 47562 LAPAROSCOPIC CHOLECYSTECTOMY: CPT | Mod: 51,,, | Performed by: SURGERY

## 2019-03-25 PROCEDURE — 36000713 HC OR TIME LEV V EA ADD 15 MIN: Performed by: SURGERY

## 2019-03-25 DEVICE — MEMBRANE SEPRAFILM 5 X 6: Type: IMPLANTABLE DEVICE | Site: ABDOMEN | Status: FUNCTIONAL

## 2019-03-25 RX ORDER — EPHEDRINE SULFATE 50 MG/ML
INJECTION, SOLUTION INTRAVENOUS
Status: DISCONTINUED | OUTPATIENT
Start: 2019-03-25 | End: 2019-03-25

## 2019-03-25 RX ORDER — LIDOCAINE HCL/PF 100 MG/5ML
SYRINGE (ML) INTRAVENOUS
Status: DISCONTINUED | OUTPATIENT
Start: 2019-03-25 | End: 2019-03-25

## 2019-03-25 RX ORDER — HYDROMORPHONE HYDROCHLORIDE 2 MG/ML
0.5 INJECTION, SOLUTION INTRAMUSCULAR; INTRAVENOUS; SUBCUTANEOUS EVERY 4 HOURS PRN
Status: DISCONTINUED | OUTPATIENT
Start: 2019-03-25 | End: 2019-03-26

## 2019-03-25 RX ORDER — BICALUTAMIDE 50 MG/1
50 TABLET, FILM COATED ORAL DAILY
Status: DISCONTINUED | OUTPATIENT
Start: 2019-03-26 | End: 2019-03-28 | Stop reason: HOSPADM

## 2019-03-25 RX ORDER — ONDANSETRON 2 MG/ML
4 INJECTION INTRAMUSCULAR; INTRAVENOUS EVERY 12 HOURS PRN
Status: DISCONTINUED | OUTPATIENT
Start: 2019-03-25 | End: 2019-03-28 | Stop reason: HOSPADM

## 2019-03-25 RX ORDER — DIPHENHYDRAMINE HYDROCHLORIDE 50 MG/ML
25 INJECTION INTRAMUSCULAR; INTRAVENOUS EVERY 6 HOURS PRN
Status: DISCONTINUED | OUTPATIENT
Start: 2019-03-25 | End: 2019-03-25 | Stop reason: HOSPADM

## 2019-03-25 RX ORDER — ACETAMINOPHEN 10 MG/ML
INJECTION, SOLUTION INTRAVENOUS
Status: DISCONTINUED | OUTPATIENT
Start: 2019-03-25 | End: 2019-03-25

## 2019-03-25 RX ORDER — OXYCODONE HYDROCHLORIDE 5 MG/1
5 TABLET ORAL EVERY 6 HOURS PRN
Status: DISCONTINUED | OUTPATIENT
Start: 2019-03-25 | End: 2019-03-28 | Stop reason: HOSPADM

## 2019-03-25 RX ORDER — IBUPROFEN 200 MG
1 TABLET ORAL DAILY
Status: DISCONTINUED | OUTPATIENT
Start: 2019-03-25 | End: 2019-03-28 | Stop reason: HOSPADM

## 2019-03-25 RX ORDER — SODIUM CHLORIDE 0.9 % (FLUSH) 0.9 %
3 SYRINGE (ML) INJECTION
Status: DISCONTINUED | OUTPATIENT
Start: 2019-03-25 | End: 2019-03-25 | Stop reason: HOSPADM

## 2019-03-25 RX ORDER — IBUPROFEN 200 MG
1 TABLET ORAL DAILY
Status: DISCONTINUED | OUTPATIENT
Start: 2019-03-26 | End: 2019-03-25

## 2019-03-25 RX ORDER — ONDANSETRON 2 MG/ML
INJECTION INTRAMUSCULAR; INTRAVENOUS
Status: DISCONTINUED | OUTPATIENT
Start: 2019-03-25 | End: 2019-03-25

## 2019-03-25 RX ORDER — KETOROLAC TROMETHAMINE 30 MG/ML
15 INJECTION, SOLUTION INTRAMUSCULAR; INTRAVENOUS EVERY 6 HOURS PRN
Status: DISPENSED | OUTPATIENT
Start: 2019-03-25 | End: 2019-03-28

## 2019-03-25 RX ORDER — ONDANSETRON 2 MG/ML
4 INJECTION INTRAMUSCULAR; INTRAVENOUS ONCE
Status: DISCONTINUED | OUTPATIENT
Start: 2019-03-25 | End: 2019-03-25 | Stop reason: HOSPADM

## 2019-03-25 RX ORDER — VENLAFAXINE 100 MG/1
100 TABLET ORAL 3 TIMES DAILY
Status: DISCONTINUED | OUTPATIENT
Start: 2019-03-25 | End: 2019-03-25 | Stop reason: SDUPTHER

## 2019-03-25 RX ORDER — HYDROCHLOROTHIAZIDE 25 MG/1
25 TABLET ORAL DAILY
Status: DISCONTINUED | OUTPATIENT
Start: 2019-03-26 | End: 2019-03-26

## 2019-03-25 RX ORDER — CEFAZOLIN SODIUM 2 G/50ML
2 SOLUTION INTRAVENOUS
Status: COMPLETED | OUTPATIENT
Start: 2019-03-25 | End: 2019-03-26

## 2019-03-25 RX ORDER — GLUCAGON 1 MG
1 KIT INJECTION
Status: DISCONTINUED | OUTPATIENT
Start: 2019-03-25 | End: 2019-03-28 | Stop reason: HOSPADM

## 2019-03-25 RX ORDER — MEPERIDINE HYDROCHLORIDE 50 MG/ML
12.5 INJECTION INTRAMUSCULAR; INTRAVENOUS; SUBCUTANEOUS ONCE AS NEEDED
Status: DISCONTINUED | OUTPATIENT
Start: 2019-03-25 | End: 2019-03-25 | Stop reason: HOSPADM

## 2019-03-25 RX ORDER — OXYCODONE HYDROCHLORIDE 5 MG/1
5 TABLET ORAL
Status: DISCONTINUED | OUTPATIENT
Start: 2019-03-25 | End: 2019-03-25 | Stop reason: HOSPADM

## 2019-03-25 RX ORDER — ACETAMINOPHEN 10 MG/ML
1000 INJECTION, SOLUTION INTRAVENOUS EVERY 6 HOURS
Status: DISCONTINUED | OUTPATIENT
Start: 2019-03-25 | End: 2019-03-26

## 2019-03-25 RX ORDER — PANTOPRAZOLE SODIUM 40 MG/1
40 TABLET, DELAYED RELEASE ORAL
Status: DISCONTINUED | OUTPATIENT
Start: 2019-03-26 | End: 2019-03-28 | Stop reason: HOSPADM

## 2019-03-25 RX ORDER — SODIUM CHLORIDE 9 MG/ML
INJECTION, SOLUTION INTRAVENOUS CONTINUOUS
Status: DISCONTINUED | OUTPATIENT
Start: 2019-03-25 | End: 2019-03-26

## 2019-03-25 RX ORDER — MIDAZOLAM HYDROCHLORIDE 1 MG/ML
INJECTION, SOLUTION INTRAMUSCULAR; INTRAVENOUS
Status: DISCONTINUED | OUTPATIENT
Start: 2019-03-25 | End: 2019-03-25

## 2019-03-25 RX ORDER — ENOXAPARIN SODIUM 100 MG/ML
40 INJECTION SUBCUTANEOUS EVERY 24 HOURS
Status: DISCONTINUED | OUTPATIENT
Start: 2019-03-26 | End: 2019-03-28 | Stop reason: HOSPADM

## 2019-03-25 RX ORDER — ATORVASTATIN CALCIUM 10 MG/1
10 TABLET, FILM COATED ORAL DAILY
Status: DISCONTINUED | OUTPATIENT
Start: 2019-03-26 | End: 2019-03-28 | Stop reason: HOSPADM

## 2019-03-25 RX ORDER — HYDROMORPHONE HYDROCHLORIDE 2 MG/ML
1 INJECTION, SOLUTION INTRAMUSCULAR; INTRAVENOUS; SUBCUTANEOUS EVERY 4 HOURS PRN
Status: DISCONTINUED | OUTPATIENT
Start: 2019-03-25 | End: 2019-03-25

## 2019-03-25 RX ORDER — METRONIDAZOLE 500 MG/100ML
500 INJECTION, SOLUTION INTRAVENOUS
Status: COMPLETED | OUTPATIENT
Start: 2019-03-25 | End: 2019-03-25

## 2019-03-25 RX ORDER — BUPIVACAINE HYDROCHLORIDE AND EPINEPHRINE 2.5; 5 MG/ML; UG/ML
INJECTION, SOLUTION EPIDURAL; INFILTRATION; INTRACAUDAL; PERINEURAL
Status: DISCONTINUED | OUTPATIENT
Start: 2019-03-25 | End: 2019-03-25 | Stop reason: HOSPADM

## 2019-03-25 RX ORDER — HYDROMORPHONE HYDROCHLORIDE 2 MG/ML
INJECTION, SOLUTION INTRAMUSCULAR; INTRAVENOUS; SUBCUTANEOUS
Status: DISCONTINUED | OUTPATIENT
Start: 2019-03-25 | End: 2019-03-25

## 2019-03-25 RX ORDER — PROPOFOL 10 MG/ML
VIAL (ML) INTRAVENOUS
Status: DISCONTINUED | OUTPATIENT
Start: 2019-03-25 | End: 2019-03-25

## 2019-03-25 RX ORDER — LISINOPRIL 2.5 MG/1
5 TABLET ORAL DAILY
Status: DISCONTINUED | OUTPATIENT
Start: 2019-03-26 | End: 2019-03-28 | Stop reason: HOSPADM

## 2019-03-25 RX ORDER — SODIUM CHLORIDE, SODIUM LACTATE, POTASSIUM CHLORIDE, CALCIUM CHLORIDE 600; 310; 30; 20 MG/100ML; MG/100ML; MG/100ML; MG/100ML
INJECTION, SOLUTION INTRAVENOUS CONTINUOUS PRN
Status: DISCONTINUED | OUTPATIENT
Start: 2019-03-25 | End: 2019-03-25

## 2019-03-25 RX ORDER — SODIUM CHLORIDE, SODIUM LACTATE, POTASSIUM CHLORIDE, CALCIUM CHLORIDE 600; 310; 30; 20 MG/100ML; MG/100ML; MG/100ML; MG/100ML
75 INJECTION, SOLUTION INTRAVENOUS CONTINUOUS
Status: DISCONTINUED | OUTPATIENT
Start: 2019-03-25 | End: 2019-03-25

## 2019-03-25 RX ORDER — LIDOCAINE HYDROCHLORIDE 10 MG/ML
1 INJECTION, SOLUTION EPIDURAL; INFILTRATION; INTRACAUDAL; PERINEURAL ONCE
Status: DISCONTINUED | OUTPATIENT
Start: 2019-03-25 | End: 2019-03-25 | Stop reason: HOSPADM

## 2019-03-25 RX ORDER — FENTANYL CITRATE 50 UG/ML
INJECTION, SOLUTION INTRAMUSCULAR; INTRAVENOUS
Status: DISCONTINUED | OUTPATIENT
Start: 2019-03-25 | End: 2019-03-25

## 2019-03-25 RX ORDER — HYDROMORPHONE HYDROCHLORIDE 2 MG/ML
0.2 INJECTION, SOLUTION INTRAMUSCULAR; INTRAVENOUS; SUBCUTANEOUS EVERY 5 MIN PRN
Status: DISCONTINUED | OUTPATIENT
Start: 2019-03-25 | End: 2019-03-25 | Stop reason: HOSPADM

## 2019-03-25 RX ORDER — INSULIN ASPART 100 [IU]/ML
1-10 INJECTION, SOLUTION INTRAVENOUS; SUBCUTANEOUS EVERY 6 HOURS PRN
Status: DISCONTINUED | OUTPATIENT
Start: 2019-03-25 | End: 2019-03-28 | Stop reason: HOSPADM

## 2019-03-25 RX ORDER — GLYCOPYRROLATE 0.2 MG/ML
INJECTION INTRAMUSCULAR; INTRAVENOUS
Status: DISCONTINUED | OUTPATIENT
Start: 2019-03-25 | End: 2019-03-25

## 2019-03-25 RX ORDER — LABETALOL 100 MG/1
300 TABLET, FILM COATED ORAL ONCE
Status: DISCONTINUED | OUTPATIENT
Start: 2019-03-25 | End: 2019-03-28 | Stop reason: HOSPADM

## 2019-03-25 RX ORDER — OXYCODONE HYDROCHLORIDE 5 MG/1
10 TABLET ORAL EVERY 6 HOURS PRN
Status: DISCONTINUED | OUTPATIENT
Start: 2019-03-25 | End: 2019-03-28 | Stop reason: HOSPADM

## 2019-03-25 RX ORDER — VENLAFAXINE 50 MG/1
100 TABLET ORAL 3 TIMES DAILY
Status: DISCONTINUED | OUTPATIENT
Start: 2019-03-25 | End: 2019-03-28 | Stop reason: HOSPADM

## 2019-03-25 RX ORDER — SODIUM CHLORIDE, SODIUM LACTATE, POTASSIUM CHLORIDE, CALCIUM CHLORIDE 600; 310; 30; 20 MG/100ML; MG/100ML; MG/100ML; MG/100ML
INJECTION, SOLUTION INTRAVENOUS CONTINUOUS
Status: DISCONTINUED | OUTPATIENT
Start: 2019-03-25 | End: 2019-03-25

## 2019-03-25 RX ORDER — GABAPENTIN 400 MG/1
400 CAPSULE ORAL 3 TIMES DAILY
Status: DISCONTINUED | OUTPATIENT
Start: 2019-03-25 | End: 2019-03-28 | Stop reason: HOSPADM

## 2019-03-25 RX ORDER — METRONIDAZOLE 500 MG/100ML
500 INJECTION, SOLUTION INTRAVENOUS
Status: COMPLETED | OUTPATIENT
Start: 2019-03-25 | End: 2019-03-26

## 2019-03-25 RX ORDER — FENTANYL CITRATE 50 UG/ML
25 INJECTION, SOLUTION INTRAMUSCULAR; INTRAVENOUS EVERY 5 MIN PRN
Status: DISCONTINUED | OUTPATIENT
Start: 2019-03-25 | End: 2019-03-25 | Stop reason: HOSPADM

## 2019-03-25 RX ORDER — ROCURONIUM BROMIDE 10 MG/ML
INJECTION, SOLUTION INTRAVENOUS
Status: DISCONTINUED | OUTPATIENT
Start: 2019-03-25 | End: 2019-03-25

## 2019-03-25 RX ORDER — IBUPROFEN 600 MG/1
600 TABLET ORAL 4 TIMES DAILY
Status: DISCONTINUED | OUTPATIENT
Start: 2019-03-25 | End: 2019-03-28 | Stop reason: HOSPADM

## 2019-03-25 RX ORDER — NEOSTIGMINE METHYLSULFATE 1 MG/ML
INJECTION, SOLUTION INTRAVENOUS
Status: DISCONTINUED | OUTPATIENT
Start: 2019-03-25 | End: 2019-03-25

## 2019-03-25 RX ADMIN — SODIUM CHLORIDE, SODIUM LACTATE, POTASSIUM CHLORIDE, AND CALCIUM CHLORIDE 10 ML: .6; .31; .03; .02 INJECTION, SOLUTION INTRAVENOUS at 08:03

## 2019-03-25 RX ADMIN — ROCURONIUM BROMIDE 20 MG: 10 INJECTION, SOLUTION INTRAVENOUS at 11:03

## 2019-03-25 RX ADMIN — IBUPROFEN 600 MG: 600 TABLET ORAL at 09:03

## 2019-03-25 RX ADMIN — HYDROMORPHONE HYDROCHLORIDE 0.2 MG: 2 INJECTION INTRAMUSCULAR; INTRAVENOUS; SUBCUTANEOUS at 01:03

## 2019-03-25 RX ADMIN — VENLAFAXINE 100 MG: 50 TABLET ORAL at 04:03

## 2019-03-25 RX ADMIN — METRONIDAZOLE 500 MG: 500 INJECTION, SOLUTION INTRAVENOUS at 09:03

## 2019-03-25 RX ADMIN — VENLAFAXINE 100 MG: 50 TABLET ORAL at 09:03

## 2019-03-25 RX ADMIN — FENTANYL CITRATE 100 MCG: 50 INJECTION, SOLUTION INTRAMUSCULAR; INTRAVENOUS at 09:03

## 2019-03-25 RX ADMIN — PROPOFOL 150 MG: 10 INJECTION, EMULSION INTRAVENOUS at 09:03

## 2019-03-25 RX ADMIN — MIDAZOLAM 2 MG: 1 INJECTION INTRAMUSCULAR; INTRAVENOUS at 09:03

## 2019-03-25 RX ADMIN — ROCURONIUM BROMIDE 20 MG: 10 INJECTION, SOLUTION INTRAVENOUS at 10:03

## 2019-03-25 RX ADMIN — HYDROMORPHONE HYDROCHLORIDE 0.6 MG: 2 INJECTION INTRAMUSCULAR; INTRAVENOUS; SUBCUTANEOUS at 10:03

## 2019-03-25 RX ADMIN — ACETAMINOPHEN 1000 MG: 10 INJECTION, SOLUTION INTRAVENOUS at 10:03

## 2019-03-25 RX ADMIN — NEOSTIGMINE METHYLSULFATE 5 MG: 1 INJECTION INTRAVENOUS at 12:03

## 2019-03-25 RX ADMIN — INSULIN ASPART 2 UNITS: 100 INJECTION, SOLUTION INTRAVENOUS; SUBCUTANEOUS at 09:03

## 2019-03-25 RX ADMIN — GLYCOPYRROLATE 0.4 MG: 0.2 INJECTION, SOLUTION INTRAMUSCULAR; INTRAVENOUS at 12:03

## 2019-03-25 RX ADMIN — ROCURONIUM BROMIDE 40 MG: 10 INJECTION, SOLUTION INTRAVENOUS at 09:03

## 2019-03-25 RX ADMIN — GABAPENTIN 400 MG: 400 CAPSULE ORAL at 03:03

## 2019-03-25 RX ADMIN — KETOROLAC TROMETHAMINE 15 MG: 30 INJECTION, SOLUTION INTRAMUSCULAR at 04:03

## 2019-03-25 RX ADMIN — GABAPENTIN 400 MG: 400 CAPSULE ORAL at 09:03

## 2019-03-25 RX ADMIN — ROCURONIUM BROMIDE 20 MG: 10 INJECTION, SOLUTION INTRAVENOUS at 09:03

## 2019-03-25 RX ADMIN — SODIUM CHLORIDE: 0.9 INJECTION, SOLUTION INTRAVENOUS at 02:03

## 2019-03-25 RX ADMIN — INSULIN DETEMIR 10 UNITS: 100 INJECTION, SOLUTION SUBCUTANEOUS at 09:03

## 2019-03-25 RX ADMIN — GLYCOPYRROLATE 0.2 MG: 0.2 INJECTION, SOLUTION INTRAMUSCULAR; INTRAVENOUS at 10:03

## 2019-03-25 RX ADMIN — LIDOCAINE HYDROCHLORIDE 100 MG: 20 INJECTION, SOLUTION INTRAVENOUS at 09:03

## 2019-03-25 RX ADMIN — ONDANSETRON 4 MG: 2 INJECTION, SOLUTION INTRAMUSCULAR; INTRAVENOUS at 11:03

## 2019-03-25 RX ADMIN — SUGAMMADEX 250 MG: 100 INJECTION, SOLUTION INTRAVENOUS at 12:03

## 2019-03-25 RX ADMIN — NICOTINE 1 PATCH: 14 PATCH, EXTENDED RELEASE TRANSDERMAL at 06:03

## 2019-03-25 RX ADMIN — HYDROMORPHONE HYDROCHLORIDE 0.2 MG: 2 INJECTION INTRAMUSCULAR; INTRAVENOUS; SUBCUTANEOUS at 02:03

## 2019-03-25 RX ADMIN — EPHEDRINE SULFATE 10 MG: 50 INJECTION, SOLUTION INTRAMUSCULAR; INTRAVENOUS; SUBCUTANEOUS at 11:03

## 2019-03-25 RX ADMIN — IBUPROFEN 600 MG: 600 TABLET ORAL at 04:03

## 2019-03-25 RX ADMIN — CEFTRIAXONE 2 G: 2 INJECTION, SOLUTION INTRAVENOUS at 09:03

## 2019-03-25 RX ADMIN — HYDROMORPHONE HYDROCHLORIDE 0.4 MG: 2 INJECTION INTRAMUSCULAR; INTRAVENOUS; SUBCUTANEOUS at 11:03

## 2019-03-25 RX ADMIN — TRAZODONE HYDROCHLORIDE 100 MG: 50 TABLET ORAL at 09:03

## 2019-03-25 RX ADMIN — METRONIDAZOLE 500 MG: 500 INJECTION, SOLUTION INTRAVENOUS at 05:03

## 2019-03-25 RX ADMIN — ACETAMINOPHEN 1000 MG: 10 INJECTION, SOLUTION INTRAVENOUS at 06:03

## 2019-03-25 RX ADMIN — EPHEDRINE SULFATE 25 MG: 50 INJECTION, SOLUTION INTRAMUSCULAR; INTRAVENOUS; SUBCUTANEOUS at 10:03

## 2019-03-25 RX ADMIN — CEFAZOLIN SODIUM 2 G: 2 SOLUTION INTRAVENOUS at 04:03

## 2019-03-25 RX ADMIN — SODIUM CHLORIDE: 0.9 INJECTION, SOLUTION INTRAVENOUS at 09:03

## 2019-03-25 RX ADMIN — EPHEDRINE SULFATE 15 MG: 50 INJECTION, SOLUTION INTRAMUSCULAR; INTRAVENOUS; SUBCUTANEOUS at 10:03

## 2019-03-25 RX ADMIN — SODIUM CHLORIDE, SODIUM LACTATE, POTASSIUM CHLORIDE, AND CALCIUM CHLORIDE: .6; .31; .03; .02 INJECTION, SOLUTION INTRAVENOUS at 08:03

## 2019-03-25 NOTE — OP NOTE
DATE OF PROCEDURE:  03/25/2019.    STAFF SURGEON:  Mark Renee M.D.    ASSISTANT:  AWAIS Prince.    PREOPERATIVE DIAGNOSIS:  Colon cancer.    POSTOPERATIVE DIAGNOSES:  Colon cancer as well as cholelithiasis.    PROCEDURES PERFORMED:  1.  Laparoscopic cholecystectomy.  2.  Robotic-assisted R hemicolectomy with ileocolic anastomosis.    ANESTHESIA:  General endotracheal anesthesia.    INDICATION FOR PROCEDURE:  A pleasant 73-year-old gentleman who presented to my   office following colonoscopy, which already had a mass in the hepatic flexure.    This was biopsied and found to be adenocarcinoma.  Preoperative CT scan   demonstrated findings suggestive of hepatic masses or metastatic disease;   however he did have significant cholelithiasis.  He was scheduled for   laparoscopic cholecystectomy with a right hemicolectomy.    DESCRIPTION OF PROCEDURE:  Following signing of informed consent, he received   preoperative antibiotics, taken to the OR and placed in supine position.  SCDs   were placed.  General anesthesia was administered without event.  Burkett catheter   was placed and abdomen was prepped and draped in standard fashion.  Following   prepping and draping the patient, appropriate timeout procedure was then   performed.  Having performed timeout procedure, I began performing a   laparoscopic cholecystectomy.  I picked a point just below the umbilicus into   the left of midline in the mid clavicular line.  I made a transverse incision   and placed a 12 mm long trocar under direct visualization.  I then evaluated the   abdominal cavity.  The patient has hepatic flexure significant just beneath the   rib cage in the right upper quadrant.  The liver was evaluated.  There was   evidence of metastatic disease.  I placed a 5 mm epigastric trocar, two in the   right subcostal margin under direct visualization.  I identified the   gallbladder.  I grasped the fundus of the gallbladder holding it up over the    liver.  This exposed the infundibulum, which I grasped and held towards the   patient's feet.  I dissected the triangle of Calot, identifying the cystic duct   and cystic artery in the usual anatomic locations.  I placed 2 clips distally on   the cystic duct, 1 clip proximally, 2 clips on the cystic artery.  I cut   between clips and used hook cautery to remove the gallbladder from the hepatic   fossa.  I then removed the laparoscopic instrumentation.  I then placed another 12 mm   stapling trocar in the epigastric region just to the left midline under direct   visualization, I placed a stapling trocar.  I placed an 8 mm robotic trocar   laterally in the left upper abdomen and another one just to the right of midline   just medial to the ASIS.  These were all placed under direct visualization.  I   then docked the robot and commenced the robotic portion of the case.  I   mobilized the small bowel and identified the ileocolic vessel.  I dissected   posterior to this, sweeping the duodenum posterior.  I then ligated the ileocolic   vessel at its base and sweeping the retroperitoneal, sweeping the duodenum   posterior and left towards the hepatic flexure.  The patient as mentioned   previously, the hepatic flexure is very high riding and my trocars were too low   To reach all the  way across.  I then mobilized the terminal ileum from its   retroperitoneal attachment in the right lower quadrant.  I mobilized the   entirety of the TI so that it will reach to make an anastomosis.  I then mobilized along the white line of Toldt,   but was unable to fully take down the hepatic flexure.  Multiple attempts were   made and position changes were made; however, unable to progress.  I therefore   converted to a local, making approximately 8 cm incision in the upper midline,   carried down through deep dermal tissue.  I then dissected the fascia, opening   the peritoneal cavity in the midline.  I then  the omentum from  the   transverse colon, I am able to fully take down the hepatic flexure and mobilize   the colon.  I picked my point of proximal resection in the terminal ileum just   proximal to the fold of Treves.  I stapled across the terminal ileum and ligated   the mesentery with silk ties.  I mobilized the remaining portion of the colon.    I picked my point of distal transection 6 cm distal to the palpable mass.  This   was just proximal to the takeoff of the right branch coming off the middle   colic vessel.  I stapled across the colon and removed the specimen.  I then   fashioned and created a stapled side-to-side ileocolic anastomosis, making a   colotomy and enterotomy.  I then placed the stapler through that.  Having   created common ostomy, I stapled across the common enterotomy closing the   defect.  I then oversewed the staple lines and placed stitch in the crotch and   placed vascularized omentum over the anastomosis.  I irrigated and ensured   adequate hemostasis.  I placed Seprafilm, I closed the fascia with a running   loop PDS suture.  I closed all skin incision with 4-0 Monocryl.  The patient was   extubated, taken to Recovery Room in stable condition.  There were no immediate   complications.  Blood loss was approximately 100 mL.      ANGELINA/IN  dd: 03/25/2019 13:23:48 (CDT)  td: 03/25/2019 14:48:00 (VALDEMAR)  Doc ID   #[id number]  Job ID #[id number]    CC:

## 2019-03-25 NOTE — ASSESSMENT & PLAN NOTE
Patient's anemia is currently controlled. S/p 0 units of PRBCs.   Current CBC reviewed-   Lab Results   Component Value Date    HGB 13.3 (L) 03/22/2019    HCT 40.4 03/22/2019     Monitor serial CBC and transfuse if patient becomes hemodynamically unstable, symptomatic or H/H drops below 7/21.

## 2019-03-25 NOTE — TRANSFER OF CARE
Anesthesia Transfer of Care Note    Patient: Senthil Chandler    Procedure(s) Performed: Procedure(s) (LRB):  ROBOTIC COLECTOMY possible conversion to open (Right)  COLECTOMY, PARTIAL (Right)  CHOLECYSTECTOMY, LAPAROSCOPIC (Right)    Patient location: PACU    Anesthesia Type: general    Transport from OR: Transported from OR on 2-3 L/min O2 by NC with adequate spontaneous ventilation    Post pain: adequate analgesia    Post assessment: no apparent anesthetic complications and tolerated procedure well    Post vital signs: stable    Level of consciousness: awake    Nausea/Vomiting: no nausea/vomiting    Complications: none    Transfer of care protocol was followed      Last vitals:   Visit Vitals  /63   Pulse 78   Temp 36.8 °C (98.3 °F) (Temporal)   Resp 16   Ht 6' (1.829 m)   Wt 106.6 kg (235 lb)   SpO2 97%   BMI 31.87 kg/m²

## 2019-03-25 NOTE — PLAN OF CARE
Patient is stable at this time.  VSS. Anesthesiologist at patient's bedside and is ok for patient to transfer to the floor.  Dressings to midline abdomen with scant amount of drainage marked.  Pain is a 5/10.  No complaints of nausea or vomiting.  Patient tolerating po intake well.

## 2019-03-25 NOTE — NURSING
Pt arrived to room 312 from PACU. Burkett to gravity. IV fluids infusing. Bed alarm set. Denies pain. Lap sites noted x 6. Pt resting. Call light in reach. Will monitor.

## 2019-03-25 NOTE — ASSESSMENT & PLAN NOTE
Chronic problem, controlled. Will continue chronic medication(s), Effexor, trazodone and monitor for any changes, adjusting as needed.

## 2019-03-25 NOTE — ASSESSMENT & PLAN NOTE
Patient's FSGs are controlled on current hypoglycemics.   Last A1c reviewed- No results found for: LABA1C, HGBA1C  Most recent fingerstick glucose reviewed- No results for input(s): POCTGLUCOSE in the last 24 hours.  Current correctional scale  Medium  Maintain anti-hyperglycemic dose as follows-   Antihyperglycemics (From admission, onward)    Start     Stop Route Frequency Ordered    03/25/19 2100  insulin detemir U-100 pen 10 Units      -- SubQ Nightly 03/25/19 1522    03/25/19 1621  insulin aspart U-100 pen 1-10 Units      -- SubQ Every 6 hours PRN 03/25/19 1522

## 2019-03-25 NOTE — ASSESSMENT & PLAN NOTE
The patient appears to be doing well postoperatively.  Will defer diet advancement and wound management to General surgery.  Continue pain control and mobilization with physical therapy and occupational therapy.  Will check CBC and CMP daily.

## 2019-03-25 NOTE — INTERVAL H&P NOTE
The patient has been examined and the H&P has been reviewed:    I concur with the findings and changes have been noted since the H&P was written: Pt with cholelithiasis on ct scan and as such will proceed with lap effie in addition to colectomy    Anesthesia/Surgery risks, benefits and alternative options discussed and understood by patient/family.          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

## 2019-03-25 NOTE — ANESTHESIA PREPROCEDURE EVALUATION
03/25/2019  Senthil Chandler is a 73 y.o., male.    Anesthesia Evaluation    I have reviewed the Patient Summary Reports.    I have reviewed the Nursing Notes.   I have reviewed the Medications.     Review of Systems  Social:  Smoker Smoking Status: Heavy Tobacco Smoker - 52 pack years  Smokeless Tobacco Status: Never Used  Alcohol use: Yes, unspecified volume  Drug use: No       Cardiovascular:   Hypertension    Pulmonary:   COPD, moderate    Renal/:   Chronic Renal Disease CKD (chronic kidney disease) stage 1, GFR 90 ml/min or greater Hyperpotassemia  Colon polyp      Hepatic/GI:   PUD, GERD, poorly controlled    Musculoskeletal:   Arthritis     Neurological:   Neuromuscular Disease,    Endocrine:   Diabetes, poorly controlled, type 2, using insulin    Psych:   Psychiatric History          Physical Exam  General:  Well nourished    Airway/Jaw/Neck:  Airway Findings: Mouth Opening: Normal Tongue: Normal  General Airway Assessment: Adult, Good  Mallampati: II  Improves to II with phonation.  TM Distance: 4-6 cm      Dental:  Dental Findings: In tact   Chest/Lungs:  Chest/Lungs Findings: Clear to auscultation, Normal Respiratory Rate     Heart/Vascular:  Heart Findings: Rate: Normal  Rhythm: Regular Rhythm  Sounds: Normal  Heart murmur: negative       Mental Status:  Mental Status Findings:  Cooperative, Alert and Oriented         Anesthesia Plan  Type of Anesthesia, risks & benefits discussed:  Anesthesia Type:  general  Patient's Preference:   Intra-op Monitoring Plan: standard ASA monitors  Intra-op Monitoring Plan Comments:   Post Op Pain Control Plan:   Post Op Pain Control Plan Comments:   Induction:   IV  Beta Blocker:  Patient is on a Beta-Blocker and has received one dose within the past 24 hours (No further documentation required).       Informed Consent: Patient understands risks and agrees with  Anesthesia plan.  Questions answered. Anesthesia consent signed with patient.  ASA Score: 3     Day of Surgery Review of History & Physical: I have interviewed and examined the patient. I have reviewed the patient's H&P dated:  There are no significant changes.  H&P update referred to the surgeon.         Ready For Surgery From Anesthesia Perspective.

## 2019-03-25 NOTE — ASSESSMENT & PLAN NOTE
Chronic, controlled.  Latest blood pressure and vitals reviewed-   Temp:  [97.3 °F (36.3 °C)-98.3 °F (36.8 °C)]   Pulse:  [64-78]   Resp:  [12-35]   BP: ()/(49-93)   SpO2:  [81 %-100 %] .   Current meds for hypertension include HCTZ, lisinopril, labetolol.  Will continue BP medications as needed for sustained BP control.

## 2019-03-25 NOTE — PLAN OF CARE
Pt in pre op assessment complete. Pt very anxious about surgery. Pt took bowel prep and states he was clear this am.  Nad. Denies pain  Will contimue to monitor  Pt couldn't take ring off of right ring finger. Taped with adhesive

## 2019-03-25 NOTE — H&P
[facility] Hospital Medicine  History & Physical    Patient Name: Senthil Chandler  MRN: [id number]  Admission Date: 3/25/2019  Attending Physician: Mann Holliday MD  Primary Care Provider: Laly Jaimes PA-C         Patient information was obtained from patient and past medical records.     Subjective:     Principal Problem:Malignant neoplasm of hepatic flexure    Chief Complaint: No chief complaint on file.       HPI:   Patient is a 73-year-old  male with a mass in the hepatic flexure of his colon the who was admitted postoperatively after a partial colectomy and reanastomosis.  The patient was initially attempted as robotic procedure in converted to open procedure with exploratory laparotomy.  He remains sedated postoperatively but is otherwise in no acute distress on exam.    Past Medical History:   Diagnosis Date    Adjustment disorder with anxious mood     Angiodysplasia     Arthritis     Cancer 1990    prostate     Carcinoma in situ of prostate     Carpal tunnel syndrome on left     CKD (chronic kidney disease) stage 1, GFR 90 ml/min or greater     Colon polyp     Cubital tunnel syndrome on left     Depressive disorder     Deviated nasal septum     Diabetes mellitus with ophthalmic complication     Diabetes mellitus, type 2     GERD (gastroesophageal reflux disease)     Hypercholesterolemia     Hyperpotassemia     Hypertension     Iron deficiency anemia     Kidney disease     focal segmental glomerulosclerosis    Lumbago     Malignant neoplasm of colon     Nephrotic syndrome     Neuropathy     Primary osteoarthritis of left elbow     PTSD (post-traumatic stress disorder)     PVD (peripheral vascular disease)     Spinal stenosis, lumbar region without neurogenic claudication     Stomach ulcer     Wears glasses        Past Surgical History:   Procedure Laterality Date    CARPAL TUNNEL RELEASE Left 10/23/2017    COLONOSCOPY N/A 2/20/2019     [facility]    COLONOSCOPY N/A 8/12/2016    Performed by [facility]    EGD (ESOPHAGOGASTRODUODENOSCOPY) N/A 2/20/2019    [facility]    ESOPHAGOGASTRODUODENOSCOPY (EGD) N/A 8/12/2016    [facility] ENDO    NASAL SEPTUM SURGERY      PROCTECTOMY      PROSTATE SURGERY  1990's    UPPER GASTROINTESTINAL ENDOSCOPY  08/12/2016    Dr. Roman       Review of patient's allergies indicates:  No Known Allergies    No current facility-administered medications on file prior to encounter.      Current Outpatient Medications on File Prior to Encounter   Medication Sig    atorvastatin (LIPITOR) 20 MG tablet Take 10 mg by mouth once daily.    bicalutamide (CASODEX) 50 MG Tab Take 50 mg by mouth once daily.    carboxymethylcellulose (REFRESH PLUS) 0.5 % Dpet Place 1 drop into both eyes 5 (five) times daily.    cholecalciferol, vitamin D3, 2,000 unit Tab Take by mouth once daily.    fluticasone 50 mcg/actuation DsDv Inhale 2 sprays into the lungs once daily.    gabapentin (NEURONTIN) 400 MG capsule Take 400 mg by mouth 3 (three) times daily. 2 qa/ 2 qnoon/ 3 qpm    GAVILYTE-G 236-22.74-6.74 -5.86 gram suspension USE AS DIRECTED    hydrochlorothiazide (HYDRODIURIL) 25 MG tablet Take 25 mg by mouth once daily.    insulin glargine (LANTUS) 100 unit/mL injection Inject into the skin once daily.     labetalol (NORMODYNE) 300 MG tablet Take 300 mg by mouth once.     lisinopril 10 MG tablet Take 0.5 tablets by mouth once daily.    trazodone (DESYREL) 100 MG tablet Take 150 mg by mouth every evening.     urea 50 % Crea Apply bid    venlafaxine (EFFEXOR) 100 MG Tab Take 100 mg by mouth 3 (three) times daily.    artificial saliva, cmce-lytes, SprP Take by mouth. 1 tsp. Daily prn dry mouth    liraglutide 0.6 mg/0.1 mL, 18 mg/3 mL, subq PNIJ (VICTOZA 2-IVAN) 0.6 mg/0.1 mL (18 mg/3 mL) PnIj once daily.     pantoprazole (PROTONIX) 40 MG tablet Take 1 tablet (40 mg total) by mouth once daily. Before breakfast     Family History     Problem Relation (Age of Onset)    Leukemia Mother, Father        Tobacco Use    Smoking status: Heavy Tobacco Smoker     Packs/day: 1.00     Years: 52.00     Pack years: 52.00     Types: Cigarettes    Smokeless tobacco: Never Used   Substance and Sexual Activity    Alcohol use: Yes     Comment: occasional- maybe one glass of wine a month    Drug use: No    Sexual activity: Not on file     Review of Systems   Constitutional: Negative for activity change and fever.   HENT: Negative for ear discharge, facial swelling and sore throat.    Eyes: Negative for photophobia, pain and visual disturbance.   Respiratory: Negative for apnea, cough and shortness of breath.    Cardiovascular: Negative for chest pain and leg swelling.   Gastrointestinal: Positive for abdominal pain and constipation. Negative for blood in stool.   Endocrine: Negative for cold intolerance and heat intolerance.   Musculoskeletal: Negative for back pain and gait problem.   Skin: Negative for pallor and rash.   Neurological: Negative for speech difficulty and headaches.   Psychiatric/Behavioral: Negative for confusion, hallucinations and suicidal ideas.   All other systems reviewed and are negative.    Objective:     Vital Signs (Most Recent):  Temp: 97.7 °F (36.5 °C) (03/25/19 1459)  Pulse: 69 (03/25/19 1459)  Resp: 18 (03/25/19 1459)  BP: (!) 110/55 (03/25/19 1459)  SpO2: (!) 94 % (03/25/19 1459) Vital Signs (24h Range):  Temp:  [97.3 °F (36.3 °C)-98.3 °F (36.8 °C)] 97.7 °F (36.5 °C)  Pulse:  [64-78] 69  Resp:  [12-35] 18  SpO2:  [81 %-100 %] 94 %  BP: ()/(49-93) 110/55     Weight: 106.6 kg (235 lb)  Body mass index is 31.87 kg/m².    Physical Exam   Constitutional: He is oriented to person, place, and time. He appears well-developed and well-nourished. No distress.   HENT:   Head: Normocephalic.   Mouth/Throat:  Oropharynx is clear and moist.   Eyes: Conjunctivae are normal. Right eye exhibits no discharge. Left eye exhibits no discharge. No scleral icterus.   Neck: No JVD present.   Cardiovascular: Normal rate, regular rhythm, normal heart sounds and intact distal pulses. Exam reveals no friction rub.   No murmur heard.  Pulmonary/Chest: Effort normal and breath sounds normal. No respiratory distress. He has no wheezes.   Abdominal: He exhibits no distension. There is tenderness. There is guarding.   Midline exploratory laparotomy incision with small amount of bloody drainage noted at the inferior pole.  Appropriate tenderness noted.  Hypoactive bowel sounds.   Musculoskeletal: Normal range of motion. He exhibits no edema.   Lymphadenopathy:     He has no cervical adenopathy.   Neurological: He is alert and oriented to person, place, and time. He has normal reflexes.   Skin: No rash noted. He is not diaphoretic. No erythema.   Psychiatric: He has a normal mood and affect. His behavior is normal.   Nursing note and vitals reviewed.          Significant Labs: All pertinent labs within the past 24 hours have been reviewed.    Significant Imaging: I have reviewed all pertinent imaging results/findings within the past 24 hours.    Assessment/Plan:     Malignant neoplasm of hepatic flexure    The patient appears to be doing well postoperatively.  Will defer diet advancement and wound management to General surgery.  Continue pain control and mobilization with physical therapy and occupational therapy.  Will check CBC and CMP daily.      HECTOR (iron deficiency anemia)  Patient's anemia is currently controlled. S/p 0 units of PRBCs.   Current CBC reviewed-   Lab Results   Component Value Date    HGB 13.3 (L) 03/22/2019    HCT 40.4 03/22/2019     Monitor serial CBC and transfuse if patient becomes hemodynamically unstable, symptomatic or H/H drops below 7/21.         Uncontrolled type 2 diabetes mellitus with hyperglycemia  Patient's  FSGs are controlled on current hypoglycemics.   Last A1c reviewed- No results found for: LABA1C, HGBA1C  Most recent fingerstick glucose reviewed- No results for input(s): POCTGLUCOSE in the last 24 hours.  Current correctional scale  Medium  Maintain anti-hyperglycemic dose as follows-   Antihyperglycemics (From admission, onward)    Start     Stop Route Frequency Ordered    03/25/19 2100  insulin detemir U-100 pen 10 Units      -- SubQ Nightly 03/25/19 1522    03/25/19 1621  insulin aspart U-100 pen 1-10 Units      -- SubQ Every 6 hours PRN 03/25/19 1522              Essential hypertension  Chronic, controlled.  Latest blood pressure and vitals reviewed-   Temp:  [97.3 °F (36.3 °C)-98.3 °F (36.8 °C)]   Pulse:  [64-78]   Resp:  [12-35]   BP: ()/(49-93)   SpO2:  [81 %-100 %] .   Current meds for hypertension include HCTZ, lisinopril, labetolol.  Will continue BP medications as needed for sustained BP control.          Hyperlipemia   Patient is chronically on statin. Will continue for now. Monitor clinically.        Moderate episode of recurrent major depressive disorder  Chronic problem, controlled. Will continue chronic medication(s), Effexor, trazodone and monitor for any changes, adjusting as needed.           VTE Risk Mitigation (From admission, onward)        Ordered     enoxaparin injection 40 mg  Daily      03/25/19 1510     IP VTE HIGH RISK PATIENT  Once      03/25/19 1510             Mann Holliday MD  Department of Hospital Medicine   [facility]

## 2019-03-26 PROBLEM — J44.9 COPD (CHRONIC OBSTRUCTIVE PULMONARY DISEASE): Status: ACTIVE | Noted: 2019-03-26

## 2019-03-26 LAB
ALBUMIN SERPL BCP-MCNC: 3 G/DL (ref 3.5–5.2)
ALP SERPL-CCNC: 95 U/L (ref 55–135)
ALT SERPL W/O P-5'-P-CCNC: 42 U/L (ref 10–44)
ANION GAP SERPL CALC-SCNC: 4 MMOL/L (ref 8–16)
AST SERPL-CCNC: 60 U/L (ref 10–40)
BASOPHILS # BLD AUTO: 0 K/UL (ref 0–0.2)
BASOPHILS NFR BLD: 0 % (ref 0–1.9)
BILIRUB SERPL-MCNC: 0.5 MG/DL (ref 0.1–1)
BUN SERPL-MCNC: 15 MG/DL (ref 8–23)
CALCIUM SERPL-MCNC: 9.1 MG/DL (ref 8.7–10.5)
CHLORIDE SERPL-SCNC: 107 MMOL/L (ref 95–110)
CO2 SERPL-SCNC: 25 MMOL/L (ref 23–29)
CREAT SERPL-MCNC: 1.1 MG/DL (ref 0.5–1.4)
DIFFERENTIAL METHOD: ABNORMAL
EOSINOPHIL # BLD AUTO: 0.1 K/UL (ref 0–0.5)
EOSINOPHIL NFR BLD: 1.8 % (ref 0–8)
ERYTHROCYTE [DISTWIDTH] IN BLOOD BY AUTOMATED COUNT: 14.4 % (ref 11.5–14.5)
EST. GFR  (AFRICAN AMERICAN): >60 ML/MIN/1.73 M^2
EST. GFR  (NON AFRICAN AMERICAN): >60 ML/MIN/1.73 M^2
ESTIMATED AVG GLUCOSE: 120 MG/DL (ref 68–131)
GLUCOSE SERPL-MCNC: 103 MG/DL (ref 70–110)
HBA1C MFR BLD HPLC: 5.8 % (ref 4–5.6)
HCT VFR BLD AUTO: 39 % (ref 40–54)
HGB BLD-MCNC: 12.6 G/DL (ref 14–18)
LYMPHOCYTES # BLD AUTO: 0.3 K/UL (ref 1–4.8)
LYMPHOCYTES NFR BLD: 5.1 % (ref 18–48)
MAGNESIUM SERPL-MCNC: 2.2 MG/DL (ref 1.6–2.6)
MCH RBC QN AUTO: 32.4 PG (ref 27–31)
MCHC RBC AUTO-ENTMCNC: 32.2 G/DL (ref 32–36)
MCV RBC AUTO: 101 FL (ref 82–98)
MONOCYTES # BLD AUTO: 0.3 K/UL (ref 0.3–1)
MONOCYTES NFR BLD: 4.9 % (ref 4–15)
NEUTROPHILS # BLD AUTO: 4.8 K/UL (ref 1.8–7.7)
NEUTROPHILS NFR BLD: 88.2 % (ref 38–73)
PHOSPHATE SERPL-MCNC: 3.5 MG/DL (ref 2.7–4.5)
PLATELET # BLD AUTO: 253 K/UL (ref 150–350)
PMV BLD AUTO: 6.4 FL (ref 9.2–12.9)
POCT GLUCOSE: 105 MG/DL (ref 70–110)
POCT GLUCOSE: 108 MG/DL (ref 70–110)
POCT GLUCOSE: 109 MG/DL (ref 70–110)
POCT GLUCOSE: 84 MG/DL (ref 70–110)
POTASSIUM SERPL-SCNC: 4.4 MMOL/L (ref 3.5–5.1)
PROT SERPL-MCNC: 5.1 G/DL (ref 6–8.4)
RBC # BLD AUTO: 3.88 M/UL (ref 4.6–6.2)
SODIUM SERPL-SCNC: 136 MMOL/L (ref 136–145)
WBC # BLD AUTO: 5.5 K/UL (ref 3.9–12.7)

## 2019-03-26 PROCEDURE — 25000003 PHARM REV CODE 250: Performed by: HOSPITALIST

## 2019-03-26 PROCEDURE — G8978 MOBILITY CURRENT STATUS: HCPCS | Mod: CK

## 2019-03-26 PROCEDURE — 97535 SELF CARE MNGMENT TRAINING: CPT

## 2019-03-26 PROCEDURE — 83036 HEMOGLOBIN GLYCOSYLATED A1C: CPT

## 2019-03-26 PROCEDURE — 97162 PT EVAL MOD COMPLEX 30 MIN: CPT

## 2019-03-26 PROCEDURE — G8979 MOBILITY GOAL STATUS: HCPCS | Mod: CJ

## 2019-03-26 PROCEDURE — 94799 UNLISTED PULMONARY SVC/PX: CPT

## 2019-03-26 PROCEDURE — 63600175 PHARM REV CODE 636 W HCPCS: Performed by: HOSPITALIST

## 2019-03-26 PROCEDURE — 63600175 PHARM REV CODE 636 W HCPCS: Performed by: SURGERY

## 2019-03-26 PROCEDURE — 94640 AIRWAY INHALATION TREATMENT: CPT

## 2019-03-26 PROCEDURE — 80053 COMPREHEN METABOLIC PANEL: CPT

## 2019-03-26 PROCEDURE — 36415 COLL VENOUS BLD VENIPUNCTURE: CPT

## 2019-03-26 PROCEDURE — 83735 ASSAY OF MAGNESIUM: CPT

## 2019-03-26 PROCEDURE — 97165 OT EVAL LOW COMPLEX 30 MIN: CPT

## 2019-03-26 PROCEDURE — 97530 THERAPEUTIC ACTIVITIES: CPT

## 2019-03-26 PROCEDURE — 84100 ASSAY OF PHOSPHORUS: CPT

## 2019-03-26 PROCEDURE — 25000242 PHARM REV CODE 250 ALT 637 W/ HCPCS: Performed by: HOSPITALIST

## 2019-03-26 PROCEDURE — 94761 N-INVAS EAR/PLS OXIMETRY MLT: CPT

## 2019-03-26 PROCEDURE — S4991 NICOTINE PATCH NONLEGEND: HCPCS | Performed by: HOSPITALIST

## 2019-03-26 PROCEDURE — G8987 SELF CARE CURRENT STATUS: HCPCS | Mod: CJ

## 2019-03-26 PROCEDURE — 85025 COMPLETE CBC W/AUTO DIFF WBC: CPT

## 2019-03-26 PROCEDURE — 25000003 PHARM REV CODE 250: Performed by: SURGERY

## 2019-03-26 PROCEDURE — 97116 GAIT TRAINING THERAPY: CPT

## 2019-03-26 PROCEDURE — G8988 SELF CARE GOAL STATUS: HCPCS | Mod: CI

## 2019-03-26 PROCEDURE — 12000002 HC ACUTE/MED SURGE SEMI-PRIVATE ROOM

## 2019-03-26 PROCEDURE — S0030 INJECTION, METRONIDAZOLE: HCPCS | Performed by: SURGERY

## 2019-03-26 RX ORDER — ACETAMINOPHEN 500 MG
1000 TABLET ORAL EVERY 6 HOURS PRN
Status: DISCONTINUED | OUTPATIENT
Start: 2019-03-26 | End: 2019-03-28 | Stop reason: HOSPADM

## 2019-03-26 RX ORDER — ACETAMINOPHEN 10 MG/ML
1000 INJECTION, SOLUTION INTRAVENOUS EVERY 8 HOURS
Status: DISCONTINUED | OUTPATIENT
Start: 2019-03-26 | End: 2019-03-27

## 2019-03-26 RX ORDER — METOCLOPRAMIDE HYDROCHLORIDE 5 MG/ML
10 INJECTION INTRAMUSCULAR; INTRAVENOUS EVERY 6 HOURS
Status: DISCONTINUED | OUTPATIENT
Start: 2019-03-26 | End: 2019-03-28 | Stop reason: HOSPADM

## 2019-03-26 RX ORDER — IPRATROPIUM BROMIDE AND ALBUTEROL SULFATE 2.5; .5 MG/3ML; MG/3ML
3 SOLUTION RESPIRATORY (INHALATION) EVERY 6 HOURS
Status: DISCONTINUED | OUTPATIENT
Start: 2019-03-26 | End: 2019-03-28 | Stop reason: HOSPADM

## 2019-03-26 RX ADMIN — GABAPENTIN 400 MG: 400 CAPSULE ORAL at 01:03

## 2019-03-26 RX ADMIN — ENOXAPARIN SODIUM 40 MG: 100 INJECTION SUBCUTANEOUS at 06:03

## 2019-03-26 RX ADMIN — VENLAFAXINE 100 MG: 50 TABLET ORAL at 09:03

## 2019-03-26 RX ADMIN — GABAPENTIN 400 MG: 400 CAPSULE ORAL at 08:03

## 2019-03-26 RX ADMIN — ACETAMINOPHEN 1000 MG: 10 INJECTION, SOLUTION INTRAVENOUS at 01:03

## 2019-03-26 RX ADMIN — VENLAFAXINE 100 MG: 50 TABLET ORAL at 08:03

## 2019-03-26 RX ADMIN — SODIUM CHLORIDE: 0.9 INJECTION, SOLUTION INTRAVENOUS at 08:03

## 2019-03-26 RX ADMIN — CEFAZOLIN SODIUM 2 G: 2 SOLUTION INTRAVENOUS at 12:03

## 2019-03-26 RX ADMIN — TRAZODONE HYDROCHLORIDE 100 MG: 50 TABLET ORAL at 09:03

## 2019-03-26 RX ADMIN — NICOTINE 1 PATCH: 14 PATCH, EXTENDED RELEASE TRANSDERMAL at 08:03

## 2019-03-26 RX ADMIN — ACETAMINOPHEN 1000 MG: 10 INJECTION, SOLUTION INTRAVENOUS at 12:03

## 2019-03-26 RX ADMIN — IBUPROFEN 600 MG: 600 TABLET ORAL at 09:03

## 2019-03-26 RX ADMIN — GABAPENTIN 400 MG: 400 CAPSULE ORAL at 09:03

## 2019-03-26 RX ADMIN — ACETAMINOPHEN 1000 MG: 10 INJECTION, SOLUTION INTRAVENOUS at 09:03

## 2019-03-26 RX ADMIN — ATORVASTATIN CALCIUM 10 MG: 10 TABLET, FILM COATED ORAL at 08:03

## 2019-03-26 RX ADMIN — IPRATROPIUM BROMIDE AND ALBUTEROL SULFATE 3 ML: .5; 3 SOLUTION RESPIRATORY (INHALATION) at 01:03

## 2019-03-26 RX ADMIN — HYDROCHLOROTHIAZIDE 25 MG: 25 TABLET ORAL at 09:03

## 2019-03-26 RX ADMIN — METRONIDAZOLE 500 MG: 500 INJECTION, SOLUTION INTRAVENOUS at 12:03

## 2019-03-26 RX ADMIN — PANTOPRAZOLE SODIUM 40 MG: 40 TABLET, DELAYED RELEASE ORAL at 06:03

## 2019-03-26 RX ADMIN — IBUPROFEN 600 MG: 600 TABLET ORAL at 08:03

## 2019-03-26 RX ADMIN — METOCLOPRAMIDE 10 MG: 5 INJECTION, SOLUTION INTRAMUSCULAR; INTRAVENOUS at 06:03

## 2019-03-26 RX ADMIN — IBUPROFEN 600 MG: 600 TABLET ORAL at 01:03

## 2019-03-26 RX ADMIN — ACETAMINOPHEN 1000 MG: 10 INJECTION, SOLUTION INTRAVENOUS at 06:03

## 2019-03-26 RX ADMIN — IBUPROFEN 600 MG: 600 TABLET ORAL at 06:03

## 2019-03-26 RX ADMIN — INSULIN DETEMIR 10 UNITS: 100 INJECTION, SOLUTION SUBCUTANEOUS at 09:03

## 2019-03-26 RX ADMIN — IPRATROPIUM BROMIDE AND ALBUTEROL SULFATE 3 ML: .5; 3 SOLUTION RESPIRATORY (INHALATION) at 07:03

## 2019-03-26 RX ADMIN — LISINOPRIL 5 MG: 2.5 TABLET ORAL at 08:03

## 2019-03-26 NOTE — PROGRESS NOTES
POD 1 s/p lap effie and R jennifer.  Pt resting comfortably.  Denies n/v.  No fever/chills.    Denies flatus.    Wt Readings from Last 3 Encounters:   03/25/19 106.6 kg (235 lb)   03/22/19 106.6 kg (235 lb)   03/12/19 106 kg (233 lb 11 oz)     Temp Readings from Last 3 Encounters:   03/26/19 99.7 °F (37.6 °C)   03/12/19 98.3 °F (36.8 °C)   02/20/19 97.9 °F (36.6 °C) (Oral)     BP Readings from Last 3 Encounters:   03/26/19 (!) 112/59   03/12/19 (!) 129/59   02/21/19 (!) 195/86     Pulse Readings from Last 3 Encounters:   03/26/19 90   03/12/19 75   02/21/19 68     AAox3  CTA B  Sinus  Soft/nd/appt ttp  2+ pulses B    Lab Results   Component Value Date    WBC 5.50 03/26/2019    HGB 12.6 (L) 03/26/2019    HCT 39.0 (L) 03/26/2019     (H) 03/26/2019     03/26/2019   .lastb    BMP  Lab Results   Component Value Date     03/26/2019    K 4.4 03/26/2019     03/26/2019    CO2 25 03/26/2019    BUN 15 03/26/2019    CREATININE 1.1 03/26/2019    CALCIUM 9.1 03/26/2019    ANIONGAP 4 (L) 03/26/2019    ESTGFRAFRICA >60 03/26/2019    EGFRNONAA >60 03/26/2019     A/P: POD1 s/p R jennifer  Ambulate  Await bowel function

## 2019-03-26 NOTE — PROGRESS NOTES
03/26/19 0913   Patient Assessment/Suction   Level of Consciousness (AVPU) alert   PRE-TX-O2   O2 Device (Oxygen Therapy) nasal cannula   Flow (L/min) 3   Oxygen Concentration (%) 32   SpO2 95 %   Pulse Oximetry Type Intermittent   $ Pulse Oximetry - Multiple Charge Pulse Oximetry - Multiple   Pulse 70   Resp 18   Incentive Spirometer   $ Incentive Spirometer Charges done with encouragement   Administration (IS) instruction provided, follow-up   Number of Repetitions (IS) 10   Level Incentive Spirometer (mL) 1800   Patient Tolerance (IS) good

## 2019-03-26 NOTE — PLAN OF CARE
Problem: Occupational Therapy Goal  Goal: Occupational Therapy Goal  Goals to be met by: 4/5/2019     Patient will increase functional independence with ADLs by performing:    Grooming while standing at sink with Modified [place].  Toileting from toilet with [place] for hygiene and clothing management.   Supine to sit with Modified [place].  Toilet transfer to toilet with Modified [place].    Outcome: Ongoing (interventions implemented as appropriate)  OT evaluation completed today. Goals & care plan established.    Wilmer Matos, OT  3/26/2019

## 2019-03-26 NOTE — PT/OT/SLP EVAL
Occupational Therapy   Evaluation    Name: Senthil Chandler  MRN: [id number]  Admitting Diagnosis:  Malignant neoplasm of hepatic flexure 1 Day Post-Op    Recommendations:     Discharge Recommendations: home health PT  Discharge Equipment Recommendations:  shower chair, other (see comments)(rollator)  Barriers to discharge:  Decreased caregiver support    Assessment:     Senthil Chandler is a 73 y.o. male with a medical diagnosis of Malignant neoplasm of hepatic flexure. Performance deficits affecting function: weakness, impaired endurance, impaired self care skills, impaired functional mobilty, gait instability, impaired cardiopulmonary response to activity. Presents with decreased activity tolerance when performing tasks while in standing and when ambulating with RW, requiring frequent sitting rest breaks. Pt c/o frequent dizziness during ambulation as well. Pt's dizziness resolved after sitting for several minutes. Recommend HHPT to increase activity endurance and independence with function mobility.    Rehab Prognosis: Good; patient would benefit from acute skilled OT services to address these deficits and reach maximum level of function.       Plan:     Patient to be seen 3 x/week to address the above listed problems via self-care/home management, therapeutic activities, therapeutic exercises  · Plan of Care Expires: 04/05/19  · Plan of Care Reviewed with: patient    Subjective     Chief Complaint: dizziness & stomach pain  Patient/Family Comments/goals: To decrease stomach pain and dizziness.    Occupational Profile:  Living Environment: Pt lives alone in [facility] with 1 [address].   Previous level of function: Pt was independent with ADLs and functional mobility.  Equipment Used at Home:  cane, straight, walker, rolling  Assistance upon Discharge: Pt will not have assistance at home upon discharge.     Pain/Comfort:  · Pain Rating 1: 8/10  · Location - Orientation 1: generalized  · Location 1: abdomen  · Pain Addressed  1: Distraction, Reposition, Nurse notified  · Pain Rating Post-Intervention 1: 8/10    Patients cultural, spiritual, Quaker conflicts given the current situation:      Objective:     Communicated with: nurse  prior to session.  Patient found up in chair with oxygen, telemetry upon OT entry to room.    General Precautions: Standard, fall   Orthopedic Precautions:N/A   Braces: N/A     Occupational Performance:    Bed Mobility:    · Not assessed; pt seated in chair upon arrival. See PT notes.     Functional Mobility/Transfers:  · Patient completed Sit <> Stand Transfer with stand by assistance  with  rolling walker   · Patient completed Toilet Transfer Stand Pivot technique with stand by assistance with  rolling walker  · Functional Mobility: Pt ambulated in room with SBA and RW from chair>bathroom>sink>chair again. Pt required 2 sitting rest breaks during OT evaluation (one sitting rest break when ambulating and one sitting rest break at sink when performing hygiene tasks).    Activities of Daily Living:  · Grooming: stand by assistance with oral and facial hygiene. Pt perform oral hygiene while standing but performed facial hygiene while seated 2/2 decreased standing tolerance and fatigue.  · Lower Body Dressing: supervision to don/doff socks while seated.  · Toileting: stand by assistance when voiding on toilet.     Cognitive/Visual Perceptual:  Cognitive/Psychosocial Skills:     -       Oriented to: x4   -       Follows Commands/attention:Follows multistep  commands  -       Communication: clear/fluent  -       Safety awareness/insight to disability: intact   -       Mood/Affect/Coping skills/emotional control: Appropriate to situation  Visual/Perceptual:      -Intact     Physical Exam:  Postural examination/scapula alignment:    -       Rounded shoulders  -       Forward head  Upper Extremity Range of Motion:     -       Right Upper Extremity: WFL  -       Left Upper Extremity: WFL  Upper Extremity Strength:     -       Right Upper Extremity: WFL  -       Left Upper Extremity: WFL   Strength:    -       Right Upper Extremity: WFL  -       Left Upper Extremity: WFL  Fine Motor Coordination:    -       Intact  Gross motor coordination:   WFL    AMPAC 6 Click ADL:  AMPAC Total Score: 21    Treatment & Education:  OT ed patient on safety with walker use for functional mobility with cues for hand placement & sequencing.   OT educated patient on energy conservation techniques to reduce demand on cardiovascular system with performance of ADL tasks.     Education:    Patient left up in chair with all lines intact, call button in reach and nurse notified    GOALS:   Multidisciplinary Problems     Occupational Therapy Goals        Problem: Occupational Therapy Goal    Goal Priority Disciplines Outcome Interventions   Occupational Therapy Goal     OT, PT/OT Ongoing (interventions implemented as appropriate)    Description:  Goals to be met by: 4/5/2019     Patient will increase functional independence with ADLs by performing:    Grooming while standing at sink with Modified [place].  Toileting from toilet with [place] for hygiene and clothing management.   Supine to sit with Modified [place].  Toilet transfer to toilet with Modified [place].                      History:     Past Medical History:   Diagnosis Date    Adjustment disorder with anxious mood     Angiodysplasia     Arthritis     Cancer 1990    prostate     Carcinoma in situ of prostate     Carpal tunnel syndrome on left     CKD (chronic kidney disease) stage 1, GFR 90 ml/min or greater     Colon polyp     Cubital tunnel syndrome on left     Depressive disorder     Deviated nasal septum     Diabetes mellitus with ophthalmic complication     Diabetes mellitus, type 2     GERD (gastroesophageal reflux disease)     Hypercholesterolemia     Hyperpotassemia     Hypertension     Iron deficiency anemia     Kidney disease     focal  segmental glomerulosclerosis    Lumbago     Malignant neoplasm of colon     Nephrotic syndrome     Neuropathy     Primary osteoarthritis of left elbow     PTSD (post-traumatic stress disorder)     PVD (peripheral vascular disease)     Spinal stenosis, lumbar region without neurogenic claudication     Stomach ulcer     Wears glasses        Past Surgical History:   Procedure Laterality Date    CARPAL TUNNEL RELEASE Left 10/23/2017    COLONOSCOPY N/A 2/20/2019    [facility]    COLONOSCOPY N/A 8/12/2016    [facility]    EGD (ESOPHAGOGASTRODUODENOSCOPY) N/A 2/20/2019    [facility]    ESOPHAGOGASTRODUODENOSCOPY (EGD) N/A 8/12/2016    [facility]    NASAL SEPTUM SURGERY      PROCTECTOMY      PROSTATE SURGERY  1990's    UPPER GASTROINTESTINAL ENDOSCOPY  08/12/2016    Dr. Roman       Time Tracking:     OT Date of Treatment: 03/26/19  OT Start Time: 1150  OT Stop Time: 1246  OT Total Time (min): 56 min    Billable Minutes:Evaluation 10  Self Care/Home Management 30  Therapeutic Activity 16    Wilmer Matos, OT  3/26/2019

## 2019-03-26 NOTE — PT/OT/SLP EVAL
Physical Therapy Evaluation    Patient Name:  Senthil Chandler   MRN:  [id number]    Recommendations:     Discharge Recommendations:  home health PT   Discharge Equipment Recommendations: walker, rolling   Barriers to discharge: Decreased caregiver support    Assessment:     Senthil Chandler is a 73 y.o. male admitted with a medical diagnosis of Malignant neoplasm of hepatic flexure.  He presents with the following impairments/functional limitations:  weakness, impaired endurance, impaired self care skills, impaired functional mobilty, gait instability, impaired cardiopulmonary response to activity . Pt presents with post op weakness, slow to mobilize with c/o dizziness while ambulating, requiring several standing rests and 1 sitting rest.. SAT 90% on 4 liters while ambulating.    Rehab Prognosis: Good; patient would benefit from acute skilled PT services to address these deficits and reach maximum level of function.    Recent Surgery: Procedure(s) (LRB):  ROBOTIC COLECTOMY possible conversion to open (Right)  COLECTOMY, PARTIAL (Right)  CHOLECYSTECTOMY, LAPAROSCOPIC (Right) 1 Day Post-Op    Plan:     During this hospitalization, patient to be seen 6 x/week to address the identified rehab impairments via gait training, therapeutic activities, therapeutic exercises and progress toward the following goals:    · Plan of Care Expires:  04/05/19    Subjective   Stated of dizziness while ambulating requiring chair  Pt requiring frequent cueing for correct breathing technique  Chief Complaint: cold- denies pain  Patient/Family Comments/goals: get well  Pain/Comfort:  · Pain Rating 1: 0/10    Patients cultural, spiritual, Denominational conflicts given the current situation:      Living Environment:  Home alone- will have family/nieces to come  Prior to admission, patients level of function was independent.  Equipment used at home: none.  DME owned (not currently used): none.  Upon discharge, patient will have assistance from  family.    Objective:     Communicated with nurse Zaidi prior to session.  Patient found HOB elevated with peripheral IV, oxygen  upon PT entry to room.    General Precautions: Standard, fall   Orthopedic Precautions:N/A   Braces: N/A     Exams:  · RLE ROM: WFL  · RLE Strength: WFL  · LLE ROM: WFL  · LLE Strength: WFL    Functional Mobility:  · Bed Mobility:     · Rolling Left:  minimum assistance  · Scooting: minimum assistance  · Supine to Sit: minimum assistance  · Transfers:     · Sit to Stand:  minimum assistance with rolling walker  · Bed to Chair: minimum assistance with  rolling walker  using  Stand Pivot  · Gait: 150ft with RW min assist/ O2 at 4 liters, several rests tands and requiring 1 seated rest due to dizziness      Therapeutic Activities and Exercises:   extra time with each pposition changes  EOB sitting, gait with RW to hallways with O2  OOB to chair post PT  Encouraged ankle pumping exercises    AM-PAC 6 CLICK MOBILITY  Total Score:16     Patient left OOB to chair with all lines intact, call button in reach and nurse Dyan notified.    GOALS:   Multidisciplinary Problems     Physical Therapy Goals        Problem: Physical Therapy Goal    Goal Priority Disciplines Outcome Goal Variances Interventions   Physical Therapy Goal     PT, PT/OT Ongoing (interventions implemented as appropriate)     Description:  Goals to be met by: 2019     Patient will increase functional independence with mobility by performin. Supine to sit with Contact Guard Assistance  2. Sit to stand transfer with Contact Guard Assistance  3. Bed to chair transfer with Contact Guard Assistance using Standard Walker  4. Gait  x 250 feet with Contact Guard Assistance using Rolling Walker.   5. Lower extremity exercise program x20 reps                       History:     Past Medical History:   Diagnosis Date    Adjustment disorder with anxious mood     Angiodysplasia     Arthritis     Cancer 1990    prostate      Carcinoma in situ of prostate     Carpal tunnel syndrome on left     CKD (chronic kidney disease) stage 1, GFR 90 ml/min or greater     Colon polyp     Cubital tunnel syndrome on left     Depressive disorder     Deviated nasal septum     Diabetes mellitus with ophthalmic complication     Diabetes mellitus, type 2     GERD (gastroesophageal reflux disease)     Hypercholesterolemia     Hyperpotassemia     Hypertension     Iron deficiency anemia     Kidney disease     focal segmental glomerulosclerosis    Lumbago     Malignant neoplasm of colon     Nephrotic syndrome     Neuropathy     Primary osteoarthritis of left elbow     PTSD (post-traumatic stress disorder)     PVD (peripheral vascular disease)     Spinal stenosis, lumbar region without neurogenic claudication     Stomach ulcer     Wears glasses        Past Surgical History:   Procedure Laterality Date    CARPAL TUNNEL RELEASE Left 10/23/2017    COLONOSCOPY N/A 2/20/2019    [facility]    COLONOSCOPY N/A 8/12/2016    [facility]    EGD (ESOPHAGOGASTRODUODENOSCOPY) N/A 2/20/2019    [facility]    ESOPHAGOGASTRODUODENOSCOPY (EGD) N/A 8/12/2016    [facility]    NASAL SEPTUM SURGERY      PROCTECTOMY      PROSTATE SURGERY  1990's    UPPER GASTROINTESTINAL ENDOSCOPY  08/12/2016    Dr. Roman       Time Tracking:     PT Received On: 03/26/19  PT Start Time: 1028     PT Stop Time: 1108  PT Total Time (min): 40 min     Billable Minutes: Evaluation 10, Gait Training 20 and Therapeutic Activity 10      Malka العراقي, PT  03/26/2019

## 2019-03-26 NOTE — PLAN OF CARE
Problem: Physical Therapy Goal  Goal: Physical Therapy Goal  Goals to be met by: 2019     Patient will increase functional independence with mobility by performin. Supine to sit with Contact Guard Assistance  2. Sit to stand transfer with Contact Guard Assistance  3. Bed to chair transfer with Contact Guard Assistance using Standard Walker  4. Gait  x 250 feet with Contact Guard Assistance using Rolling Walker.   5. Lower extremity exercise program x20 reps     Outcome: Ongoing (interventions implemented as appropriate)  PT eval and treat completed. Gait 150ft with RW O2 at 4 liters SAT 90%. Several rest stands in standing, 1 seated rest. OOB to chair. Pt slow to mobilize

## 2019-03-26 NOTE — PLAN OF CARE
03/25/19 2100   Patient Assessment/Suction   Level of Consciousness (AVPU) alert   Respiratory Effort Unlabored   Expansion/Accessory Muscles/Retractions no use of accessory muscles   All Lung Fields Breath Sounds clear   PRE-TX-O2   O2 Device (Oxygen Therapy) nasal cannula   Flow (L/min) 3   SpO2 96 %   Pulse 66   Resp 16   Incentive Spirometer   $ Incentive Spirometer Charges done with encouragement   Administration (IS) instruction provided, follow-up   Number of Repetitions (IS) 10   Level Incentive Spirometer (mL) 2000   Patient Tolerance (IS) good

## 2019-03-26 NOTE — PROGRESS NOTES
smoking cessation education given to patient patient states he will think about joining the program information left with patient about program at this time

## 2019-03-27 PROBLEM — I95.1 ORTHOSTATIC HYPOTENSION: Status: ACTIVE | Noted: 2019-03-27

## 2019-03-27 PROBLEM — J18.9 RLL PNEUMONIA: Status: ACTIVE | Noted: 2019-03-27

## 2019-03-27 LAB
ALBUMIN SERPL BCP-MCNC: 3 G/DL (ref 3.5–5.2)
ALP SERPL-CCNC: 100 U/L (ref 55–135)
ALT SERPL W/O P-5'-P-CCNC: 30 U/L (ref 10–44)
ANION GAP SERPL CALC-SCNC: 7 MMOL/L (ref 8–16)
AST SERPL-CCNC: 47 U/L (ref 10–40)
BASOPHILS # BLD AUTO: 0 K/UL (ref 0–0.2)
BASOPHILS NFR BLD: 0 % (ref 0–1.9)
BILIRUB SERPL-MCNC: 0.7 MG/DL (ref 0.1–1)
BUN SERPL-MCNC: 12 MG/DL (ref 8–23)
CALCIUM SERPL-MCNC: 9.7 MG/DL (ref 8.7–10.5)
CHLORIDE SERPL-SCNC: 105 MMOL/L (ref 95–110)
CO2 SERPL-SCNC: 22 MMOL/L (ref 23–29)
CREAT SERPL-MCNC: 1 MG/DL (ref 0.5–1.4)
DIFFERENTIAL METHOD: ABNORMAL
EOSINOPHIL # BLD AUTO: 0.3 K/UL (ref 0–0.5)
EOSINOPHIL NFR BLD: 3.5 % (ref 0–8)
ERYTHROCYTE [DISTWIDTH] IN BLOOD BY AUTOMATED COUNT: 13.8 % (ref 11.5–14.5)
EST. GFR  (AFRICAN AMERICAN): >60 ML/MIN/1.73 M^2
EST. GFR  (NON AFRICAN AMERICAN): >60 ML/MIN/1.73 M^2
GLUCOSE SERPL-MCNC: 102 MG/DL (ref 70–110)
HCT VFR BLD AUTO: 36.7 % (ref 40–54)
HGB BLD-MCNC: 12.1 G/DL (ref 14–18)
LYMPHOCYTES # BLD AUTO: 0.2 K/UL (ref 1–4.8)
LYMPHOCYTES NFR BLD: 3 % (ref 18–48)
MAGNESIUM SERPL-MCNC: 2 MG/DL (ref 1.6–2.6)
MCH RBC QN AUTO: 32.8 PG (ref 27–31)
MCHC RBC AUTO-ENTMCNC: 32.9 G/DL (ref 32–36)
MCV RBC AUTO: 100 FL (ref 82–98)
MONOCYTES # BLD AUTO: 0.4 K/UL (ref 0.3–1)
MONOCYTES NFR BLD: 5.4 % (ref 4–15)
NEUTROPHILS # BLD AUTO: 6.7 K/UL (ref 1.8–7.7)
NEUTROPHILS NFR BLD: 88.1 % (ref 38–73)
PHOSPHATE SERPL-MCNC: 2.3 MG/DL (ref 2.7–4.5)
PLATELET # BLD AUTO: 245 K/UL (ref 150–350)
PMV BLD AUTO: 6.6 FL (ref 9.2–12.9)
POCT GLUCOSE: 101 MG/DL (ref 70–110)
POCT GLUCOSE: 209 MG/DL (ref 70–110)
POCT GLUCOSE: 238 MG/DL (ref 70–110)
POTASSIUM SERPL-SCNC: 4.1 MMOL/L (ref 3.5–5.1)
PROT SERPL-MCNC: 5.6 G/DL (ref 6–8.4)
RBC # BLD AUTO: 3.68 M/UL (ref 4.6–6.2)
SODIUM SERPL-SCNC: 134 MMOL/L (ref 136–145)
WBC # BLD AUTO: 7.6 K/UL (ref 3.9–12.7)

## 2019-03-27 PROCEDURE — 25000242 PHARM REV CODE 250 ALT 637 W/ HCPCS: Performed by: HOSPITALIST

## 2019-03-27 PROCEDURE — 85025 COMPLETE CBC W/AUTO DIFF WBC: CPT

## 2019-03-27 PROCEDURE — 25000003 PHARM REV CODE 250: Performed by: HOSPITALIST

## 2019-03-27 PROCEDURE — 83735 ASSAY OF MAGNESIUM: CPT

## 2019-03-27 PROCEDURE — 97116 GAIT TRAINING THERAPY: CPT

## 2019-03-27 PROCEDURE — 97530 THERAPEUTIC ACTIVITIES: CPT

## 2019-03-27 PROCEDURE — 27000221 HC OXYGEN, UP TO 24 HOURS

## 2019-03-27 PROCEDURE — 84100 ASSAY OF PHOSPHORUS: CPT

## 2019-03-27 PROCEDURE — 94640 AIRWAY INHALATION TREATMENT: CPT

## 2019-03-27 PROCEDURE — 94799 UNLISTED PULMONARY SVC/PX: CPT

## 2019-03-27 PROCEDURE — 36415 COLL VENOUS BLD VENIPUNCTURE: CPT

## 2019-03-27 PROCEDURE — S4991 NICOTINE PATCH NONLEGEND: HCPCS | Performed by: HOSPITALIST

## 2019-03-27 PROCEDURE — 80053 COMPREHEN METABOLIC PANEL: CPT

## 2019-03-27 PROCEDURE — 63600175 PHARM REV CODE 636 W HCPCS: Performed by: SURGERY

## 2019-03-27 PROCEDURE — 12000002 HC ACUTE/MED SURGE SEMI-PRIVATE ROOM

## 2019-03-27 PROCEDURE — 94761 N-INVAS EAR/PLS OXIMETRY MLT: CPT

## 2019-03-27 PROCEDURE — 25000003 PHARM REV CODE 250: Performed by: SURGERY

## 2019-03-27 RX ADMIN — INSULIN DETEMIR 10 UNITS: 100 INJECTION, SOLUTION SUBCUTANEOUS at 09:03

## 2019-03-27 RX ADMIN — VENLAFAXINE 100 MG: 50 TABLET ORAL at 09:03

## 2019-03-27 RX ADMIN — ENOXAPARIN SODIUM 40 MG: 100 INJECTION SUBCUTANEOUS at 06:03

## 2019-03-27 RX ADMIN — GABAPENTIN 400 MG: 400 CAPSULE ORAL at 08:03

## 2019-03-27 RX ADMIN — LISINOPRIL 5 MG: 2.5 TABLET ORAL at 08:03

## 2019-03-27 RX ADMIN — IBUPROFEN 600 MG: 600 TABLET ORAL at 08:03

## 2019-03-27 RX ADMIN — METOCLOPRAMIDE 10 MG: 5 INJECTION, SOLUTION INTRAMUSCULAR; INTRAVENOUS at 06:03

## 2019-03-27 RX ADMIN — IPRATROPIUM BROMIDE AND ALBUTEROL SULFATE 3 ML: .5; 3 SOLUTION RESPIRATORY (INHALATION) at 07:03

## 2019-03-27 RX ADMIN — TRAZODONE HYDROCHLORIDE 100 MG: 50 TABLET ORAL at 09:03

## 2019-03-27 RX ADMIN — IBUPROFEN 600 MG: 600 TABLET ORAL at 06:03

## 2019-03-27 RX ADMIN — ATORVASTATIN CALCIUM 10 MG: 10 TABLET, FILM COATED ORAL at 08:03

## 2019-03-27 RX ADMIN — OXYCODONE HYDROCHLORIDE 10 MG: 5 TABLET ORAL at 06:03

## 2019-03-27 RX ADMIN — BICALUTAMIDE 50 MG: 50 TABLET, FILM COATED ORAL at 11:03

## 2019-03-27 RX ADMIN — METOCLOPRAMIDE 10 MG: 5 INJECTION, SOLUTION INTRAMUSCULAR; INTRAVENOUS at 12:03

## 2019-03-27 RX ADMIN — METOCLOPRAMIDE 10 MG: 5 INJECTION, SOLUTION INTRAMUSCULAR; INTRAVENOUS at 05:03

## 2019-03-27 RX ADMIN — PANTOPRAZOLE SODIUM 40 MG: 40 TABLET, DELAYED RELEASE ORAL at 05:03

## 2019-03-27 RX ADMIN — NICOTINE 1 PATCH: 14 PATCH, EXTENDED RELEASE TRANSDERMAL at 08:03

## 2019-03-27 RX ADMIN — IPRATROPIUM BROMIDE AND ALBUTEROL SULFATE 3 ML: .5; 3 SOLUTION RESPIRATORY (INHALATION) at 01:03

## 2019-03-27 RX ADMIN — GABAPENTIN 400 MG: 400 CAPSULE ORAL at 03:03

## 2019-03-27 RX ADMIN — METOCLOPRAMIDE 10 MG: 5 INJECTION, SOLUTION INTRAMUSCULAR; INTRAVENOUS at 11:03

## 2019-03-27 RX ADMIN — INSULIN ASPART 4 UNITS: 100 INJECTION, SOLUTION INTRAVENOUS; SUBCUTANEOUS at 09:03

## 2019-03-27 RX ADMIN — IBUPROFEN 600 MG: 600 TABLET ORAL at 09:03

## 2019-03-27 RX ADMIN — IPRATROPIUM BROMIDE AND ALBUTEROL SULFATE 3 ML: .5; 3 SOLUTION RESPIRATORY (INHALATION) at 12:03

## 2019-03-27 RX ADMIN — GABAPENTIN 400 MG: 400 CAPSULE ORAL at 09:03

## 2019-03-27 RX ADMIN — OXYCODONE HYDROCHLORIDE 5 MG: 5 TABLET ORAL at 08:03

## 2019-03-27 NOTE — PLAN OF CARE
03/27/19 0737   Patient Assessment/Suction   Level of Consciousness (AVPU) alert   All Lung Fields Breath Sounds coarse   PRE-TX-O2   O2 Device (Oxygen Therapy) nasal cannula   $ Is the patient on Low Flow Oxygen? Yes   Flow (L/min) 3   SpO2 98 %   Pulse Oximetry Type Intermittent   $ Pulse Oximetry - Multiple Charge Pulse Oximetry - Multiple   Pulse 94   Resp 18   Aerosol Therapy   $ Aerosol Therapy Charges Aerosol Treatment   Respiratory Treatment Status (SVN) given   Treatment Route (SVN) mask   Patient Position (SVN) HOB elevated   Post Treatment Assessment (SVN) breath sounds unchanged   Signs of Intolerance (SVN) none   Breath Sounds Post-Respiratory Treatment   Throughout All Fields Post-Treatment All Fields   Throughout All Fields Post-Treatment aeration increased   Post-treatment Heart Rate (beats/min) 96   Post-treatment Resp Rate (breaths/min) 18   Incentive Spirometer   $ Incentive Spirometer Charges done with encouragement   Administration (IS) instruction provided, follow-up   Number of Repetitions (IS) 8   Level Incentive Spirometer (mL) 1700   Patient Tolerance (IS) good

## 2019-03-27 NOTE — PROGRESS NOTES
POD 2 s/p lap effie and R jennifer    Pt resting comfortably.  Pain controlled.  Denies n/v.  Report BM x2    Wt Readings from Last 3 Encounters:   03/25/19 106.6 kg (235 lb)   03/22/19 106.6 kg (235 lb)   03/12/19 106 kg (233 lb 11 oz)     Temp Readings from Last 3 Encounters:   03/27/19 98.3 °F (36.8 °C) (Oral)   03/12/19 98.3 °F (36.8 °C)   02/20/19 97.9 °F (36.6 °C) (Oral)     BP Readings from Last 3 Encounters:   03/27/19 (!) 144/69   03/12/19 (!) 129/59   02/21/19 (!) 195/86     Pulse Readings from Last 3 Encounters:   03/27/19 102   03/12/19 75   02/21/19 68     AAOx3  Crackles bilaterally  Sinus  Soft/nd/appt ttp  BS are present  2+ pulses B    Lab Results   Component Value Date    WBC 7.60 03/27/2019    HGB 12.1 (L) 03/27/2019    HCT 36.7 (L) 03/27/2019     (H) 03/27/2019     03/27/2019     BMP  Lab Results   Component Value Date     (L) 03/27/2019    K 4.1 03/27/2019     03/27/2019    CO2 22 (L) 03/27/2019    BUN 12 03/27/2019    CREATININE 1.0 03/27/2019    CALCIUM 9.7 03/27/2019    ANIONGAP 7 (L) 03/27/2019    ESTGFRAFRICA >60 03/27/2019    EGFRNONAA >60 03/27/2019       CxR: atelectasis    A/P POD 2 s/p open R jennifer  Ambulate  Aggressive IS  Adv diet

## 2019-03-27 NOTE — PLAN OF CARE
Problem: Physical Therapy Goal  Goal: Physical Therapy Goal  Goals to be met by: 2019     Patient will increase functional independence with mobility by performin. Supine to sit with Contact Guard Assistance  2. Sit to stand transfer with Contact Guard Assistance  3. Bed to chair transfer with Contact Guard Assistance using Standard Walker  4. Gait  x 250 feet with Contact Guard Assistance using Rolling Walker.   5. Lower extremity exercise program x20 reps      Outcome: Ongoing (interventions implemented as appropriate)  PT for functional mob training and safety educ

## 2019-03-27 NOTE — SUBJECTIVE & OBJECTIVE
Interval History:  Patient seen and examined.  No acute events overnight.  Pain appears to be well controlled.    Review of Systems   Constitutional: Negative for chills, fatigue and fever.   Respiratory: Negative for cough and shortness of breath.    Cardiovascular: Negative for chest pain and leg swelling.   Gastrointestinal: Positive for abdominal pain. Negative for nausea and vomiting.   Musculoskeletal: Negative for back pain.   Neurological: Negative for weakness.   Psychiatric/Behavioral: Negative for confusion. The patient is not nervous/anxious.    All other systems reviewed and are negative.    Objective:     Vital Signs (Most Recent):  Temp: 98.6 °F (37 °C) (03/26/19 2109)  Pulse: 94 (03/26/19 2111)  Resp: 18 (03/26/19 1924)  BP: (!) 84/50 (03/26/19 2111)  SpO2: (!) 91 % (03/26/19 2111) Vital Signs (24h Range):  Temp:  [97.2 °F (36.2 °C)-99.7 °F (37.6 °C)] 98.6 °F (37 °C)  Pulse:  [65-94] 94  Resp:  [18] 18  SpO2:  [91 %-99 %] 91 %  BP: ()/(50-65) 84/50     Weight: 106.6 kg (235 lb)  Body mass index is 31.87 kg/m².    Intake/Output Summary (Last 24 hours) at 3/26/2019 2150  Last data filed at 3/26/2019 1830  Gross per 24 hour   Intake 1000 ml   Output 400 ml   Net 600 ml      Physical Exam   Constitutional: He appears well-developed and well-nourished.   Eyes: Pupils are equal, round, and reactive to light. EOM are normal.   Neck: No JVD present.   Cardiovascular: Normal rate, regular rhythm, normal heart sounds and intact distal pulses.   Pulmonary/Chest: Effort normal.   Noted mild wheezing bilaterally.  Noted bibasilar crackles.   Abdominal: Soft. Bowel sounds are normal. There is tenderness.   Midline abdominal incision noted.  Appropriate tenderness noted.   Musculoskeletal: He exhibits no edema or deformity.   Lymphadenopathy:     He has no cervical adenopathy.   Skin: No rash noted. No pallor.   Nursing note and vitals reviewed.      Significant Labs: All pertinent labs within the past 24  hours have been reviewed.    Significant Imaging: I have reviewed all pertinent imaging results/findings within the past 24 hours.

## 2019-03-27 NOTE — PLAN OF CARE
Cm completed the assessment at pt's bedside.  Pt is independent.  [facility]  [facility].  Pt is a diabetic.  Disposition:  Pt will discharge to home with family.  No needs verbalized at this time.       03/27/19 1005   Discharge Assessment   Assessment Type Discharge Planning Assessment   Confirmed/corrected address and phone number on facesheet? Yes   Assessment information obtained from? Patient   Prior to hospitilization cognitive status: Alert/Oriented   Prior to hospitalization functional status: Independent   Current cognitive status: Alert/Oriented   Current Functional Status: Independent   Facility Arrived From: home   Lives With alone   Able to Return to Prior Arrangements yes   Is patient able to care for self after discharge? Yes   Who are your caregiver(s) and their phone number(s)? corrina Everett - [phone]/ tosha Dobbins - [phone]   Patient's perception of discharge disposition home or selfcare   Readmission Within the Last 30 Days no previous admission in last 30 days   Patient currently being followed by outpatient case management? No   Patient currently receives any other outside agency services? No   Equipment Currently Used at Home cane, straight;walker, rolling;glucometer   Do you have any problems affording any of your prescribed medications? No   Is the patient taking medications as prescribed? [facility]   Does the patient have transportation home? Yes   Transportation Anticipated family or friend will provide   Dialysis Name and Scheduled days na   Does the patient receive services at the [facility]madin Clinic? No   Discharge Plan A Home   DME Needed Upon Discharge  none   Patient/Family in Agreement with Plan yes

## 2019-03-27 NOTE — ASSESSMENT & PLAN NOTE
Patient with chronic COPD without evidence of acute exacerbation.  Will start scheduled bronchodilators, and encourage incentive spirometry along with Acapella.  Continue supplemental oxygen for O2 saturation greater than 92%.

## 2019-03-27 NOTE — PLAN OF CARE
Problem: Adult Inpatient Plan of Care  Goal: Plan of Care Review  Outcome: Ongoing (interventions implemented as appropriate)  Plan of care reviewed with patient. Patient verbalized understanding. AAO with intermediate confusion noted. VSS/NADN. PIV intact. No complaints of pain throughout shift. Patient had 2 small BM during shift. Surgical dressing CDI. SR up x 2, bed in lowest position, call light in reach. Will continue to monitor.

## 2019-03-27 NOTE — PT/OT/SLP PROGRESS
Physical Therapy Treatment    Patient Name:  Senthil Chandler   MRN:  [id number]    Recommendations:     Discharge Recommendations:  home health PT   Discharge Equipment Recommendations: walker, rolling   Barriers to discharge: None    Assessment:     Senthil Chandler is a 73 y.o. male admitted with a medical diagnosis of Malignant neoplasm of hepatic flexure.  He presents with the following impairments/functional limitations:  weakness, impaired self care skills, impaired balance, decreased coordination, impaired endurance, impaired functional mobilty, gait instability, decreased lower extremity function, impaired cardiopulmonary response to activity; pt has very good motivation to walk and get better; steady gait with min kyphosis and slow-paced; had no bouts of dizziness though was afraid he might have some initially; progressing well with PT Tx.    Rehab Prognosis: Good; patient would benefit from acute skilled PT services to address these deficits and reach maximum level of function.    Recent Surgery: Procedure(s) (LRB):  ROBOTIC COLECTOMY possible conversion to open (Right)  COLECTOMY, PARTIAL (Right)  CHOLECYSTECTOMY, LAPAROSCOPIC (Right) 2 Days Post-Op    Plan:     During this hospitalization, patient to be seen 6 x/week to address the identified rehab impairments via gait training, therapeutic activities, therapeutic exercises and progress toward the following goals:    · Plan of Care Expires:  04/05/19    Subjective     Chief Complaint: abd pain  Patient/Family Comments/goals: wants to walk with PT  Pain/Comfort:  · Pain Rating 1: 5/10  · Location - Side 1: Bilateral  · Location 1: abdomen  · Pain Addressed 1: Distraction, Reposition, Nurse notified  · Pain Rating Post-Intervention 1: 4/10      Objective:     Communicated with RN prior to session.  Patient found HOB elevated with oxygen upon PT entry to room.     General Precautions: Standard, fall   Orthopedic Precautions:N/A   Braces: N/A     Functional  Mobility:  · Bed Mobility:     · Rolling Right: modified independence  · Supine to Sit: stand by assistance  · Transfers:     · Sit to Stand:  minimum assistance with rolling walker  · Gait: 250 ft with a RW, slow-paced, min kyphotic posture      AM-PAC 6 CLICK MOBILITY  Turning over in bed (including adjusting bedclothes, sheets and blankets)?: 4  Sitting down on and standing up from a chair with arms (e.g., wheelchair, bedside commode, etc.): 3  Moving from lying on back to sitting on the side of the bed?: 3  Moving to and from a bed to a chair (including a wheelchair)?: 3  Need to walk in hospital room?: 3  Climbing 3-5 steps with a railing?: 2  Basic Mobility Total Score: 18       Therapeutic Activities and Exercises:   functional mob training as above; safety and mobility techniques instructed to pt.    Patient left up in chair with all lines intact, call button in reach and RN notified.PT instructed pt to call RN staff when going back to bed, pt verbalized understanding    GOALS:   Multidisciplinary Problems     Physical Therapy Goals        Problem: Physical Therapy Goal    Goal Priority Disciplines Outcome Goal Variances Interventions   Physical Therapy Goal     PT, PT/OT Ongoing (interventions implemented as appropriate)     Description:  Goals to be met by: 2019     Patient will increase functional independence with mobility by performin. Supine to sit with Contact Guard Assistance  2. Sit to stand transfer with Contact Guard Assistance  3. Bed to chair transfer with Contact Guard Assistance using Standard Walker  4. Gait  x 250 feet with Contact Guard Assistance using Rolling Walker.   5. Lower extremity exercise program x20 reps                       Time Tracking:     PT Received On: 19  PT Start Time: 1618     PT Stop Time: 1647  PT Total Time (min): 29 min     Billable Minutes: Gait Training 14 and Therapeutic Activity 14    Treatment Type: Treatment  PT/PTA: PT           Neftaly GAXIOLA  [place], PT  03/27/2019

## 2019-03-27 NOTE — ASSESSMENT & PLAN NOTE
Patient's anemia is currently controlled. S/p 0 units of PRBCs.   Current CBC reviewed-   Lab Results   Component Value Date    HGB 12.6 (L) 03/26/2019    HCT 39.0 (L) 03/26/2019     Monitor serial CBC and transfuse if patient becomes hemodynamically unstable, symptomatic or H/H drops below 7/21.

## 2019-03-27 NOTE — PLAN OF CARE
03/27/19 0737   Patient Assessment/Suction   Level of Consciousness (AVPU) alert   All Lung Fields Breath Sounds coarse   PRE-TX-O2   O2 Device (Oxygen Therapy) nasal cannula   $ Is the patient on Low Flow Oxygen? Yes   Flow (L/min) 3   SpO2 98 %   Pulse Oximetry Type Intermittent   $ Pulse Oximetry - Multiple Charge Pulse Oximetry - Multiple   Pulse 94   Resp 18   Aerosol Therapy   $ Aerosol Therapy Charges Aerosol Treatment   Respiratory Treatment Status (SVN) given   Treatment Route (SVN) mask   Patient Position (SVN) HOB elevated   Post Treatment Assessment (SVN) breath sounds unchanged   Signs of Intolerance (SVN) none   Breath Sounds Post-Respiratory Treatment   Throughout All Fields Post-Treatment All Fields   Throughout All Fields Post-Treatment aeration increased   Post-treatment Heart Rate (beats/min) 96   Post-treatment Resp Rate (breaths/min) 18

## 2019-03-27 NOTE — PROGRESS NOTES
[facility] Hospital Medicine  Progress Note    Patient Name: Senthil Chandler  MRN: [id number]  Patient Class: IP- Inpatient   Admission Date: 3/25/2019  Length of Stay: 1 days  Attending Physician: Mann Holliday MD  Primary Care Provider: Laly Jaimes PA-C        Subjective:     Principal Problem:Malignant neoplasm of hepatic flexure    HPI:    Patient is a 73-year-old  male with a mass in the hepatic flexure of his colon the who was admitted postoperatively after a partial colectomy and reanastomosis.  The patient was initially attempted as robotic procedure in converted to open procedure with exploratory laparotomy.  He remains sedated postoperatively but is otherwise in no acute distress on exam.    Hospital Course:  No notes on file    Interval History:  Patient seen and examined.  No acute events overnight.  Pain appears to be well controlled.    Review of Systems   Constitutional: Negative for chills, fatigue and fever.   Respiratory: Negative for cough and shortness of breath.    Cardiovascular: Negative for chest pain and leg swelling.   Gastrointestinal: Positive for abdominal pain. Negative for nausea and vomiting.   Musculoskeletal: Negative for back pain.   Neurological: Negative for weakness.   Psychiatric/Behavioral: Negative for confusion. The patient is not nervous/anxious.    All other systems reviewed and are negative.    Objective:     Vital Signs (Most Recent):  Temp: 98.6 °F (37 °C) (03/26/19 2109)  Pulse: 94 (03/26/19 2111)  Resp: 18 (03/26/19 1924)  BP: (!) 84/50 (03/26/19 2111)  SpO2: (!) 91 % (03/26/19 2111) Vital Signs (24h Range):  Temp:  [97.2 °F (36.2 °C)-99.7 °F (37.6 °C)] 98.6 °F (37 °C)  Pulse:  [65-94] 94  Resp:  [18] 18  SpO2:  [91 %-99 %] 91 %  BP: ()/(50-65) 84/50     Weight: 106.6 kg (235 lb)  Body mass index is 31.87 kg/m².    Intake/Output Summary (Last 24 hours) at 3/26/2019 2150  Last data filed at 3/26/2019 1830  Gross per 24  hour   Intake 1000 ml   Output 400 ml   Net 600 ml      Physical Exam   Constitutional: He appears well-developed and well-nourished.   Eyes: Pupils are equal, round, and reactive to light. EOM are normal.   Neck: No JVD present.   Cardiovascular: Normal rate, regular rhythm, normal heart sounds and intact distal pulses.   Pulmonary/Chest: Effort normal.   Noted mild wheezing bilaterally.  Noted bibasilar crackles.   Abdominal: Soft. Bowel sounds are normal. There is tenderness.   Midline abdominal incision noted.  Appropriate tenderness noted.   Musculoskeletal: He exhibits no edema or deformity.   Lymphadenopathy:     He has no cervical adenopathy.   Skin: No rash noted. No pallor.   Nursing note and vitals reviewed.      Significant Labs: All pertinent labs within the past 24 hours have been reviewed.    Significant Imaging: I have reviewed all pertinent imaging results/findings within the past 24 hours.    Assessment/Plan:      * Malignant neoplasm of hepatic flexure    The patient appears to be doing well postoperatively.  Will defer diet advancement and wound management to General surgery.  Continue pain control and mobilization with physical therapy and occupational therapy.  Will check CBC and CMP daily.      COPD (chronic obstructive pulmonary disease)   Patient with chronic COPD without evidence of acute exacerbation.  Will start scheduled bronchodilators, and encourage incentive spirometry along with Acapella.  Continue supplemental oxygen for O2 saturation greater than 92%.      Hyperlipemia   Patient is chronically on statin. Will continue for now. Monitor clinically.        Moderate episode of recurrent major depressive disorder  Chronic problem, controlled. Will continue chronic medication(s), Effexor, trazodone and monitor for any changes, adjusting as needed.         Essential hypertension  Chronic, controlled.  Latest blood pressure and vitals reviewed-   Temp:  [97.3 °F (36.3 °C)-98.3 °F (36.8  °C)]   Pulse:  [64-78]   Resp:  [12-35]   BP: ()/(49-93)   SpO2:  [81 %-100 %] .   Current meds for hypertension include HCTZ, lisinopril, labetolol.  Will continue BP medications as needed for sustained BP control.          Uncontrolled type 2 diabetes mellitus with hyperglycemia  Patient's FSGs are controlled on current hypoglycemics.   Last A1c reviewed- No results found for: LABA1C, HGBA1C  Most recent fingerstick glucose reviewed- No results for input(s): POCTGLUCOSE in the last 24 hours.  Current correctional scale  Medium  Maintain anti-hyperglycemic dose as follows-   Antihyperglycemics (From admission, onward)    Start     Stop Route Frequency Ordered    03/25/19 2100  insulin detemir U-100 pen 10 Units      -- SubQ Nightly 03/25/19 1522    03/25/19 1621  insulin aspart U-100 pen 1-10 Units      -- SubQ Every 6 hours PRN 03/25/19 1522              HECTOR (iron deficiency anemia)  Patient's anemia is currently controlled. S/p 0 units of PRBCs.   Current CBC reviewed-   Lab Results   Component Value Date    HGB 12.6 (L) 03/26/2019    HCT 39.0 (L) 03/26/2019     Monitor serial CBC and transfuse if patient becomes hemodynamically unstable, symptomatic or H/H drops below 7/21.           VTE Risk Mitigation (From admission, onward)        Ordered     enoxaparin injection 40 mg  Daily      03/25/19 1510     IP VTE HIGH RISK PATIENT  Once      03/25/19 1510              Mann Holliday MD  Department of Hospital Medicine   [facility]

## 2019-03-27 NOTE — PROGRESS NOTES
03/26/19 1924   Patient Assessment/Suction   Level of Consciousness (AVPU) alert   Respiratory Effort Normal;Unlabored   All Lung Fields Breath Sounds rhonchi;coarse   PRE-TX-O2   O2 Device (Oxygen Therapy) nasal cannula   Flow (L/min) 3   Oxygen Concentration (%) 32   SpO2 (!) 94 %   Pulse Oximetry Type Intermittent   Pulse 83   Resp 18   Aerosol Therapy   $ Aerosol Therapy Charges Aerosol Treatment   Respiratory Treatment Status (SVN) given   Treatment Route (SVN) mask   Patient Position (SVN) HOB elevated   Post Treatment Assessment (SVN) breath sounds improved   Signs of Intolerance (SVN) none   Breath Sounds Post-Respiratory Treatment   Throughout All Fields Post-Treatment All Fields   Throughout All Fields Post-Treatment aeration increased   Post-treatment Heart Rate (beats/min) 84   Post-treatment Resp Rate (breaths/min) 18   Incentive Spirometer   $ Incentive Spirometer Charges done with encouragement   Administration (IS) instruction provided, follow-up   Number of Repetitions (IS) 10   Level Incentive Spirometer (mL) 2000   Patient Tolerance (IS) good   Ready to Wean/Extubation Screen   FIO2<=50 (chart decimal) 0.32

## 2019-03-28 ENCOUNTER — TELEPHONE (OUTPATIENT)
Dept: SURGERY | Facility: CLINIC | Age: 74
End: 2019-03-28

## 2019-03-28 VITALS
WEIGHT: 235 LBS | OXYGEN SATURATION: 95 % | BODY MASS INDEX: 31.83 KG/M2 | DIASTOLIC BLOOD PRESSURE: 88 MMHG | RESPIRATION RATE: 18 BRPM | TEMPERATURE: 100 F | SYSTOLIC BLOOD PRESSURE: 193 MMHG | HEIGHT: 72 IN | HEART RATE: 102 BPM

## 2019-03-28 LAB
ALBUMIN SERPL BCP-MCNC: 2.8 G/DL (ref 3.5–5.2)
ALP SERPL-CCNC: 89 U/L (ref 55–135)
ALT SERPL W/O P-5'-P-CCNC: 27 U/L (ref 10–44)
ANION GAP SERPL CALC-SCNC: 8 MMOL/L (ref 8–16)
AST SERPL-CCNC: 33 U/L (ref 10–40)
BASOPHILS # BLD AUTO: 0 K/UL (ref 0–0.2)
BASOPHILS NFR BLD: 0.1 % (ref 0–1.9)
BILIRUB SERPL-MCNC: 0.8 MG/DL (ref 0.1–1)
BUN SERPL-MCNC: 11 MG/DL (ref 8–23)
CALCIUM SERPL-MCNC: 9.5 MG/DL (ref 8.7–10.5)
CHLORIDE SERPL-SCNC: 106 MMOL/L (ref 95–110)
CO2 SERPL-SCNC: 22 MMOL/L (ref 23–29)
CREAT SERPL-MCNC: 1 MG/DL (ref 0.5–1.4)
CRP SERPL-MCNC: 195.4 MG/L (ref 0–8.2)
DIFFERENTIAL METHOD: ABNORMAL
EOSINOPHIL # BLD AUTO: 0.3 K/UL (ref 0–0.5)
EOSINOPHIL NFR BLD: 4.1 % (ref 0–8)
ERYTHROCYTE [DISTWIDTH] IN BLOOD BY AUTOMATED COUNT: 14.2 % (ref 11.5–14.5)
EST. GFR  (AFRICAN AMERICAN): >60 ML/MIN/1.73 M^2
EST. GFR  (NON AFRICAN AMERICAN): >60 ML/MIN/1.73 M^2
GLUCOSE SERPL-MCNC: 158 MG/DL (ref 70–110)
HCT VFR BLD AUTO: 35.9 % (ref 40–54)
HGB BLD-MCNC: 11.9 G/DL (ref 14–18)
LYMPHOCYTES # BLD AUTO: 0.3 K/UL (ref 1–4.8)
LYMPHOCYTES NFR BLD: 4.9 % (ref 18–48)
MAGNESIUM SERPL-MCNC: 2 MG/DL (ref 1.6–2.6)
MCH RBC QN AUTO: 32.9 PG (ref 27–31)
MCHC RBC AUTO-ENTMCNC: 33.1 G/DL (ref 32–36)
MCV RBC AUTO: 99 FL (ref 82–98)
MONOCYTES # BLD AUTO: 0.5 K/UL (ref 0.3–1)
MONOCYTES NFR BLD: 7 % (ref 4–15)
NEUTROPHILS # BLD AUTO: 5.7 K/UL (ref 1.8–7.7)
NEUTROPHILS NFR BLD: 83.9 % (ref 38–73)
PHOSPHATE SERPL-MCNC: 2.1 MG/DL (ref 2.7–4.5)
PLATELET # BLD AUTO: 268 K/UL (ref 150–350)
PMV BLD AUTO: 6.4 FL (ref 9.2–12.9)
POCT GLUCOSE: 175 MG/DL (ref 70–110)
POCT GLUCOSE: 221 MG/DL (ref 70–110)
POTASSIUM SERPL-SCNC: 4.1 MMOL/L (ref 3.5–5.1)
PROT SERPL-MCNC: 5.5 G/DL (ref 6–8.4)
RBC # BLD AUTO: 3.62 M/UL (ref 4.6–6.2)
SODIUM SERPL-SCNC: 136 MMOL/L (ref 136–145)
WBC # BLD AUTO: 6.8 K/UL (ref 3.9–12.7)

## 2019-03-28 PROCEDURE — 80053 COMPREHEN METABOLIC PANEL: CPT

## 2019-03-28 PROCEDURE — 36415 COLL VENOUS BLD VENIPUNCTURE: CPT

## 2019-03-28 PROCEDURE — 97116 GAIT TRAINING THERAPY: CPT

## 2019-03-28 PROCEDURE — 94761 N-INVAS EAR/PLS OXIMETRY MLT: CPT

## 2019-03-28 PROCEDURE — S4991 NICOTINE PATCH NONLEGEND: HCPCS | Performed by: HOSPITALIST

## 2019-03-28 PROCEDURE — 25000242 PHARM REV CODE 250 ALT 637 W/ HCPCS: Performed by: HOSPITALIST

## 2019-03-28 PROCEDURE — 83735 ASSAY OF MAGNESIUM: CPT

## 2019-03-28 PROCEDURE — 94799 UNLISTED PULMONARY SVC/PX: CPT

## 2019-03-28 PROCEDURE — 84100 ASSAY OF PHOSPHORUS: CPT

## 2019-03-28 PROCEDURE — 86140 C-REACTIVE PROTEIN: CPT

## 2019-03-28 PROCEDURE — 25000003 PHARM REV CODE 250: Performed by: SURGERY

## 2019-03-28 PROCEDURE — 94640 AIRWAY INHALATION TREATMENT: CPT

## 2019-03-28 PROCEDURE — 25000003 PHARM REV CODE 250: Performed by: HOSPITALIST

## 2019-03-28 PROCEDURE — 85025 COMPLETE CBC W/AUTO DIFF WBC: CPT

## 2019-03-28 PROCEDURE — 63600175 PHARM REV CODE 636 W HCPCS: Performed by: HOSPITALIST

## 2019-03-28 PROCEDURE — 63600175 PHARM REV CODE 636 W HCPCS: Performed by: SURGERY

## 2019-03-28 RX ORDER — IBUPROFEN 600 MG/1
600 TABLET ORAL 4 TIMES DAILY
Qty: 20 TABLET | Refills: 0 | Status: SHIPPED | OUTPATIENT
Start: 2019-03-28 | End: 2019-04-02

## 2019-03-28 RX ORDER — OXYCODONE HYDROCHLORIDE 5 MG/1
5 TABLET ORAL EVERY 6 HOURS PRN
Qty: 20 TABLET | Refills: 0 | Status: SHIPPED | OUTPATIENT
Start: 2019-03-28 | End: 2022-03-30

## 2019-03-28 RX ORDER — LEVOFLOXACIN 500 MG/1
500 TABLET, FILM COATED ORAL DAILY
Qty: 5 TABLET | Refills: 0 | Status: SHIPPED | OUTPATIENT
Start: 2019-03-28 | End: 2019-04-02

## 2019-03-28 RX ADMIN — LISINOPRIL 5 MG: 2.5 TABLET ORAL at 08:03

## 2019-03-28 RX ADMIN — NICOTINE 1 PATCH: 14 PATCH, EXTENDED RELEASE TRANSDERMAL at 08:03

## 2019-03-28 RX ADMIN — METOCLOPRAMIDE 10 MG: 5 INJECTION, SOLUTION INTRAMUSCULAR; INTRAVENOUS at 12:03

## 2019-03-28 RX ADMIN — IPRATROPIUM BROMIDE AND ALBUTEROL SULFATE 3 ML: .5; 3 SOLUTION RESPIRATORY (INHALATION) at 07:03

## 2019-03-28 RX ADMIN — PANTOPRAZOLE SODIUM 40 MG: 40 TABLET, DELAYED RELEASE ORAL at 06:03

## 2019-03-28 RX ADMIN — ATORVASTATIN CALCIUM 10 MG: 10 TABLET, FILM COATED ORAL at 08:03

## 2019-03-28 RX ADMIN — BICALUTAMIDE 50 MG: 50 TABLET, FILM COATED ORAL at 08:03

## 2019-03-28 RX ADMIN — VENLAFAXINE 100 MG: 50 TABLET ORAL at 08:03

## 2019-03-28 RX ADMIN — GABAPENTIN 400 MG: 400 CAPSULE ORAL at 08:03

## 2019-03-28 RX ADMIN — INSULIN ASPART 4 UNITS: 100 INJECTION, SOLUTION INTRAVENOUS; SUBCUTANEOUS at 12:03

## 2019-03-28 RX ADMIN — CEFTRIAXONE 1 G: 1 INJECTION, SOLUTION INTRAVENOUS at 01:03

## 2019-03-28 RX ADMIN — IPRATROPIUM BROMIDE AND ALBUTEROL SULFATE 3 ML: .5; 3 SOLUTION RESPIRATORY (INHALATION) at 12:03

## 2019-03-28 RX ADMIN — IBUPROFEN 600 MG: 600 TABLET ORAL at 08:03

## 2019-03-28 RX ADMIN — IBUPROFEN 600 MG: 600 TABLET ORAL at 12:03

## 2019-03-28 RX ADMIN — IPRATROPIUM BROMIDE AND ALBUTEROL SULFATE 3 ML: .5; 3 SOLUTION RESPIRATORY (INHALATION) at 01:03

## 2019-03-28 NOTE — PLAN OF CARE
03/27/19 1949   Patient Assessment/Suction   Level of Consciousness (AVPU) alert   Respiratory Effort Unlabored   Expansion/Accessory Muscles/Retractions no use of accessory muscles   All Lung Fields Breath Sounds coarse   Rhythm/Pattern, Respiratory unlabored   Cough Frequency infrequent   Cough Type nonproductive   PRE-TX-O2   O2 Device (Oxygen Therapy) room air   SpO2 95 %   Pulse Oximetry Type Intermittent   Pulse 99   Resp 16   Aerosol Therapy   $ Aerosol Therapy Charges Aerosol Treatment   Respiratory Treatment Status (SVN) given   Treatment Route (SVN) mask   Patient Position (SVN) HOB elevated   Post Treatment Assessment (SVN) breath sounds improved   Signs of Intolerance (SVN) none   Breath Sounds Post-Respiratory Treatment   Throughout All Fields Post-Treatment All Fields   Throughout All Fields Post-Treatment aeration increased   Post-treatment Heart Rate (beats/min) 101   Post-treatment Resp Rate (breaths/min) 16   Incentive Spirometer   $ Incentive Spirometer Charges done with encouragement   Incentive Spirometer Predicted Level (mL) 2750   Administration (IS) proper technique demonstrated   Number of Repetitions (IS) 10   Level Incentive Spirometer (mL) 1750   Patient Tolerance (IS) good

## 2019-03-28 NOTE — NURSING
Discharge instructions given to patient he verbalized understanding all questions and concerns answered. Medications faxed to pharmacy and follow up appts scheduled. Removed piv and pt left via wheelchair with all of his belongings. Relinquished care.

## 2019-03-28 NOTE — PHYSICIAN QUERY
PT Name: Senthil Chandler  MR #: [id number]     Physician Query Form - Documentation Clarification      CDS/: Julianna lElis Rn          Contact information:[email or web address]    This form is a permanent document in the medical record.     Query Date: March 28, 2019    By submitting this query, we are merely seeking further clarification of documentation. Please utilize your independent clinical judgment when addressing the question(s) below.    The Medical record reflects the following:    Supporting Clinical Findings Location in Medical Record   PROCEDURES PERFORMED:  1.  Laparoscopic cholecystectomy.  2.  Robotic-assisted sigmoid colectomy with ileocolic anastomosis.     Op note 3/25   I then  the omentum from the   transverse colon, I am able to fully take down the hepatic flexure and mobilize   the colon.  I picked my point of proximal resection in the terminal ileum just   proximal to the fold of Treves.  I stapled across the terminal ileum and ligated   the mesentery with silk ties.  I mobilized the remaining portion of the colon.    I picked my point of distal transection 6 cm distal to the palpable mass.  This   was just proximal to the takeoff of the right branch coming off the middle   colic vessel.  I stapled across the colon and removed the specimen.  I then   fashioned and created a stapled side-to-side ileocolic anastomosis, making a   colotomy and enterotomy.  I then placed the stapler through that.  Having   created common ostomy, I stapled across the common enterotomy closing the   defect.  I then oversewed the staple lines and placed stitch in the crotch and   placed vascularized omentum over the anastomosis.         Op note 3/25                                                                            Doctor, Please specify all areas of extraction/removal during ___Robotic assisted sigmoid colectomy with ileocolic anastomosis___    Provider Use Only      [  ]  Hepatic  flexure is considered transverse colon and not sigmoid colon    [  ]  Sigmoid colon (included rectosigmoid junction, flexure)    [  ]  Transverse colon (includes hepatic and splenic flexure)    [  ]  Large intestine, right (hemicolectomy)    [  ]  Other (please explain_________________________                                                                                                                       [ {Click F2; select 'X' if Clinically Undetermined:28644} ] Clinically Undetermined

## 2019-03-28 NOTE — PHYSICIAN QUERY
PT Name: Senthil Chandler  MR #: [id number]     Physician Query Form - Documentation Clarification      CDS/: Julianna Ellis RN          Contact information:[email or web address]    This form is a permanent document in the medical record.     Query Date: March 28, 2019    By submitting this query, we are merely seeking further clarification of documentation. Please utilize your independent clinical judgment when addressing the question(s) below.    The Medical record reflects the following:    Supporting Clinical Findings Location in Medical Record   PROCEDURES PERFORMED:  1.  Laparoscopic cholecystectomy.  2.  Robotic-assisted sigmoid colectomy with ileocolic anastomosis.     Op note 3/25   I then  the omentum from the   transverse colon, I am able to fully take down the hepatic flexure and mobilize   the colon.  I picked my point of proximal resection in the terminal ileum just   proximal to the fold of Treves.  I stapled across the terminal ileum and ligated   the mesentery with silk ties.  I mobilized the remaining portion of the colon.    I picked my point of distal transection 6 cm distal to the palpable mass.  This   was just proximal to the takeoff of the right branch coming off the middle   colic vessel.  I stapled across the colon and removed the specimen.  I then   fashioned and created a stapled side-to-side ileocolic anastomosis, making a   colotomy and enterotomy.  I then placed the stapler through that.  Having   created common ostomy, I stapled across the common enterotomy closing the   defect.  I then oversewed the staple lines and placed stitch in the crotch and   placed vascularized omentum over the anastomosis.         Op note 3/25                                                                            Doctor, Please specify all areas of extraction/removal during ___Robotic assisted sigmoid colectomy with ileocolic anastomosis___    Provider Use Only      [  ]  Hepatic  flexure is considered transverse colon and not sigmoid colon    [  ]  Sigmoid colon (included rectosigmoid junction, flexure)    [  ]  Transverse colon (includes hepatic and splenic flexure)    [  ]  Large intestine, right (hemicolectomy)    [  ]  Other (please explain___Op note has been addened this was a R hemicolectomy______________________                                                                                                                       [  ] Clinically Undetermined

## 2019-03-28 NOTE — ASSESSMENT & PLAN NOTE
Patient's FSGs are controlled on current hypoglycemics.   Last A1c reviewed-   Lab Results   Component Value Date    HGBA1C 5.8 (H) 03/26/2019     Most recent fingerstick glucose reviewed-   Recent Labs   Lab 03/27/19  0546 03/27/19  1649 03/27/19  2114   POCTGLUCOSE 101 209* 238*     Current correctional scale  Medium  Maintain anti-hyperglycemic dose as follows-   Antihyperglycemics (From admission, onward)    Start     Stop Route Frequency Ordered    03/25/19 2100  insulin detemir U-100 pen 10 Units      -- SubQ Nightly 03/25/19 1522    03/25/19 1621  insulin aspart U-100 pen 1-10 Units      -- SubQ Every 6 hours PRN 03/25/19 1522

## 2019-03-28 NOTE — PLAN OF CARE
03/28/19 0900   Home Oxygen Qualification   Room Air SpO2 At Rest 94 %   Room Air SpO2 on Exertion 95 %

## 2019-03-28 NOTE — PROGRESS NOTES
[facility] Hospital Medicine  Progress Note    Patient Name: Senthil Chandler  MRN: [id number]  Patient Class: IP- Inpatient   Admission Date: 3/25/2019  Length of Stay: 2 days  Attending Physician: Mann Holliday MD  Primary Care Provider: Laly Jaimes PA-C        Subjective:     Principal Problem:Malignant neoplasm of hepatic flexure    HPI:    Patient is a 73-year-old  male with a mass in the hepatic flexure of his colon the who was admitted postoperatively after a partial colectomy and reanastomosis.  The patient was initially attempted as robotic procedure in converted to open procedure with exploratory laparotomy.  He remains sedated postoperatively but is otherwise in no acute distress on exam.    Hospital Course:  No notes on file    Interval History:  Patient seen and examined.  No acute events overnight.  Pain appears to be well controlled. RLL pneumonia suspected.    Review of Systems   Constitutional: Negative for chills, fatigue and fever.   Respiratory: Negative for cough and shortness of breath.    Cardiovascular: Negative for chest pain and leg swelling.   Gastrointestinal: Positive for abdominal pain. Negative for nausea and vomiting.   Musculoskeletal: Negative for back pain.   Neurological: Negative for weakness.   Psychiatric/Behavioral: Negative for confusion. The patient is not nervous/anxious.    All other systems reviewed and are negative.    Objective:     Vital Signs (Most Recent):  Temp: 98.2 °F (36.8 °C) (03/27/19 2002)  Pulse: 107 (03/27/19 2002)  Resp: 16 (03/27/19 2002)  BP: (!) 143/68 (03/27/19 2002)  SpO2: (!) 90 % (03/27/19 2002) Vital Signs (24h Range):  Temp:  [98.2 °F (36.8 °C)-99.9 °F (37.7 °C)] 98.2 °F (36.8 °C)  Pulse:  [] 107  Resp:  [16-18] 16  SpO2:  [90 %-98 %] 90 %  BP: (103-174)/(56-81) 143/68     Weight: 106.6 kg (235 lb)  Body mass index is 31.87 kg/m².    Intake/Output Summary (Last 24 hours) at 3/27/2019 2352  Last data  filed at 3/27/2019 1820  Gross per 24 hour   Intake 300 ml   Output 2100 ml   Net -1800 ml      Physical Exam   Constitutional: He appears well-developed and well-nourished.   Eyes: Pupils are equal, round, and reactive to light. EOM are normal.   Neck: No JVD present.   Cardiovascular: Normal rate, regular rhythm, normal heart sounds and intact distal pulses.   Pulmonary/Chest: Effort normal.   Noted mild wheezing bilaterally.  Noted bibasilar crackles and RLL rales   Abdominal: Soft. Bowel sounds are normal. There is tenderness.   Midline abdominal incision noted.  Appropriate tenderness noted.   Musculoskeletal: He exhibits no edema or deformity.   Lymphadenopathy:     He has no cervical adenopathy.   Skin: No rash noted. No pallor.   Nursing note and vitals reviewed.      Significant Labs: All pertinent labs within the past 24 hours have been reviewed.    Significant Imaging: I have reviewed all pertinent imaging results/findings within the past 24 hours.    Assessment/Plan:      * Malignant neoplasm of hepatic flexure    The patient appears to be doing well postoperatively.  Will defer diet advancement and wound management to General surgery.  Continue pain control and mobilization with physical therapy and occupational therapy.  Will check CBC and CMP daily.      COPD (chronic obstructive pulmonary disease)   Patient with chronic COPD without evidence of acute exacerbation.  Will start scheduled bronchodilators, and encourage incentive spirometry along with Acapella.  Continue supplemental oxygen for O2 saturation greater than 92%. Start ABX for PNA.      RLL pneumonia  Start treatment with rocephin. Continue Meds for COPD. Follow clinically.      Orthostatic hypotension    Severe, avoid medications that cause further hypotension and instruct patient on avoidant measures for syncopal events.      Hyperlipemia   Patient is chronically on statin. Will continue for now. Monitor clinically.        Moderate episode  of recurrent major depressive disorder  Chronic problem, controlled. Will continue chronic medication(s), Effexor, trazodone and monitor for any changes, adjusting as needed.         Essential hypertension  Chronic, controlled.  Latest blood pressure and vitals reviewed-   Temp:  [97.3 °F (36.3 °C)-98.3 °F (36.8 °C)]   Pulse:  [64-78]   Resp:  [12-35]   BP: ()/(49-93)   SpO2:  [81 %-100 %] .   Current meds for hypertension include HCTZ, lisinopril, labetolol.  Will continue BP medications as needed for sustained BP control.          Uncontrolled type 2 diabetes mellitus with hyperglycemia  Patient's FSGs are controlled on current hypoglycemics.   Last A1c reviewed-   Lab Results   Component Value Date    HGBA1C 5.8 (H) 03/26/2019     Most recent fingerstick glucose reviewed-   Recent Labs   Lab 03/27/19  0546 03/27/19  1649 03/27/19  2114   POCTGLUCOSE 101 209* 238*     Current correctional scale  Medium  Maintain anti-hyperglycemic dose as follows-   Antihyperglycemics (From admission, onward)    Start     Stop Route Frequency Ordered    03/25/19 2100  insulin detemir U-100 pen 10 Units      -- SubQ Nightly 03/25/19 1522    03/25/19 1621  insulin aspart U-100 pen 1-10 Units      -- SubQ Every 6 hours PRN 03/25/19 1522              HECTOR (iron deficiency anemia)  Patient's anemia is currently controlled. S/p 0 units of PRBCs.   Current CBC reviewed-   Lab Results   Component Value Date    HGB 12.1 (L) 03/27/2019    HCT 36.7 (L) 03/27/2019     Monitor serial CBC and transfuse if patient becomes hemodynamically unstable, symptomatic or H/H drops below 7/21.           VTE Risk Mitigation (From admission, onward)        Ordered     enoxaparin injection 40 mg  Daily      03/25/19 1510     IP VTE HIGH RISK PATIENT  Once      03/25/19 1510              Mann Holliday MD  Department of Hospital Medicine   [facility]

## 2019-03-28 NOTE — PLAN OF CARE
03/28/19 0727   Patient Assessment/Suction   Level of Consciousness (AVPU) alert   All Lung Fields Breath Sounds clear;diminished   PRE-TX-O2   O2 Device (Oxygen Therapy) room air   SpO2 95 %   Pulse Oximetry Type Intermittent   $ Pulse Oximetry - Multiple Charge Pulse Oximetry - Multiple   Pulse 88   Resp 16   Aerosol Therapy   $ Aerosol Therapy Charges Aerosol Treatment   Respiratory Treatment Status (SVN) given   Treatment Route (SVN) mask   Patient Position (SVN) HOB elevated   Post Treatment Assessment (SVN) breath sounds unchanged   Signs of Intolerance (SVN) none   Breath Sounds Post-Respiratory Treatment   Throughout All Fields Post-Treatment All Fields   Throughout All Fields Post-Treatment aeration increased   Post-treatment Heart Rate (beats/min) 92   Post-treatment Resp Rate (breaths/min) 18   Incentive Spirometer   $ Incentive Spirometer Charges done with encouragement   Administration (IS) proper technique demonstrated   Number of Repetitions (IS) 8   Level Incentive Spirometer (mL) 1750   Patient Tolerance (IS) good

## 2019-03-28 NOTE — PLAN OF CARE
Problem: Physical Therapy Goal  Goal: Physical Therapy Goal  Goals to be met by: 2019     Patient will increase functional independence with mobility by performin. Supine to sit with Contact Guard Assistance  2. Sit to stand transfer with Contact Guard Assistance  3. Bed to chair transfer with Contact Guard Assistance using Standard Walker  4. Gait  x 250 feet with Contact Guard Assistance using Rolling Walker.   5. Lower extremity exercise program x20 reps      Outcome: Ongoing (interventions implemented as appropriate)  PT for gait training

## 2019-03-28 NOTE — ASSESSMENT & PLAN NOTE
Patient with chronic COPD without evidence of acute exacerbation.  Will start scheduled bronchodilators, and encourage incentive spirometry along with Acapella.  Continue supplemental oxygen for O2 saturation greater than 92%. Start ABX for PNA.

## 2019-03-28 NOTE — TELEPHONE ENCOUNTER
----- Message from Jennifer Styles sent at 3/28/2019 11:20 AM CDT -----  Contact: Addison with [facility]  Type:  Sooner Apoointment Request    Caller is requesting a sooner appointment.  Caller declined first available appointment listed below.  Caller will not accept being placed on the waitlist and is requesting a message be sent to doctor.    Name of Caller:  Addison  When is the first available appointment?4/23/19  Symptoms:  Needs 1 week follow up on 4/4/19 hosp follow from colectomy,partial right Cholecystectomy,laparoscopic right  Best Call Back Number:  [phone] or [phone]

## 2019-03-28 NOTE — PLAN OF CARE
03/28/19 1125   Final Note   Assessment Type Final Discharge Note   Anticipated Discharge Disposition Home

## 2019-03-28 NOTE — SUBJECTIVE & OBJECTIVE
Interval History:  Patient seen and examined.  No acute events overnight.  Pain appears to be well controlled. RLL pneumonia suspected.    Review of Systems   Constitutional: Negative for chills, fatigue and fever.   Respiratory: Negative for cough and shortness of breath.    Cardiovascular: Negative for chest pain and leg swelling.   Gastrointestinal: Positive for abdominal pain. Negative for nausea and vomiting.   Musculoskeletal: Negative for back pain.   Neurological: Negative for weakness.   Psychiatric/Behavioral: Negative for confusion. The patient is not nervous/anxious.    All other systems reviewed and are negative.    Objective:     Vital Signs (Most Recent):  Temp: 98.2 °F (36.8 °C) (03/27/19 2002)  Pulse: 107 (03/27/19 2002)  Resp: 16 (03/27/19 2002)  BP: (!) 143/68 (03/27/19 2002)  SpO2: (!) 90 % (03/27/19 2002) Vital Signs (24h Range):  Temp:  [98.2 °F (36.8 °C)-99.9 °F (37.7 °C)] 98.2 °F (36.8 °C)  Pulse:  [] 107  Resp:  [16-18] 16  SpO2:  [90 %-98 %] 90 %  BP: (103-174)/(56-81) 143/68     Weight: 106.6 kg (235 lb)  Body mass index is 31.87 kg/m².    Intake/Output Summary (Last 24 hours) at 3/27/2019 2352  Last data filed at 3/27/2019 1820  Gross per 24 hour   Intake 300 ml   Output 2100 ml   Net -1800 ml      Physical Exam   Constitutional: He appears well-developed and well-nourished.   Eyes: Pupils are equal, round, and reactive to light. EOM are normal.   Neck: No JVD present.   Cardiovascular: Normal rate, regular rhythm, normal heart sounds and intact distal pulses.   Pulmonary/Chest: Effort normal.   Noted mild wheezing bilaterally.  Noted bibasilar crackles and RLL rales   Abdominal: Soft. Bowel sounds are normal. There is tenderness.   Midline abdominal incision noted.  Appropriate tenderness noted.   Musculoskeletal: He exhibits no edema or deformity.   Lymphadenopathy:     He has no cervical adenopathy.   Skin: No rash noted. No pallor.   Nursing note and vitals  reviewed.      Significant Labs: All pertinent labs within the past 24 hours have been reviewed.    Significant Imaging: I have reviewed all pertinent imaging results/findings within the past 24 hours.

## 2019-03-28 NOTE — ASSESSMENT & PLAN NOTE
Patient's anemia is currently controlled. S/p 0 units of PRBCs.   Current CBC reviewed-   Lab Results   Component Value Date    HGB 12.1 (L) 03/27/2019    HCT 36.7 (L) 03/27/2019     Monitor serial CBC and transfuse if patient becomes hemodynamically unstable, symptomatic or H/H drops below 7/21.

## 2019-03-28 NOTE — TELEPHONE ENCOUNTER
I called and spoke to patient to inform him of his post op appointment on Tuesday, 4/2/19 at 12:30pm in [place].  Hima

## 2019-03-28 NOTE — DISCHARGE INSTRUCTIONS
Thank you for choosing [facility] for your medical care. The primary doctor who is taking care of you at the time of your discharge is Mann Holliday MD.     You were admitted to the hospital with Malignant neoplasm of hepatic flexure.     Please note your discharge instructions, including diet/activity restrictions, follow-up appointments, and medication changes.  If you have any questions about your medical issues, prescriptions, or any other questions, please feel free to contact the [facility] Medicine Dept at [phone] and we will help.    If you are previously with Home health, outpatient PT/OT or under a therapy program, you are cleared to return to those programs.    Please direct all long term medication refills and follow up to your primary care provider, Laly Jaimes PA-C. Thank you again for letting us take care of your health care needs.

## 2019-03-28 NOTE — PLAN OF CARE
Problem: Adult Inpatient Plan of Care  Goal: Plan of Care Review  Outcome: Ongoing (interventions implemented as appropriate)  Plan of care reviewed with patient. Pt verbalized understanding. Safety maintained throughout the shift. Bed locked, in lowest position, and call light within reach. Pt remained free of falls throughout shift. VS stable. Pt afebrile. POC glucose monitored. Sliding scale insulin administered per order. Tele monitored normal sinus rhythm throughout shift. PRN pain medications given as requested by patient with moderate relief. IV antibiotics maintained as ordered. Will continue to monitor.

## 2019-03-28 NOTE — ASSESSMENT & PLAN NOTE
Severe, avoid medications that cause further hypotension and instruct patient on avoidant measures for syncopal events.

## 2019-03-28 NOTE — PT/OT/SLP PROGRESS
Physical Therapy Treatment    Patient Name:  Senthil Chandler   MRN:  [id number]    Recommendations:     Discharge Recommendations:  home health PT       Assessment:     Senthil Chandler is a 73 y.o. male admitted with a medical diagnosis of Malignant neoplasm of hepatic flexure.  He presents with the following impairments/functional limitations:  weakness, impaired endurance, gait instability .    Rehab Prognosis: Good; patient would benefit from acute skilled PT services to address these deficits and reach maximum level of function.    Recent Surgery: Procedure(s) (LRB):  ROBOTIC COLECTOMY possible conversion to open (Right)  COLECTOMY, PARTIAL (Right)  CHOLECYSTECTOMY, LAPAROSCOPIC (Right) 3 Days Post-Op    Plan:     During this hospitalization, patient to be seen 6 x/week to address the identified rehab impairments via gait training, therapeutic activities, therapeutic exercises and progress toward the following goals:    · Plan of Care Expires:  04/05/19    Subjective     Chief Complaint: none expressed  Patient/Family Comments/goals: reported already walking with RT today but agreeable to walk with PT also  Pain/Comfort:  · Pain Rating 1: 0/10      Objective:     Communicated with nurse Guajardo prior to session.  Patient found seated EOB with telemetry upon PT entry to room.     General Precautions: Standard, fall   Orthopedic Precautions:N/A   Braces: N/A     Functional Mobility:  · Transfers:     · Sit to Stand:  stand by assistance with rolling walker    · Gait: 250' with CGA-SBA using RW    Therapeutic Activities and Exercises:   pt assisted back to sitting EOB    Patient left sitting EOB with all lines intact, call button in reach and nurse notified..    GOALS:   Multidisciplinary Problems     Physical Therapy Goals        Problem: Physical Therapy Goal    Goal Priority Disciplines Outcome Goal Variances Interventions   Physical Therapy Goal     PT, PT/OT Ongoing (interventions implemented as appropriate)      Description:  Goals to be met by: 2019     Patient will increase functional independence with mobility by performin. Supine to sit with Contact Guard Assistance  2. Sit to stand transfer with Contact Guard Assistance  3. Bed to chair transfer with Contact Guard Assistance using Standard Walker  4. Gait  x 250 feet with Contact Guard Assistance using Rolling Walker.   5. Lower extremity exercise program x20 reps                       Time Tracking:     PT Received On: 19  PT Start Time: 1032     PT Stop Time: 1042  PT Total Time (min): 10 min     Billable Minutes: Gait Training 10    Treatment Type: Treatment  PT/PTA: PTA     PTA Visit Number: 1     Na Rai, ANTHONY  2019

## 2019-03-29 ENCOUNTER — TELEPHONE (OUTPATIENT)
Dept: MEDSURG UNIT | Facility: HOSPITAL | Age: 74
End: 2019-03-29

## 2019-03-29 NOTE — HOSPITAL COURSE
Patient is a 73-year-old  male with a past medical history significant for COPD, hypertension, hyperlipidemia who presents the hospital for scheduled partial colectomy secondary to colon mass.  Patient required conversion to open colectomy intraoperatively.  Postoperatively, patient did well with adequate pain control.  He was noted to have increasing O2 requirement on postop day 2 and further workup showed evidence of right lower lobe infiltrate consistent with acute pneumonia.   Patient was started on treatment for  COPD exacerbation and community-acquired pneumonia improved symptomatically.  Home O2 evaluation was performed on day of discharge and patient was found to have no evidence of hypoxemia at rest or on ambulation.  Patient was discharged home to complete a course of 5 days of oral antibiotics for pneumonia and to follow up with General surgery and his primary care physician.  Patient was seen examined on the day of discharge and deemed medically stable for discharge home.

## 2019-03-29 NOTE — DISCHARGE SUMMARY
[facility] Hospital Medicine  Discharge Summary      Patient Name: Senthil Chandler  MRN: [id number]  Admission Date: 3/25/2019  Hospital Length of Stay: 3 days  Discharge Date and Time: 3/28/2019  5:22 PM  Attending Physician: No att. providers found   Discharging Provider: Mann Holliday MD  Primary Care Provider: Laly Jaimes PA-C      HPI:     Patient is a 73-year-old  male with a mass in the hepatic flexure of his colon the who was admitted postoperatively after a partial colectomy and reanastomosis.  The patient was initially attempted as robotic procedure in converted to open procedure with exploratory laparotomy.  He remains sedated postoperatively but is otherwise in no acute distress on exam.    Procedure(s) (LRB):  ROBOTIC COLECTOMY possible conversion to open (Right)  COLECTOMY, PARTIAL (Right)  CHOLECYSTECTOMY, LAPAROSCOPIC (Right)      Hospital Course:     Patient is a 73-year-old  male with a past medical history significant for COPD, hypertension, hyperlipidemia who presents the hospital for scheduled partial colectomy secondary to colon mass.  Patient required conversion to open colectomy intraoperatively.  Postoperatively, patient did well with adequate pain control.  He was noted to have increasing O2 requirement on postop day 2 and further workup showed evidence of right lower lobe infiltrate consistent with acute pneumonia.   Patient was started on treatment for  COPD exacerbation and community-acquired pneumonia improved symptomatically.  Home O2 evaluation was performed on day of discharge and patient was found to have no evidence of hypoxemia at rest or on ambulation.  Patient was discharged home to complete a course of 5 days of oral antibiotics for pneumonia and to follow up with General surgery and his primary care physician.  Patient was seen examined on the day of discharge and deemed medically stable for discharge home.     Consults:        Final Active Diagnoses:    Diagnosis Date Noted POA    PRINCIPAL PROBLEM:  Malignant neoplasm of hepatic flexure [C18.3] 03/25/2019 Yes    COPD (chronic obstructive pulmonary disease) [J44.9] 03/26/2019 Yes    Orthostatic hypotension [I95.1] 03/27/2019 Yes    RLL pneumonia [J18.1] 03/27/2019 No    Uncontrolled type 2 diabetes mellitus with hyperglycemia [E11.65] 03/25/2019 Yes    Essential hypertension [I10] 03/25/2019 Yes    Moderate episode of recurrent major depressive disorder [F33.1] 03/25/2019 Yes    Hyperlipemia [E78.5] 03/25/2019 Yes    HECTOR (iron deficiency anemia) [D50.9] 02/20/2019 Yes      Problems Resolved During this Admission:       Discharged Condition: stable    Disposition: Home or Self Care    Follow Up:  Follow-up Information     Laly Jaimes PA-C In 2 weeks.    Specialty:  Family Medicine  Why:  Cm left message on answering machine to please call pt with appointment  Contact information:  [address]  [place] [id number] [phone]             Mark Renee MD.    Specialties:  General Surgery, Colon and Rectal Surgery, Surgery  Why:  Stalin spoke with Jennifer in scheduling, she sent a m essage to the nurse to schedule and notify pt of appointment  Contact information:  [address]  [address]  [place] [address] [phone]                 Patient Instructions:      Diet Adult Regular     Notify your health care provider if you experience any of the following:  temperature >100.4     Notify your health care provider if you experience any of the following:  persistent nausea and vomiting or diarrhea     Notify your health care provider if you experience any of the following:  severe uncontrolled pain     No dressing needed     Activity as tolerated       Significant Diagnostic Studies:     Pending Diagnostic Studies:     None         Medications:  Reconciled Home Medications:      Medication List      START taking these medications    ibuprofen 600 MG tablet  Commonly  known as:  ADVIL,MOTRIN  Take 1 tablet (600 mg total) by mouth 4 (four) times daily. for 5 days     levoFLOXacin 500 MG tablet  Commonly known as:  LEVAQUIN  Take 1 tablet (500 mg total) by mouth once daily. for 5 days     oxyCODONE 5 MG immediate release tablet  Commonly known as:  ROXICODONE  Take 1 tablet (5 mg total) by mouth every 6 (six) hours as needed for Pain (take 2 as needed for severe pain).        CONTINUE taking these medications    artificial saliva (cmce-lytes) Sprp  Take by mouth. 1 tsp. Daily prn dry mouth     atorvastatin 20 MG tablet  Commonly known as:  LIPITOR  Take 10 mg by mouth once daily.     bicalutamide 50 MG Tab  Commonly known as:  CASODEX  Take 50 mg by mouth once daily.     carboxymethylcellulose 0.5 % Dpet  Commonly known as:  REFRESH PLUS  Place 1 drop into both eyes 5 (five) times daily.     cholecalciferol (vitamin D3) 2,000 unit Tab  Commonly known as:  VITAMIN D3  Take by mouth once daily.     fluticasone 50 mcg/actuation Dsdv  Commonly known as:  FLOVENT DISKUS  Inhale 2 sprays into the lungs once daily.     gabapentin 400 MG capsule  Commonly known as:  NEURONTIN  Take 400 mg by mouth 3 (three) times daily. 2 qa/ 2 qnoon/ 3 qpm     GAVILYTE-G 236-22.74-6.74 -5.86 gram suspension  Generic drug:  polyethylene glycol  USE AS DIRECTED     hydroCHLOROthiazide 25 MG tablet  Commonly known as:  HYDRODIURIL  Take 25 mg by mouth once daily.     insulin glargine 100 unit/mL injection  Commonly known as:  LANTUS  Inject into the skin once daily.     liraglutide 0.6 mg/0.1 mL (18 mg/3 mL) subq PNIJ 0.6 mg/0.1 mL (18 mg/3 mL) Pnij  Commonly known as:  VICTOZA 2-IVAN  once daily.     lisinopril 10 MG tablet  Take 0.5 tablets by mouth once daily.     pantoprazole 40 MG tablet  Commonly known as:  PROTONIX  Take 1 tablet (40 mg total) by mouth once daily. Before breakfast     traZODone 100 MG tablet  Commonly known as:  DESYREL  Take 150 mg by mouth every evening.     urea 50 % Crea  Apply  bid     venlafaxine 100 MG Tab  Commonly known as:  EFFEXOR  Take 100 mg by mouth 3 (three) times daily.        STOP taking these medications    labetalol 300 MG tablet  Commonly known as:  NORMODYNE            Indwelling Lines/Drains at time of discharge:   Lines/Drains/Airways          None          Time spent on the discharge of patient:   32 a minutes  Patient was seen and examined on the date of discharge and determined to be suitable for discharge.         Mann Holliday MD  Department of Hospital Medicine  [facility]

## 2019-04-02 ENCOUNTER — OFFICE VISIT (OUTPATIENT)
Dept: SURGERY | Facility: CLINIC | Age: 74
End: 2019-04-02
Payer: OTHER GOVERNMENT

## 2019-04-02 VITALS
SYSTOLIC BLOOD PRESSURE: 183 MMHG | HEART RATE: 65 BPM | HEIGHT: 72 IN | BODY MASS INDEX: 31.59 KG/M2 | TEMPERATURE: 98 F | DIASTOLIC BLOOD PRESSURE: 83 MMHG | WEIGHT: 233.25 LBS

## 2019-04-02 DIAGNOSIS — Z09 POSTOP CHECK: Primary | ICD-10-CM

## 2019-04-02 PROCEDURE — 99213 OFFICE O/P EST LOW 20 MIN: CPT | Mod: PBBFAC,PO | Performed by: SURGERY

## 2019-04-02 PROCEDURE — 99024 PR POST-OP FOLLOW-UP VISIT: ICD-10-PCS | Mod: ,,, | Performed by: SURGERY

## 2019-04-02 PROCEDURE — 99999 PR PBB SHADOW E&M-EST. PATIENT-LVL III: ICD-10-PCS | Mod: PBBFAC,,, | Performed by: SURGERY

## 2019-04-02 PROCEDURE — 99024 POSTOP FOLLOW-UP VISIT: CPT | Mod: ,,, | Performed by: SURGERY

## 2019-04-02 PROCEDURE — 99999 PR PBB SHADOW E&M-EST. PATIENT-LVL III: CPT | Mod: PBBFAC,,, | Performed by: SURGERY

## 2019-04-02 NOTE — PROGRESS NOTES
Cc: post op    HPI: 73 y.o.  male  1 weeks s/p R jennifer.   Pt notes that he has been feeling well.  Denies n/v.  Reports flatus and BM.  Pt does note that over the weekend he had a brief period where he had slurred speech.  This resolved spontaneously and he has not noted any recurrence    PE: AFVSS    AAOx3  CTA  Soft/NT/nd  Inc: c/d/i        Path: pending     A/P:   Pt doing well post surgery.   D/w pt.  Recommended f/u with his pcp Dr Theodore KEITH   Recommended proceeding to ER if another bout of these symptoms returns  Pt HTN in office today recommended restarting home meds  RTC in 2 weeks

## 2019-04-16 ENCOUNTER — OFFICE VISIT (OUTPATIENT)
Dept: SURGERY | Facility: CLINIC | Age: 74
End: 2019-04-16
Payer: OTHER GOVERNMENT

## 2019-04-16 VITALS
HEART RATE: 78 BPM | BODY MASS INDEX: 30.04 KG/M2 | WEIGHT: 221.81 LBS | TEMPERATURE: 98 F | DIASTOLIC BLOOD PRESSURE: 61 MMHG | SYSTOLIC BLOOD PRESSURE: 121 MMHG | HEIGHT: 72 IN

## 2019-04-16 DIAGNOSIS — Z09 POSTOP CHECK: Primary | ICD-10-CM

## 2019-04-16 PROCEDURE — 99213 OFFICE O/P EST LOW 20 MIN: CPT | Mod: PBBFAC,PO | Performed by: SURGERY

## 2019-04-16 PROCEDURE — 99024 PR POST-OP FOLLOW-UP VISIT: ICD-10-PCS | Mod: ,,, | Performed by: SURGERY

## 2019-04-16 PROCEDURE — 99999 PR PBB SHADOW E&M-EST. PATIENT-LVL III: CPT | Mod: PBBFAC,,, | Performed by: SURGERY

## 2019-04-16 PROCEDURE — 99024 POSTOP FOLLOW-UP VISIT: CPT | Mod: ,,, | Performed by: SURGERY

## 2019-04-16 PROCEDURE — 99999 PR PBB SHADOW E&M-EST. PATIENT-LVL III: ICD-10-PCS | Mod: PBBFAC,,, | Performed by: SURGERY

## 2019-04-17 NOTE — PROGRESS NOTES
Cc: post op    HPI: 73 y.o.  male  3 weeks s/p R hemia and lap effie.   Pt notes that he is feeling well.  Denies pain.  NO n/v.  No fever/chills.  Reports good appetite.  Normal bowel habits.        PE: AFVSS    AAOx3  CTA  Soft/NT/nd  Inc: c/d/i        Path:     FINAL PATHOLOGIC DIAGNOSIS  1. GALLBLADDER, CHOLECYSTECTOMY:  - Chronic cholecystitis with mild mucosal hyperplasia.  - Cholelithiasis.  - No evidence of dysplasia or malignancy.  2. RIGHT COLON WITH APPENDIX AND SEGMENT OF TERMINAL ILEUM, PARTIAL COLECTOMY:  - Invasive moderately differentiated adenocarcinoma. The tumor invades the muscularis propria but does not  extend into pericolonic tissue. The proximal ileal, distal colonic and mesenteric margins are free of tumor.  - Cecal lipoma.  - Diverticulosis.  - Tattoo site.  - Adhesions.  APPENDIX:  - No significant histopathologic abnormality.  REGIONAL LYMPH NODES (12):  - 12 out of 12 lymph nodes are negative for malignancy.  - Focal pigmented macrophages consistent with drainage of a tattoo site.  SEPARATE SEGMENT OF SMALL BOWEL:  - No significant histopathologic abnormality.  COLON, CANCER CASE SUMMARY:  PROCEDURE: Partial colectomy.  TUMOR SITE: Hepatic flexure.    TUMOR SIZE, GREATEST DIMENSION: 2.5 cm.  MACROSCOPIC TUMOR PERFORATION: Not identified.  HISTOLOGIC TYPE: Adenocarcinoma.  HISTOLOGIC GRADE: Grade 2: Moderately differentiated.  TUMOR EXTENSION: Tumor invades muscularis propria.  MARGINS: All margins are uninvolved by invasive carcinoma, high-grade dysplasia, intramucosal adenocarcinoma  and adenoma.  Margins examined: Proximal ileal, distal colonic and mesenteric.  TREATMENT EFFECT: No known presurgical therapy.  LYMPHOVASCULAR INVASION: Present.  PERINEURAL INVASION: Not identified.  TUMOR DEPOSITS: Not identified.  REGIONAL LYMPH NODES:  Number of lymph nodes involved: 0.  Number of lymph nodes examined: 12.  PATHOLOGIC STAGE CLASSIFICATION (pTNM, AJCC 8TH EDITION):  pT2: Tumor invades  the muscularis propria.  pN0: No regional lymph node metastasis.  pM: Not applicable  ANCILLARY STUDIES: A section of tumor is submitted for MSI/MMR and CONNIE/BRAF studies; a separate report  will follow after completion of the studies.    A/P:  St I Colon cancer   Pt doing well post surgery.   F/U with me in 3 months with CEA  Repeat colonoscopy in 1 year  [facility]

## 2019-05-07 ENCOUNTER — OFFICE VISIT (OUTPATIENT)
Dept: SURGERY | Facility: CLINIC | Age: 74
End: 2019-05-07
Payer: OTHER GOVERNMENT

## 2019-05-07 VITALS
DIASTOLIC BLOOD PRESSURE: 70 MMHG | SYSTOLIC BLOOD PRESSURE: 136 MMHG | TEMPERATURE: 98 F | WEIGHT: 220.44 LBS | HEIGHT: 72 IN | HEART RATE: 75 BPM | RESPIRATION RATE: 16 BRPM | BODY MASS INDEX: 29.86 KG/M2

## 2019-05-07 DIAGNOSIS — Z85.038 HISTORY OF COLON CANCER, STAGE I: Primary | ICD-10-CM

## 2019-05-07 DIAGNOSIS — Z09 POSTOP CHECK: ICD-10-CM

## 2019-05-07 PROCEDURE — 99999 PR PBB SHADOW E&M-EST. PATIENT-LVL III: CPT | Mod: PBBFAC,,, | Performed by: SURGERY

## 2019-05-07 PROCEDURE — 99999 PR PBB SHADOW E&M-EST. PATIENT-LVL III: ICD-10-PCS | Mod: PBBFAC,,, | Performed by: SURGERY

## 2019-05-07 PROCEDURE — 99024 PR POST-OP FOLLOW-UP VISIT: ICD-10-PCS | Mod: ,,, | Performed by: SURGERY

## 2019-05-07 PROCEDURE — 99024 POSTOP FOLLOW-UP VISIT: CPT | Mod: ,,, | Performed by: SURGERY

## 2019-05-07 PROCEDURE — 99213 OFFICE O/P EST LOW 20 MIN: CPT | Mod: PBBFAC,PO | Performed by: SURGERY

## 2019-05-07 NOTE — PROGRESS NOTES
Cc: post op    HPI: 73 y.o.  male  6 weeks s/p  R hemicolectomy for a T2NO colon cancer. .   Pt notes that he is feeling well.  Denies pain.  Non /v.  Having normal bowel function.    PE: AFVSS    AAOx3  CTA  Soft/NT/nd  Inc: c/d/i        Path: T2N0 colon cancer    A/P:   Pt doing well post surgery.   F/U with me in 3 months with CEA

## 2019-05-30 ENCOUNTER — TELEPHONE (OUTPATIENT)
Dept: SURGERY | Facility: CLINIC | Age: 74
End: 2019-05-30

## 2019-05-30 NOTE — TELEPHONE ENCOUNTER
----- Message from Tsering Mcdermott sent at 5/30/2019 10:25 AM CDT -----  Contact: Valeriy from [facility]  Calling in to get patient surgical pathology report.  Fax # [phone]    Please contact to advise [phone] ext. [phone]

## 2019-08-13 ENCOUNTER — OFFICE VISIT (OUTPATIENT)
Dept: SURGERY | Facility: CLINIC | Age: 74
End: 2019-08-13
Payer: OTHER GOVERNMENT

## 2019-08-13 ENCOUNTER — LAB VISIT (OUTPATIENT)
Dept: LAB | Facility: HOSPITAL | Age: 74
End: 2019-08-13
Attending: SURGERY
Payer: COMMERCIAL

## 2019-08-13 VITALS
HEIGHT: 72 IN | DIASTOLIC BLOOD PRESSURE: 68 MMHG | BODY MASS INDEX: 30.25 KG/M2 | SYSTOLIC BLOOD PRESSURE: 139 MMHG | WEIGHT: 223.31 LBS | HEART RATE: 71 BPM | TEMPERATURE: 98 F

## 2019-08-13 DIAGNOSIS — Z85.038 HISTORY OF COLON CANCER, STAGE I: Primary | ICD-10-CM

## 2019-08-13 DIAGNOSIS — Z85.038 HISTORY OF COLON CANCER, STAGE I: ICD-10-CM

## 2019-08-13 PROCEDURE — 82378 CARCINOEMBRYONIC ANTIGEN: CPT

## 2019-08-13 PROCEDURE — 99999 PR PBB SHADOW E&M-EST. PATIENT-LVL III: CPT | Mod: PBBFAC,,, | Performed by: SURGERY

## 2019-08-13 PROCEDURE — 99211 OFF/OP EST MAY X REQ PHY/QHP: CPT | Mod: S$PBB,,, | Performed by: SURGERY

## 2019-08-13 PROCEDURE — 99999 PR PBB SHADOW E&M-EST. PATIENT-LVL III: ICD-10-PCS | Mod: PBBFAC,,, | Performed by: SURGERY

## 2019-08-13 PROCEDURE — 99211 PR OFFICE/OUTPT VISIT, EST, LEVL I: ICD-10-PCS | Mod: S$PBB,,, | Performed by: SURGERY

## 2019-08-13 PROCEDURE — 36415 COLL VENOUS BLD VENIPUNCTURE: CPT

## 2019-08-13 PROCEDURE — 99213 OFFICE O/P EST LOW 20 MIN: CPT | Mod: PBBFAC,PO | Performed by: SURGERY

## 2019-08-13 NOTE — PROGRESS NOTES
74 yo M who is 5 months s/p  R hemicolectomy for a T2NO colon cancer.  Pt has been feeling well.  Complains of back pains and pains throughout his joints.  Denies abd pain.  No n/v.  No fever/chills.  No other complaints.  NO changes in bowel habits.       AFVSS  AAOx3  Soft/nt/nd  2+ pulses B        Lab Results   Component Value Date    CEA 1.8 02/20/2019     A/P: History of St I colon cancer  Pt doing well.   Will repeat cea today  Pt will need f/u cscope in 2/20  He will RTC in 3 months  Regarding joint pains and back pain have recommended f/u with his PCP

## 2019-08-14 LAB — CEA SERPL-MCNC: 2.5 NG/ML (ref 0–5)

## 2019-10-14 ENCOUNTER — TELEPHONE (OUTPATIENT)
Dept: SURGERY | Facility: CLINIC | Age: 74
End: 2019-10-14

## 2019-10-14 NOTE — TELEPHONE ENCOUNTER
I called patient to reschedule his appointment on Tuesday, 11/12/19 at 12pm to Tuesday, 11/5/19 at 1:30pm in [place]. [place]

## 2019-11-05 ENCOUNTER — OFFICE VISIT (OUTPATIENT)
Dept: SURGERY | Facility: CLINIC | Age: 74
End: 2019-11-05
Payer: OTHER GOVERNMENT

## 2019-11-05 ENCOUNTER — LAB VISIT (OUTPATIENT)
Dept: LAB | Facility: HOSPITAL | Age: 74
End: 2019-11-05
Attending: SURGERY
Payer: OTHER GOVERNMENT

## 2019-11-05 VITALS
WEIGHT: 223.13 LBS | HEIGHT: 72 IN | BODY MASS INDEX: 30.22 KG/M2 | SYSTOLIC BLOOD PRESSURE: 142 MMHG | DIASTOLIC BLOOD PRESSURE: 70 MMHG | HEART RATE: 66 BPM | TEMPERATURE: 98 F

## 2019-11-05 DIAGNOSIS — Z85.038 HISTORY OF COLON CANCER: ICD-10-CM

## 2019-11-05 DIAGNOSIS — Z85.038 HISTORY OF COLON CANCER: Primary | ICD-10-CM

## 2019-11-05 LAB — CEA SERPL-MCNC: 2.5 NG/ML (ref 0–5)

## 2019-11-05 PROCEDURE — 99212 OFFICE O/P EST SF 10 MIN: CPT | Mod: S$PBB,,, | Performed by: SURGERY

## 2019-11-05 PROCEDURE — 99212 PR OFFICE/OUTPT VISIT, EST, LEVL II, 10-19 MIN: ICD-10-PCS | Mod: S$PBB,,, | Performed by: SURGERY

## 2019-11-05 PROCEDURE — 99999 PR PBB SHADOW E&M-EST. PATIENT-LVL III: CPT | Mod: PBBFAC,,, | Performed by: SURGERY

## 2019-11-05 PROCEDURE — 99999 PR PBB SHADOW E&M-EST. PATIENT-LVL III: ICD-10-PCS | Mod: PBBFAC,,, | Performed by: SURGERY

## 2019-11-05 PROCEDURE — 82378 CARCINOEMBRYONIC ANTIGEN: CPT

## 2019-11-05 PROCEDURE — 99213 OFFICE O/P EST LOW 20 MIN: CPT | Mod: PBBFAC,PO | Performed by: SURGERY

## 2019-11-05 PROCEDURE — 36415 COLL VENOUS BLD VENIPUNCTURE: CPT

## 2019-11-05 NOTE — PROGRESS NOTES
89yo M who is 8 months s/p  R hemicolectomy for a T2NO colon cancer.  Pt has been feeling well.   Denies abd pain.  No n/v.  No fever/chills.  No other com plaints.  NO changes in bowel habits. Reports feeling well    Wt Readings from Last 3 Encounters:   11/05/19 101.2 kg (223 lb 1.7 oz)   08/13/19 101.3 kg (223 lb 5.2 oz)   05/07/19 100 kg (220 lb 7.4 oz)     Temp Readings from Last 3 Encounters:   11/05/19 98.4 °F (36.9 °C) (Oral)   08/13/19 98.3 °F (36.8 °C) (Oral)   05/07/19 98.2 °F (36.8 °C) (Oral)     BP Readings from Last 3 Encounters:   11/05/19 (!) 142/70   08/13/19 139/68   05/07/19 136/70     Pulse Readings from Last 3 Encounters:   11/05/19 66   08/13/19 71   05/07/19 75     AAox3  CTA B  Soft/nt/nd  2+ pulses B    Lab Results   Component Value Date    CEA 2.5 08/13/2019     A/P: history of ST 1 Colon cancer    CHeck cea  RTC in 3 months.  WIll need repeat cscope in 3/20

## 2020-02-04 ENCOUNTER — OFFICE VISIT (OUTPATIENT)
Dept: SURGERY | Facility: CLINIC | Age: 75
End: 2020-02-04
Payer: OTHER GOVERNMENT

## 2020-02-04 ENCOUNTER — TELEPHONE (OUTPATIENT)
Dept: GASTROENTEROLOGY | Facility: CLINIC | Age: 75
End: 2020-02-04

## 2020-02-04 ENCOUNTER — LAB VISIT (OUTPATIENT)
Dept: LAB | Facility: HOSPITAL | Age: 75
End: 2020-02-04
Attending: SURGERY
Payer: OTHER GOVERNMENT

## 2020-02-04 VITALS
DIASTOLIC BLOOD PRESSURE: 77 MMHG | HEIGHT: 72 IN | SYSTOLIC BLOOD PRESSURE: 166 MMHG | TEMPERATURE: 99 F | BODY MASS INDEX: 30.22 KG/M2 | WEIGHT: 223.13 LBS | HEART RATE: 72 BPM

## 2020-02-04 DIAGNOSIS — Z85.038 HISTORY OF COLON CANCER: Primary | ICD-10-CM

## 2020-02-04 DIAGNOSIS — Z85.038 HISTORY OF COLON CANCER: ICD-10-CM

## 2020-02-04 PROCEDURE — 99999 PR PBB SHADOW E&M-EST. PATIENT-LVL II: ICD-10-PCS | Mod: PBBFAC,,, | Performed by: SURGERY

## 2020-02-04 PROCEDURE — 82378 CARCINOEMBRYONIC ANTIGEN: CPT

## 2020-02-04 PROCEDURE — 99212 OFFICE O/P EST SF 10 MIN: CPT | Mod: PBBFAC,PO | Performed by: SURGERY

## 2020-02-04 PROCEDURE — 99212 OFFICE O/P EST SF 10 MIN: CPT | Mod: S$PBB,,, | Performed by: SURGERY

## 2020-02-04 PROCEDURE — 99212 PR OFFICE/OUTPT VISIT, EST, LEVL II, 10-19 MIN: ICD-10-PCS | Mod: S$PBB,,, | Performed by: SURGERY

## 2020-02-04 PROCEDURE — 36415 COLL VENOUS BLD VENIPUNCTURE: CPT

## 2020-02-04 PROCEDURE — 99999 PR PBB SHADOW E&M-EST. PATIENT-LVL II: CPT | Mod: PBBFAC,,, | Performed by: SURGERY

## 2020-02-04 RX ORDER — HYDROCODONE BITARTRATE AND ACETAMINOPHEN 5; 325 MG/1; MG/1
1 TABLET ORAL
Status: ON HOLD | COMMUNITY
Start: 2020-01-28 | End: 2022-04-02

## 2020-02-04 RX ORDER — CHOLECALCIFEROL (VITAMIN D3) 50 MCG
1 TABLET ORAL DAILY
COMMUNITY

## 2020-02-04 NOTE — PROGRESS NOTES
75yo M who is 8 months s/p  R hemicolectomy in 3/19 for a T2NO colon cancer.  Pt has been feeling well.   Denies abd pain.  No n/v.  No fever/chills.  No other com plaints.  NO changes in bowel habits.  He has been well.  Did have surgeyr on L arm 2 weeks ago but otherwise without complaint  Pt is due for surveillance colonoscopy      AFVSS  AAOx3  CTA B  Soft/nt/nd  2+ pulses B    Lab Results   Component Value Date    CEA 2.5 11/05/2019     A/P: history of st I colon cancer  Arrange f/u cscope with Dr Roman  Repeat cea today  RTC in 3 months

## 2020-02-05 ENCOUNTER — TELEPHONE (OUTPATIENT)
Dept: GASTROENTEROLOGY | Facility: CLINIC | Age: 75
End: 2020-02-05

## 2020-02-05 LAB — CEA SERPL-MCNC: 3.5 NG/ML (ref 0–5)

## 2020-02-05 NOTE — TELEPHONE ENCOUNTER
Patient notified and understands needs colonoscopy patient [facility] to get [facility] and call back to schedule.

## 2020-06-05 ENCOUNTER — TELEPHONE (OUTPATIENT)
Dept: GASTROENTEROLOGY | Facility: CLINIC | Age: 75
End: 2020-06-05

## 2020-06-05 NOTE — TELEPHONE ENCOUNTER
----- Message from Ama Cuevas sent at 6/5/2020  2:20 PM CDT -----  Contact: Lisa [facility] at [phone]  Type:  Needs Medical Advice    Who Called: Lisa  Symptoms (please be specific): Colon Check up  How long has patient had these symptoms: since May  Would the patient rather a call back or a response via [facility]? Callback  Best Call Back Number:  [phone]  Additional Information: Lisa is requesting a callback from office need to know if pt can get a colonoscopy scheduled says she see in the notes where it says pt need to have a procedure done. Pt has no order so schedule an office visit

## 2020-06-16 ENCOUNTER — TELEPHONE (OUTPATIENT)
Dept: GASTROENTEROLOGY | Facility: CLINIC | Age: 75
End: 2020-06-16

## 2020-06-16 DIAGNOSIS — Z85.038 HISTORY OF COLON CANCER: Primary | ICD-10-CM

## 2020-06-16 NOTE — TELEPHONE ENCOUNTER
Colonoscopy scheduled 6/30 at 9am arrive for 8am with covid screening 6/27 instructions reviewed and patient states understanding. Patient will  copy from office .

## 2020-06-27 ENCOUNTER — LAB VISIT (OUTPATIENT)
Dept: PRIMARY CARE CLINIC | Facility: CLINIC | Age: 75
End: 2020-06-27
Payer: OTHER GOVERNMENT

## 2020-06-27 PROCEDURE — U0003 INFECTIOUS AGENT DETECTION BY NUCLEIC ACID (DNA OR RNA); SEVERE ACUTE RESPIRATORY SYNDROME CORONAVIRUS 2 (SARS-COV-2) (CORONAVIRUS DISEASE [COVID-19]), AMPLIFIED PROBE TECHNIQUE, MAKING USE OF HIGH THROUGHPUT TECHNOLOGIES AS DESCRIBED BY CMS-2020-01-R: HCPCS

## 2020-06-28 LAB — SARS-COV-2 RNA RESP QL NAA+PROBE: NOT DETECTED

## 2020-06-30 ENCOUNTER — ANESTHESIA EVENT (OUTPATIENT)
Dept: ENDOSCOPY | Facility: HOSPITAL | Age: 75
End: 2020-06-30
Payer: COMMERCIAL

## 2020-06-30 ENCOUNTER — ANESTHESIA (OUTPATIENT)
Dept: ENDOSCOPY | Facility: HOSPITAL | Age: 75
End: 2020-06-30
Payer: OTHER GOVERNMENT

## 2020-06-30 ENCOUNTER — HOSPITAL ENCOUNTER (OUTPATIENT)
Facility: HOSPITAL | Age: 75
Discharge: HOME | End: 2020-06-30
Attending: INTERNAL MEDICINE | Admitting: INTERNAL MEDICINE
Payer: COMMERCIAL

## 2020-06-30 DIAGNOSIS — K57.90 DIVERTICULOSIS: ICD-10-CM

## 2020-06-30 DIAGNOSIS — K63.5 POLYP OF COLON, UNSPECIFIED PART OF COLON, UNSPECIFIED TYPE: Primary | ICD-10-CM

## 2020-06-30 DIAGNOSIS — Z85.038 HX OF MALIGNANT NEOPLASM OF COLON: ICD-10-CM

## 2020-06-30 DIAGNOSIS — K64.8 INTERNAL HEMORRHOIDS: ICD-10-CM

## 2020-06-30 LAB — POCT GLUCOSE: 117 MG/DL (ref 70–110)

## 2020-06-30 PROCEDURE — 25000003 PHARM REV CODE 250: Performed by: INTERNAL MEDICINE

## 2020-06-30 PROCEDURE — 63600175 PHARM REV CODE 636 W HCPCS: Performed by: NURSE ANESTHETIST, CERTIFIED REGISTERED

## 2020-06-30 PROCEDURE — 45380 COLONOSCOPY AND BIOPSY: CPT | Performed by: INTERNAL MEDICINE

## 2020-06-30 PROCEDURE — 37000009 HC ANESTHESIA EA ADD 15 MINS: Performed by: INTERNAL MEDICINE

## 2020-06-30 PROCEDURE — D9220A PRA ANESTHESIA: ICD-10-PCS | Mod: PT,,, | Performed by: ANESTHESIOLOGY

## 2020-06-30 PROCEDURE — 45385 COLONOSCOPY W/LESION REMOVAL: CPT | Performed by: INTERNAL MEDICINE

## 2020-06-30 PROCEDURE — 37000008 HC ANESTHESIA 1ST 15 MINUTES: Performed by: INTERNAL MEDICINE

## 2020-06-30 PROCEDURE — 27201012 HC FORCEPS, HOT/COLD, DISP: Performed by: INTERNAL MEDICINE

## 2020-06-30 PROCEDURE — 88305 TISSUE EXAM BY PATHOLOGIST: CPT | Mod: 26,,, | Performed by: PATHOLOGY

## 2020-06-30 PROCEDURE — 82962 GLUCOSE BLOOD TEST: CPT | Performed by: INTERNAL MEDICINE

## 2020-06-30 PROCEDURE — 88305 TISSUE EXAM BY PATHOLOGIST: ICD-10-PCS | Mod: 26,,, | Performed by: PATHOLOGY

## 2020-06-30 PROCEDURE — 45380 PR COLONOSCOPY,BIOPSY: ICD-10-PCS | Mod: 59,,, | Performed by: INTERNAL MEDICINE

## 2020-06-30 PROCEDURE — 45385 PR COLONOSCOPY,REMV LESN,SNARE: ICD-10-PCS | Mod: ,,, | Performed by: INTERNAL MEDICINE

## 2020-06-30 PROCEDURE — 45380 COLONOSCOPY AND BIOPSY: CPT | Mod: 59,,, | Performed by: INTERNAL MEDICINE

## 2020-06-30 PROCEDURE — 27201089 HC SNARE, DISP (ANY): Performed by: INTERNAL MEDICINE

## 2020-06-30 PROCEDURE — 45385 COLONOSCOPY W/LESION REMOVAL: CPT | Mod: ,,, | Performed by: INTERNAL MEDICINE

## 2020-06-30 PROCEDURE — D9220A PRA ANESTHESIA: Mod: PT,,, | Performed by: ANESTHESIOLOGY

## 2020-06-30 PROCEDURE — 88305 TISSUE EXAM BY PATHOLOGIST: CPT | Mod: 59 | Performed by: PATHOLOGY

## 2020-06-30 RX ORDER — PROPOFOL 10 MG/ML
VIAL (ML) INTRAVENOUS
Status: DISCONTINUED | OUTPATIENT
Start: 2020-06-30 | End: 2020-06-30

## 2020-06-30 RX ORDER — SODIUM CHLORIDE 9 MG/ML
INJECTION, SOLUTION INTRAVENOUS CONTINUOUS
Status: DISCONTINUED | OUTPATIENT
Start: 2020-06-30 | End: 2020-06-30 | Stop reason: HOSPADM

## 2020-06-30 RX ORDER — LIDOCAINE HYDROCHLORIDE 20 MG/ML
INJECTION INTRAVENOUS
Status: DISCONTINUED | OUTPATIENT
Start: 2020-06-30 | End: 2020-06-30

## 2020-06-30 RX ADMIN — PROPOFOL 50 MG: 10 INJECTION, EMULSION INTRAVENOUS at 09:06

## 2020-06-30 RX ADMIN — SODIUM CHLORIDE: 0.9 INJECTION, SOLUTION INTRAVENOUS at 08:06

## 2020-06-30 RX ADMIN — LIDOCAINE HYDROCHLORIDE 100 MG: 20 INJECTION, SOLUTION INTRAVENOUS at 09:06

## 2020-06-30 RX ADMIN — PROPOFOL 100 MG: 10 INJECTION, EMULSION INTRAVENOUS at 09:06

## 2020-06-30 NOTE — ANESTHESIA POSTPROCEDURE EVALUATION
Anesthesia Post Evaluation    Patient: Senthil Chandler    Procedure(s) Performed: Procedure(s) (LRB):  COLONOSCOPY (N/A)    Final Anesthesia Type: general    Patient location during evaluation: PACU  Patient participation: Yes- Able to Participate  Level of consciousness: awake and alert and oriented  Post-procedure vital signs: reviewed and stable  Pain management: adequate  Airway patency: patent    PONV status at discharge: No PONV  Anesthetic complications: no      Cardiovascular status: blood pressure returned to baseline  Respiratory status: unassisted, spontaneous ventilation and room air  Hydration status: euvolemic  Follow-up not needed.          Vitals Value Taken Time   /55 06/30/20 0933   Temp 36.7 °C (98 °F) 06/30/20 0500   Pulse 49 06/30/20 0933   Resp 16 06/30/20 0933   SpO2 97 % 06/30/20 0933         No case tracking events are documented in the log.      Pain/Gilda Score: No data recorded

## 2020-06-30 NOTE — DISCHARGE INSTRUCTIONS
Colonoscopy     A camera attached to a flexible tube with a viewing lens is used to take video pictures.     Colonoscopy is a test to view the inside of your lower digestive tract (colon and rectum). Sometimes it can show the last part of the small intestine (ileum). During the test, small pieces of tissue may be removed for testing. This is called a biopsy. Small growths, such as polyps, may also be removed.   Why is colonoscopy done?  The test is done to help look for colon cancer. And it can help find the source of abdominal pain, bleeding, and changes in bowel habits. It may be needed once a year, depending on factors such as your:  · Age  · Health history  · Family health history  · Symptoms  · Results from any prior colonoscopy  Risks and possible complications  These include:  · Bleeding               · A puncture or tear in the colon   · Risks of anesthesia  · A cancer lesion not being seen  Getting ready   To prepare for the test:  · Talk with your healthcare provider about the risks of the test (see below). Also ask your healthcare provider about alternatives to the test.  · Tell your healthcare provider about any medicines you take. Also tell him or her about any health conditions you may have.  · Make sure your rectum and colon are empty for the test. Follow the diet and bowel prep instructions exactly. If you dont, the test may need to be rescheduled.  · Plan for a friend or family member to drive you home after the test.     Colonoscopy provides an inside view of the entire colon.     You may discuss the results with your doctor right away or at a future visit.  During the test   The test is usually done in the hospital on an outpatient basis. This means you go home the same day. The procedure takes about 30 minutes. During that time:  · You are given relaxing (sedating) medicine through an IV line. You may be drowsy, or fully asleep.  · The healthcare provider will first give you a physical exam to  check for anal and rectal problems.  · Then the anus is lubricated and the scope inserted.  · If you are awake, you may have a feeling similar to needing to have a bowel movement. You may also feel pressure as air is pumped into the colon. Its OK to pass gas during the procedure.  · Biopsy, polyp removal, or other treatments may be done during the test.  After the test   You may have gas right after the test. It can help to try to pass it to help prevent later bloating. Your healthcare provider may discuss the results with you right away. Or you may need to schedule a follow-up visit to talk about the results. After the test, you can go back to your normal eating and other activities. You may be tired from the sedation and need to rest for a few hours.  Date Last Reviewed: 11/1/2016 © 2000-2017 [employer]. [address] [place] [address]. All rights reserved. This information is not intended as a substitute for professional medical care. Always follow your healthcare professional's instructions.        Understanding Colon and Rectal Polyps    The colon (also called the large intestine) is a muscular tube that forms the last part of the digestive tract. It absorbs water and stores food waste. The colon is about 4 to 6 feet long. The rectum is the last 6 inches of the colon. The colon and rectum have a smooth lining composed of millions of cells. Changes in these cells can lead to growths in the colon that can become cancerous and should be removed. Multiple tests are available to screen for colon cancer, but the colonoscopy is the most recommended test. During colonoscopy, these polyps can be removed. How often you need this test depends on many things including your condition, your family history, symptoms, and what the findings were at the previous colonoscopy.   When the colon lining changes  Changes that happen in the cells that line the colon or rectum can lead to growths called  polyps. Over a period of years, polyps can turn cancerous. Removing polyps early may prevent cancer from ever forming.  Polyps  Polyps are fleshy clumps of tissue that form on the lining of the colon or rectum. Small polyps are usually benign (not cancerous). However, over time, cells in a polyp can change and become cancerous. Certain types of polyps known as adenomatous polyps are premalignant. The risk for invasive cancer increases with the size of the polyp and certain cell and gene features. This means that they can become cancerous if they're not removed. Hyperplastic polyps are benign. They can grow quite large and not turn cancerous.   Cancer  Almost all colorectal cancers start when polyp cells begin growing abnormally. As a cancerous tumor grows, it may involve more and more of the colon or rectum. In time, cancer can also grow beyond the colon or rectum and spread to nearby organs or to glands called lymph nodes. The cells can also travel to other parts of the body. This is known as metastasis. The earlier a cancerous tumor is removed, the better the chance of preventing its spread.    Date Last Reviewed: 8/1/2016 © 2000-2017 [employer]. [address] [place] [address]. All rights reserved. This information is not intended as a substitute for professional medical care. Always follow your healthcare professional's instructions.    Discharge Instructions: After Your Surgery  Youve just had surgery. During surgery, you were given medicine called anesthesia to keep you relaxed and free of pain. After surgery, you may have some pain or nausea. This is common. Here are some tips for feeling better and getting well after surgery.     Stay on schedule with your medicine.   Going home  Your healthcare provider will show you how to take care of yourself when you go home. He or she will also answer your questions. Have an adult family member or friend drive you home. For the first 24 hours  after your surgery:  · Do not drive or use heavy equipment.  · Do not make important decisions or sign legal papers.  · Do not drink alcohol.  · Have someone stay with you, if needed. He or she can watch for problems and help keep you safe.  Be sure to go to all follow-up visits with your healthcare provider. And rest after your surgery for as long as your healthcare provider tells you to.  Coping with pain  If you have pain after surgery, pain medicine will help you feel better. Take it as told, before pain becomes severe. Also, ask your healthcare provider or pharmacist about other ways to control pain. This might be with heat, ice, or relaxation. And follow any other instructions your surgeon or nurse gives you.  Tips for taking pain medicine  To get the best relief possible, remember these points:  · Pain medicines can upset your stomach. Taking them with a little food may help.  · Most pain relievers taken by mouth need at least 20 to 30 minutes to start to work.  · Taking medicine on a schedule can help you remember to take it. Try to time your medicine so that you can take it before starting an activity. This might be before you get dressed, go for a walk, or sit down for dinner.  · Constipation is a common side effect of pain medicines. Call your healthcare provider before taking any medicines such as laxatives or stool softeners to help ease constipation. Also ask if you should skip any foods. Drinking lots of fluids and eating foods such as fruits and vegetables that are high in fiber can also help. Remember, do not take laxatives unless your surgeon has prescribed them.  · Drinking alcohol and taking pain medicine can cause dizziness and slow your breathing. It can even be deadly. Do not drink alcohol while taking pain medicine.  · Pain medicine can make you react more slowly to things. Do not drive or run machinery while taking pain medicine.  Your healthcare provider may tell you to take acetaminophen  to help ease your pain. Ask him or her how much you are supposed to take each day. Acetaminophen or other pain relievers may interact with your prescription medicines or other over-the-counter (OTC) medicines. Some prescription medicines have acetaminophen and other ingredients. Using both prescription and OTC acetaminophen for pain can cause you to overdose. Read the labels on your OTC medicines with care. This will help you to clearly know the list of ingredients, how much to take, and any warnings. It may also help you not take too much acetaminophen. If you have questions or do not understand the information, ask your pharmacist or healthcare provider to explain it to you before you take the OTC medicine.  Managing nausea  Some people have an upset stomach after surgery. This is often because of anesthesia, pain, or pain medicine, or the stress of surgery. These tips will help you handle nausea and eat healthy foods as you get better. If you were on a special food plan before surgery, ask your healthcare provider if you should follow it while you get better. These tips may help:  · Do not push yourself to eat. Your body will tell you when to eat and how much.  · Start off with clear liquids and soup. They are easier to digest.  · Next try semi-solid foods, such as mashed potatoes, applesauce, and gelatin, as you feel ready.  · Slowly move to solid foods. Dont eat fatty, rich, or spicy foods at first.  · Do not force yourself to have 3 large meals a day. Instead eat smaller amounts more often.  · Take pain medicines with a small amount of solid food, such as crackers or toast, to avoid nausea.     Call your surgeon if  · You still have pain an hour after taking medicine. The medicine may not be strong enough.  · You feel too sleepy, dizzy, or groggy. The medicine may be too strong.  · You have side effects like nausea, vomiting, or skin changes, such as rash, itching, or hives.       If you have obstructive  sleep apnea  You were given anesthesia medicine during surgery to keep you comfortable and free of pain. After surgery, you may have more apnea spells because of this medicine and other medicines you were given. The spells may last longer than usual.   At home:  · Keep using the continuous positive airway pressure (CPAP) device when you sleep. Unless your healthcare provider tells you not to, use it when you sleep, day or night. CPAP is a common device used to treat obstructive sleep apnea.  · Talk with your provider before taking any pain medicine, muscle relaxants, or sedatives. Your provider will tell you about the possible dangers of taking these medicines.  Date Last Reviewed: 12/1/2016  © 0058-5105 [employer]. [address] [place] [address], [place] [address]. All rights reserved. This information is not intended as a substitute for professional medical care. Always follow your healthcare professional's instructions.

## 2020-06-30 NOTE — TRANSFER OF CARE
Anesthesia Transfer of Care Note    Patient: Senthil Chandler    Procedure(s) Performed: Procedure(s) (LRB):  COLONOSCOPY (N/A)    Patient location: PACU    Anesthesia Type: general    Transport from OR: Transported from OR on 2-3 L/min O2 by NC with adequate spontaneous ventilation    Post pain: adequate analgesia    Post assessment: no apparent anesthetic complications and tolerated procedure well    Post vital signs: stable    Level of consciousness: sedated    Nausea/Vomiting: no nausea/vomiting    Complications: none    Transfer of care protocol was followed      Last vitals:   Visit Vitals  BP (!) 110/55   Pulse (!) 49   Temp 36.7 °C (98 °F) (Skin)   Resp 16   Ht 6' (1.829 m)   Wt 98.9 kg (218 lb)   SpO2 97%   BMI 29.57 kg/m²

## 2020-06-30 NOTE — PLAN OF CARE
Pt lying in bed awake. No complaints of pain. No signs/symptoms of pain or distress. Niece in waiting room.

## 2020-06-30 NOTE — [employer] PATIENT INSTRUCTIONS
Discharge Summary/Instructions after an Endoscopic Procedure  Patient Name: Senthil Chandler  Patient MRN: [id number]  Patient YOB: 1945  Tuesday, June 30, 2020  Carlos Coleman MD  RESTRICTIONS:  During your procedure today, you received medications for sedation.  These   medications may affect your judgment, balance and coordination.  Therefore,   for 24 hours, you have the following restrictions:   - DO NOT drive a car, operate machinery, make legal/financial decisions,   sign important papers or drink alcohol.    ACTIVITY:  Today: no heavy lifting, straining or running due to procedural   sedation/anesthesia.  The following day: return to full activity including work.  DIET:  Eat and drink normally unless instructed otherwise.     TREATMENT FOR COMMON SIDE EFFECTS:  - Mild abdominal pain, nausea, belching, bloating or excessive gas:  rest,   eat lightly and use a heating pad.  - Sore Throat: treat with throat lozenges and/or gargle with warm salt   water.  - Because air was used during the procedure, expelling large amounts of air   from your rectum or belching is normal.  - If a bowel prep was taken, you may not have a bowel movement for 1-3 days.    This is normal.  SYMPTOMS TO WATCH FOR AND REPORT TO YOUR PHYSICIAN:  1. Abdominal pain or bloating, other than gas cramps.  2. Chest pain.  3. Back pain.  4. Signs of infection such as: chills or fever occurring within 24 hours   after the procedure.  5. Rectal bleeding, which would show as bright red, maroon, or black stools.   (A tablespoon of blood from the rectum is not serious, especially if   hemorrhoids are present.)  6. Vomiting.  7. Weakness or dizziness.  GO DIRECTLY TO THE NEAREST EMERGENCY ROOM IF YOU HAVE ANY OF THE FOLLOWING:      Difficulty breathing              Chills and/or fever over 101 F   Persistent vomiting and/or vomiting blood   Severe abdominal pain   Severe chest pain   Black, tarry stools   Bleeding- more than one  tablespoon   Any other symptom or condition that you feel may need urgent attention  Your doctor recommends these additional instructions:  If any biopsies were taken, your doctors clinic will contact you in 1 to 2   weeks with any results.  - Patient has a contact number available for emergencies.  The signs and   symptoms of potential delayed complications were discussed with the   patient.  Return to normal activities tomorrow.  Written discharge   instructions were provided to the patient.   - High fiber diet.   - Continue present medications.   - Await pathology results.   - Repeat colonoscopy in 3 years for surveillance.   - Discharge patient to home (ambulatory).   - Return to my office PRN.  For questions, problems or results please call your physician - Carlos Coleman MD at Work:  [phone].  [facility], EMERGENCY ROOM PHONE NUMBER: [phone]  IF A COMPLICATION OR EMERGENCY SITUATION ARISES AND YOU ARE UNABLE TO REACH   YOUR PHYSICIAN - GO DIRECTLY TO THE EMERGENCY ROOM.  Carlos Coleman MD  6/30/2020 9:46:45 AM  This report has been verified and signed electronically.  [employer]

## 2020-06-30 NOTE — ANESTHESIA PREPROCEDURE EVALUATION
06/30/2020  Senthil Chandler is a 74 y.o., male.    Anesthesia Evaluation    I have reviewed the Patient Summary Reports.    I have reviewed the Nursing Notes. I have reviewed the NPO Status.   I have reviewed the Medications.     Review of Systems  Social:  Smoker Smoking Status: Heavy Tobacco Smoker - 52 pack years  Smokeless Tobacco Status: Never Used  Alcohol use: Yes, unspecified volume  Drug use: No       Hematology/Oncology:        Oncology Comments: Malignant neoplasm of colon  Malignant neoplasm of colon   Cardiovascular:   Hypertension    Pulmonary:   Pneumonia COPD, moderate    Renal/:   Chronic Renal Disease    Hepatic/GI:   PUD, GERD, poorly controlled    Musculoskeletal:   Arthritis     Neurological:   Neuromuscular Disease,    Endocrine:   Diabetes, poorly controlled, type 2    Psych:   Psychiatric History          Physical Exam  General:  Well nourished    Airway/Jaw/Neck:  Airway Findings: Mouth Opening: Normal Tongue: Normal  General Airway Assessment: Adult, Good  Mallampati: II  Improves to II with phonation.  TM Distance: 4-6 cm      Dental:  Dental Findings: In tact   Chest/Lungs:  Chest/Lungs Findings: Clear to auscultation, Normal Respiratory Rate     Heart/Vascular:  Heart Findings: Rate: Normal  Rhythm: Regular Rhythm  Sounds: Normal  Heart murmur: negative       Mental Status:  Mental Status Findings:  Cooperative, Alert and Oriented         Anesthesia Plan  Type of Anesthesia, risks & benefits discussed:  Anesthesia Type:  general  Patient's Preference:   Intra-op Monitoring Plan: standard ASA monitors  Intra-op Monitoring Plan Comments:   Post Op Pain Control Plan:   Post Op Pain Control Plan Comments:   Induction:   IV  Beta Blocker:  Patient is not currently on a Beta-Blocker (No further documentation required).       Informed Consent: Patient understands risks and agrees  with Anesthesia plan.  Questions answered. Anesthesia consent signed with patient.  ASA Score: 3     Day of Surgery Review of History & Physical: I have interviewed and examined the patient. I have reviewed the patient's H&P dated:  There are no significant changes.  H&P update referred to the surgeon.         Ready For Surgery From Anesthesia Perspective.

## 2020-07-01 VITALS
SYSTOLIC BLOOD PRESSURE: 142 MMHG | WEIGHT: 218 LBS | TEMPERATURE: 98 F | RESPIRATION RATE: 19 BRPM | BODY MASS INDEX: 29.53 KG/M2 | HEART RATE: 56 BPM | HEIGHT: 72 IN | DIASTOLIC BLOOD PRESSURE: 77 MMHG | OXYGEN SATURATION: 95 %

## 2020-07-02 LAB
FINAL PATHOLOGIC DIAGNOSIS: NORMAL
GROSS: NORMAL

## 2020-07-06 ENCOUNTER — TELEPHONE (OUTPATIENT)
Dept: GASTROENTEROLOGY | Facility: CLINIC | Age: 75
End: 2020-07-06

## 2020-07-06 NOTE — TELEPHONE ENCOUNTER
----- Message from Carlso Roman MD sent at 7/2/2020  4:41 PM CDT -----  Please advise patient that polyp pathology was reviewed and is benign and is the adenomatous type which is precancerous/risk factor for cancer.  Repeat colonoscopy recommended in 3 years.  Place reminder in system for repeat colonoscopy.

## 2022-03-30 ENCOUNTER — OFFICE VISIT (OUTPATIENT)
Dept: FAMILY MEDICINE | Facility: CLINIC | Age: 77
End: 2022-03-30
Payer: MEDICARE

## 2022-03-30 VITALS
BODY MASS INDEX: 29.39 KG/M2 | RESPIRATION RATE: 16 BRPM | DIASTOLIC BLOOD PRESSURE: 82 MMHG | WEIGHT: 217 LBS | OXYGEN SATURATION: 97 % | HEIGHT: 72 IN | HEART RATE: 72 BPM | SYSTOLIC BLOOD PRESSURE: 138 MMHG

## 2022-03-30 DIAGNOSIS — N32.81 OAB (OVERACTIVE BLADDER): ICD-10-CM

## 2022-03-30 DIAGNOSIS — R20.2 PARESTHESIA OF RIGHT LOWER EXTREMITY: Primary | ICD-10-CM

## 2022-03-30 DIAGNOSIS — E78.5 HYPERLIPIDEMIA, UNSPECIFIED HYPERLIPIDEMIA TYPE: ICD-10-CM

## 2022-03-30 DIAGNOSIS — M54.10 RADICULOPATHY, UNSPECIFIED SPINAL REGION: ICD-10-CM

## 2022-03-30 DIAGNOSIS — I10 ESSENTIAL HYPERTENSION: ICD-10-CM

## 2022-03-30 DIAGNOSIS — Z12.5 SCREENING PSA (PROSTATE SPECIFIC ANTIGEN): ICD-10-CM

## 2022-03-30 DIAGNOSIS — Z79.4 TYPE 2 DIABETES MELLITUS WITH DIABETIC POLYNEUROPATHY, WITH LONG-TERM CURRENT USE OF INSULIN: ICD-10-CM

## 2022-03-30 DIAGNOSIS — E11.42 TYPE 2 DIABETES MELLITUS WITH DIABETIC POLYNEUROPATHY, WITH LONG-TERM CURRENT USE OF INSULIN: ICD-10-CM

## 2022-03-30 DIAGNOSIS — E53.8 VITAMIN B 12 DEFICIENCY: ICD-10-CM

## 2022-03-30 DIAGNOSIS — C61 PROSTATE CANCER: ICD-10-CM

## 2022-03-30 PROCEDURE — 99213 OFFICE O/P EST LOW 20 MIN: CPT | Mod: 25,S$GLB,, | Performed by: FAMILY MEDICINE

## 2022-03-30 PROCEDURE — 3044F PR MOST RECENT HEMOGLOBIN A1C LEVEL <7.0%: ICD-10-PCS | Mod: CPTII,S$GLB,, | Performed by: FAMILY MEDICINE

## 2022-03-30 PROCEDURE — 3288F PR FALLS RISK ASSESSMENT DOCUMENTED: ICD-10-PCS | Mod: CPTII,S$GLB,, | Performed by: FAMILY MEDICINE

## 2022-03-30 PROCEDURE — 3075F PR MOST RECENT SYSTOLIC BLOOD PRESS GE 130-139MM HG: ICD-10-PCS | Mod: CPTII,S$GLB,, | Performed by: FAMILY MEDICINE

## 2022-03-30 PROCEDURE — 1126F AMNT PAIN NOTED NONE PRSNT: CPT | Mod: CPTII,S$GLB,, | Performed by: FAMILY MEDICINE

## 2022-03-30 PROCEDURE — 3079F PR MOST RECENT DIASTOLIC BLOOD PRESSURE 80-89 MM HG: ICD-10-PCS | Mod: CPTII,S$GLB,, | Performed by: FAMILY MEDICINE

## 2022-03-30 PROCEDURE — 1101F PT FALLS ASSESS-DOCD LE1/YR: CPT | Mod: CPTII,S$GLB,, | Performed by: FAMILY MEDICINE

## 2022-03-30 PROCEDURE — 99213 PR OFFICE/OUTPT VISIT, EST, LEVL III, 20-29 MIN: ICD-10-PCS | Mod: 25,S$GLB,, | Performed by: FAMILY MEDICINE

## 2022-03-30 PROCEDURE — 1126F PR PAIN SEVERITY QUANTIFIED, NO PAIN PRESENT: ICD-10-PCS | Mod: CPTII,S$GLB,, | Performed by: FAMILY MEDICINE

## 2022-03-30 PROCEDURE — 1160F RVW MEDS BY RX/DR IN RCRD: CPT | Mod: CPTII,S$GLB,, | Performed by: FAMILY MEDICINE

## 2022-03-30 PROCEDURE — 1159F MED LIST DOCD IN RCRD: CPT | Mod: CPTII,S$GLB,, | Performed by: FAMILY MEDICINE

## 2022-03-30 PROCEDURE — 1160F PR REVIEW ALL MEDS BY PRESCRIBER/CLIN PHARMACIST DOCUMENTED: ICD-10-PCS | Mod: CPTII,S$GLB,, | Performed by: FAMILY MEDICINE

## 2022-03-30 PROCEDURE — 3044F HG A1C LEVEL LT 7.0%: CPT | Mod: CPTII,S$GLB,, | Performed by: FAMILY MEDICINE

## 2022-03-30 PROCEDURE — 99387 INIT PM E/M NEW PAT 65+ YRS: CPT | Mod: S$GLB,,, | Performed by: FAMILY MEDICINE

## 2022-03-30 PROCEDURE — 3075F SYST BP GE 130 - 139MM HG: CPT | Mod: CPTII,S$GLB,, | Performed by: FAMILY MEDICINE

## 2022-03-30 PROCEDURE — 3079F DIAST BP 80-89 MM HG: CPT | Mod: CPTII,S$GLB,, | Performed by: FAMILY MEDICINE

## 2022-03-30 PROCEDURE — 3288F FALL RISK ASSESSMENT DOCD: CPT | Mod: CPTII,S$GLB,, | Performed by: FAMILY MEDICINE

## 2022-03-30 PROCEDURE — 1101F PR PT FALLS ASSESS DOC 0-1 FALLS W/OUT INJ PAST YR: ICD-10-PCS | Mod: CPTII,S$GLB,, | Performed by: FAMILY MEDICINE

## 2022-03-30 PROCEDURE — 99387 PR PREVENTIVE VISIT,NEW,65 & OVER: ICD-10-PCS | Mod: S$GLB,,, | Performed by: FAMILY MEDICINE

## 2022-03-30 PROCEDURE — 1159F PR MEDICATION LIST DOCUMENTED IN MEDICAL RECORD: ICD-10-PCS | Mod: CPTII,S$GLB,, | Performed by: FAMILY MEDICINE

## 2022-03-30 RX ORDER — OXYBUTYNIN CHLORIDE 10 MG/1
1 TABLET, EXTENDED RELEASE ORAL DAILY
COMMUNITY
Start: 2021-07-22

## 2022-03-30 RX ORDER — LABETALOL 200 MG/1
1 TABLET, FILM COATED ORAL 2 TIMES DAILY
COMMUNITY
Start: 2021-12-27

## 2022-03-31 ENCOUNTER — HOSPITAL ENCOUNTER (OUTPATIENT)
Dept: RADIOLOGY | Facility: HOSPITAL | Age: 77
Discharge: HOME | End: 2022-03-31
Attending: FAMILY MEDICINE
Payer: MEDICARE

## 2022-03-31 DIAGNOSIS — M54.10 RADICULOPATHY, UNSPECIFIED SPINAL REGION: ICD-10-CM

## 2022-03-31 DIAGNOSIS — R20.2 PARESTHESIA OF RIGHT LOWER EXTREMITY: ICD-10-CM

## 2022-03-31 PROCEDURE — 72110 X-RAY EXAM L-2 SPINE 4/>VWS: CPT | Mod: TC

## 2022-04-01 ENCOUNTER — PATIENT OUTREACH (OUTPATIENT)
Dept: ADMINISTRATIVE | Facility: HOSPITAL | Age: 77
End: 2022-04-01
Payer: MEDICARE

## 2022-04-01 NOTE — LETTER
AUTHORIZATION FOR RELEASE OF   CONFIDENTIAL INFORMATION    [facility]    We are seeing Senthil Chandler, date of birth 1945, in the clinic at [facility]. Don Hernandez III, MD is the patient's PCP. Senthil Chandler has an outstanding lab/procedure at the time we reviewed his chart. In order to help keep his health information updated, he has authorized us to request the following medical record(s):        (  )  MAMMOGRAM                                      (  )  COLONOSCOPY      (  )  PAP SMEAR                                          (  )  OUTSIDE LAB RESULTS     (  )  DEXA SCAN                                          (  )  EYE EXAM            (  )  FOOT EXAM                                          (  )  ENTIRE RECORD     (  )  OUTSIDE IMMUNIZATIONS                 ( X )  CT CHEST_______________         Please fax records to [facility], Clinton H Sharp III, MD, [phone]    Thank you in advance,      Renae GELLER  Care Coordinator  [facility]  [address] [place] [id number]  Phone [phone]  Fax [phone]          Patient Name: Senthil Chandler  : 1945  Patient Phone #: [phone]

## 2022-04-01 NOTE — PROGRESS NOTES
Subjective:       Patient ID: Senthil Chandler is a 76 y.o. male.    Chief Complaint: Establish Care    Here for new patient evaluation.  [facility]  Not able to drive [facility] [place] [facility] any longer.    Social history smoker pack per day many years ago.  No alcohol intake.  Retired .  Lived in [place].  Moved here 8 years ago.  Because of still having the family in the area.    Family history diabetes mellitus type 2. No cancer.  This not know what mother and father  from.    Past medical history prostate cancer with surgery when he lived in [place].  Overactive bladder.  Hypertension for about 8 years controlled.  Hyperlipidemia also.  Diabetes mellitus type 2 pre diabetes when he lived in [place].  Was then on some insulin and Victoza has been added.  Sugar currently in the 130s.  Believes his last A1c was less than 6.  Metformin was stopped in the past.  He did have some diarrhea on it.  Had colon cancer before.  Adenocarcinoma surgery for this in 2019. Colonoscopy current.  Next 1 due 2023. Robotic right colectomy.  Uses a cane.      Review of Systems   Constitutional: Negative.    HENT: Negative.    Eyes: Negative.    Respiratory: Negative.    Cardiovascular: Negative.    Gastrointestinal: Negative.    Endocrine: Negative.    Genitourinary: Negative.    Musculoskeletal: Negative.    Skin: Negative.    Neurological: Negative.         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  This is a new issue.   Hematological: Negative.    Psychiatric/Behavioral: Negative.        Objective:      Physical Exam  Vitals reviewed.   Constitutional:       Appearance: He is well-developed.   HENT:      Head: Normocephalic and atraumatic.   Eyes:      Conjunctiva/sclera: Conjunctivae normal.      Pupils: Pupils are equal, round, and reactive to light.   Cardiovascular:      Rate and Rhythm: Normal rate and regular rhythm.      Heart sounds: Normal heart  sounds.   Pulmonary:      Effort: Pulmonary effort is normal.      Breath sounds: Normal breath sounds.   Abdominal:      General: Bowel sounds are normal.      Palpations: Abdomen is soft. There is no mass.      Tenderness: There is no abdominal tenderness.   Musculoskeletal:      Cervical back: Normal range of motion.   Skin:     General: Skin is warm and dry.   Neurological:      Comments: Straight leg raising is negative.  Sensation decreased.  Four 5 spots each foot.  Positive pulses.         Assessment:       1. Paresthesia of right lower extremity    2. Essential hypertension    3. OAB (overactive bladder)    4. Hyperlipidemia, unspecified hyperlipidemia type    5. Type 2 diabetes mellitus with diabetic polyneuropathy, with long-term current use of insulin    6. Radiculopathy, unspecified spinal region    7. Prostate cancer    8. Screening PSA (prostate specific antigen)    9. Vitamin B 12 deficiency        Plan:       Paresthesia of right lower extremity  -     Uric Acid; Future; Expected date: 03/30/2022  -     X-Ray Lumbar Spine 5 View; Future; Expected date: 03/30/2022  -     EMG W/ ULTRASOUND AND NERVE CONDUCTION TEST 1 Extremity; Future    Essential hypertension  -     Comprehensive Metabolic Panel; Future; Expected date: 04/13/2022  -     CBC Auto Differential; Future; Expected date: 04/13/2022  -     TSH; Future; Expected date: 04/13/2022    OAB (overactive bladder)    Hyperlipidemia, unspecified hyperlipidemia type  -     Lipid Panel; Future; Expected date: 04/13/2022    Type 2 diabetes mellitus with diabetic polyneuropathy, with long-term current use of insulin  -     Hemoglobin A1C; Future; Expected date: 04/13/2022    Radiculopathy, unspecified spinal region  -     X-Ray Lumbar Spine 5 View; Future; Expected date: 03/30/2022  -     EMG W/ ULTRASOUND AND NERVE CONDUCTION TEST 1 Extremity; Future    Prostate cancer    Screening PSA (prostate specific antigen)  -     PSA, Screening; Future; Expected  date: 04/13/2022    Vitamin B 12 deficiency  -     Vitamin B12; Future; Expected date: 03/30/2022    [facility]  Continue his Lantus 25 units.  CBC CMP lipids PSA A1c ordered.  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  Consider resuming the metformin ER if he is not well controlled.    In addition to physical.  He is complaining of itching pain in his right lower leg.  Occurred especially while driving.  Had trouble driving on the interstate had to take his foot on and off the gas speed up and slow down.  Will no longer driving on the interstate.  Little bit of this in the past.  But never this severe.  On the lower gabapentin.  This problem has worsened significantly.    Physical examination vital signs are noted.  Straight leg raising is negative.  Positive pedal pulses 2+.  Sensation decreased his feet for 5 spots in each foot.  Abnormal sensation seems to track the dermatomal on the outside of the leg down to the foot.    Impression.  Possible radiculopathy right lower extremity.  This may well be post herpetic neuralgia.    Plan check TSH and B12 and uric acid.  EMG and nerve conduction studies right lower extremity.  X-ray lumbar spine.  Possibly changed to Lyrica or Cymbalta when he follows up to review everything.

## 2022-04-02 ENCOUNTER — HOSPITAL ENCOUNTER (INPATIENT)
Facility: HOSPITAL | Age: 77
LOS: 3 days | Discharge: HOME HEALTH | DRG: 074 | End: 2022-04-05
Attending: EMERGENCY MEDICINE | Admitting: STUDENT IN AN ORGANIZED HEALTH CARE EDUCATION/TRAINING PROGRAM
Payer: MEDICARE

## 2022-04-02 DIAGNOSIS — I63.9 ACUTE ISCHEMIC STROKE: ICD-10-CM

## 2022-04-02 DIAGNOSIS — I63.9 STROKE: ICD-10-CM

## 2022-04-02 LAB
ABO + RH BLD: NORMAL
ALBUMIN SERPL BCP-MCNC: 3.8 G/DL (ref 3.5–5.2)
ALP SERPL-CCNC: 90 U/L (ref 55–135)
ALT SERPL W/O P-5'-P-CCNC: 33 U/L (ref 10–44)
AMPHET+METHAMPHET UR QL: NEGATIVE
ANION GAP SERPL CALC-SCNC: 12 MMOL/L (ref 8–16)
AST SERPL-CCNC: 21 U/L (ref 10–40)
BARBITURATES UR QL SCN>200 NG/ML: NEGATIVE
BASOPHILS # BLD AUTO: 0.03 K/UL (ref 0–0.2)
BASOPHILS NFR BLD: 0.9 % (ref 0–1.9)
BENZODIAZ UR QL SCN>200 NG/ML: NEGATIVE
BILIRUB SERPL-MCNC: 0.7 MG/DL (ref 0.1–1)
BILIRUB UR QL STRIP: NEGATIVE
BLD GP AB SCN CELLS X3 SERPL QL: NORMAL
BUN SERPL-MCNC: 16 MG/DL (ref 8–23)
BZE UR QL SCN: NEGATIVE
CALCIUM SERPL-MCNC: 9.8 MG/DL (ref 8.7–10.5)
CANNABINOIDS UR QL SCN: NEGATIVE
CHLORIDE SERPL-SCNC: 109 MMOL/L (ref 95–110)
CHOLEST SERPL-MCNC: 106 MG/DL (ref 120–199)
CHOLEST/HDLC SERPL: 2.9 {RATIO} (ref 2–5)
CLARITY UR: CLEAR
CO2 SERPL-SCNC: 18 MMOL/L (ref 23–29)
COLOR UR: YELLOW
CREAT SERPL-MCNC: 1.1 MG/DL (ref 0.5–1.4)
CREAT UR-MCNC: 39.7 MG/DL (ref 23–375)
DIFFERENTIAL METHOD: ABNORMAL
EOSINOPHIL # BLD AUTO: 0.2 K/UL (ref 0–0.5)
EOSINOPHIL NFR BLD: 5.8 % (ref 0–8)
ERYTHROCYTE [DISTWIDTH] IN BLOOD BY AUTOMATED COUNT: 12.7 % (ref 11.5–14.5)
EST. GFR  (AFRICAN AMERICAN): >60 ML/MIN/1.73 M^2
EST. GFR  (NON AFRICAN AMERICAN): >60 ML/MIN/1.73 M^2
ESTIMATED AVG GLUCOSE: 128 MG/DL (ref 68–131)
GLUCOSE SERPL-MCNC: 162 MG/DL (ref 70–110)
GLUCOSE UR QL STRIP: NEGATIVE
HBA1C MFR BLD: 6.1 % (ref 4–5.6)
HCT VFR BLD AUTO: 43.5 % (ref 40–54)
HDLC SERPL-MCNC: 36 MG/DL (ref 40–75)
HDLC SERPL: 34 % (ref 20–50)
HGB BLD-MCNC: 14.5 G/DL (ref 14–18)
HGB UR QL STRIP: NEGATIVE
IMM GRANULOCYTES # BLD AUTO: 0.01 K/UL (ref 0–0.04)
IMM GRANULOCYTES NFR BLD AUTO: 0.3 % (ref 0–0.5)
INR PPP: 1 (ref 0.8–1.2)
KETONES UR QL STRIP: NEGATIVE
LDLC SERPL CALC-MCNC: 49.2 MG/DL (ref 63–159)
LEUKOCYTE ESTERASE UR QL STRIP: NEGATIVE
LYMPHOCYTES # BLD AUTO: 1.3 K/UL (ref 1–4.8)
LYMPHOCYTES NFR BLD: 36.7 % (ref 18–48)
MCH RBC QN AUTO: 34.3 PG (ref 27–31)
MCHC RBC AUTO-ENTMCNC: 33.3 G/DL (ref 32–36)
MCV RBC AUTO: 103 FL (ref 82–98)
METHADONE UR QL SCN>300 NG/ML: NEGATIVE
MONOCYTES # BLD AUTO: 0.3 K/UL (ref 0.3–1)
MONOCYTES NFR BLD: 9 % (ref 4–15)
NEUTROPHILS # BLD AUTO: 1.6 K/UL (ref 1.8–7.7)
NEUTROPHILS NFR BLD: 47.3 % (ref 38–73)
NITRITE UR QL STRIP: NEGATIVE
NONHDLC SERPL-MCNC: 70 MG/DL
NRBC BLD-RTO: 0 /100 WBC
OPIATES UR QL SCN: NEGATIVE
PCP UR QL SCN>25 NG/ML: NEGATIVE
PH UR STRIP: 7 [PH] (ref 5–8)
PLATELET # BLD AUTO: 247 K/UL (ref 150–450)
PMV BLD AUTO: 8.8 FL (ref 9.2–12.9)
POC PTINR: 1.1 (ref 0.9–1.2)
POC PTWBT: 12.9 SEC (ref 9.7–14.3)
POCT GLUCOSE: 117 MG/DL (ref 70–110)
POCT GLUCOSE: 121 MG/DL (ref 70–110)
POCT GLUCOSE: 140 MG/DL (ref 70–110)
POCT GLUCOSE: 166 MG/DL (ref 70–110)
POTASSIUM SERPL-SCNC: 4.3 MMOL/L (ref 3.5–5.1)
PROT SERPL-MCNC: 6.6 G/DL (ref 6–8.4)
PROT UR QL STRIP: NEGATIVE
PROTHROMBIN TIME: 10.7 SEC (ref 9–12.5)
RBC # BLD AUTO: 4.23 M/UL (ref 4.6–6.2)
SAMPLE: NORMAL
SARS-COV-2 RDRP RESP QL NAA+PROBE: NEGATIVE
SODIUM SERPL-SCNC: 139 MMOL/L (ref 136–145)
SP GR UR STRIP: 1.01 (ref 1–1.03)
T4 FREE SERPL-MCNC: 0.99 NG/DL (ref 0.71–1.51)
TOXICOLOGY INFORMATION: NORMAL
TRIGL SERPL-MCNC: 104 MG/DL (ref 30–150)
TROPONIN I SERPL DL<=0.01 NG/ML-MCNC: 0.02 NG/ML (ref 0–0.03)
TSH SERPL DL<=0.005 MIU/L-ACNC: 0.25 UIU/ML (ref 0.4–4)
TSH SERPL DL<=0.005 MIU/L-ACNC: 0.6 UIU/ML (ref 0.4–4)
URN SPEC COLLECT METH UR: ABNORMAL
UROBILINOGEN UR STRIP-ACNC: ABNORMAL EU/DL
WBC # BLD AUTO: 3.46 K/UL (ref 3.9–12.7)

## 2022-04-02 PROCEDURE — 63600175 PHARM REV CODE 636 W HCPCS

## 2022-04-02 PROCEDURE — 81003 URINALYSIS AUTO W/O SCOPE: CPT | Mod: 59 | Performed by: STUDENT IN AN ORGANIZED HEALTH CARE EDUCATION/TRAINING PROGRAM

## 2022-04-02 PROCEDURE — 36415 COLL VENOUS BLD VENIPUNCTURE: CPT | Performed by: EMERGENCY MEDICINE

## 2022-04-02 PROCEDURE — 85610 PROTHROMBIN TIME: CPT

## 2022-04-02 PROCEDURE — 96366 THER/PROPH/DIAG IV INF ADDON: CPT

## 2022-04-02 PROCEDURE — 80307 DRUG TEST PRSMV CHEM ANLYZR: CPT | Performed by: STUDENT IN AN ORGANIZED HEALTH CARE EDUCATION/TRAINING PROGRAM

## 2022-04-02 PROCEDURE — S4991 NICOTINE PATCH NONLEGEND: HCPCS | Performed by: STUDENT IN AN ORGANIZED HEALTH CARE EDUCATION/TRAINING PROGRAM

## 2022-04-02 PROCEDURE — G0427 INPT/ED TELECONSULT70: HCPCS | Mod: 95,,, | Performed by: PSYCHIATRY & NEUROLOGY

## 2022-04-02 PROCEDURE — 85610 PROTHROMBIN TIME: CPT | Performed by: EMERGENCY MEDICINE

## 2022-04-02 PROCEDURE — 83036 HEMOGLOBIN GLYCOSYLATED A1C: CPT | Performed by: STUDENT IN AN ORGANIZED HEALTH CARE EDUCATION/TRAINING PROGRAM

## 2022-04-02 PROCEDURE — 84484 ASSAY OF TROPONIN QUANT: CPT | Performed by: STUDENT IN AN ORGANIZED HEALTH CARE EDUCATION/TRAINING PROGRAM

## 2022-04-02 PROCEDURE — 93005 ELECTROCARDIOGRAM TRACING: CPT

## 2022-04-02 PROCEDURE — 84439 ASSAY OF FREE THYROXINE: CPT | Performed by: STUDENT IN AN ORGANIZED HEALTH CARE EDUCATION/TRAINING PROGRAM

## 2022-04-02 PROCEDURE — 93010 ELECTROCARDIOGRAM REPORT: CPT | Mod: ,,, | Performed by: GENERAL PRACTICE

## 2022-04-02 PROCEDURE — 25000003 PHARM REV CODE 250: Performed by: STUDENT IN AN ORGANIZED HEALTH CARE EDUCATION/TRAINING PROGRAM

## 2022-04-02 PROCEDURE — 85025 COMPLETE CBC W/AUTO DIFF WBC: CPT | Performed by: EMERGENCY MEDICINE

## 2022-04-02 PROCEDURE — 84443 ASSAY THYROID STIM HORMONE: CPT | Performed by: STUDENT IN AN ORGANIZED HEALTH CARE EDUCATION/TRAINING PROGRAM

## 2022-04-02 PROCEDURE — 82962 GLUCOSE BLOOD TEST: CPT

## 2022-04-02 PROCEDURE — G0427 PR INPT TELEHEALTH CON 70/>M: ICD-10-PCS | Mod: 95,,, | Performed by: PSYCHIATRY & NEUROLOGY

## 2022-04-02 PROCEDURE — 93010 EKG 12-LEAD: ICD-10-PCS | Mod: 76,,, | Performed by: GENERAL PRACTICE

## 2022-04-02 PROCEDURE — 84443 ASSAY THYROID STIM HORMONE: CPT | Mod: 91 | Performed by: EMERGENCY MEDICINE

## 2022-04-02 PROCEDURE — 94760 N-INVAS EAR/PLS OXIMETRY 1: CPT

## 2022-04-02 PROCEDURE — 36415 COLL VENOUS BLD VENIPUNCTURE: CPT | Performed by: STUDENT IN AN ORGANIZED HEALTH CARE EDUCATION/TRAINING PROGRAM

## 2022-04-02 PROCEDURE — 80053 COMPREHEN METABOLIC PANEL: CPT | Performed by: EMERGENCY MEDICINE

## 2022-04-02 PROCEDURE — 94799 UNLISTED PULMONARY SVC/PX: CPT

## 2022-04-02 PROCEDURE — 99291 CRITICAL CARE FIRST HOUR: CPT | Mod: 25

## 2022-04-02 PROCEDURE — 25500020 PHARM REV CODE 255

## 2022-04-02 PROCEDURE — 99900035 HC TECH TIME PER 15 MIN (STAT)

## 2022-04-02 PROCEDURE — 86901 BLOOD TYPING SEROLOGIC RH(D): CPT | Performed by: STUDENT IN AN ORGANIZED HEALTH CARE EDUCATION/TRAINING PROGRAM

## 2022-04-02 PROCEDURE — 80061 LIPID PANEL: CPT | Performed by: STUDENT IN AN ORGANIZED HEALTH CARE EDUCATION/TRAINING PROGRAM

## 2022-04-02 PROCEDURE — 25000003 PHARM REV CODE 250: Performed by: EMERGENCY MEDICINE

## 2022-04-02 PROCEDURE — 96365 THER/PROPH/DIAG IV INF INIT: CPT

## 2022-04-02 PROCEDURE — U0002 COVID-19 LAB TEST NON-CDC: HCPCS | Performed by: EMERGENCY MEDICINE

## 2022-04-02 PROCEDURE — 93010 ELECTROCARDIOGRAM REPORT: CPT | Mod: 76,,, | Performed by: GENERAL PRACTICE

## 2022-04-02 PROCEDURE — 20000000 HC ICU ROOM

## 2022-04-02 PROCEDURE — 25000003 PHARM REV CODE 250: Performed by: INTERNAL MEDICINE

## 2022-04-02 PROCEDURE — 96367 TX/PROPH/DG ADDL SEQ IV INF: CPT

## 2022-04-02 RX ORDER — MUPIROCIN 20 MG/G
OINTMENT TOPICAL 2 TIMES DAILY
Status: DISCONTINUED | OUTPATIENT
Start: 2022-04-02 | End: 2022-04-05 | Stop reason: HOSPADM

## 2022-04-02 RX ORDER — SODIUM,POTASSIUM PHOSPHATES 280-250MG
2 POWDER IN PACKET (EA) ORAL
Status: DISCONTINUED | OUTPATIENT
Start: 2022-04-02 | End: 2022-04-05 | Stop reason: HOSPADM

## 2022-04-02 RX ORDER — LABETALOL HYDROCHLORIDE 5 MG/ML
10 INJECTION, SOLUTION INTRAVENOUS
Status: DISCONTINUED | OUTPATIENT
Start: 2022-04-02 | End: 2022-04-05 | Stop reason: HOSPADM

## 2022-04-02 RX ORDER — FAMOTIDINE 20 MG/1
20 TABLET, FILM COATED ORAL 2 TIMES DAILY
Status: DISCONTINUED | OUTPATIENT
Start: 2022-04-02 | End: 2022-04-05 | Stop reason: HOSPADM

## 2022-04-02 RX ORDER — IBUPROFEN 200 MG
1 TABLET ORAL DAILY
Status: DISCONTINUED | OUTPATIENT
Start: 2022-04-02 | End: 2022-04-05 | Stop reason: HOSPADM

## 2022-04-02 RX ORDER — IPRATROPIUM BROMIDE AND ALBUTEROL SULFATE 2.5; .5 MG/3ML; MG/3ML
3 SOLUTION RESPIRATORY (INHALATION) EVERY 6 HOURS PRN
Status: DISCONTINUED | OUTPATIENT
Start: 2022-04-02 | End: 2022-04-05 | Stop reason: HOSPADM

## 2022-04-02 RX ORDER — NICARDIPINE HYDROCHLORIDE 0.2 MG/ML
0-15 INJECTION INTRAVENOUS CONTINUOUS
Status: DISCONTINUED | OUTPATIENT
Start: 2022-04-02 | End: 2022-04-04

## 2022-04-02 RX ORDER — SODIUM CHLORIDE 9 MG/ML
INJECTION, SOLUTION INTRAVENOUS CONTINUOUS
Status: DISCONTINUED | OUTPATIENT
Start: 2022-04-02 | End: 2022-04-04

## 2022-04-02 RX ORDER — INSULIN ASPART 100 [IU]/ML
0-5 INJECTION, SOLUTION INTRAVENOUS; SUBCUTANEOUS
Status: DISCONTINUED | OUTPATIENT
Start: 2022-04-02 | End: 2022-04-05 | Stop reason: HOSPADM

## 2022-04-02 RX ORDER — GLUCAGON 1 MG
1 KIT INJECTION
Status: DISCONTINUED | OUTPATIENT
Start: 2022-04-02 | End: 2022-04-05 | Stop reason: HOSPADM

## 2022-04-02 RX ORDER — ATORVASTATIN CALCIUM 40 MG/1
80 TABLET, FILM COATED ORAL DAILY
Status: DISCONTINUED | OUTPATIENT
Start: 2022-04-02 | End: 2022-04-05 | Stop reason: HOSPADM

## 2022-04-02 RX ORDER — IBUPROFEN 200 MG
16 TABLET ORAL
Status: DISCONTINUED | OUTPATIENT
Start: 2022-04-02 | End: 2022-04-05 | Stop reason: HOSPADM

## 2022-04-02 RX ORDER — LANOLIN ALCOHOL/MO/W.PET/CERES
800 CREAM (GRAM) TOPICAL
Status: DISCONTINUED | OUTPATIENT
Start: 2022-04-02 | End: 2022-04-05 | Stop reason: HOSPADM

## 2022-04-02 RX ORDER — AMOXICILLIN 250 MG
1 CAPSULE ORAL 2 TIMES DAILY
Status: DISCONTINUED | OUTPATIENT
Start: 2022-04-02 | End: 2022-04-05 | Stop reason: HOSPADM

## 2022-04-02 RX ORDER — SODIUM CHLORIDE 0.9 % (FLUSH) 0.9 %
10 SYRINGE (ML) INJECTION
Status: DISCONTINUED | OUTPATIENT
Start: 2022-04-02 | End: 2022-04-05 | Stop reason: HOSPADM

## 2022-04-02 RX ORDER — ONDANSETRON 2 MG/ML
4 INJECTION INTRAMUSCULAR; INTRAVENOUS EVERY 8 HOURS PRN
Status: DISCONTINUED | OUTPATIENT
Start: 2022-04-02 | End: 2022-04-05 | Stop reason: HOSPADM

## 2022-04-02 RX ORDER — IBUPROFEN 200 MG
24 TABLET ORAL
Status: DISCONTINUED | OUTPATIENT
Start: 2022-04-02 | End: 2022-04-05 | Stop reason: HOSPADM

## 2022-04-02 RX ORDER — SODIUM CHLORIDE 9 MG/ML
INJECTION, SOLUTION INTRAVENOUS
Status: COMPLETED | OUTPATIENT
Start: 2022-04-02 | End: 2022-04-02

## 2022-04-02 RX ADMIN — FAMOTIDINE 20 MG: 20 TABLET ORAL at 09:04

## 2022-04-02 RX ADMIN — ALTEPLASE 79.7 MG: KIT at 09:04

## 2022-04-02 RX ADMIN — NICARDIPINE HYDROCHLORIDE 2 MG/HR: 0.2 INJECTION, SOLUTION INTRAVENOUS at 03:04

## 2022-04-02 RX ADMIN — SODIUM CHLORIDE: 0.9 INJECTION, SOLUTION INTRAVENOUS at 03:04

## 2022-04-02 RX ADMIN — IOHEXOL 75 ML: 350 INJECTION, SOLUTION INTRAVENOUS at 09:04

## 2022-04-02 RX ADMIN — NICARDIPINE HYDROCHLORIDE 2 MG/HR: 0.2 INJECTION, SOLUTION INTRAVENOUS at 10:04

## 2022-04-02 RX ADMIN — SODIUM CHLORIDE: 0.9 INJECTION, SOLUTION INTRAVENOUS at 11:04

## 2022-04-02 RX ADMIN — DOCUSATE SODIUM AND SENNOSIDES 1 TABLET: 8.6; 5 TABLET, FILM COATED ORAL at 09:04

## 2022-04-02 RX ADMIN — Medication 1 PATCH: at 05:04

## 2022-04-02 RX ADMIN — ATORVASTATIN CALCIUM 80 MG: 40 TABLET, FILM COATED ORAL at 02:04

## 2022-04-02 RX ADMIN — NICARDIPINE HYDROCHLORIDE 3 MG/HR: 0.2 INJECTION, SOLUTION INTRAVENOUS at 09:04

## 2022-04-02 RX ADMIN — MUPIROCIN: 20 OINTMENT TOPICAL at 09:04

## 2022-04-02 NOTE — ED PROVIDER NOTES
Encounter Date: 4/2/2022    SCRIBE #1 NOTE: I, Barbara Jaimes, am scribing for, and in the presence of, Roosevelt Loja MD.       History     Chief Complaint   Patient presents with    Extremity Weakness     Rt. Arm is numb and he cannot move it / started 0800     Time seen by provider: 8:53 AM on 04/02/2022    Senthil Chandler is a 76 y.o. male who presents to the ED with an onset of right hemiparesis that occurred after waking up this morning at ~8 am.  Patient reports waking up with right knee numbness before getting a few sips of coffee and heading back to the bedroom.  Once in the bedroom he notes weakness and numbness to the RUE to the point where he couldn't extend his arm at the elbow.  He is concerned he may have had a stroke.  Upon arrival to the ED patient has a CBG of 166 mg/dL.  The patient denies numbness to any other areas of the RLE, facial droop, visual changes, HA, slurred speech or any other symptoms at this time.  Patient has a PMHx of arthritis, DM, neuropathy, HTN, DM, spinal stenosis lumbar region, PVD, carcinoma in situ of prostate and CKD.  No neurological PSHx documented.  Other PSHx includes prostate surgery, proctectomy, colonoscopy and EGD.  No anticoagulation therapy mentioned.      The history is provided by the patient.     Review of patient's allergies indicates:  No Known Allergies  Past Medical History:   Diagnosis Date    Adjustment disorder with anxious mood     Angiodysplasia     Arthritis     Cancer 1990    prostate     Carcinoma in situ of prostate     Carpal tunnel syndrome on left     CKD (chronic kidney disease) stage 1, GFR 90 ml/min or greater     Colon polyp     Cubital tunnel syndrome on left     Depressive disorder     Deviated nasal septum     Diabetes mellitus with ophthalmic complication     Diabetes mellitus, type 2     GERD (gastroesophageal reflux disease)     Hypercholesterolemia     Hyperpotassemia     Hypertension     Iron deficiency anemia      Kidney disease     focal segmental glomerulosclerosis    Lumbago     Malignant neoplasm of colon     Nephrotic syndrome     Neuropathy     Primary osteoarthritis of left elbow     PTSD (post-traumatic stress disorder)     PVD (peripheral vascular disease)     Spinal stenosis, lumbar region without neurogenic claudication     Stomach ulcer     Wears glasses      Past Surgical History:   Procedure Laterality Date    CARPAL TUNNEL RELEASE Left 10/23/2017    COLONOSCOPY N/A 8/12/2016    Procedure: COLONOSCOPY;  Surgeon: Carlos Roman MD;  Location: [facility] ENDO;  Service: Endoscopy;  Laterality: N/A; repeat in 5 years for surveillance    COLONOSCOPY N/A 2/20/2019    Procedure: COLONOSCOPY;  Surgeon: Carlos Roman MD;  Location: [facility] ENDO;  Service: Endoscopy;  Laterality: N/A;    COLONOSCOPY N/A 6/30/2020    Procedure: COLONOSCOPY;  Surgeon: Carlos Roman MD;  Location: [facility] ENDO;  Service: Endoscopy;  Laterality: N/A;    ESOPHAGOGASTRODUODENOSCOPY N/A 2/20/2019    Procedure: EGD (ESOPHAGOGASTRODUODENOSCOPY);  Surgeon: Carlos Roman MD;  Location: [facility] ENDO;  Service: Endoscopy;  Laterality: N/A;    LAPAROSCOPIC CHOLECYSTECTOMY Right 3/25/2019    Procedure: CHOLECYSTECTOMY, LAPAROSCOPIC;  Surgeon: Mark Renee MD;  Location: [facility] OR;  Service: General;  Laterality: Right;    NASAL SEPTUM SURGERY      PROCTECTOMY      PROSTATE SURGERY  1990's    ROBOT-ASSISTED COLECTOMY Right 3/25/2019    Procedure: ROBOTIC COLECTOMY possible conversion to open;  Surgeon: Mark Renee MD;  Location: [facility] OR;  Service: General;  Laterality: Right;    SUBTOTAL COLECTOMY Right 3/25/2019    Procedure: COLECTOMY, PARTIAL;  Surgeon: Mark Renee MD;  Location: [facility] OR;  Service: General;  Laterality: Right;    UPPER GASTROINTESTINAL ENDOSCOPY  08/12/2016    Dr. Roman     Family History   Problem Relation Age of Onset    Leukemia Mother     Leukemia Father     Colon cancer Neg Hx      Crohn's disease Neg Hx     Ulcerative colitis Neg Hx     Stomach cancer Neg Hx     Esophageal cancer Neg Hx      Social History     Tobacco Use    Smoking status: Heavy Tobacco Smoker     Packs/day: 1.00     Years: 52.00     Pack years: 52.00     Types: Cigarettes    Smokeless tobacco: Never Used   Substance Use Topics    Alcohol use: Yes     Comment: occasional- maybe one glass of wine a month    Drug use: No     Review of Systems   Constitutional: Negative for fever.   HENT: Negative for congestion.    Eyes: Negative for visual disturbance.   Respiratory: Negative for wheezing.    Cardiovascular: Negative for chest pain.   Gastrointestinal: Negative for abdominal pain.   Genitourinary: Negative for dysuria.   Musculoskeletal: Negative for joint swelling.   Skin: Negative for rash.   Neurological: Positive for weakness and numbness. Negative for syncope.   Hematological: Does not bruise/bleed easily.   Psychiatric/Behavioral: Negative for confusion.       Physical Exam     Initial Vitals   BP Pulse Resp Temp SpO2   04/02/22 0841 04/02/22 0841 04/02/22 0841 04/02/22 0900 04/02/22 0842   (!) 146/83 73 20 98.8 °F (37.1 °C) (P) 95 %      MAP       --                Physical Exam    Nursing note and vitals reviewed.  Constitutional: He appears well-nourished.   HENT:   Head: Normocephalic and atraumatic.   No facial droop.   Eyes: Conjunctivae and EOM are normal.   Neck: Neck supple. No thyroid mass present.   Normal range of motion.  Cardiovascular: Normal rate, regular rhythm and normal heart sounds. Exam reveals no gallop and no friction rub.    No murmur heard.  Pulmonary/Chest: Breath sounds normal. He has no wheezes. He has no rhonchi. He has no rales.   Abdominal: Abdomen is soft. Bowel sounds are normal. There is no abdominal tenderness.   Musculoskeletal:      Cervical back: Normal range of motion and neck supple.      Comments: RUE weakness at level of the biceps.  Distally flaccid.  No other involved  extremity.       Neurological: He is alert and oriented to person, place, and time. A sensory deficit is present. No cranial nerve deficit. GCS eye subscore is 4. GCS verbal subscore is 5. GCS motor subscore is 6.   Normal mentation.   Skin: Skin is warm and dry. No rash noted. No erythema.   Psychiatric: He has a normal mood and affect. His speech is normal. His speech is not slurred. Cognition and memory are normal.         ED Course   Procedures  Labs Reviewed   CBC W/ AUTO DIFFERENTIAL - Abnormal; Notable for the following components:       Result Value    WBC 3.46 (*)     RBC 4.23 (*)      (*)     MCH 34.3 (*)     MPV 8.8 (*)     Gran # (ANC) 1.6 (*)     All other components within normal limits   COMPREHENSIVE METABOLIC PANEL - Abnormal; Notable for the following components:    CO2 18 (*)     Glucose 162 (*)     All other components within normal limits   LIPID PANEL - Abnormal; Notable for the following components:    Cholesterol 106 (*)     HDL 36 (*)     LDL Cholesterol 49.2 (*)     All other components within normal limits   POCT GLUCOSE - Abnormal; Notable for the following components:    POCT Glucose 166 (*)     All other components within normal limits   POCT GLUCOSE - Abnormal; Notable for the following components:    POCT Glucose 140 (*)     All other components within normal limits   PROTIME-INR   TSH   SARS-COV-2 RNA AMPLIFICATION, QUAL   URINALYSIS, REFLEX TO URINE CULTURE   HEMOGLOBIN A1C   TSH   DRUG SCREEN PANEL, URINE EMERGENCY   POCT GLUCOSE, HAND-HELD DEVICE   TYPE & SCREEN   ISTAT PROCEDURE   POCT PROTIME-INR   POCT GLUCOSE MONITORING CONTINUOUS     EKG Readings: (Independently Interpreted)   Initial Reading: No STEMI.   Sinus rhythm with 1st degress A-V block at a rate of 67 bpm with normal axis and normal intervals.  Consider T wave abnormalities.         Imaging Results          X-Ray Chest AP Single View (Final result)  Result time 04/02/22 13:46:16    Final result by Chiqui MACK  MD Flak[place] (04/02/22 13:46:16)                 Impression:      Interval development of linear atelectasis versus fibrosis in the right midlung zone and linear atelectasis versus fibrosis versus pneumonitis in the left midlung zone.      Electronically signed by: Giancarlo Boggs MD  Date:    04/02/2022  Time:    13:46             Narrative:    EXAMINATION:  XR CHEST 1 VIEW    CLINICAL HISTORY:  stroke;    TECHNIQUE:  A portable semi upright AP view of the chest was acquired.    COMPARISON:  Chest x-ray-03/27/2019    FINDINGS:  The cardiac silhouette is not enlarged.  There is patchy segmental airspace opacity in the left midlung zone suggesting atelectasis versus fibrosis versus pneumonitis.  There is band like opacity in the right midlung zone which could relate to atelectasis or fibrosis.  Elsewhere, the lungs are clear.  Previously noted segmental airspace opacity at the right lung base is no longer visualized on today's study.  No significant volume of pleural fluid or pneumothorax.  There is degenerative change of the thoracic spine.  There is degenerative change of the right acromioclavicular joint.                               CTA Head and Neck (xpd) (Final result)  Result time 04/02/22 10:10:13    Final result by Chiqui Boggs MD (04/02/22 10:10:13)                 Impression:      1. Normal CT angiogram of the extracranial carotid vasculature and wqwtmp-vl-Xoxtwy vasculature.  No hemodynamically significant stenosis involving the left or right internal carotid artery.  2. No imaging evidence of acute/recent major vascular territory cerebral infarction or acute intraparenchymal hemorrhage.  3. 11 mm right thyroid lobe hypodensity.  Consider follow-up outpatient thyroid ultrasound for additional characterization.  4. Minimal smooth short segment 30-40% luminal stenosis within the proximal right vertebral artery at the level of C7.  5. Other incidental findings as detailed above.      Electronically  signed by: Giancarlo Boggs MD  Date:    04/02/2022  Time:    10:10             Narrative:    EXAMINATION:  CTA HEAD AND NECK (XPD)    CLINICAL HISTORY:  Stroke/TIA, determine embolic source;    TECHNIQUE:  Following the intravenous administration of 75cc of Omnipaque 350 contrast material, 1.25-mm contiguous axial images were acquired through the vasculature of the neck and [place] [facility] [place] utilizing the CTA protocol.  Subsequently, coronal and sagittal reconstructed images were generated from the source data.  Curved reformatted images were also acquired through the carotid arteries and great vessel origins .  2-D and 3-D MIP reconstructed images of the [place] [facility] [place]  and 3-D volume rendered images of the [place] vasculature were also generated from the source data.    COMPARISON:  CT head without contrast-04/02/2022 at 08:50    FINDINGS:  CTA neck:    There is scattered calcified atherosclerotic plaque within the thoracic aortic arch and origin of the left subclavian artery.  The left and right subclavian artery are widely patent without hemodynamically significant stenosis, dissection, or occlusion.  The common carotid arteries are widely patent.  There is calcified atherosclerotic plaque deposition within both carotid bulbs and within the proximal right internal carotid artery.  No focal areas of > 50% hemodynamically significant stenosis, dissection, or occlusion involving the left or right internal carotid arteries.  There is mild calcified plaque deposition observed within the cavernous segment internal carotid arteries.  There are codominant vertebral arteries present which appear widely patent throughout with no evidence of hemodynamically significant stenosis, dissection, or occlusion.  There is minimal 30-40% smooth luminal narrowing observed within the right vertebral artery at the level of C7 (series 606, image 69 and  series 2, image 184).    CTA Head:    The lvitbz-jq-Caxjvt vasculature  (including the visualized vertebral arteries, basilar artery, posterior cerebral arteries, middle cerebral arteries, and anterior cerebral arteries) is widely patent with no foci of > 50% hemodynamically significant luminal stenosis, aneurysm formation, or AVM.  No definite dissection.  There are absent posterior communicating arteries as an anatomic variant.    Soft tissues Neck:    The nasopharynx, hypopharynx, oropharynx, epiglottis, larynx, true vocal cords, and subglottic trachea are unremarkable.  No salivary gland abnormalities appreciated.  No pathologically enlarged lymph nodes in the neck or supraclavicular regions.  There is a 11 mm right thyroid lobe hypodensity present on series 2, image 176.  Consider follow-up outpatient thyroid ultrasound for additional characterization.  The visualized superior mediastinum is unremarkable.  There is emphysematous lung architecture present at the lung apices.    Brain:    The brain is normally formed with preserved gray-white matter junction differentiation. No evidence of acute/recent major vascular territory cerebral infarction, parenchymal hemorrhage, or intra-axial mass.  There is age-appropriate supratentorial cerebral volume loss.  No enhancing vascular malformation. No hydrocephalus.  No effacement of the skull-base cisterns.  No extra-axial fluid collections or blood products.  No abnormal dural or leptomeningeal enhancement.    There is mucoperiosteal thickening observed within the anterior ethmoid air cells and left frontal sinus.  There are postoperative changes of right medial antrectomy.  There are sherie bullosa of the middle turbinates.  The mastoid air cells are clear.  The visualized orbits are unremarkable.  The bony calvarium and visualized facial bones show no acute abnormality.    Bones:    There is multilevel degenerative change of the cervical spine in the form of marginal osteophyte formation, uncovertebral spurring, and facet arthropathy.                                CT Head Without Contrast (Final result)  Result time 04/02/22 08:57:24    Final result by Kirk Kelly DO (04/02/22 08:57:24)                 Impression:      1.  No acute intracranial pathology      Electronically signed by: Kirk Kelly DO  Date:    04/02/2022  Time:    08:57             Narrative:    EXAMINATION:  CT HEAD WITHOUT CONTRAST    CLINICAL HISTORY:  Neuro deficit, acute, stroke suspected;    TECHNIQUE:  Low dose axial images were obtained through the head.  Coronal and sagittal reformations were also performed. Contrast was not administered.    COMPARISON:  MRI of the brain from 12/28/2018    FINDINGS:  There is no evidence for hemorrhage, midline shift or mass effect.There is no gross evidence to suggest an acute infarct.Mild generalized cortical atrophy is noted.    There is mild-to-moderate mucosal thickening seen involving the inferior left frontal sinus.    The calvarium is intact.                                 Medications   niCARdipine 40 mg/200 mL (0.2 mg/mL) infusion (0 mg/hr Intravenous Paused 4/2/22 1302)   0.9%  NaCl infusion ( Intravenous New Bag 4/2/22 1501)   sodium chloride 0.9% flush 10 mL (has no administration in time range)   potassium bicarbonate disintegrating tablet 50 mEq (has no administration in time range)   potassium bicarbonate disintegrating tablet 35 mEq (has no administration in time range)   potassium bicarbonate disintegrating tablet 60 mEq (has no administration in time range)   magnesium oxide tablet 800 mg (has no administration in time range)   magnesium oxide tablet 800 mg (has no administration in time range)   potassium, sodium phosphates 280-160-250 mg packet 2 packet (has no administration in time range)   potassium, sodium phosphates 280-160-250 mg packet 2 packet (has no administration in time range)   potassium, sodium phosphates 280-160-250 mg packet 2 packet (has no administration in time range)   senna-docusate 8.6-50  mg per tablet 1 tablet (has no administration in time range)   famotidine tablet 20 mg (has no administration in time range)   ondansetron injection 4 mg (has no administration in time range)   labetaloL injection 10 mg (has no administration in time range)   atorvastatin tablet 80 mg (80 mg Oral Given 4/2/22 1459)   ALTEPLASE IV BOLUS FROM VIAL 8.9 mg (0 mg/kg × 98.4 kg Intravenous Stopped 4/2/22 0926)   alteplase (ACtivase) injection 79.7 mg (0 mg/kg × 98.4 kg Intravenous Stopped 4/2/22 1020)   0.9%  NaCl infusion ( Intravenous Stopped 4/2/22 1414)   iohexoL (OMNIPAQUE 350) 350 mg iodine/mL injection (75 mLs Intravenous Given 4/2/22 0933)     Medical Decision Making:   History:   Old Medical Records: I decided to obtain old medical records.  Independently Interpreted Test(s):   I have ordered and independently interpreted EKG Reading(s) - see prior notes  Clinical Tests:   Lab Tests: Ordered and Reviewed  Radiological Study: Ordered and Reviewed  Medical Tests: Ordered and Reviewed  ED Management:  This patient was emergently assessed early after arrival.  Code stroke alerted.  No bleed noted on CT of head.  Initial vital signs significant for hypertension.  Patient reporting acute onset right upper extremity flaccid weakness with sensation deficits.  Evaluated by tele Neurology, Dr. Cheng, who recommends tPA administration.  The patient is provided informed consent to receive this medication and that risks can include conversion to hemorrhagic stroke and death.  Being knowledge is risks and benefits and would like to receive this medication.  Shortly after he became hypertensive was started on Cardene with improvement.  Additional screening labs are found to be largely unremarkable.  The patient had significant improvement in right upper extremity weakness and sensation improvement, although ataxic deficits remain as assessed by our in-house neurologist, Dr. Ford, who presented to evaluate the patient.   CTA of the head and neck are negative for large vessel occlusion or other retrievable vascular anomaly.  Patient will be admitted to the ICU for further close monitoring.  He is to receive a noncontrast head scan at 9:30 a.m. tomorrow.  Additional note is made for him to not receive Plavix or aspirin in the interim.  Patient is updated on the plan of care and he is in agreement with this disposition.  Transported to a ICU bed in stable condition.  Last known normal: 8 am this morning.  Provider contact time: on arrival.    Head CT Read by MD: 9 am with no bleed on CT scan.    Neurology consult ordered by ED MD: 8:44 am    VAN positive? Yes     Weakness: Yes    Visual changes: No  Aphasia: No  Neglect: Yes    NIH score: 2  TPa administered: Yes with consent at 9:22 am            Scribe Attestation:   Scribe #1: I performed the above scribed service and the documentation accurately describes the services I performed. I attest to the accuracy of the note.    Attending Attestation:         Attending Critical Care:   Critical Care Times:   Direct Patient Care (initial evaluation, reassessments, and time considering the case)................................................................35 minutes.   Additional History from reviewing old medical records or taking additional history from the family, EMS, PCP, etc.......................5 minutes.   Ordering, Reviewing, and Interpreting Diagnostic Studies...............................................................................................................5 minutes.   Documentation..................................................................................................................................................................................5 minutes.   Consultation with other Physicians. .................................................................................................................................................15 minutes.    Consultation with the patient's family directly relating to the patient's condition, care, and DNR status (when patient unable)......0 minutes.   Other..................................................................................................................................................................................................10 minutes.   ==============================================================  · Total Critical Care Time - exclusive of procedural time: 75 minutes.  ==============================================================  Critical care was necessary to treat or prevent imminent or life-threatening deterioration of the following conditions: stroke.   The following critical care procedures were done by me (see procedure notes): pulse oximetry.   Critical care was time spent personally by me on the following activities: obtaining history from patient or relative, examination of patient, review of old charts, ordering lab, x-rays, and/or EKG, development of treatment plan with patient or relative, ordering and performing treatments and interventions, evaluation of patient's response to treatment, discussions with primary provider, interpretation of cardiac measurements, re-evaluation of patient's conition and discussion with consultants.   Critical Care Condition: potentially life-threatening                  I, Dr. Roosevelt Loja, personally performed the services described in this documentation. All medical record entries made by the scribe were at my direction and in my presence.  I have reviewed the chart and agree that the record reflects my personal performance and is accurate and complete. Roosevelt Loja MD.  2:56 PM 04/02/2022      Clinical Impression:   Final diagnoses:  [I63.9] Stroke          ED Disposition Condition    Admit               Roosevelt Loja MD  04/02/22 2492

## 2022-04-02 NOTE — ED NOTES
Pt assisted with phone to call family member, updated on plan of care, call bell within reach. Will continue to monitor and update patient.

## 2022-04-02 NOTE — ED NOTES
Patient due for office visit - please schedule.  Please address HM, if applicable.     Pt ready for CTA

## 2022-04-02 NOTE — HPI
76 year old male presents with right sided weakness, last seen normal as 8 am.     No current facility-administered medications for this encounter.    Current Outpatient Medications:     artificial saliva, cmce-lytes, SprP, Take by mouth. 1 tsp. Daily prn dry mouth, Disp: , Rfl:     atorvastatin (LIPITOR) 20 MG tablet, Take 10 mg by mouth once daily., Disp: , Rfl:     carboxymethylcellulose (REFRESH PLUS) 0.5 % Dpet, Place 1 drop into both eyes 5 (five) times daily., Disp: , Rfl:     cholecalciferol, vitamin D3, (VITAMIN D3) 2,000 unit Tab, Take 1 tablet by mouth once daily., Disp: , Rfl:     cholecalciferol, vitamin D3, 2,000 unit Tab, Take by mouth once daily., Disp: , Rfl:     fluticasone 50 mcg/actuation DsDv, Inhale 2 sprays into the lungs once daily., Disp: , Rfl:     gabapentin (NEURONTIN) 400 MG capsule, Take 400 mg by mouth 3 (three) times daily. 2 qa/ 2 qnoon/ 3 qpm, Disp: , Rfl:     GAVILYTE-G 236-22.74-6.74 -5.86 gram suspension, USE AS DIRECTED, Disp: , Rfl: 0    hydrochlorothiazide (HYDRODIURIL) 25 MG tablet, Take 25 mg by mouth once daily., Disp: , Rfl:     HYDROcodone-acetaminophen (NORCO) 5-325 mg per tablet, Take 1 tablet by mouth every 4 to 6 hours as needed., Disp: , Rfl:     insulin glargine (LANTUS) 100 unit/mL injection, Inject into the skin once daily. , Disp: , Rfl:     labetaloL (NORMODYNE) 200 MG tablet, Take 1 tablet by mouth 2 (two) times daily., Disp: , Rfl:     liraglutide 0.6 mg/0.1 mL, 18 mg/3 mL, subq PNIJ (VICTOZA 2-IVAN) 0.6 mg/0.1 mL (18 mg/3 mL) PnIj, once daily., Disp: , Rfl:     lisinopril 10 MG tablet, Take 0.5 tablets by mouth once daily., Disp: , Rfl:     oxybutynin (DITROPAN-XL) 10 MG 24 hr tablet, Take 1 tablet by mouth once daily., Disp: , Rfl:     pantoprazole (PROTONIX) 40 MG tablet, Take 1 tablet (40 mg total) by mouth once daily. Before breakfast, Disp: 30 tablet, Rfl: 6    trazodone (DESYREL) 100 MG tablet, Take 150 mg by mouth every evening. , Disp: , Rfl:     urea  50 % Crea, Apply bid, Disp: , Rfl:

## 2022-04-02 NOTE — H&P
[facility]  Neurology History and Physical    Patient Name: Senthil Chandler  MRN: [id number]  : 1945  TODAY'S DATE: 2022  ADMIT DATE: 2022  8:48 AM                                          ADMITTING PROVIDER: Brittany Gudino MD, Neurologist. Cellphone: [phone]    Chief Complaint   Patient presents with    Extremity Weakness     Rt. Arm is numb and he cannot move it / started 0800       History of Present Illness     Senthil Chandler is a 76 y.o.  man with past medical history of hypertension, insulin-dependent diabetes mellitus type 2, hyperlipidemia, and prostate cancer who presented with sudden onset right-sided weakness and numbness.  Patient reports that he he woke up in his usual state of health at about 7:00 a.m..  At around 8:00 a.m. he first noticed a mild right lower extremity weakness with buckling of his knee when trying to ambulate.  Then his right arm became severely weak and limp, and he could not feel anything when he touched his right arm, so he called family members who presented to his home and then proceeded to bring him to the emergency department for evaluation.  He was stroke activated, tele neurology evaluated him and he was a candidate so he received tPA.  He was then admitted to the ICU under neurology service for the 1st 24 hours, then will transition to hospital medicine if there are no complications after tPA.    Acute Stroke Times   Last Known Normal Date: 22  Last Known Normal Time: 830  Symptom Onset Time: 830  Stroke Team Called Date: 22  Stroke Team Called Time: 900  Stroke Team Arrival Date: 22  Stroke Team Arrival Time: 904  CT Interpretation Time: 904  Alteplase Recommended: Yes  Decision to Treat Time for Alteplase: 909    Review of Systems:    A comprehensive ROS was performed and all systems were negative except as noted above in HPI.    Past Medical History:  has a past medical history  of Adjustment disorder with anxious mood, Angiodysplasia, Arthritis, Cancer (1990), Carcinoma in situ of prostate, Carpal tunnel syndrome on left, CKD (chronic kidney disease) stage 1, GFR 90 ml/min or greater, Colon polyp, Cubital tunnel syndrome on left, Depressive disorder, Deviated nasal septum, Diabetes mellitus with ophthalmic complication, Diabetes mellitus, type 2, GERD (gastroesophageal reflux disease), Hypercholesterolemia, Hyperpotassemia, Hypertension, Iron deficiency anemia, Kidney disease, Lumbago, Malignant neoplasm of colon, Nephrotic syndrome, Neuropathy, Primary osteoarthritis of left elbow, PTSD (post-traumatic stress disorder), PVD (peripheral vascular disease), Spinal stenosis, lumbar region without neurogenic claudication, Stomach ulcer, and Wears glasses.    Past Surgical History:  has a past surgical history that includes Prostate surgery (1990's); Nasal septum surgery; Carpal tunnel release (Left, 10/23/2017); Proctectomy; Colonoscopy (N/A, 8/12/2016); Upper gastrointestinal endoscopy (08/12/2016); Esophagogastroduodenoscopy (N/A, 2/20/2019); Colonoscopy (N/A, 2/20/2019); Robot-assisted colectomy (Right, 3/25/2019); Subtotal colectomy (Right, 3/25/2019); Laparoscopic cholecystectomy (Right, 3/25/2019); and Colonoscopy (N/A, 6/30/2020).    Family Medical History: family history includes Leukemia in his father and mother.    Social History:  reports that he has been smoking cigarettes. He has a 52.00 pack-year smoking history. He has never used smokeless tobacco. He reports current alcohol use. He reports that he does not use drugs.    PCP: Don Hernandez III, MD    Allergies: Review of patient's allergies indicates:  No Known Allergies    Medications:   No current facility-administered medications on file prior to encounter.     Current Outpatient Medications on File Prior to Encounter   Medication Sig Dispense Refill    artificial saliva, cmce-lytes, SprP Take by mouth. 1 tsp. Daily prn  dry mouth      atorvastatin (LIPITOR) 20 MG tablet Take 10 mg by mouth once daily.      carboxymethylcellulose (REFRESH PLUS) 0.5 % Dpet Place 1 drop into both eyes 5 (five) times daily.      cholecalciferol, vitamin D3, (VITAMIN D3) 2,000 unit Tab Take 1 tablet by mouth once daily.      cholecalciferol, vitamin D3, 2,000 unit Tab Take by mouth once daily.      fluticasone 50 mcg/actuation DsDv Inhale 2 sprays into the lungs once daily.      gabapentin (NEURONTIN) 400 MG capsule Take 400 mg by mouth 3 (three) times daily. 2 qa/ 2 qnoon/ 3 qpm      GAVILYTE-G 236-22.74-6.74 -5.86 gram suspension USE AS DIRECTED  0    hydrochlorothiazide (HYDRODIURIL) 25 MG tablet Take 25 mg by mouth once daily.      HYDROcodone-acetaminophen (NORCO) 5-325 mg per tablet Take 1 tablet by mouth every 4 to 6 hours as needed.      insulin glargine (LANTUS) 100 unit/mL injection Inject into the skin once daily.       labetaloL (NORMODYNE) 200 MG tablet Take 1 tablet by mouth 2 (two) times daily.      liraglutide 0.6 mg/0.1 mL, 18 mg/3 mL, subq PNIJ (VICTOZA 2-IVAN) 0.6 mg/0.1 mL (18 mg/3 mL) PnIj once daily.      lisinopril 10 MG tablet Take 0.5 tablets by mouth once daily.      oxybutynin (DITROPAN-XL) 10 MG 24 hr tablet Take 1 tablet by mouth once daily.      pantoprazole (PROTONIX) 40 MG tablet Take 1 tablet (40 mg total) by mouth once daily. Before breakfast 30 tablet 6    trazodone (DESYREL) 100 MG tablet Take 150 mg by mouth every evening.       urea 50 % Crea Apply bid         Physical Exam  Current Vitals:  Vitals:    04/02/22 1332   BP: (!) 149/76   Pulse: 63   Resp: 18       Physical Exam:  General: AO x4  HEENT: PERRL, EOMI  CV: RRR  Lungs: CTAB  Abdomen: +BS, S, NT, ND    Neurological Exam    MENTAL STATUS EXAM:  Level of alertness: Alert  Level of attention: Attentive w/out deficit  Orientation/Awareness: intact to person, place, time, situation  Language: fluent. Comprehension/repetition/naming  intact    CRANIAL NERVE EXAM:  II/III: fundoscopic exam deferred, PERRL; visual fields full to confrontation; no gross deficit on visual acuity  III/IV/VI: EOMI w/out evidence of nystagmus, strabismus, or evoked diplopia  V: no deficits appreciated to pinprick, temp, vibration; masseter strength intact bilaterally  VII:  Mild right nasal labial fold flattening  VIII: no deficits in hearing bilaterally  IX/X: palate @ ML and raises symmetrically  XI: shoulder shrug 5/5 bilaterally; head turn 5/5 bilaterally  XII: tongue to midline w/out asymmetry  No dysarthria noted on exam.    MOTOR EXAM:  Bulk and Tone: normal throughout  Strength is 4/5 proximally and 4/5 distally in right upper extremity .  Strength is 4 out of 5 in lower extremity on the right.  Left upper and lower extremity 5/5 strength without deficits.  Positive pronator drift on right upper extremity, negative on the left.  Mild intention tremor seen on the left side.    REFLEXES:  Masseter (CN V) Not Tested  2+ in bilateral upper and lower extremities, no Mcmanus's, no clonus. Downgoing plantars.    SENSORY EXAM  Light touch and vibration are mildly decreased on right upper and lower extremity when compared to the left side, rest of the sensation is intact throughout without deficits.    COORDINATION/CEREBELLAR EXAM:  FTN:  Significant dysmetria and ataxia which is out of proportion to weakness on right upper extremity, no dysmetria on left  HTS: No evidence of appendicular ataxia    GAIT:  Deferred for safety.    NIH Stroke Scale      Date: 04/02/2022  Time: 1600  Person Administering Scale: Brittany Gudino MD    Administer stroke scale items in the order listed. Record performance in each category after each subscale exam. Do not go back and change scores. Follow directions provided for each exam technique. Scores should reflect what the patient does, not what the clinician thinks the patient can do. The clinician should record answers while  administering the exam and work quickly. Except where indicated, the patient should not be coached (i.e., repeated requests to patient to make a special effort).      1a  Level of consciousness: 0=alert; keenly responsive   1b. LOC questions:  0=Answers both tasks correctly   1c. LOC commands: 0=Answers both tasks correctly   2.  Best Gaze: 0=normal   3.  Visual: 0=No visual loss   4. Facial Palsy: 1=Minor paralysis (flattened nasolabial fold, asymmetric on smiling)   5a.  Motor left arm: 0=No drift, limb holds 90 (or 45) degrees for full 10 seconds   5b.  Motor right arm: 1=Drift, limb holds 90 (or 45) degrees but drifts down before full 10 seconds: does not hit bed   6a. motor left le=No drift, limb holds 90 (or 45) degrees for full 10 seconds   6b  Motor right le=Drift, limb holds 90 (or 45) degrees but drifts down before full 10 seconds: does not hit bed   7. Limb Ataxia: 1=Present in one limb   8.  Sensory: 1=Mild to moderate sensory loss; patient feels pinprick is less sharp or is dull on the affected side; there is a loss of superficial pain with pinprick but patient is aware He is being touched   9. Best Language:  0=No aphasia, normal   10. Dysarthria: 0=Normal   11. Extinction and Inattention: 0=No abnormality    Total:   5       Laboratory Data & Studies    Recent Labs   Lab 22  0858   WBC 3.39* 3.46*   HGB 14.7 14.5    247   * 103*       Recent Labs   Lab 22  0859    139   K 5.2* 4.3    109   CO2 27 18*   BUN 13 16   * 162*   CALCIUM 10.2 9.8       Recent Labs   Lab 22  0859   PROT 6.9 6.6   ALBUMIN 4.1 3.8   BILITOT 0.7 0.7   AST 25 21   ALT 32 33   ALKPHOS 89 90       Recent Labs   Lab 22  0859   INR 1.0       Recent Labs   Lab 22  0902 22  0859   HGBA1C 6.6*  --    CHOL 114*  --    TRIG 113  --    LDLCALC 51.4*  --    HDL 40  --    TSH 0.810 0.596       Imaging:  X-Ray Chest AP  Single View    Result Date: 4/2/2022  EXAMINATION: XR CHEST 1 VIEW CLINICAL HISTORY: stroke; TECHNIQUE: A portable semi upright AP view of the chest was acquired. COMPARISON: Chest x-ray-03/27/2019 FINDINGS: The cardiac silhouette is not enlarged.  There is patchy segmental airspace opacity in the left midlung zone suggesting atelectasis versus fibrosis versus pneumonitis.  There is band like opacity in the right midlung zone which could relate to atelectasis or fibrosis.  Elsewhere, the lungs are clear.  Previously noted segmental airspace opacity at the right lung base is no longer visualized on today's study.  No significant volume of pleural fluid or pneumothorax.  There is degenerative change of the thoracic spine.  There is degenerative change of the right acromioclavicular joint.     Interval development of linear atelectasis versus fibrosis in the right midlung zone and linear atelectasis versus fibrosis versus pneumonitis in the left midlung zone. Electronically signed by: Giancarlo Boggs MD Date:    04/02/2022 Time:    13:46    X-Ray Lumbar Spine 5 View    Result Date: 3/31/2022  Five views of the lumbar spine Clinical history is pain There is mild levoscoliosis of the lumbar spine. The vertebral bodies are of normal height. The intervertebral disc spaces are maintained. There are mild degenerative endplate changes at L5-S1. There is anterior spurring of L5. There is diffuse facet hypertrophy from L2 to S1. There are no pars defects. There is diffuse vascular calcification of the aorta. The SI joints are symmetrical. There are surgical clips within the pelvis. IMPRESSION: Diffuse facet hypertrophy with no acute osseous abnormality Diffuse vascular calcification of the aorta Electronically signed by:  Dianna Mehta MD  3/31/2022 9:36 AM CDT Workstation: [id number]    CT Head Without Contrast    Result Date: 4/2/2022  EXAMINATION: CT HEAD WITHOUT CONTRAST CLINICAL HISTORY: Neuro deficit, acute, stroke  suspected; TECHNIQUE: Low dose axial images were obtained through the head.  Coronal and sagittal reformations were also performed. Contrast was not administered. COMPARISON: MRI of the brain from 12/28/2018 FINDINGS: There is no evidence for hemorrhage, midline shift or mass effect.There is no gross evidence to suggest an acute infarct.Mild generalized cortical atrophy is noted. There is mild-to-moderate mucosal thickening seen involving the inferior left frontal sinus. The calvarium is intact.     1.  No acute intracranial pathology Electronically signed by: Kirk Kelly DO Date:    04/02/2022 Time:    08:57    CTA Head and Neck (xpd)    Result Date: 4/2/2022  EXAMINATION: CTA HEAD AND NECK (XPD) CLINICAL HISTORY: Stroke/TIA, determine embolic source; TECHNIQUE: Following the intravenous administration of 75cc of Omnipaque 350 contrast material, 1.25-mm contiguous axial images were acquired through the vasculature of the neck and [place] utilizing the CTA protocol.  Subsequently, coronal and sagittal reconstructed images were generated from the source data.  Curved reformatted images were also acquired through the carotid arteries and great vessel origins .  2-D and 3-D MIP reconstructed images of the [place]  and 3-D volume rendered images of the [place] vasculature were also generated from the source data. COMPARISON: CT head without contrast-04/02/2022 at 08:50 FINDINGS: CTA neck: There is scattered calcified atherosclerotic plaque within the thoracic aortic arch and origin of the left subclavian artery.  The left and right subclavian artery are widely patent without hemodynamically significant stenosis, dissection, or occlusion.  The common carotid arteries are widely patent.  There is calcified atherosclerotic plaque deposition within both carotid bulbs and within the proximal right internal carotid artery.  No focal areas of > 50% hemodynamically significant stenosis, dissection,  or occlusion involving the left or right internal carotid arteries.  There is mild calcified plaque deposition observed within the cavernous segment internal carotid arteries.  There are codominant vertebral arteries present which appear widely patent throughout with no evidence of hemodynamically significant stenosis, dissection, or occlusion.  There is minimal 30-40% smooth luminal narrowing observed within the right vertebral artery at the level of C7 (series 606, image 69 and series 2, image 184). CTA Head: The mhjmib-ii-Tywdob vasculature (including the visualized vertebral arteries, basilar artery, posterior cerebral arteries, middle cerebral arteries, and anterior cerebral arteries) is widely patent with no foci of > 50% hemodynamically significant luminal stenosis, aneurysm formation, or AVM.  No definite dissection.  There are absent posterior communicating arteries as an anatomic variant. Soft tissues Neck: The nasopharynx, hypopharynx, oropharynx, epiglottis, larynx, true vocal cords, and subglottic trachea are unremarkable.  No salivary gland abnormalities appreciated.  No pathologically enlarged lymph nodes in the neck or supraclavicular regions.  There is a 11 mm right thyroid lobe hypodensity present on series 2, image 176.  Consider follow-up outpatient thyroid ultrasound for additional characterization.  The visualized superior mediastinum is unremarkable.  There is emphysematous lung architecture present at the lung apices. Brain: The brain is normally formed with preserved gray-white matter junction differentiation. No evidence of acute/recent major vascular territory cerebral infarction, parenchymal hemorrhage, or intra-axial mass.  There is age-appropriate supratentorial cerebral volume loss.  No enhancing vascular malformation. No hydrocephalus.  No effacement of the skull-base cisterns.  No extra-axial fluid collections or blood products.  No abnormal dural or leptomeningeal enhancement. There  is mucoperiosteal thickening observed within the anterior ethmoid air cells and left frontal sinus.  There are postoperative changes of right medial antrectomy.  There are sherie bullosa of the middle turbinates.  The mastoid air cells are clear.  The visualized orbits are unremarkable.  The bony calvarium and visualized facial bones show no acute abnormality. Bones: There is multilevel degenerative change of the cervical spine in the form of marginal osteophyte formation, uncovertebral spurring, and facet arthropathy.     1. Normal CT angiogram of the extracranial carotid vasculature and volriz-jv-Jjdlmj vasculature.  No hemodynamically significant stenosis involving the left or right internal carotid artery. 2. No imaging evidence of acute/recent major vascular territory cerebral infarction or acute intraparenchymal hemorrhage. 3. 11 mm right thyroid lobe hypodensity.  Consider follow-up outpatient thyroid ultrasound for additional characterization. 4. Minimal smooth short segment 30-40% luminal stenosis within the proximal right vertebral artery at the level of C7. 5. Other incidental findings as detailed above. Electronically signed by: Giancarlo Boggs MD Date:    04/02/2022 Time:    10:10      Assessment and Plan:  Senthil Chandler is a 76 y.o.  man with past medical history of hypertension, insulin-dependent diabetes mellitus type 2, hyperlipidemia, and prostate cancer who presented with sudden onset right-sided weakness and numbness that started 1 hour prior to his arrival to the ED. Was stroke activated and evaluated by teleneurology, deemed a candidate, so he received tPA. NIHSS is 5. Now admitted to the ICU for Q1H neurochecks and close monitoring. Neurology admitted patient as requested s/p tPA.    Stroke workup:  CT brain:  No acute intracranial abnormality, no bleed  CTA head and neck:  No large vessel occlusion  MRI brain:  Ordered and pending  Risk factor stratification: A1C, LDL,  TSH  Echocardiogram:  Ordered and pending    Neuro:  Acute ischemic stroke s/p tPA  - Admit to ICU with q1 hour neuro checks, on telemetry, continuous pulse oximetry  - passed dysphagia screen by nursing.  Will start soft mechanical diet with thin liquids until speech evaluation  - nursing staff instructed to perform q.1 hour neuro checks.  If patient worsens mental status or neurological examination a CT brain should be obtained stat and Neurology called immediately  - Secondary stroke prevention only atorvastatin for now.  If there is no evidence of bleeding on 24 hour CT brain he will be started on antiplatelets  - Obtain MRI brain  - CT w/o contrast 24 hours after TPA.  - Risk factor stratification with HgbA1C, Fasting Lipid profile, TSH  - 2D echocardiogram to assess cardiac function with bubble study to evaluate for PFO  - Maintain Euvolemia with normal saline IV.  - Maintain Euglycemia with Accuchecks q4 hours and regular Insulin Sliding Scale  - Maintain Euthermia with Tylenol prn temp > 37.2 degrees C.  - Assessment for rehab with PT/OT/SLP evaluation and treatment.    Cardiovascular:  Hypertension  - BP Goal <180/105 s/p iv tPA: cardene gtt to titrate, and prn Labetalol  - Cardiac enzymes and EKG ordered    Pulm:   History of tobacco abuse/COPD  - CXR showed an area of atelectasis, will order incentive spirometry  - Prn Q6H duonebs  - Smoking counseling, offered assistance with smoking cessation and ordered nicotine patch    FEN/GI:   - Replace electrolytes prn-Keep K >4.0, Mg > 2.0.   - Mechanical soft diet  - Speech evaluation pending  - Famotidine for GI ppx    Renal: no acute issues.  - Monitor BMP daily    Heme/ID:   - UA with no evidence of infection  - CXR with linear atelectasis but no consolidation  - Utox negative   - Daily CBC to monitor    Endocrine:   History of Diabetes Mellitus, on insulin  - Accuchecks q6 hrs with regular Insulin Sliding scale   - HgA1C, TSH as above    · Extensively  discussed lifestyle modifications as prophylactic measures for stroke prevention including smoking cessation, adequate blood pressure management, healthy diet and regular exercise.    [phone] 3 days from discharge.     Stroke education was provided including stroke risk factors modification and any acute neurological changes including weakness, confusion, visual changes to come straight to the ER.     All questions were answered.                           Diet: mechanical soft with thin liquids   DVT prophylaxis-SCDs+  GI prophylaxis- famotidine  Dispo: pending stroke workup, will transfer to hospital medicine tomorrow if no complications arise      **Critical Care:  75 minutes of critical care time has been spent evaluating with the patient. Time includes chart review not limited to diagnostic imaging, labs, and vitals, patient assessment, discussion with family and nursing, current order evaluations, and new order entries.**      Brittany Gudino MD  General and Vascular Neurology  [facility]  Ph: [phone]

## 2022-04-02 NOTE — PLAN OF CARE
[facility]  Initial Discharge Assessment       Primary Care Provider: Don Hernandez III, MD    Admission Diagnosis: Stroke [I63.9]  Acute ischemic stroke [I63.9]    Admission Date: 4/2/2022  Expected Discharge Date:      met with patient at bedside to complete assessment. Demographics, PCP and insurance verified. Patient lives alone. Most family out of state. No current HH. No dialysis. Patient drives self to doctor appointments. Case management to assist with any additional needs upon discharge. No further needs to discuss at this time.      Discharge Barriers Identified: Social (Lack of family in town or friends with transportation.)    Payor: [employer] MANAGED MEDICARE / Plan: [employer] MEDICARE PPO / Product Type: Medicare Advantage /     Extended Emergency Contact Information  Primary Emergency Contact: Karlos Chandler   [facility]  Home Phone: [phone]  Relation: Son  Secondary Emergency Contact: Marilu Cahndler  Mobile Phone: [phone]  Relation: Relative   needed? No    Discharge Plan A: Home Health  Discharge Plan B: Home      [id number] [place] - [address]  [address] [address]  Phone: [phone] Fax: [phone]      Initial Assessment (most recent)     Adult Discharge Assessment - 04/02/22 1646        Discharge Assessment    Assessment Type Discharge Planning Assessment     Confirmed/corrected address, phone number and insurance Yes     Confirmed Demographics Correct on Facesheet     Source of Information patient     Lives With alone     Do you expect to return to your current living situation? Yes     Do you have help at home or someone to help you manage your care at home? No     Prior to hospitilization cognitive status: Alert/Oriented     Current cognitive status: Alert/Oriented     Walking or Climbing Stairs Difficulty none     Dressing/Bathing Difficulty none     Equipment Currently Used at Home none      Readmission within 30 days? No     Patient currently being followed by outpatient case management? No     Do you currently have service(s) that help you manage your care at home? No     Do you take prescription medications? Yes     Do you have prescription coverage? Yes     Do you have any problems affording any of your prescribed medications? No     Is the patient taking medications as prescribed? yes     How do you get to doctors appointments? car, drives self     Are you on dialysis? No     Do you take coumadin? No     Discharge Plan A Home Health     Discharge Plan B Home     DME Needed Upon Discharge  none     Discharge Plan discussed with: Patient     Discharge Barriers Identified Social   Lack of family in town or friends with transportation.

## 2022-04-02 NOTE — ED NOTES
Pt identifiers checked and accurate with Senthil Chandler     Pt presents to ED with complaints of right arm numbness and weakness beginning at 830 this AM. Pt reports right knee weakness upon waking up today at 8 am, attempting to use right arm without success, reports decreased sensation to right arm. Pt denies headache, vision changes, trauma, falls, previous injury.

## 2022-04-02 NOTE — CONSULTS
[facility]  Vascular Neurology  Comprehensive Stroke Center  TeleVascular Neurology Acute Consultation Note      Consults    Consulting Provider: ANEL CHE  Current Providers  No providers found    Patient Location:  [facility] EMERGENCY DEPARTMENT Emergency Department  Spoke hospital nurse at bedside with patient assisting consultant.     Patient information was obtained from patient.         Assessment/Plan:     76 year old male presents with right sided weakness, last seen normal as 8 am. He initially had right arm and leg weakness but leg weakness resolved with minimal improvement in right arm. NIHSS-2 but given disability, decided to proceed with TPA.     IV- TPA: per the NINDS Study, IV TPA given within 3 hour window has a 33% chance of disability  improvement, 2% chance of patient getting worse, and 1% chance of severe complication/death.  Informed consent was obtained discussing this trial and IV TPA therapy. All questions by patient and/or  family were answered and the decision to proceed with IV TPA was made. Risk vs benefit discussion done by ER team.     - Plan:  - Admit to MICU for 24 hour monitoring s/p IV tPA given at Bolus 09:26, infusion 09:27.       - Please use IV TPA order set  - CTA with no LVO.   - Keep Strict NPO until patient passes bedside SS  - BP: Aggressive Treatment to keep blood pressure less than 180/105 mmHg, using: labetalol or  nicardipine, avoid hydralazine/nitrates  - Emergent CT Brain for: acute neurologic decline, nausea, vomiting, or new headache  - Repeat Imaging, ideally MRI Brain in 24 hours post treatment, to minimize radiation  exposure, but if unable to obtain MRI Brain, please get repeat non-con CT Brain  - TTE with shunt  - LDL, HgA1C  - Neuro checks, vital checks  - PT/OT/SS  - IV fluids of choice -         Diagnoses:   Stroke like symptoms    STROKE DOCUMENTATION     Acute Stroke Times:   Acute Stroke Times   Last Known Normal  Date: 04/02/22  Last Known Normal Time: 0830  Symptom Onset Time: 0830  Stroke Team Called Date: 04/02/22  Stroke Team Called Time: 0900  Stroke Team Arrival Date: 04/02/22  Stroke Team Arrival Time: 0904  CT Interpretation Time: 0904  Alteplase Recommended: Yes  Decision to Treat Time for Alteplase: 0909    NIH Scale:  1a. Level of Consciousness: 0-->Alert, keenly responsive  1b. LOC Questions: 0-->Answers both questions correctly  1c. LOC Commands: 0-->Performs both tasks correctly  2. Best Gaze: 0-->Normal  3. Visual: 0-->No visual loss  4. Facial Palsy: 0-->Normal symmetrical movements  5a. Motor Arm, Left: 0-->No drift, limb holds 90 (or 45) degrees for full 10 secs  5b. Motor Arm, Right: 2-->Some effort against gravity, limb cannot get to or maintain (if cued) 90 (or 45) degrees, drifts down to bed, but has some effort against gravity  6a. Motor Leg, Left: 0-->No drift, leg holds 30 degree position for full 5 secs  6b. Motor Leg, Right: 0-->No drift, leg holds 30 degree position for full 5 secs  7. Limb Ataxia: 0-->Absent  8. Sensory: 0-->Normal, no sensory loss  9. Best Language: 0-->No aphasia, normal  10. Dysarthria: 0-->Normal  11. Extinction and Inattention (formerly Neglect): 0-->No abnormality  Total (NIH Stroke Scale): 2     Modified [place] Coma Scale:    ABCD2 Score:    [id number] Score:   HAS -BLED Score:   ICH Score:   Hunt & Tirado Classification:       Blood pressure (!) 178/88, pulse 65, resp. rate (P) 18, height 6' (1.829 m), weight 98.4 kg (217 lb), SpO2 95 %.  Alteplase Eligible?: Yes  Alteplase Recommendation:   Alteplase Total Dose:   Total dose: Alteplase 0.9mg/kg (max dose:90mg)                      ** based on acquired weight from facility   Bolus Dose:   10% of total Alteplase dose given intravenously over 1 minute   Continuous Infusion Dose:   Remaining 90% of total Alteplase dose infused intravenously over 60 minutes    **infusion must start at the same time as the bolus  dose     Additional Recommendations:   1. Neurological assessment and vital signs (except temperature) every 15 minutes during Altaplase infusion.  2. Frequency of BP assessments may need to be increased if systolic BP stays >= 180 mm Hg or diastolic BP stays >= 105 mm Hg. Administer antihypertensive meds as ordered  3. Continue to monitor and control blood pressure and monitor for neurological deterioration every 15 minutes for the first hour after the infusion is stopped. Then every 30 minutes for the next 6 hours. Perform hourly monitoring from the 8th post-infusion hour until 24 hours post-infusion.  4. Temperature every 4 hours or as required.  5. Follow hospital protocol for further orders re: post tPA infusion patient management.  6. No antithrombotics or anticoagulants (including but not limited to: heparin, warfarin, aspirin, clopidigrel, or dipyridamole) for 24 hours, then start antithrombotics as ordered by treating physician    Adapted from the American Heart Association/American Stroke Association (AHA/ASA) and American Association of Neuroscience Nurses (AANN) Guidelines.   Possible Interventional Revascularization Candidate? Awaiting CTA results from Spoke for determination     Disposition Recommendation: pending further studies    Subjective:     History of Present Illness:  76 year old male presents with right sided weakness, last seen normal as 8 am.     No current facility-administered medications for this encounter.    Current Outpatient Medications:     artificial saliva, cmce-lytes, SprP, Take by mouth. 1 tsp. Daily prn dry mouth, Disp: , Rfl:     atorvastatin (LIPITOR) 20 MG tablet, Take 10 mg by mouth once daily., Disp: , Rfl:     carboxymethylcellulose (REFRESH PLUS) 0.5 % Dpet, Place 1 drop into both eyes 5 (five) times daily., Disp: , Rfl:     cholecalciferol, vitamin D3, (VITAMIN D3) 2,000 unit Tab, Take 1 tablet by mouth once daily., Disp: , Rfl:     cholecalciferol, vitamin D3,  2,000 unit Tab, Take by mouth once daily., Disp: , Rfl:     fluticasone 50 mcg/actuation DsDv, Inhale 2 sprays into the lungs once daily., Disp: , Rfl:     gabapentin (NEURONTIN) 400 MG capsule, Take 400 mg by mouth 3 (three) times daily. 2 qa/ 2 qnoon/ 3 qpm, Disp: , Rfl:     GAVILYTE-G 236-22.74-6.74 -5.86 gram suspension, USE AS DIRECTED, Disp: , Rfl: 0    hydrochlorothiazide (HYDRODIURIL) 25 MG tablet, Take 25 mg by mouth once daily., Disp: , Rfl:     HYDROcodone-acetaminophen (NORCO) 5-325 mg per tablet, Take 1 tablet by mouth every 4 to 6 hours as needed., Disp: , Rfl:     insulin glargine (LANTUS) 100 unit/mL injection, Inject into the skin once daily. , Disp: , Rfl:     labetaloL (NORMODYNE) 200 MG tablet, Take 1 tablet by mouth 2 (two) times daily., Disp: , Rfl:     liraglutide 0.6 mg/0.1 mL, 18 mg/3 mL, subq PNIJ (VICTOZA 2-IVAN) 0.6 mg/0.1 mL (18 mg/3 mL) PnIj, once daily., Disp: , Rfl:     lisinopril 10 MG tablet, Take 0.5 tablets by mouth once daily., Disp: , Rfl:     oxybutynin (DITROPAN-XL) 10 MG 24 hr tablet, Take 1 tablet by mouth once daily., Disp: , Rfl:     pantoprazole (PROTONIX) 40 MG tablet, Take 1 tablet (40 mg total) by mouth once daily. Before breakfast, Disp: 30 tablet, Rfl: 6    trazodone (DESYREL) 100 MG tablet, Take 150 mg by mouth every evening. , Disp: , Rfl:     urea 50 % Crea, Apply bid, Disp: , Rfl:           Woke up with symptoms?: no    Recent bleeding noted: no  Does the patient take any Blood Thinners? no  Medications: Unknown  No current facility-administered medications for this encounter.    Current Outpatient Medications:     artificial saliva, cmce-lytes, SprP, Take by mouth. 1 tsp. Daily prn dry mouth, Disp: , Rfl:     atorvastatin (LIPITOR) 20 MG tablet, Take 10 mg by mouth once daily., Disp: , Rfl:     carboxymethylcellulose (REFRESH PLUS) 0.5 % Dpet, Place 1 drop into both eyes 5 (five) times daily., Disp: , Rfl:     cholecalciferol, vitamin D3,  (VITAMIN D3) 2,000 unit Tab, Take 1 tablet by mouth once daily., Disp: , Rfl:     cholecalciferol, vitamin D3, 2,000 unit Tab, Take by mouth once daily., Disp: , Rfl:     fluticasone 50 mcg/actuation DsDv, Inhale 2 sprays into the lungs once daily., Disp: , Rfl:     gabapentin (NEURONTIN) 400 MG capsule, Take 400 mg by mouth 3 (three) times daily. 2 qa/ 2 qnoon/ 3 qpm, Disp: , Rfl:     GAVILYTE-G 236-22.74-6.74 -5.86 gram suspension, USE AS DIRECTED, Disp: , Rfl: 0    hydrochlorothiazide (HYDRODIURIL) 25 MG tablet, Take 25 mg by mouth once daily., Disp: , Rfl:     HYDROcodone-acetaminophen (NORCO) 5-325 mg per tablet, Take 1 tablet by mouth every 4 to 6 hours as needed., Disp: , Rfl:     insulin glargine (LANTUS) 100 unit/mL injection, Inject into the skin once daily. , Disp: , Rfl:     labetaloL (NORMODYNE) 200 MG tablet, Take 1 tablet by mouth 2 (two) times daily., Disp: , Rfl:     liraglutide 0.6 mg/0.1 mL, 18 mg/3 mL, subq PNIJ (VICTOZA 2-IVAN) 0.6 mg/0.1 mL (18 mg/3 mL) PnIj, once daily., Disp: , Rfl:     lisinopril 10 MG tablet, Take 0.5 tablets by mouth once daily., Disp: , Rfl:     oxybutynin (DITROPAN-XL) 10 MG 24 hr tablet, Take 1 tablet by mouth once daily., Disp: , Rfl:     pantoprazole (PROTONIX) 40 MG tablet, Take 1 tablet (40 mg total) by mouth once daily. Before breakfast, Disp: 30 tablet, Rfl: 6    trazodone (DESYREL) 100 MG tablet, Take 150 mg by mouth every evening. , Disp: , Rfl:     urea 50 % Crea, Apply bid, Disp: , Rfl:       Past Medical History: hypertension, diabetes and hyperlipidemia    Past Surgical History: no major surgeries within the last 2 weeks    Family History: no relevant history    Social History: unable to obtain    Allergies: No Known Allergies No relevant allergies    Review of Systems  Objective:   Vitals: Blood pressure (!) 178/88, pulse 65, resp. rate (P) 18, height 6' (1.829 m), weight 98.4 kg (217 lb), SpO2 95 %. BP: 178/88    CT READ: Yes  No hemmorhage.  No mass effect. No early infarct signs.     Physical Exam  Neurological:      Mental Status: He is alert.      Motor: Weakness present.               Recommended the emergency room physician to have a brief discussion with the patient and/or family if available regarding the  risks and benefits of treatment, and to briefly document the occurrence of that discussion in his clinical encounter note.     The attending portion of this evaluation, treatment, and documentation was performed per Surendra Hughes MD via audiovisual.    Billing code:  (moderate to severe stroke, large areas of edema, some mimics)    · This patient has a critical neurological condition/illness, with high morbidity and mortality.  · There is a high probability for acute neurological change leading to clinical and possibly life-threatening deterioration requiring highest level of physician preparedness for urgent intervention.  · Care was coordinated with other physicians involved in the patient's care.  · Radiologic studies and laboratory data were reviewed and interpreted, and plan of care was re-assessed based on the results.  · Diagnosis, treatment options and prognosis may have been discussed with the patient and/or family members or caregiver.  · Further advanced medical management and further evaluation is warranted for his care.      In your opinion, this was a: Tier 1 Van Negative    Consult End Time: 10:26 AM     Surendra Hughes MD  Comprehensive Stroke Center  Vascular Neurology   [facility]

## 2022-04-02 NOTE — SUBJECTIVE & OBJECTIVE
Woke up with symptoms?: no    Recent bleeding noted: no  Does the patient take any Blood Thinners? no  Medications: Unknown  No current facility-administered medications for this encounter.    Current Outpatient Medications:     artificial saliva, cmce-lytes, SprP, Take by mouth. 1 tsp. Daily prn dry mouth, Disp: , Rfl:     atorvastatin (LIPITOR) 20 MG tablet, Take 10 mg by mouth once daily., Disp: , Rfl:     carboxymethylcellulose (REFRESH PLUS) 0.5 % Dpet, Place 1 drop into both eyes 5 (five) times daily., Disp: , Rfl:     cholecalciferol, vitamin D3, (VITAMIN D3) 2,000 unit Tab, Take 1 tablet by mouth once daily., Disp: , Rfl:     cholecalciferol, vitamin D3, 2,000 unit Tab, Take by mouth once daily., Disp: , Rfl:     fluticasone 50 mcg/actuation DsDv, Inhale 2 sprays into the lungs once daily., Disp: , Rfl:     gabapentin (NEURONTIN) 400 MG capsule, Take 400 mg by mouth 3 (three) times daily. 2 qa/ 2 qnoon/ 3 qpm, Disp: , Rfl:     GAVILYTE-G 236-22.74-6.74 -5.86 gram suspension, USE AS DIRECTED, Disp: , Rfl: 0    hydrochlorothiazide (HYDRODIURIL) 25 MG tablet, Take 25 mg by mouth once daily., Disp: , Rfl:     HYDROcodone-acetaminophen (NORCO) 5-325 mg per tablet, Take 1 tablet by mouth every 4 to 6 hours as needed., Disp: , Rfl:     insulin glargine (LANTUS) 100 unit/mL injection, Inject into the skin once daily. , Disp: , Rfl:     labetaloL (NORMODYNE) 200 MG tablet, Take 1 tablet by mouth 2 (two) times daily., Disp: , Rfl:     liraglutide 0.6 mg/0.1 mL, 18 mg/3 mL, subq PNIJ (VICTOZA 2-IVAN) 0.6 mg/0.1 mL (18 mg/3 mL) PnIj, once daily., Disp: , Rfl:     lisinopril 10 MG tablet, Take 0.5 tablets by mouth once daily., Disp: , Rfl:     oxybutynin (DITROPAN-XL) 10 MG 24 hr tablet, Take 1 tablet by mouth once daily., Disp: , Rfl:     pantoprazole (PROTONIX) 40 MG tablet, Take 1 tablet (40 mg total) by mouth once daily. Before breakfast, Disp: 30 tablet, Rfl: 6    trazodone (DESYREL) 100 MG tablet,  Take 150 mg by mouth every evening. , Disp: , Rfl:     urea 50 % Crea, Apply bid, Disp: , Rfl:       Past Medical History: hypertension, diabetes and hyperlipidemia    Past Surgical History: no major surgeries within the last 2 weeks    Family History: no relevant history    Social History: unable to obtain    Allergies: No Known Allergies No relevant allergies    Review of Systems  Objective:   Vitals: Blood pressure (!) 178/88, pulse 65, resp. rate (P) 18, height 6' (1.829 m), weight 98.4 kg (217 lb), SpO2 95 %. BP: 178/88    CT READ: Yes  No hemmorhage. No mass effect. No early infarct signs.     Physical Exam  Neurological:      Mental Status: He is alert.      Motor: Weakness present.

## 2022-04-03 PROBLEM — R53.1 RIGHT SIDED WEAKNESS: Status: ACTIVE | Noted: 2022-04-03

## 2022-04-03 PROBLEM — F17.200 NICOTINE ADDICTION: Status: ACTIVE | Noted: 2022-04-03

## 2022-04-03 LAB
ALBUMIN SERPL BCP-MCNC: 3.6 G/DL (ref 3.5–5.2)
ALP SERPL-CCNC: 86 U/L (ref 55–135)
ALT SERPL W/O P-5'-P-CCNC: 32 U/L (ref 10–44)
ANION GAP SERPL CALC-SCNC: 10 MMOL/L (ref 8–16)
AST SERPL-CCNC: 21 U/L (ref 10–40)
BASOPHILS # BLD AUTO: 0.02 K/UL (ref 0–0.2)
BASOPHILS NFR BLD: 0.5 % (ref 0–1.9)
BILIRUB SERPL-MCNC: 0.8 MG/DL (ref 0.1–1)
BUN SERPL-MCNC: 11 MG/DL (ref 8–23)
CALCIUM SERPL-MCNC: 9.7 MG/DL (ref 8.7–10.5)
CHLORIDE SERPL-SCNC: 107 MMOL/L (ref 95–110)
CO2 SERPL-SCNC: 21 MMOL/L (ref 23–29)
CREAT SERPL-MCNC: 0.8 MG/DL (ref 0.5–1.4)
DIFFERENTIAL METHOD: ABNORMAL
EOSINOPHIL # BLD AUTO: 0.2 K/UL (ref 0–0.5)
EOSINOPHIL NFR BLD: 5.1 % (ref 0–8)
ERYTHROCYTE [DISTWIDTH] IN BLOOD BY AUTOMATED COUNT: 12.6 % (ref 11.5–14.5)
EST. GFR  (AFRICAN AMERICAN): >60 ML/MIN/1.73 M^2
EST. GFR  (NON AFRICAN AMERICAN): >60 ML/MIN/1.73 M^2
GLUCOSE SERPL-MCNC: 126 MG/DL (ref 70–110)
HCT VFR BLD AUTO: 42.1 % (ref 40–54)
HGB BLD-MCNC: 14.3 G/DL (ref 14–18)
IMM GRANULOCYTES # BLD AUTO: 0.01 K/UL (ref 0–0.04)
IMM GRANULOCYTES NFR BLD AUTO: 0.3 % (ref 0–0.5)
LYMPHOCYTES # BLD AUTO: 1 K/UL (ref 1–4.8)
LYMPHOCYTES NFR BLD: 26.8 % (ref 18–48)
MAGNESIUM SERPL-MCNC: 1.6 MG/DL (ref 1.6–2.6)
MCH RBC QN AUTO: 34.2 PG (ref 27–31)
MCHC RBC AUTO-ENTMCNC: 34 G/DL (ref 32–36)
MCV RBC AUTO: 101 FL (ref 82–98)
MONOCYTES # BLD AUTO: 0.4 K/UL (ref 0.3–1)
MONOCYTES NFR BLD: 10.3 % (ref 4–15)
NEUTROPHILS # BLD AUTO: 2.1 K/UL (ref 1.8–7.7)
NEUTROPHILS NFR BLD: 57 % (ref 38–73)
NRBC BLD-RTO: 0 /100 WBC
PHOSPHATE SERPL-MCNC: 2.2 MG/DL (ref 2.7–4.5)
PLATELET # BLD AUTO: 229 K/UL (ref 150–450)
PMV BLD AUTO: 8.6 FL (ref 9.2–12.9)
POCT GLUCOSE: 150 MG/DL (ref 70–110)
POCT GLUCOSE: 183 MG/DL (ref 70–110)
POTASSIUM SERPL-SCNC: 3.8 MMOL/L (ref 3.5–5.1)
PROT SERPL-MCNC: 6.2 G/DL (ref 6–8.4)
RBC # BLD AUTO: 4.18 M/UL (ref 4.6–6.2)
SODIUM SERPL-SCNC: 138 MMOL/L (ref 136–145)
TROPONIN I SERPL DL<=0.01 NG/ML-MCNC: 0.03 NG/ML (ref 0–0.03)
WBC # BLD AUTO: 3.7 K/UL (ref 3.9–12.7)

## 2022-04-03 PROCEDURE — 25000003 PHARM REV CODE 250: Performed by: STUDENT IN AN ORGANIZED HEALTH CARE EDUCATION/TRAINING PROGRAM

## 2022-04-03 PROCEDURE — S4991 NICOTINE PATCH NONLEGEND: HCPCS | Performed by: STUDENT IN AN ORGANIZED HEALTH CARE EDUCATION/TRAINING PROGRAM

## 2022-04-03 PROCEDURE — 94799 UNLISTED PULMONARY SVC/PX: CPT

## 2022-04-03 PROCEDURE — 25000003 PHARM REV CODE 250: Performed by: INTERNAL MEDICINE

## 2022-04-03 PROCEDURE — 97161 PT EVAL LOW COMPLEX 20 MIN: CPT

## 2022-04-03 PROCEDURE — 36415 COLL VENOUS BLD VENIPUNCTURE: CPT | Performed by: STUDENT IN AN ORGANIZED HEALTH CARE EDUCATION/TRAINING PROGRAM

## 2022-04-03 PROCEDURE — 85025 COMPLETE CBC W/AUTO DIFF WBC: CPT | Performed by: STUDENT IN AN ORGANIZED HEALTH CARE EDUCATION/TRAINING PROGRAM

## 2022-04-03 PROCEDURE — 25000003 PHARM REV CODE 250: Performed by: EMERGENCY MEDICINE

## 2022-04-03 PROCEDURE — 99900035 HC TECH TIME PER 15 MIN (STAT)

## 2022-04-03 PROCEDURE — 83735 ASSAY OF MAGNESIUM: CPT | Performed by: STUDENT IN AN ORGANIZED HEALTH CARE EDUCATION/TRAINING PROGRAM

## 2022-04-03 PROCEDURE — 84100 ASSAY OF PHOSPHORUS: CPT | Performed by: STUDENT IN AN ORGANIZED HEALTH CARE EDUCATION/TRAINING PROGRAM

## 2022-04-03 PROCEDURE — 80053 COMPREHEN METABOLIC PANEL: CPT | Performed by: STUDENT IN AN ORGANIZED HEALTH CARE EDUCATION/TRAINING PROGRAM

## 2022-04-03 PROCEDURE — 20000000 HC ICU ROOM

## 2022-04-03 PROCEDURE — 63600175 PHARM REV CODE 636 W HCPCS: Performed by: STUDENT IN AN ORGANIZED HEALTH CARE EDUCATION/TRAINING PROGRAM

## 2022-04-03 PROCEDURE — 97116 GAIT TRAINING THERAPY: CPT

## 2022-04-03 PROCEDURE — 84484 ASSAY OF TROPONIN QUANT: CPT | Performed by: STUDENT IN AN ORGANIZED HEALTH CARE EDUCATION/TRAINING PROGRAM

## 2022-04-03 PROCEDURE — 94761 N-INVAS EAR/PLS OXIMETRY MLT: CPT

## 2022-04-03 RX ORDER — LISINOPRIL 2.5 MG/1
5 TABLET ORAL DAILY
Status: DISCONTINUED | OUTPATIENT
Start: 2022-04-03 | End: 2022-04-05 | Stop reason: HOSPADM

## 2022-04-03 RX ORDER — GABAPENTIN 300 MG/1
300 CAPSULE ORAL 3 TIMES DAILY
Status: DISCONTINUED | OUTPATIENT
Start: 2022-04-03 | End: 2022-04-03

## 2022-04-03 RX ORDER — OXYBUTYNIN CHLORIDE 5 MG/1
10 TABLET, EXTENDED RELEASE ORAL DAILY
Status: DISCONTINUED | OUTPATIENT
Start: 2022-04-03 | End: 2022-04-05 | Stop reason: HOSPADM

## 2022-04-03 RX ORDER — GABAPENTIN 100 MG/1
100 CAPSULE ORAL 3 TIMES DAILY
Status: DISCONTINUED | OUTPATIENT
Start: 2022-04-03 | End: 2022-04-04

## 2022-04-03 RX ORDER — ENOXAPARIN SODIUM 100 MG/ML
40 INJECTION SUBCUTANEOUS
Status: DISCONTINUED | OUTPATIENT
Start: 2022-04-03 | End: 2022-04-05 | Stop reason: HOSPADM

## 2022-04-03 RX ORDER — CHOLECALCIFEROL (VITAMIN D3) 25 MCG
2000 TABLET ORAL DAILY
Status: DISCONTINUED | OUTPATIENT
Start: 2022-04-03 | End: 2022-04-05 | Stop reason: HOSPADM

## 2022-04-03 RX ORDER — ASPIRIN 81 MG/1
81 TABLET ORAL DAILY
Status: DISCONTINUED | OUTPATIENT
Start: 2022-04-03 | End: 2022-04-05 | Stop reason: HOSPADM

## 2022-04-03 RX ORDER — CLOPIDOGREL BISULFATE 75 MG/1
75 TABLET ORAL DAILY
Status: DISCONTINUED | OUTPATIENT
Start: 2022-04-03 | End: 2022-04-05 | Stop reason: HOSPADM

## 2022-04-03 RX ORDER — PANTOPRAZOLE SODIUM 40 MG/1
40 TABLET, DELAYED RELEASE ORAL DAILY
Status: DISCONTINUED | OUTPATIENT
Start: 2022-04-03 | End: 2022-04-05 | Stop reason: HOSPADM

## 2022-04-03 RX ORDER — LABETALOL 200 MG/1
200 TABLET, FILM COATED ORAL 2 TIMES DAILY
Status: DISCONTINUED | OUTPATIENT
Start: 2022-04-03 | End: 2022-04-05 | Stop reason: HOSPADM

## 2022-04-03 RX ADMIN — OXYBUTYNIN CHLORIDE 10 MG: 5 TABLET, EXTENDED RELEASE ORAL at 01:04

## 2022-04-03 RX ADMIN — Medication 1 PATCH: at 09:04

## 2022-04-03 RX ADMIN — LISINOPRIL 5 MG: 2.5 TABLET ORAL at 01:04

## 2022-04-03 RX ADMIN — FAMOTIDINE 20 MG: 20 TABLET ORAL at 08:04

## 2022-04-03 RX ADMIN — SODIUM CHLORIDE: 0.9 INJECTION, SOLUTION INTRAVENOUS at 03:04

## 2022-04-03 RX ADMIN — LABETALOL HYDROCHLORIDE 200 MG: 200 TABLET, FILM COATED ORAL at 08:04

## 2022-04-03 RX ADMIN — DOCUSATE SODIUM AND SENNOSIDES 1 TABLET: 8.6; 5 TABLET, FILM COATED ORAL at 08:04

## 2022-04-03 RX ADMIN — CLOPIDOGREL 75 MG: 75 TABLET, FILM COATED ORAL at 01:04

## 2022-04-03 RX ADMIN — PANTOPRAZOLE SODIUM 40 MG: 40 TABLET, DELAYED RELEASE ORAL at 01:04

## 2022-04-03 RX ADMIN — MUPIROCIN: 20 OINTMENT TOPICAL at 08:04

## 2022-04-03 RX ADMIN — NICARDIPINE HYDROCHLORIDE 3.5 MG/HR: 0.2 INJECTION, SOLUTION INTRAVENOUS at 01:04

## 2022-04-03 RX ADMIN — ATORVASTATIN CALCIUM 80 MG: 40 TABLET, FILM COATED ORAL at 08:04

## 2022-04-03 RX ADMIN — ENOXAPARIN SODIUM 40 MG: 40 INJECTION SUBCUTANEOUS at 04:04

## 2022-04-03 RX ADMIN — Medication 2000 UNITS: at 01:04

## 2022-04-03 RX ADMIN — GABAPENTIN 100 MG: 100 CAPSULE ORAL at 08:04

## 2022-04-03 RX ADMIN — SODIUM CHLORIDE: 0.9 INJECTION, SOLUTION INTRAVENOUS at 12:04

## 2022-04-03 RX ADMIN — ASPIRIN 81 MG: 81 TABLET, COATED ORAL at 01:04

## 2022-04-03 NOTE — PROGRESS NOTES
[facility]  Neurology Progress Note    Patient Name: Senthil Chandler  MRN: [id number]  : 1945  TODAY'S DATE: 2022  ADMIT DATE: 2022  8:48 AM                                          CONSULTING PROVIDER: Brittany Gudino MD, Neurologist. Cellphone: [phone]  ATTENDING PROVIDER: Dr. Mckay    Chief Complaint   Patient presents with    Extremity Weakness     Rt. Arm is numb and he cannot move it / started 0800       History of Present Illness     Senthil Chandler is a 76 y.o.  man with past medical history of hypertension, insulin-dependent diabetes mellitus type 2, hyperlipidemia, and prostate cancer who presented with sudden onset right-sided weakness and numbness.  Patient reports that he he woke up in his usual state of health at about 7:00 a.m..  At around 8:00 a.m. he first noticed a mild right lower extremity weakness with buckling of his knee when trying to ambulate.  Then his right arm became severely weak and limp, and he could not feel anything when he touched his right arm, so he called family members who presented to his home and then proceeded to bring him to the emergency department for evaluation.  He was stroke activated, tele neurology evaluated him and he was a candidate so he received tPA.  He was then admitted to the ICU under neurology service for the 1st 24 hours, then will transition to hospital medicine if there are no complications after tPA.    4/3/22: Patient was seen and examined by me today. Symptoms improved, no bleeding reported. No acute events overnight. Transferring patient to hospital medicine.    Scheduled Meds:   atorvastatin  80 mg Oral Daily    famotidine  20 mg Oral BID    mupirocin   Nasal BID    nicotine  1 patch Transdermal Daily    senna-docusate 8.6-50 mg  1 tablet Oral BID     Continuous Infusions:   sodium chloride 0.9% 75 mL/hr at 22 0700    niCARdipine 3.5 mg/hr (22 0700)     PRN  Meds:.albuterol-ipratropium, dextrose 10%, dextrose 10%, glucagon (human recombinant), glucose, glucose, insulin aspart U-100, labetalol, magnesium oxide, magnesium oxide, ondansetron, potassium bicarbonate, potassium bicarbonate, potassium bicarbonate, potassium, sodium phosphates, potassium, sodium phosphates, potassium, sodium phosphates, sodium chloride 0.9%      Physical Exam  Current Vitals:  Vitals:    04/03/22 0700   BP: (!) 172/81   Pulse: 86   Resp: (!) 76   Temp:        Physical Exam:  General: AO x4  HEENT: PERRL, EOMI  CV: RRR  Lungs: CTAB  Abdomen: +BS, S, NT, ND    Neurological Exam    MENTAL STATUS EXAM:  Level of alertness: Alert  Level of attention: Attentive w/out deficit  Orientation/Awareness: intact to person, place, time, situation  Language: fluent. Comprehension/repetition/naming intact    CRANIAL NERVE EXAM:  II/III: fundoscopic exam deferred, PERRL; visual fields full to confrontation; no gross deficit on visual acuity  III/IV/VI: EOMI w/out evidence of nystagmus, strabismus, or evoked diplopia  V: no deficits appreciated to pinprick, temp, vibration; masseter strength intact bilaterally  VII:  Mild right nasal labial fold flattening  VIII: no deficits in hearing bilaterally  IX/X: palate @ ML and raises symmetrically  XI: shoulder shrug 5/5 bilaterally; head turn 5/5 bilaterally  XII: tongue to midline w/out asymmetry  No dysarthria noted on exam.    MOTOR EXAM:  Bulk and Tone: normal throughout  Strength is 4/5 proximally and 4/5 distally in right upper extremity .  Strength is 4 out of 5 in lower extremity on the right.  Left upper and lower extremity 5/5 strength without deficits.  Positive pronator drift on right upper extremity, negative on the left.  Mild intention tremor seen on the left side.    REFLEXES:  Masseter (CN V) Not Tested  2+ in bilateral upper and lower extremities, no Mcmanus's, no clonus. Downgoing plantars.    SENSORY EXAM  Light touch and vibration are mildly  decreased on right upper and lower extremity when compared to the left side, rest of the sensation is intact throughout without deficits.    COORDINATION/CEREBELLAR EXAM:  FTN:  Significant dysmetria and ataxia which is out of proportion to weakness on right upper extremity, no dysmetria on left  HTS: No evidence of appendicular ataxia    GAIT:  Deferred for safety.    NIH Stroke Scale      Date: 2022  Time: 1300  Person Administering Scale: Brittany Gudino MD    Administer stroke scale items in the order listed. Record performance in each category after each subscale exam. Do not go back and change scores. Follow directions provided for each exam technique. Scores should reflect what the patient does, not what the clinician thinks the patient can do. The clinician should record answers while administering the exam and work quickly. Except where indicated, the patient should not be coached (i.e., repeated requests to patient to make a special effort).      1a  Level of consciousness: 0=alert; keenly responsive   1b. LOC questions:  0=Answers both tasks correctly   1c. LOC commands: 0=Answers both tasks correctly   2.  Best Gaze: 0=normal   3.  Visual: 0=No visual loss   4. Facial Palsy: 0=Normal symmetric movement   5a.  Motor left arm: 0=No drift, limb holds 90 (or 45) degrees for full 10 seconds   5b.  Motor right arm: 1=Drift, limb holds 90 (or 45) degrees but drifts down before full 10 seconds: does not hit bed   6a. motor left le=No drift, limb holds 90 (or 45) degrees for full 10 seconds   6b  Motor right le=No drift, limb holds 90 (or 45) degrees for full 10 seconds   7. Limb Ataxia: 1=Present in one limb   8.  Sensory: 1=Mild to moderate sensory loss; patient feels pinprick is less sharp or is dull on the affected side; there is a loss of superficial pain with pinprick but patient is aware He is being touched   9. Best Language:  0=No aphasia, normal   10. Dysarthria: 0=Normal   11.  Extinction and Inattention: 0=No abnormality    Total:   3       Laboratory Data & Studies    Recent Labs   Lab 03/31/22  0902 04/02/22  0858 04/03/22  0325   WBC 3.39* 3.46* 3.70*   HGB 14.7 14.5 14.3    247 229   * 103* 101*       Recent Labs   Lab 03/31/22  0902 04/02/22  0859 04/03/22  0325    139 138   K 5.2* 4.3 3.8    109 107   CO2 27 18* 21*   BUN 13 16 11   * 162* 126*   CALCIUM 10.2 9.8 9.7   MG  --   --  1.6   PHOS  --   --  2.2*       Recent Labs   Lab 03/31/22  0902 04/02/22  0859 04/03/22  0325   PROT 6.9 6.6 6.2   ALBUMIN 4.1 3.8 3.6   BILITOT 0.7 0.7 0.8   AST 25 21 21   ALT 32 33 32   ALKPHOS 89 90 86       Recent Labs   Lab 04/02/22  0859   INR 1.0       Recent Labs   Lab 03/31/22  0902 04/02/22  0859 04/02/22  1423   HGBA1C 6.6*  --  6.1*   CHOL 114*  --  106*   TRIG 113  --  104   LDLCALC 51.4*  --  49.2*   HDL 40  --  36*   TSH 0.810 0.596 0.253*       Imaging:  X-Ray Chest AP Single View    Result Date: 4/2/2022  EXAMINATION: XR CHEST 1 VIEW CLINICAL HISTORY: stroke; TECHNIQUE: A portable semi upright AP view of the chest was acquired. COMPARISON: Chest x-ray-03/27/2019 FINDINGS: The cardiac silhouette is not enlarged.  There is patchy segmental airspace opacity in the left midlung zone suggesting atelectasis versus fibrosis versus pneumonitis.  There is band like opacity in the right midlung zone which could relate to atelectasis or fibrosis.  Elsewhere, the lungs are clear.  Previously noted segmental airspace opacity at the right lung base is no longer visualized on today's study.  No significant volume of pleural fluid or pneumothorax.  There is degenerative change of the thoracic spine.  There is degenerative change of the right acromioclavicular joint.     Interval development of linear atelectasis versus fibrosis in the right midlung zone and linear atelectasis versus fibrosis versus pneumonitis in the left midlung zone. Electronically signed by: Giancarlo  MD Flakita Date:    04/02/2022 Time:    13:46    X-Ray Lumbar Spine 5 View    Result Date: 3/31/2022  Five views of the lumbar spine Clinical history is pain There is mild levoscoliosis of the lumbar spine. The vertebral bodies are of normal height. The intervertebral disc spaces are maintained. There are mild degenerative endplate changes at L5-S1. There is anterior spurring of L5. There is diffuse facet hypertrophy from L2 to S1. There are no pars defects. There is diffuse vascular calcification of the aorta. The SI joints are symmetrical. There are surgical clips within the pelvis. IMPRESSION: Diffuse facet hypertrophy with no acute osseous abnormality Diffuse vascular calcification of the aorta Electronically signed by:  Dianna Mehta MD  3/31/2022 9:36 AM CDT Workstation: [id number]    CT Head Without Contrast    Result Date: 4/2/2022  EXAMINATION: CT HEAD WITHOUT CONTRAST CLINICAL HISTORY: Neuro deficit, acute, stroke suspected; TECHNIQUE: Low dose axial images were obtained through the head.  Coronal and sagittal reformations were also performed. Contrast was not administered. COMPARISON: MRI of the brain from 12/28/2018 FINDINGS: There is no evidence for hemorrhage, midline shift or mass effect.There is no gross evidence to suggest an acute infarct.Mild generalized cortical atrophy is noted. There is mild-to-moderate mucosal thickening seen involving the inferior left frontal sinus. The calvarium is intact.     1.  No acute intracranial pathology Electronically signed by: Kirk Kelly DO Date:    04/02/2022 Time:    08:57    CTA Head and Neck (xpd)    Result Date: 4/2/2022  EXAMINATION: CTA HEAD AND NECK (XPD) CLINICAL HISTORY: Stroke/TIA, determine embolic source; TECHNIQUE: Following the intravenous administration of 75cc of Omnipaque 350 contrast material, 1.25-mm contiguous axial images were acquired through the vasculature of the neck and [place] utilizing the CTA protocol.  Subsequently,  coronal and sagittal reconstructed images were generated from the source data.  Curved reformatted images were also acquired through the carotid arteries and great vessel origins .  2-D and 3-D MIP reconstructed images of the [place]  and 3-D volume rendered images of the [place] vasculature were also generated from the source data. COMPARISON: CT head without contrast-04/02/2022 at 08:50 FINDINGS: CTA neck: There is scattered calcified atherosclerotic plaque within the thoracic aortic arch and origin of the left subclavian artery.  The left and right subclavian artery are widely patent without hemodynamically significant stenosis, dissection, or occlusion.  The common carotid arteries are widely patent.  There is calcified atherosclerotic plaque deposition within both carotid bulbs and within the proximal right internal carotid artery.  No focal areas of > 50% hemodynamically significant stenosis, dissection, or occlusion involving the left or right internal carotid arteries.  There is mild calcified plaque deposition observed within the cavernous segment internal carotid arteries.  There are codominant vertebral arteries present which appear widely patent throughout with no evidence of hemodynamically significant stenosis, dissection, or occlusion.  There is minimal 30-40% smooth luminal narrowing observed within the right vertebral artery at the level of C7 (series 606, image 69 and series 2, image 184). CTA Head: The hyxdqf-va-Hdwueh vasculature (including the visualized vertebral arteries, basilar artery, posterior cerebral arteries, middle cerebral arteries, and anterior cerebral arteries) is widely patent with no foci of > 50% hemodynamically significant luminal stenosis, aneurysm formation, or AVM.  No definite dissection.  There are absent posterior communicating arteries as an anatomic variant. Soft tissues Neck: The nasopharynx, hypopharynx, oropharynx, epiglottis, larynx, true vocal  cords, and subglottic trachea are unremarkable.  No salivary gland abnormalities appreciated.  No pathologically enlarged lymph nodes in the neck or supraclavicular regions.  There is a 11 mm right thyroid lobe hypodensity present on series 2, image 176.  Consider follow-up outpatient thyroid ultrasound for additional characterization.  The visualized superior mediastinum is unremarkable.  There is emphysematous lung architecture present at the lung apices. Brain: The brain is normally formed with preserved gray-white matter junction differentiation. No evidence of acute/recent major vascular territory cerebral infarction, parenchymal hemorrhage, or intra-axial mass.  There is age-appropriate supratentorial cerebral volume loss.  No enhancing vascular malformation. No hydrocephalus.  No effacement of the skull-base cisterns.  No extra-axial fluid collections or blood products.  No abnormal dural or leptomeningeal enhancement. There is mucoperiosteal thickening observed within the anterior ethmoid air cells and left frontal sinus.  There are postoperative changes of right medial antrectomy.  There are sherie bullosa of the middle turbinates.  The mastoid air cells are clear.  The visualized orbits are unremarkable.  The bony calvarium and visualized facial bones show no acute abnormality. Bones: There is multilevel degenerative change of the cervical spine in the form of marginal osteophyte formation, uncovertebral spurring, and facet arthropathy.     1. Normal CT angiogram of the extracranial carotid vasculature and hyzzrz-pa-Srqeoi vasculature.  No hemodynamically significant stenosis involving the left or right internal carotid artery. 2. No imaging evidence of acute/recent major vascular territory cerebral infarction or acute intraparenchymal hemorrhage. 3. 11 mm right thyroid lobe hypodensity.  Consider follow-up outpatient thyroid ultrasound for additional characterization. 4. Minimal smooth short segment  30-40% luminal stenosis within the proximal right vertebral artery at the level of C7. 5. Other incidental findings as detailed above. Electronically signed by: Giancarlo Boggs MD Date:    04/02/2022 Time:    10:10      Assessment and Plan:  Senthil Chandler is a 76 y.o.  man with past medical history of hypertension, insulin-dependent diabetes mellitus type 2, hyperlipidemia, and prostate cancer who presented with sudden onset right-sided weakness and numbness that started 1 hour prior to his arrival to the ED. Was stroke activated and evaluated by teleneurology, deemed a candidate, so he received tPA. Initial NIHSS was 5. Now admitted to the ICU for Q1H neurochecks and close monitoring. Neurology admitted patient as requested s/p tPA, now transferred to Hospital Medicine since he remained stable.    Stroke workup:  CT brain:  No acute intracranial abnormality, no bleed  CTA head and neck:  No large vessel occlusion  MRI brain:  Negative for acute stroke, mild microvascular disease  Risk factor stratification: A1C, LDL, TSH  Echocardiogram:  Ordered and pending    Neuro:  Averted stroke s/p tPA vs cervical stenosis or radiculopathy  - Admitted to ICU with q1 hour neuro checks, on telemetry, continuous pulse oximetry  - passed dysphagia screen by nursing. On soft mechanical diet with thin liquids until speech evaluation  - nursing staff instructed to perform q.1 hour neuro checks.  If patient worsens mental status or neurological examination a CT brain should be obtained stat and Neurology called immediately  - Secondary stroke prevention with plavix 75 mg daily, ASA 81 mg daily and atorvastatin 80 mg daily since CT brain 24H s/p tPA was negative for bleed  - MRI brain negative for acute stroke, may be averted stroke after tPA administration or stroke mimic. Will order C spine MRI to rule out stenosis or radiculopathy  - Risk factor stratification with HgbA1C, Fasting Lipid profile, TSH  - 2D  echocardiogram to assess cardiac function with bubble study to evaluate for PFO  - Maintain Euvolemia with normal saline IV.  - Maintain Euglycemia with Accuchecks q4 hours and regular Insulin Sliding Scale  - Maintain Euthermia with Tylenol prn temp > 37.2 degrees C.  - Assessment for rehab with PT/OT/SLP evaluation and treatment.    Cardiovascular:  Hypertension  - BP Goal <180/105 s/p iv tPA: cardene gtt to titrate, and prn Labetalol  - Cardiac enzymes and EKG ordered, no ischemia    Pulm:   History of tobacco abuse/COPD  - CXR showed an area of atelectasis, will order incentive spirometry  - Prn Q6H duonebs  - Smoking counseling, offered assistance with smoking cessation and ordered nicotine patch    FEN/GI:   - Replace electrolytes prn-Keep K >4.0, Mg > 2.0.   - Mechanical soft diet  - Speech evaluation pending  - Famotidine for GI ppx    Renal: no acute issues.  - Monitor BMP daily    Heme/ID:   - UA with no evidence of infection  - CXR with linear atelectasis but no consolidation  - Utox negative   - Daily CBC to monitor    Endocrine:   History of Diabetes Mellitus, on insulin  - Accuchecks q6 hrs with regular Insulin Sliding scale   - HgA1C, TSH as above    · Extensively discussed lifestyle modifications as prophylactic measures for stroke prevention including smoking cessation, adequate blood pressure management, healthy diet and regular exercise.    Patient to follow up with [facility] at [phone] within 3 days from discharge.     Stroke education was provided including stroke risk factors modification and any acute neurological changes including weakness, confusion, visual changes to come straight to the ER.     All questions were answered.                           Diet: mechanical soft with thin liquids   DVT prophylaxis-SCDs+HSQ  GI prophylaxis- famotidine  Dispo: pending stroke workup, will transfer to hospital medicine tomorrow if no complications arise    **Critical Care:  55 minutes  of critical care time has been spent evaluating with the patient. Time includes chart review not limited to diagnostic imaging, labs, and vitals, patient assessment, discussion with family and nursing, current order evaluations, and new order entries.**      Brittany Gudino MD  General and Vascular Neurology  [facility]  Ph: [phone]

## 2022-04-03 NOTE — PLAN OF CARE
POC reviewed w/ pt - verbalized understanding. A&O x 4. RUE still w/ minor loss of function, sensation improved, but still weak - able to use. Stroke education booklet given to pt and educated on all info pertaining to him. Pt ambulated w/ walker w/ PT today & up to bedside commode 3 times for BM. Neuro turned primary care to Dr. Mckay today. Home meds restarted. Weaning cardene gtt - SBP<180. VSS otherwise. Condom cath in place per request of pt - UO adequate. Head MRI and head CT done today; order for c-spine MRI tomorrow. Pt in locked and low bed, side rails up, bed alarm on, call light in reach.    Problem: Functional Ability Impaired (Stroke, Ischemic/Transient Ischemic Attack)  Goal: Optimal Functional Ability  Outcome: Ongoing, Progressing     Problem: Respiratory Compromise (Stroke, Ischemic/Transient Ischemic Attack)  Goal: Effective Oxygenation and Ventilation  Outcome: Ongoing, Progressing     Problem: Sensorimotor Impairment (Stroke, Ischemic/Transient Ischemic Attack)  Goal: Improved Sensorimotor Function  Outcome: Ongoing, Progressing     Problem: Swallowing Impairment (Stroke, Ischemic/Transient Ischemic Attack)  Goal: Optimal Eating and Swallowing without Aspiration  Outcome: Ongoing, Progressing     Problem: Adult Inpatient Plan of Care  Goal: Plan of Care Review  Outcome: Ongoing, Progressing     Problem: Diabetes Comorbidity  Goal: Blood Glucose Level Within Targeted Range  Outcome: Ongoing, Progressing     Problem: Fall Injury Risk  Goal: Absence of Fall and Fall-Related Injury  Outcome: Ongoing, Progressing     Problem: Skin Injury Risk Increased  Goal: Skin Health and Integrity  Outcome: Ongoing, Progressing

## 2022-04-03 NOTE — ASSESSMENT & PLAN NOTE
Patient's FSGs are controlled on current medication regimen.  Last A1c reviewed-   Lab Results   Component Value Date    HGBA1C 6.1 (H) 04/02/2022     Most recent fingerstick glucose reviewed-   Recent Labs   Lab 04/02/22  1404 04/02/22  1539 04/02/22  2119 04/03/22  1126   POCTGLUCOSE 140* 117* 121* 183*     Current correctional scale  Low  Maintain anti-hyperglycemic dose as follows-   Antihyperglycemics (From admission, onward)            Start     Stop Route Frequency Ordered    04/02/22 1751  insulin aspart U-100 pen 0-5 Units         -- SubQ Before meals & nightly PRN 04/02/22 1651        Hold Oral hypoglycemics while patient is in the hospital.

## 2022-04-03 NOTE — PLAN OF CARE
"Orientated to room and plan of care for his admission. Call light in reach.    Patient has significant ataxia to right arm. He is able to hold it steady for 10 seconds, but hand  is weaker than left.    Notified MD that I updated his home med list, from [facility]. He is vague on some of the dosages / indications of some of the medications he takes, and said to just "go by what the paper said".    Nicotine patch applied to right chest.    The patient is left handed.    He is swallowing ok on swallow screening and ate 100% of dinner, although did not care for "soft" consistency. He is voiding / continent per urinal, clear yellow urine.    X1 continent stool per bed pan - large, formed. Brown with some bright red blood on exterior of stool. Patient states that he has had this before in the past.    He appears to have some baseline mild cognitive deficit and says he tends to "forget things". He lives by himself and doesn't use any adaptive equipment.    The nicardipine was off when the patient arrived from the ER, but had to turn it back on as was hypertensive.    The patient states that he has been talking to his family and that they are aware he is in the hospital and what is going on. Most of his family is in [place] or [place]. His decision maker / power of  is Karlos. The chart says that it is his son, but patient says that he is legally his nephew (raised as his son).    He c/o being cold.    The skin is intact. Patient is able to shift his weight fairly independently in the bed and was encouraged to do so.    Lungs are clear, but he does have an intermittent moist cough that he says has been going on for at least a year.    SCDs are on. The bed is low and alarm on. Clinical alarms reviewed and armed. Monitor strip printed and placed in the chart - sinus rhythm.  "

## 2022-04-03 NOTE — PLAN OF CARE
Goals to be met by: 22     Patient will increase functional independence with mobility by performin. Supine to sit with Contact Guard Assistance  2. Bed to chair transfer with Minimal Assistance using Rolling Walker  3. Gait  x 250 feet with Contact Guard Assistance using Rolling Walker.   4. Lower extremity exercise program x 15 reps, with supervision

## 2022-04-03 NOTE — CARE UPDATE
04/03/22 0920   Patient Assessment/Suction   Level of Consciousness (AVPU) alert   Respiratory Effort Unlabored   Expansion/Accessory Muscles/Retractions no use of accessory muscles;expansion symmetric   All Lung Fields Breath Sounds coarse;clear   Rhythm/Pattern, Respiratory no shortness of breath reported;pattern regular   Cough Frequency infrequent   Cough Type good;loose;nonproductive   PRE-TX-O2   O2 Device (Oxygen Therapy) room air   SpO2 96 %   Pulse Oximetry Type Continuous   $ Pulse Oximetry - Multiple Charge Pulse Oximetry - Multiple   Pulse 80   Resp (!) 25   BP (!) 167/85   Incentive Spirometer   $ Incentive Spirometer Charges done with encouragement;proper technique demonstrated   Administration (IS) instruction provided, follow-up;mouthpiece utilized;proper technique demonstrated   Number of Repetitions (IS) 10   Level Incentive Spirometer (mL) 2500   Patient Tolerance (IS) good;no adverse signs/symptoms present

## 2022-04-03 NOTE — PLAN OF CARE
Plan of Care:    Neuro: neuro checks every hour; notify Dr. Ford of changes    Aspiration precautions: patient in upright position during oral intake    Bleeding precautions: s/p TPA; monitor for signs of bleeding and excessive bruising    Cardiac Monitoring: patient on Cardene infusion; titrate per protocol. Monitor and document vital signs per unit protocol; notify physician of significant changes in vital signs    Fall precautions: Bed alarm activated    Safety precautions: Call light and personal belongings within reach, wheels locked and bed in lowest position    Moisture Management: incontinence pad changed as needed; skin to remain clean, dry, and intact    Shift Summary:  Uneventful shift. Neuro status remains intact. Ataxia noted in RUE. Patient denied needs during shift. Size 25 condom cath applied; patient tolerated well. No leakage noted and no skin breakdown or discomfort reported. Wild hugger in room for ambient heat. Patient remains alert and oriented x 4. Monitoring continues.     0700: Bedside report given to oncoming shift.

## 2022-04-03 NOTE — CONSULTS
[facility] Medicine  Consult Note    Patient Name: Senthil Chandler  MRN: [id number]  Admission Date: 4/2/2022  Hospital Length of Stay: 1 days  Attending Physician: Brittany Ford MD   Primary Care Provider: Don Hernandez III, MD           Patient information was obtained from patient, ER records and Dr. Goldman.     Consults  Subjective:     Principal Problem: Right sided weakness    Chief Complaint:   Chief Complaint   Patient presents with    Extremity Weakness     Rt. Arm is numb and he cannot move it / started 0800        HPI: Patient is a 76-year-old pleasant  male with past medical history significant for hypertension, hyperlipidemia, diabetes mellitus type 2 and history of prostate carcinoma who was admitted to intensive care unit by Neurology service after patient received tPA infusion for possibility of acute stay CVA.  Patient presented to the emergency room with complaint of sudden onset right sided weakness and numbness.  Patient woke up yesterday morning around 7:00 a.m. and noticed around 8 in a.m. patient 1st noticed right lower extremity weakness with knee buckling upon attempt to ambulate.  Symptoms were much worse, in right upper extremity which was significantly weak with numb feeling all over.  Patient's family brought him to the emergency room where tele neurology service evaluated patient and recommended tPA.  Patient has stayed overnight post tPA infusion and is doing well.  Patient reports improving strength on the right side.  Improvement is much pronounced in right lower extremity than right upper extremity which still feels clumsy and mildly numb.  MRI brain did not report acute cerebrovascular accident.  Patient is not aware of prior history of CVA.  Patient denies any other subjective complaints itis chest pain, shortness a breath, fever, chills, abdominal pain, urinary difficulties or any rash, headache or vision changes.          Past  Medical History:   Diagnosis Date    Adjustment disorder with anxious mood     Angiodysplasia     Arthritis     Cancer 1990    prostate     Carcinoma in situ of prostate     Carpal tunnel syndrome on left     CKD (chronic kidney disease) stage 1, GFR 90 ml/min or greater     Colon polyp     Cubital tunnel syndrome on left     Depressive disorder     Deviated nasal septum     Diabetes mellitus with ophthalmic complication     Diabetes mellitus, type 2     GERD (gastroesophageal reflux disease)     Hypercholesterolemia     Hyperpotassemia     Hypertension     Iron deficiency anemia     Kidney disease     focal segmental glomerulosclerosis    Lumbago     Malignant neoplasm of colon     Nephrotic syndrome     Neuropathy     Primary osteoarthritis of left elbow     PTSD (post-traumatic stress disorder)     PVD (peripheral vascular disease)     Spinal stenosis, lumbar region without neurogenic claudication     Stomach ulcer     Wears glasses        Past Surgical History:   Procedure Laterality Date    CARPAL TUNNEL RELEASE Left 10/23/2017    COLONOSCOPY N/A 8/12/2016    Procedure: COLONOSCOPY;  Surgeon: Carlos Roman MD;  Location: [facility];  Service: Endoscopy;  Laterality: N/A; repeat in 5 years for surveillance    COLONOSCOPY N/A 2/20/2019    Procedure: COLONOSCOPY;  Surgeon: Carlos Roman MD;  Location: [facility];  Service: Endoscopy;  Laterality: N/A;    COLONOSCOPY N/A 6/30/2020    Procedure: COLONOSCOPY;  Surgeon: Carols Roman MD;  Location: [facility];  Service: Endoscopy;  Laterality: N/A;    ESOPHAGOGASTRODUODENOSCOPY N/A 2/20/2019    Procedure: EGD (ESOPHAGOGASTRODUODENOSCOPY);  Surgeon: Carlos Roman MD;  Location: [facility];  Service: Endoscopy;  Laterality: N/A;    LAPAROSCOPIC CHOLECYSTECTOMY Right 3/25/2019    Procedure: CHOLECYSTECTOMY, LAPAROSCOPIC;  Surgeon: Mark Renee MD;  [facility];  Service: General;  Laterality: Right;    NASAL  SEPTUM SURGERY      PROCTECTOMY      PROSTATE SURGERY  1990's    ROBOT-ASSISTED COLECTOMY Right 3/25/2019    Procedure: ROBOTIC COLECTOMY possible conversion to open;  Surgeon: Mark Renee MD;  Location: [facility] OR;  Service: General;  Laterality: Right;    SUBTOTAL COLECTOMY Right 3/25/2019    Procedure: COLECTOMY, PARTIAL;  Surgeon: Mark Renee MD;  Location: [facility] OR;  Service: General;  Laterality: Right;    UPPER GASTROINTESTINAL ENDOSCOPY  08/12/2016    Dr. Coleman       Review of patient's allergies indicates:  No Known Allergies    No current facility-administered medications on file prior to encounter.     Current Outpatient Medications on File Prior to Encounter   Medication Sig    artificial saliva, cmce-lytes, SprP Take by mouth. 1 tsp. Daily prn dry mouth    atorvastatin (LIPITOR) 20 MG tablet Take 10 mg by mouth once daily.    carboxymethylcellulose (REFRESH PLUS) 0.5 % Dpet Place 1 drop into both eyes 5 (five) times daily.    cholecalciferol, vitamin D3, (VITAMIN D3) 2,000 unit Tab Take 1 tablet by mouth once daily.    cholecalciferol, vitamin D3, 2,000 unit Tab Take by mouth once daily.    fluticasone 50 mcg/actuation DsDv Inhale 2 sprays into the lungs once daily.    gabapentin (NEURONTIN) 400 MG capsule Take 400 mg by mouth 3 (three) times daily. 2 qa/ 2 qnoon/ 3 qpm . Take 800 mg morning, 800 mg noon, 1200 mg pm    GAVILYTE-G 236-22.74-6.74 -5.86 gram suspension USE AS DIRECTED    insulin glargine (LANTUS) 100 unit/mL injection Inject 25 Units into the skin once daily. Is supposed to be taking it at night, but takes it in the morning    labetaloL (NORMODYNE) 200 MG tablet Take 1 tablet by mouth 2 (two) times daily.    liraglutide 0.6 mg/0.1 mL, 18 mg/3 mL, subq PNIJ (VICTOZA 2-IVAN) 0.6 mg/0.1 mL (18 mg/3 mL) PnIj Inject 1.8 mg into the skin once daily.    lisinopril 10 MG tablet Take 0.5 tablets by mouth once daily.    oxybutynin (DITROPAN-XL) 10 MG 24 hr tablet Take 1  tablet by mouth once daily.    trazodone (DESYREL) 100 MG tablet Take 200 mg by mouth every evening.    urea 50 % Crea Apply bid    pantoprazole (PROTONIX) 40 MG tablet Take 1 tablet (40 mg total) by mouth once daily. Before breakfast     Family History       Problem Relation (Age of Onset)    Leukemia Mother, Father          Tobacco Use    Smoking status: Heavy Tobacco Smoker     Packs/day: 1.00     Years: 52.00     Pack years: 52.00     Types: Cigarettes    Smokeless tobacco: Never Used   Substance and Sexual Activity    Alcohol use: Yes     Comment: occasional- maybe one glass of wine a month    Drug use: No    Sexual activity: Not on file     Review of Systems   Neurological:  Positive for weakness.   All other systems reviewed and are negative.  Objective:     Vital Signs (Most Recent):  Temp: 98.2 °F (36.8 °C) (04/03/22 1200)  Pulse: 89 (04/03/22 1200)  Resp: (!) 40 (04/03/22 1200)  BP: (!) 153/70 (04/03/22 1200)  SpO2: 96 % (04/03/22 1200)   Vital Signs (24h Range):  Temp:  [97.7 °F (36.5 °C)-98.3 °F (36.8 °C)] 98.2 °F (36.8 °C)  Pulse:  [59-89] 89  Resp:  [16-84] 40  SpO2:  [91 %-100 %] 96 %  BP: (135-194)/(65-93) 153/70     Weight: 97.1 kg (214 lb 1.1 oz)  Body mass index is 29.03 kg/m².    Physical Exam  Vitals and nursing note reviewed.   Constitutional:       Appearance: Normal appearance. He is well-developed.   HENT:      Head: Normocephalic and atraumatic.      Nose: Nose normal. No septal deviation.   Eyes:      Conjunctiva/sclera: Conjunctivae normal.      Pupils: Pupils are equal, round, and reactive to light.   Neck:      Thyroid: No thyroid mass.      Vascular: No JVD.      Trachea: No tracheal tenderness or tracheal deviation.   Cardiovascular:      Rate and Rhythm: Normal rate and regular rhythm.      Heart sounds: S1 normal and S2 normal. No murmur heard.    No friction rub. No gallop.   Pulmonary:      Effort: Pulmonary effort is normal.      Breath sounds: Normal breath sounds. No  decreased breath sounds, wheezing, rhonchi or rales.   Abdominal:      General: Bowel sounds are normal. There is no distension.      Palpations: Abdomen is soft. There is no hepatomegaly, splenomegaly or mass.      Tenderness: There is no abdominal tenderness.   Skin:     General: Skin is warm.      Findings: No rash.   Neurological:      Mental Status: He is alert and oriented to person, place, and time.      Cranial Nerves: No cranial nerve deficit.      Sensory: No sensory deficit.      Comments: Right upper extremity with decreased touch sensation, strength 4/5 and some degree of dysmetria noted on finger-to-nose testing.   Psychiatric:         Mood and Affect: Mood normal.         Behavior: Behavior normal.         CRANIAL NERVES     CN III, IV, VI   Pupils are equal, round, and reactive to light.     Significant Labs: All pertinent labs within the past 24 hours have been reviewed.  CBC:   Recent Labs   Lab 04/02/22  0858 04/03/22  0325   WBC 3.46* 3.70*   HGB 14.5 14.3   HCT 43.5 42.1    229     CMP:   Recent Labs   Lab 04/02/22  0859 04/03/22  0325    138   K 4.3 3.8    107   CO2 18* 21*   * 126*   BUN 16 11   CREATININE 1.1 0.8   CALCIUM 9.8 9.7   PROT 6.6 6.2   ALBUMIN 3.8 3.6   BILITOT 0.7 0.8   ALKPHOS 90 86   AST 21 21   ALT 33 32   ANIONGAP 12 10   EGFRNONAA >60 >60     Coagulation:   Recent Labs   Lab 04/02/22  0859   INR 1.0     Lipid Panel:   Recent Labs   Lab 04/02/22  1423   CHOL 106*   HDL 36*   LDLCALC 49.2*   TRIG 104   CHOLHDL 34.0     Troponin:   Recent Labs   Lab 04/02/22  1423 04/03/22  1129   TROPONINI 0.023 0.025     TSH:   Recent Labs   Lab 04/02/22  1423   TSH 0.253*     PSA : 0.01    HgA1c: 6.1    UDS: Negative.     Significant Imaging:   ECHO: Pending.     CT Head:  No acute intracranial pathology.    CTA Head and neck:  1. Normal CT angiogram of the extracranial carotid vasculature and pdgxap-uv-[id number] vasculature.  No hemodynamically significant stenosis  involving the left or right internal carotid artery.  2. No imaging evidence of acute/recent major vascular territory cerebral infarction or acute intraparenchymal hemorrhage.  3. 11 mm right thyroid lobe hypodensity.  Consider follow-up outpatient thyroid ultrasound for additional characterization.  4. Minimal smooth short segment 30-40% luminal stenosis within the proximal right vertebral artery at the level of C7.  5. Other incidental findings as detailed above.    CXR: Interval development of linear atelectasis versus fibrosis in the right midlung zone and linear atelectasis versus fibrosis versus pneumonitis in the left midlung zone.    Repeat CT Heat (Post-TPA):  1. No acute intracranial abnormality appreciated.  2. Tiny foci of remote lacunar infarction within both cerebellar hemispheres inferiorly.  3. Sinus disease.    MRI brain:  1. No acute intracranial abnormality.  2. Small foci of remote lacunar infarction within both cerebellar hemispheres.  3. Sinus disease.  4. Age-appropriate supratentorial cerebral volume loss and chronic microvascular ischemic changes within the periventricular white matter of the supratentorial brain.    Assessment/Plan:     * Right sided weakness  Status post tPA infusion.  No evidence of acute CVA on MRI and CT scan of the head results.  Case discussed with Dr. Goldman from Neurology.  Telemonitoring.  Neuro-checks..  Fall and seizure precautions.  Continue Aspirin 81 mg po q day. Continue Plavix 75 mg daily. Follow neurology recommendations.  Neuro imaging results reviewed.  Follow 2 D ECHO for evaluation of intra-cardiac thrombus or valvular heart disease and bubble study.  Continue Physical Therapy and Occupational therapy for evaluation and treatment.          Nicotine addiction  Smoking cessation counseling performed. Dangers of cigarette smoking were reviewed with patient in detail and patient was encouraged to quit. Nicotine replacement options were discussed for > 3  minutes.        COPD (chronic obstructive pulmonary disease)  Patient's COPD is controlled currently. Continue scheduled inhalers and monitor respiratory status closely.        Hyperlipemia  Continue Lipitor 80 mg daily.      Essential hypertension  Chronic problem. Will continue chronic medications and monitor for any changes, adjusting as needed.  Wean off intravenous Cardene infusion.  Use intravenous hydralazine 10 mg IV q.2 hours p.r.n. systolic blood pressure 160 or above.      Uncontrolled type 2 diabetes mellitus with hyperglycemia  Patient's FSGs are controlled on current medication regimen.  Last A1c reviewed-   Lab Results   Component Value Date    HGBA1C 6.1 (H) 04/02/2022     Most recent fingerstick glucose reviewed-   Recent Labs   Lab 04/02/22  1404 04/02/22  1539 04/02/22  2119 04/03/22  1126   POCTGLUCOSE 140* 117* 121* 183*     Current correctional scale  Low  Maintain anti-hyperglycemic dose as follows-   Antihyperglycemics (From admission, onward)            Start     Stop Route Frequency Ordered    04/02/22 1751  insulin aspart U-100 pen 0-5 Units         -- SubQ Before meals & nightly PRN 04/02/22 1651        Hold Oral hypoglycemics while patient is in the hospital.          VTE Risk Mitigation (From admission, onward)         Ordered     IP VTE HIGH RISK PATIENT  Once         04/02/22 1336     Reason for No Pharmacological VTE Prophylaxis  Once        Question:  Reasons:  Answer:  Physician Provided (leave comment)  Comment:  status post tPA    04/02/22 1336     Place sequential compression device  Until discontinued         04/02/22 1336                Critical care time spent on the evaluation and treatment of severe organ dysfunction, review of pertinent labs and imaging studies, discussions with consulting providers and discussions with patient/family: 32 minutes.    Thank you for your consult. I will follow-up with patient. Please contact us if you have any additional questions.    Aqib  MD Nely  Department of Hospital Medicine   [facility]

## 2022-04-03 NOTE — SUBJECTIVE & OBJECTIVE
Past Medical History:   Diagnosis Date    Adjustment disorder with anxious mood     Angiodysplasia     Arthritis     Cancer 1990    prostate     Carcinoma in situ of prostate     Carpal tunnel syndrome on left     CKD (chronic kidney disease) stage 1, GFR 90 ml/min or greater     Colon polyp     Cubital tunnel syndrome on left     Depressive disorder     Deviated nasal septum     Diabetes mellitus with ophthalmic complication     Diabetes mellitus, type 2     GERD (gastroesophageal reflux disease)     Hypercholesterolemia     Hyperpotassemia     Hypertension     Iron deficiency anemia     Kidney disease     focal segmental glomerulosclerosis    Lumbago     Malignant neoplasm of colon     Nephrotic syndrome     Neuropathy     Primary osteoarthritis of left elbow     PTSD (post-traumatic stress disorder)     PVD (peripheral vascular disease)     Spinal stenosis, lumbar region without neurogenic claudication     Stomach ulcer     Wears glasses        Past Surgical History:   Procedure Laterality Date    CARPAL TUNNEL RELEASE Left 10/23/2017    COLONOSCOPY N/A 8/12/2016    Procedure: COLONOSCOPY;  Surgeon: Carlos Roman MD;  Location: [facility];  Service: Endoscopy;  Laterality: N/A; repeat in 5 years for surveillance    COLONOSCOPY N/A 2/20/2019    Procedure: COLONOSCOPY;  Surgeon: Carlos Roman MD;  Location: [facility];  Service: Endoscopy;  Laterality: N/A;    COLONOSCOPY N/A 6/30/2020    Procedure: COLONOSCOPY;  Surgeon: Carlos Roman MD;  Location: [facility];  Service: Endoscopy;  Laterality: N/A;    ESOPHAGOGASTRODUODENOSCOPY N/A 2/20/2019    Procedure: EGD (ESOPHAGOGASTRODUODENOSCOPY);  Surgeon: Carlos Roman MD;  Location: [facility];  Service: Endoscopy;  Laterality: N/A;    LAPAROSCOPIC CHOLECYSTECTOMY Right 3/25/2019    Procedure: CHOLECYSTECTOMY, LAPAROSCOPIC;  Surgeon: Mark Renee MD;  Location: [facility];  Service: General;  Laterality: Right;    NASAL SEPTUM SURGERY      PROCTECTOMY       PROSTATE SURGERY  1990's    ROBOT-ASSISTED COLECTOMY Right 3/25/2019    Procedure: ROBOTIC COLECTOMY possible conversion to open;  Surgeon: Mark Renee MD;  Location: [facility] OR;  Service: General;  Laterality: Right;    SUBTOTAL COLECTOMY Right 3/25/2019    Procedure: COLECTOMY, PARTIAL;  Surgeon: Mark Renee MD;  Location: [facility] OR;  Service: General;  Laterality: Right;    UPPER GASTROINTESTINAL ENDOSCOPY  08/12/2016    Dr. Coleman       Review of patient's allergies indicates:  No Known Allergies    No current facility-administered medications on file prior to encounter.     Current Outpatient Medications on File Prior to Encounter   Medication Sig    artificial saliva, cmce-lytes, SprP Take by mouth. 1 tsp. Daily prn dry mouth    atorvastatin (LIPITOR) 20 MG tablet Take 10 mg by mouth once daily.    carboxymethylcellulose (REFRESH PLUS) 0.5 % Dpet Place 1 drop into both eyes 5 (five) times daily.    cholecalciferol, vitamin D3, (VITAMIN D3) 2,000 unit Tab Take 1 tablet by mouth once daily.    cholecalciferol, vitamin D3, 2,000 unit Tab Take by mouth once daily.    fluticasone 50 mcg/actuation DsDv Inhale 2 sprays into the lungs once daily.    gabapentin (NEURONTIN) 400 MG capsule Take 400 mg by mouth 3 (three) times daily. 2 qa/ 2 qnoon/ 3 qpm . Take 800 mg morning, 800 mg noon, 1200 mg pm    GAVILYTE-G 236-22.74-6.74 -5.86 gram suspension USE AS DIRECTED    insulin glargine (LANTUS) 100 unit/mL injection Inject 25 Units into the skin once daily. Is supposed to be taking it at night, but takes it in the morning    labetaloL (NORMODYNE) 200 MG tablet Take 1 tablet by mouth 2 (two) times daily.    liraglutide 0.6 mg/0.1 mL, 18 mg/3 mL, subq PNIJ (VICTOZA 2-IVAN) 0.6 mg/0.1 mL (18 mg/3 mL) PnIj Inject 1.8 mg into the skin once daily.    lisinopril 10 MG tablet Take 0.5 tablets by mouth once daily.    oxybutynin (DITROPAN-XL) 10 MG 24 hr tablet Take 1 tablet by mouth once daily.    trazodone (DESYREL) 100 MG  tablet Take 200 mg by mouth every evening.    urea 50 % Crea Apply bid    pantoprazole (PROTONIX) 40 MG tablet Take 1 tablet (40 mg total) by mouth once daily. Before breakfast     Family History       Problem Relation (Age of Onset)    Leukemia Mother, Father          Tobacco Use    Smoking status: Heavy Tobacco Smoker     Packs/day: 1.00     Years: 52.00     Pack years: 52.00     Types: Cigarettes    Smokeless tobacco: Never Used   Substance and Sexual Activity    Alcohol use: Yes     Comment: occasional- maybe one glass of wine a month    Drug use: No    Sexual activity: Not on file     Review of Systems   Neurological:  Positive for weakness.   All other systems reviewed and are negative.  Objective:     Vital Signs (Most Recent):  Temp: 98.2 °F (36.8 °C) (04/03/22 1200)  Pulse: 89 (04/03/22 1200)  Resp: (!) 40 (04/03/22 1200)  BP: (!) 153/70 (04/03/22 1200)  SpO2: 96 % (04/03/22 1200)   Vital Signs (24h Range):  Temp:  [97.7 °F (36.5 °C)-98.3 °F (36.8 °C)] 98.2 °F (36.8 °C)  Pulse:  [59-89] 89  Resp:  [16-84] 40  SpO2:  [91 %-100 %] 96 %  BP: (135-194)/(65-93) 153/70     Weight: 97.1 kg (214 lb 1.1 oz)  Body mass index is 29.03 kg/m².    Physical Exam  Vitals and nursing note reviewed.   Constitutional:       Appearance: Normal appearance. He is well-developed.   HENT:      Head: Normocephalic and atraumatic.      Nose: Nose normal. No septal deviation.   Eyes:      Conjunctiva/sclera: Conjunctivae normal.      Pupils: Pupils are equal, round, and reactive to light.   Neck:      Thyroid: No thyroid mass.      Vascular: No JVD.      Trachea: No tracheal tenderness or tracheal deviation.   Cardiovascular:      Rate and Rhythm: Normal rate and regular rhythm.      Heart sounds: S1 normal and S2 normal. No murmur heard.    No friction rub. No gallop.   Pulmonary:      Effort: Pulmonary effort is normal.      Breath sounds: Normal breath sounds. No decreased breath sounds, wheezing, rhonchi or rales.   Abdominal:       General: Bowel sounds are normal. There is no distension.      Palpations: Abdomen is soft. There is no hepatomegaly, splenomegaly or mass.      Tenderness: There is no abdominal tenderness.   Skin:     General: Skin is warm.      Findings: No rash.   Neurological:      Mental Status: He is alert and oriented to person, place, and time.      Cranial Nerves: No cranial nerve deficit.      Sensory: No sensory deficit.      Comments: Right upper extremity with decreased touch sensation, strength 4/5 and some degree of dysmetria noted on finger-to-nose testing.   Psychiatric:         Mood and Affect: Mood normal.         Behavior: Behavior normal.         CRANIAL NERVES     CN III, IV, VI   Pupils are equal, round, and reactive to light.     Significant Labs: All pertinent labs within the past 24 hours have been reviewed.  CBC:   Recent Labs   Lab 04/02/22  0858 04/03/22  0325   WBC 3.46* 3.70*   HGB 14.5 14.3   HCT 43.5 42.1    229     CMP:   Recent Labs   Lab 04/02/22  0859 04/03/22  0325    138   K 4.3 3.8    107   CO2 18* 21*   * 126*   BUN 16 11   CREATININE 1.1 0.8   CALCIUM 9.8 9.7   PROT 6.6 6.2   ALBUMIN 3.8 3.6   BILITOT 0.7 0.8   ALKPHOS 90 86   AST 21 21   ALT 33 32   ANIONGAP 12 10   EGFRNONAA >60 >60     Coagulation:   Recent Labs   Lab 04/02/22  0859   INR 1.0     Lipid Panel:   Recent Labs   Lab 04/02/22  1423   CHOL 106*   HDL 36*   LDLCALC 49.2*   TRIG 104   CHOLHDL 34.0     Troponin:   Recent Labs   Lab 04/02/22  1423 04/03/22  1129   TROPONINI 0.023 0.025     TSH:   Recent Labs   Lab 04/02/22  1423   TSH 0.253*     PSA : 0.01    HgA1c: 6.1    UDS: Negative.     Significant Imaging:   ECHO: Pending.     CT Head:  No acute intracranial pathology.    CTA Head and neck:  1. Normal CT angiogram of the extracranial carotid vasculature and ladfrd-[id number]-[id number] vasculature.  No hemodynamically significant stenosis involving the left or right internal carotid artery.  2. No imaging  evidence of acute/recent major vascular territory cerebral infarction or acute intraparenchymal hemorrhage.  3. 11 mm right thyroid lobe hypodensity.  Consider follow-up outpatient thyroid ultrasound for additional characterization.  4. Minimal smooth short segment 30-40% luminal stenosis within the proximal right vertebral artery at the level of C7.  5. Other incidental findings as detailed above.    CXR: Interval development of linear atelectasis versus fibrosis in the right midlung zone and linear atelectasis versus fibrosis versus pneumonitis in the left midlung zone.    Repeat CT Heat (Post-TPA):  1. No acute intracranial abnormality appreciated.  2. Tiny foci of remote lacunar infarction within both cerebellar hemispheres inferiorly.  3. Sinus disease.    MRI brain:  1. No acute intracranial abnormality.  2. Small foci of remote lacunar infarction within both cerebellar hemispheres.  3. Sinus disease.  4. Age-appropriate supratentorial cerebral volume loss and chronic microvascular ischemic changes within the periventricular white matter of the supratentorial brain.

## 2022-04-03 NOTE — HPI
Patient is a 76-year-old pleasant  male with past medical history significant for hypertension, hyperlipidemia, diabetes mellitus type 2 and history of prostate carcinoma who was admitted to intensive care unit by Neurology service after patient received tPA infusion for possibility of acute stay CVA.  Patient presented to the emergency room with complaint of sudden onset right sided weakness and numbness.  Patient woke up yesterday morning around 7:00 a.m. and noticed around 8 in a.m. patient 1st noticed right lower extremity weakness with knee buckling upon attempt to ambulate.  Symptoms were much worse, in right upper extremity which was significantly weak with numb feeling all over.  Patient's family brought him to the emergency room where tele neurology service evaluated patient and recommended tPA.  Patient has stayed overnight post tPA infusion and is doing well.  Patient reports improving strength on the right side.  Improvement is much pronounced in right lower extremity than right upper extremity which still feels clumsy and mildly numb.  MRI brain did not report acute cerebrovascular accident.  Patient is not aware of prior history of CVA.  Patient denies any other subjective complaints itis chest pain, shortness a breath, fever, chills, abdominal pain, urinary difficulties or any rash, headache or vision changes.

## 2022-04-03 NOTE — ASSESSMENT & PLAN NOTE
Patient's COPD is controlled currently. Continue scheduled inhalers and monitor respiratory status closely.

## 2022-04-03 NOTE — PT/OT/SLP EVAL
Physical Therapy Evaluation    Patient Name:  Senthil Chandler   MRN:  [id number]    Recommendations:     Discharge Recommendations:  rehabilitation facility   Discharge Equipment Recommendations: none   Barriers to discharge: Decreased caregiver support    Assessment:     Senthil Chandler is a 76 y.o. male admitted with a medical diagnosis of <principal problem not specified>.  He presents with the following impairments/functional limitations:  weakness, impaired endurance, impaired self care skills, impaired functional mobilty, gait instability, impaired balance. Patient agreeable to gait training.    Rehab Prognosis: Good; patient would benefit from acute skilled PT services to address these deficits and reach maximum level of function.    Recent Surgery: * No surgery found *      Plan:     During this hospitalization, patient to be seen daily to address the identified rehab impairments via gait training, therapeutic activities, therapeutic exercises, neuromuscular re-education and progress toward the following goals:    · Plan of Care Expires:  04/17/22    Subjective     Chief Complaint: right sided weakness  Patient/Family Comments/goals: improve overall strength/mobility  Pain/Comfort:  · Pain Rating 1: 0/10    Patients cultural, spiritual, Christian conflicts given the current situation:      Living Environment:  Lives alone in 1-story home (no steps).  Prior to admission, patients level of function was independent.  Equipment used at home: none.  DME owned (not currently used): none.  Upon discharge, patient will have assistance from facility staff.    Objective:     Communicated with nurse prior to session.  Patient found supine with    upon PT entry to room.    General Precautions: Standard, fall   Orthopedic Precautions:N/A   Braces: N/A  Respiratory Status: Room air    Exams:  · RLE ROM: WFL  · RLE Strength: Deficits: 3+/5 throughout  · LLE ROM: WFL  · LLE Strength: Deficits: 4/5  throughout    Functional Mobility:  · Bed Mobility:     · Supine to Sit: minimum assistance  · Sit to Supine: minimum assistance  · Transfers:     · Sit to Stand:  minimum assistance with rolling walker  · Gait: 150 ft w/ RW and CGA    Therapeutic Activities and Exercises:   n/a    AM-PAC 6 CLICK MOBILITY  Total Score:14     Patient left supine with all lines intact and call button in reach.    GOALS:   Multidisciplinary Problems     Physical Therapy Goals        Problem: Physical Therapy    Goal Priority Disciplines Outcome Goal Variances Interventions   Physical Therapy Goal     PT, PT/OT      Description: Goals to be met by: 22     Patient will increase functional independence with mobility by performin. Supine to sit with Contact Guard Assistance  2. Bed to chair transfer with Minimal Assistance using Rolling Walker  3. Gait  x 250 feet with Contact Guard Assistance using Rolling Walker.   4. Lower extremity exercise program x 15 reps, with supervision                     History:     Past Medical History:   Diagnosis Date    Adjustment disorder with anxious mood     Angiodysplasia     Arthritis     Cancer     prostate     Carcinoma in situ of prostate     Carpal tunnel syndrome on left     CKD (chronic kidney disease) stage 1, GFR 90 ml/min or greater     Colon polyp     Cubital tunnel syndrome on left     Depressive disorder     Deviated nasal septum     Diabetes mellitus with ophthalmic complication     Diabetes mellitus, type 2     GERD (gastroesophageal reflux disease)     Hypercholesterolemia     Hyperpotassemia     Hypertension     Iron deficiency anemia     Kidney disease     focal segmental glomerulosclerosis    Lumbago     Malignant neoplasm of colon     Nephrotic syndrome     Neuropathy     Primary osteoarthritis of left elbow     PTSD (post-traumatic stress disorder)     PVD (peripheral vascular disease)     Spinal stenosis, lumbar region without neurogenic  claudication     Stomach ulcer     Wears glasses        Past Surgical History:   Procedure Laterality Date    CARPAL TUNNEL RELEASE Left 10/23/2017    COLONOSCOPY N/A 8/12/2016    Procedure: COLONOSCOPY;  Surgeon: Carlos Roman MD;  Location: [facility] ENDO;  Service: Endoscopy;  Laterality: N/A; repeat in 5 years for surveillance    COLONOSCOPY N/A 2/20/2019    Procedure: COLONOSCOPY;  Surgeon: Carlos Roman MD;  Location: [facility] ENDO;  Service: Endoscopy;  Laterality: N/A;    COLONOSCOPY N/A 6/30/2020    Procedure: COLONOSCOPY;  Surgeon: Carlos Roman MD;  Location: [facility] ENDO;  Service: Endoscopy;  Laterality: N/A;    ESOPHAGOGASTRODUODENOSCOPY N/A 2/20/2019    Procedure: EGD (ESOPHAGOGASTRODUODENOSCOPY);  Surgeon: Carlos Roman MD;  Location: [facility] ENDO;  Service: Endoscopy;  Laterality: N/A;    LAPAROSCOPIC CHOLECYSTECTOMY Right 3/25/2019    Procedure: CHOLECYSTECTOMY, LAPAROSCOPIC;  Surgeon: Mark Renee MD;  Location: [facility] OR;  Service: General;  Laterality: Right;    NASAL SEPTUM SURGERY      PROCTECTOMY      PROSTATE SURGERY  1990's    ROBOT-ASSISTED COLECTOMY Right 3/25/2019    Procedure: ROBOTIC COLECTOMY possible conversion to open;  Surgeon: Mark Renee MD;  Location: [facility] OR;  Service: General;  Laterality: Right;    SUBTOTAL COLECTOMY Right 3/25/2019    Procedure: COLECTOMY, PARTIAL;  Surgeon: Mark Renee MD;  Location: [facility] OR;  Service: General;  Laterality: Right;    UPPER GASTROINTESTINAL ENDOSCOPY  08/12/2016    Dr. Roman       Time Tracking:     PT Received On: 04/03/22  PT Start Time: 1150     PT Stop Time: 1220  PT Total Time (min): 30 min     Billable Minutes: Evaluation 15 and Gait Training 15      04/03/2022

## 2022-04-03 NOTE — ASSESSMENT & PLAN NOTE
Status post tPA infusion.  No evidence of acute CVA on MRI and CT scan of the head results.  Case discussed with Dr. Goldman from Neurology.  Telemonitoring.  Neuro-checks..  Fall and seizure precautions.  Continue Aspirin 81 mg po q day. Continue Plavix 75 mg daily. Follow neurology recommendations.  Neuro imaging results reviewed.  Follow 2 D ECHO for evaluation of intra-cardiac thrombus or valvular heart disease and bubble study.  Continue Physical Therapy and Occupational therapy for evaluation and treatment.

## 2022-04-03 NOTE — ASSESSMENT & PLAN NOTE
Chronic problem. Will continue chronic medications and monitor for any changes, adjusting as needed.  Wean off intravenous Cardene infusion.  Use intravenous hydralazine 10 mg IV q.2 hours p.r.n. systolic blood pressure 160 or above.

## 2022-04-04 LAB
ALBUMIN SERPL BCP-MCNC: 3.6 G/DL (ref 3.5–5.2)
ALP SERPL-CCNC: 92 U/L (ref 55–135)
ALT SERPL W/O P-5'-P-CCNC: 32 U/L (ref 10–44)
ANION GAP SERPL CALC-SCNC: 12 MMOL/L (ref 8–16)
AORTIC ROOT ANNULUS: 4.35 CM
AORTIC VALVE CUSP SEPERATION: 2.63 CM
AST SERPL-CCNC: 26 U/L (ref 10–40)
AV INDEX (PROSTH): 0.98
AV MEAN GRADIENT: 5 MMHG
AV PEAK GRADIENT: 8 MMHG
AV VALVE AREA: 3.81 CM2
AV VELOCITY RATIO: 0.85
BASOPHILS # BLD AUTO: 0.01 K/UL (ref 0–0.2)
BASOPHILS NFR BLD: 0.3 % (ref 0–1.9)
BILIRUB SERPL-MCNC: 1 MG/DL (ref 0.1–1)
BSA FOR ECHO PROCEDURE: 2.19 M2
BUN SERPL-MCNC: 10 MG/DL (ref 8–23)
CALCIUM SERPL-MCNC: 9.6 MG/DL (ref 8.7–10.5)
CHLORIDE SERPL-SCNC: 107 MMOL/L (ref 95–110)
CO2 SERPL-SCNC: 17 MMOL/L (ref 23–29)
CREAT SERPL-MCNC: 0.8 MG/DL (ref 0.5–1.4)
CV ECHO LV RWT: 0.43 CM
DIFFERENTIAL METHOD: ABNORMAL
DOP CALC AO PEAK VEL: 1.43 M/S
DOP CALC AO VTI: 27.78 CM
DOP CALC LVOT AREA: 3.9 CM2
DOP CALC LVOT DIAMETER: 2.23 CM
DOP CALC LVOT PEAK VEL: 1.21 M/S
DOP CALC LVOT STROKE VOLUME: 105.87 CM3
DOP CALC MV VTI: 18.37 CM
DOP CALCLVOT PEAK VEL VTI: 27.12 CM
E WAVE DECELERATION TIME: 350.74 MSEC
E/A RATIO: 0.58
E/E' RATIO: 7.27 M/S
ECHO LV POSTERIOR WALL: 1.06 CM (ref 0.6–1.1)
EJECTION FRACTION: 65 %
EOSINOPHIL # BLD AUTO: 0.1 K/UL (ref 0–0.5)
EOSINOPHIL NFR BLD: 3.7 % (ref 0–8)
ERYTHROCYTE [DISTWIDTH] IN BLOOD BY AUTOMATED COUNT: 12.2 % (ref 11.5–14.5)
EST. GFR  (AFRICAN AMERICAN): >60 ML/MIN/1.73 M^2
EST. GFR  (NON AFRICAN AMERICAN): >60 ML/MIN/1.73 M^2
FRACTIONAL SHORTENING: 38 % (ref 28–44)
GLUCOSE SERPL-MCNC: 156 MG/DL (ref 70–110)
HCT VFR BLD AUTO: 42.7 % (ref 40–54)
HGB BLD-MCNC: 14.9 G/DL (ref 14–18)
IMM GRANULOCYTES # BLD AUTO: 0.01 K/UL (ref 0–0.04)
IMM GRANULOCYTES NFR BLD AUTO: 0.3 % (ref 0–0.5)
INTERVENTRICULAR SEPTUM: 1.11 CM (ref 0.6–1.1)
LEFT ATRIUM SIZE: 3.25 CM
LEFT INTERNAL DIMENSION IN SYSTOLE: 3.08 CM (ref 2.1–4)
LEFT VENTRICLE DIASTOLIC VOLUME INDEX: 53.82 ML/M2
LEFT VENTRICLE DIASTOLIC VOLUME: 116.8 ML
LEFT VENTRICLE MASS INDEX: 93 G/M2
LEFT VENTRICLE SYSTOLIC VOLUME INDEX: 17.2 ML/M2
LEFT VENTRICLE SYSTOLIC VOLUME: 37.41 ML
LEFT VENTRICULAR INTERNAL DIMENSION IN DIASTOLE: 4.97 CM (ref 3.5–6)
LEFT VENTRICULAR MASS: 201.3 G
LV LATERAL E/E' RATIO: 5.71 M/S
LV SEPTAL E/E' RATIO: 10 M/S
LYMPHOCYTES # BLD AUTO: 1.1 K/UL (ref 1–4.8)
LYMPHOCYTES NFR BLD: 33.6 % (ref 18–48)
MAGNESIUM SERPL-MCNC: 1.5 MG/DL (ref 1.6–2.6)
MCH RBC QN AUTO: 34.6 PG (ref 27–31)
MCHC RBC AUTO-ENTMCNC: 34.9 G/DL (ref 32–36)
MCV RBC AUTO: 99 FL (ref 82–98)
MONOCYTES # BLD AUTO: 0.4 K/UL (ref 0.3–1)
MONOCYTES NFR BLD: 12.5 % (ref 4–15)
MV MEAN GRADIENT: 0 MMHG
MV PEAK A VEL: 0.69 M/S
MV PEAK E VEL: 0.4 M/S
MV PEAK GRADIENT: 2 MMHG
MV STENOSIS PRESSURE HALF TIME: 105.98 MS
MV VALVE AREA BY CONTINUITY EQUATION: 5.76 CM2
MV VALVE AREA P 1/2 METHOD: 2.08 CM2
NEUTROPHILS # BLD AUTO: 1.6 K/UL (ref 1.8–7.7)
NEUTROPHILS NFR BLD: 49.6 % (ref 38–73)
NRBC BLD-RTO: 0 /100 WBC
PHOSPHATE SERPL-MCNC: 2 MG/DL (ref 2.7–4.5)
PLATELET # BLD AUTO: 220 K/UL (ref 150–450)
PMV BLD AUTO: 8.3 FL (ref 9.2–12.9)
POCT GLUCOSE: 151 MG/DL (ref 70–110)
POCT GLUCOSE: 163 MG/DL (ref 70–110)
POCT GLUCOSE: 227 MG/DL (ref 70–110)
POTASSIUM SERPL-SCNC: 3.6 MMOL/L (ref 3.5–5.1)
PROT SERPL-MCNC: 6.4 G/DL (ref 6–8.4)
PV PEAK VELOCITY: 0.75 CM/S
RA PRESSURE: 3 MMHG
RBC # BLD AUTO: 4.31 M/UL (ref 4.6–6.2)
RIGHT VENTRICULAR END-DIASTOLIC DIMENSION: 3.59 CM
SODIUM SERPL-SCNC: 136 MMOL/L (ref 136–145)
TDI LATERAL: 0.07 M/S
TDI SEPTAL: 0.04 M/S
TDI: 0.06 M/S
TRICUSPID ANNULAR PLANE SYSTOLIC EXCURSION: 1.88 CM
WBC # BLD AUTO: 3.21 K/UL (ref 3.9–12.7)

## 2022-04-04 PROCEDURE — 25000003 PHARM REV CODE 250: Performed by: INTERNAL MEDICINE

## 2022-04-04 PROCEDURE — 97535 SELF CARE MNGMENT TRAINING: CPT

## 2022-04-04 PROCEDURE — 97116 GAIT TRAINING THERAPY: CPT

## 2022-04-04 PROCEDURE — 25000003 PHARM REV CODE 250: Performed by: EMERGENCY MEDICINE

## 2022-04-04 PROCEDURE — 99900035 HC TECH TIME PER 15 MIN (STAT)

## 2022-04-04 PROCEDURE — 63600175 PHARM REV CODE 636 W HCPCS: Performed by: INTERNAL MEDICINE

## 2022-04-04 PROCEDURE — 63600175 PHARM REV CODE 636 W HCPCS: Performed by: STUDENT IN AN ORGANIZED HEALTH CARE EDUCATION/TRAINING PROGRAM

## 2022-04-04 PROCEDURE — 25000003 PHARM REV CODE 250: Performed by: STUDENT IN AN ORGANIZED HEALTH CARE EDUCATION/TRAINING PROGRAM

## 2022-04-04 PROCEDURE — 94799 UNLISTED PULMONARY SVC/PX: CPT

## 2022-04-04 PROCEDURE — 94761 N-INVAS EAR/PLS OXIMETRY MLT: CPT

## 2022-04-04 PROCEDURE — 84100 ASSAY OF PHOSPHORUS: CPT | Performed by: STUDENT IN AN ORGANIZED HEALTH CARE EDUCATION/TRAINING PROGRAM

## 2022-04-04 PROCEDURE — 12000002 HC ACUTE/MED SURGE SEMI-PRIVATE ROOM

## 2022-04-04 PROCEDURE — 97165 OT EVAL LOW COMPLEX 30 MIN: CPT

## 2022-04-04 PROCEDURE — 92523 SPEECH SOUND LANG COMPREHEN: CPT

## 2022-04-04 PROCEDURE — 80053 COMPREHEN METABOLIC PANEL: CPT | Performed by: STUDENT IN AN ORGANIZED HEALTH CARE EDUCATION/TRAINING PROGRAM

## 2022-04-04 PROCEDURE — 83735 ASSAY OF MAGNESIUM: CPT | Performed by: STUDENT IN AN ORGANIZED HEALTH CARE EDUCATION/TRAINING PROGRAM

## 2022-04-04 PROCEDURE — 85025 COMPLETE CBC W/AUTO DIFF WBC: CPT | Performed by: STUDENT IN AN ORGANIZED HEALTH CARE EDUCATION/TRAINING PROGRAM

## 2022-04-04 PROCEDURE — 92610 EVALUATE SWALLOWING FUNCTION: CPT

## 2022-04-04 PROCEDURE — 36415 COLL VENOUS BLD VENIPUNCTURE: CPT | Performed by: STUDENT IN AN ORGANIZED HEALTH CARE EDUCATION/TRAINING PROGRAM

## 2022-04-04 PROCEDURE — S4991 NICOTINE PATCH NONLEGEND: HCPCS | Performed by: STUDENT IN AN ORGANIZED HEALTH CARE EDUCATION/TRAINING PROGRAM

## 2022-04-04 RX ORDER — MAGNESIUM SULFATE HEPTAHYDRATE 40 MG/ML
2 INJECTION, SOLUTION INTRAVENOUS ONCE
Status: DISCONTINUED | OUTPATIENT
Start: 2022-04-04 | End: 2022-04-04

## 2022-04-04 RX ORDER — GABAPENTIN 400 MG/1
800 CAPSULE ORAL 3 TIMES DAILY
Status: DISCONTINUED | OUTPATIENT
Start: 2022-04-04 | End: 2022-04-05 | Stop reason: HOSPADM

## 2022-04-04 RX ADMIN — LABETALOL HYDROCHLORIDE 200 MG: 200 TABLET, FILM COATED ORAL at 08:04

## 2022-04-04 RX ADMIN — POTASSIUM BICARBONATE 50 MEQ: 977.5 TABLET, EFFERVESCENT ORAL at 08:04

## 2022-04-04 RX ADMIN — Medication 800 MG: at 01:04

## 2022-04-04 RX ADMIN — Medication 2000 UNITS: at 08:04

## 2022-04-04 RX ADMIN — GABAPENTIN 100 MG: 100 CAPSULE ORAL at 08:04

## 2022-04-04 RX ADMIN — GABAPENTIN 800 MG: 400 CAPSULE ORAL at 02:04

## 2022-04-04 RX ADMIN — Medication 1 PATCH: at 08:04

## 2022-04-04 RX ADMIN — LISINOPRIL 5 MG: 2.5 TABLET ORAL at 08:04

## 2022-04-04 RX ADMIN — PANTOPRAZOLE SODIUM 40 MG: 40 TABLET, DELAYED RELEASE ORAL at 08:04

## 2022-04-04 RX ADMIN — ASPIRIN 81 MG: 81 TABLET, COATED ORAL at 08:04

## 2022-04-04 RX ADMIN — INSULIN ASPART 2 UNITS: 100 INJECTION, SOLUTION INTRAVENOUS; SUBCUTANEOUS at 11:04

## 2022-04-04 RX ADMIN — FAMOTIDINE 20 MG: 20 TABLET ORAL at 09:04

## 2022-04-04 RX ADMIN — MUPIROCIN: 20 OINTMENT TOPICAL at 08:04

## 2022-04-04 RX ADMIN — ATORVASTATIN CALCIUM 80 MG: 40 TABLET, FILM COATED ORAL at 08:04

## 2022-04-04 RX ADMIN — SODIUM CHLORIDE: 0.9 INJECTION, SOLUTION INTRAVENOUS at 03:04

## 2022-04-04 RX ADMIN — GABAPENTIN 800 MG: 400 CAPSULE ORAL at 08:04

## 2022-04-04 RX ADMIN — FAMOTIDINE 20 MG: 20 TABLET ORAL at 08:04

## 2022-04-04 RX ADMIN — OXYBUTYNIN CHLORIDE 10 MG: 5 TABLET, EXTENDED RELEASE ORAL at 08:04

## 2022-04-04 RX ADMIN — Medication 800 MG: at 11:04

## 2022-04-04 RX ADMIN — GABAPENTIN 700 MG: 300 CAPSULE ORAL at 11:04

## 2022-04-04 RX ADMIN — DOCUSATE SODIUM AND SENNOSIDES 1 TABLET: 8.6; 5 TABLET, FILM COATED ORAL at 08:04

## 2022-04-04 RX ADMIN — NICARDIPINE HYDROCHLORIDE 1.5 MG/HR: 0.2 INJECTION, SOLUTION INTRAVENOUS at 03:04

## 2022-04-04 RX ADMIN — ENOXAPARIN SODIUM 40 MG: 40 INJECTION SUBCUTANEOUS at 04:04

## 2022-04-04 RX ADMIN — CLOPIDOGREL 75 MG: 75 TABLET, FILM COATED ORAL at 08:04

## 2022-04-04 NOTE — PT/OT/SLP PROGRESS
Physical Therapy Treatment    Patient Name:  Senthil Chandler   MRN:  [id number]    Recommendations:     Discharge Recommendations:  home health PT, outpatient PT   Discharge Equipment Recommendations: walker, rolling   Barriers to discharge: None    Assessment:     Senthil Chandler is a 76 y.o. male admitted with a medical diagnosis of Right sided weakness.  He presents with the following impairments/functional limitations:    weakness,impaired mobility/gait,tingling R hand.     Pt seen at ICU and up in chair- interactive/cooperative. Pt ambulated at hallways CGA/min assist with RW 150ft with assist maneuvering through obstacles/direction. OOB chair.    Rehab Prognosis: Good; patient would benefit from acute skilled PT services to address these deficits and reach maximum level of function.    Recent Surgery: * No surgery found *      Plan:     During this hospitalization, patient to be seen daily to address the identified rehab impairments via gait training, therapeutic activities, therapeutic exercises, neuromuscular re-education and progress toward the following goals:    · Plan of Care Expires:  04/17/22    Subjective     Chief Complaint: tingling R hand, carpal tunnel pain L wrist  Patient/Family Comments/goals: get well  Pain/Comfort:  · Pain Rating 1: 0/10      Objective:     Communicated with nurse Son prior to session.  Patient found up in chair with blood pressure cuff, pulse ox (continuous), telemetry upon PT entry to room.     General Precautions: Standard, fall   Orthopedic Precautions:N/A   Braces:    Respiratory Status: Room air     Functional Mobility:  · Transfers:     · Sit to Stand:  contact guard assistance with rolling walker  · Gait: 150ft with RW min to CGA      AM-PAC 6 CLICK MOBILITY          Therapeutic Activities and Exercises:   Patient was educated on the importance of OOB activity and functional mobility to negate negative effects of prolonged bed rest during hospitalization, safe  transfers and ambulation, and D/C planning   Back to chair post PT  Dr Pool at bedside    Patient left up in chair with all lines intact, call button in reach and nurse Adelaida present..    GOALS:   Multidisciplinary Problems     Physical Therapy Goals        Problem: Physical Therapy    Goal Priority Disciplines Outcome Goal Variances Interventions   Physical Therapy Goal     PT, PT/OT Ongoing, Progressing     Description: Goals to be met by: 22     Patient will increase functional independence with mobility by performin. Supine to sit with Contact Guard Assistance  2. Bed to chair transfer with Minimal Assistance using Rolling Walker  3. Gait  x 250 feet with Contact Guard Assistance using Rolling Walker.   4. Lower extremity exercise program x 15 reps, with supervision                     Time Tracking:     PT Received On: 22  PT Start Time: 1052     PT Stop Time: 1102  PT Total Time (min): 10 min     Billable Minutes: Gait Training 10       PT/PTA: PT     PTA Visit Number: 0     2022

## 2022-04-04 NOTE — SUBJECTIVE & OBJECTIVE
Interval History:  Patient is in severe distress with bilateral lower extremity peripheral painful neuropathy.  Patient getting much less dose of gabapentin than he is used to.  Patient is without any mental status changes.  Patient denies any sedation related to gabapentin use.  No new focal neuro deficits reported.  Right upper extremity continues to have numbness feeling with clumsiness.    Review of Systems   Neurological:  Positive for weakness.   All other systems reviewed and are negative.  Objective:     Vital Signs (Most Recent):  Temp: 97.7 °F (36.5 °C) (04/04/22 0800)  Pulse: 74 (04/04/22 0905)  Resp: (!) 100 (04/04/22 0905)  BP: (!) 144/65 (04/04/22 0905)  SpO2: 96 % (04/04/22 0905)   Vital Signs (24h Range):  Temp:  [97.7 °F (36.5 °C)-98.5 °F (36.9 °C)] 97.7 °F (36.5 °C)  Pulse:  [68-96] 74  Resp:  [] 100  SpO2:  [90 %-98 %] 96 %  BP: (109-180)/(65-85) 144/65     Weight: 95.1 kg (209 lb 10.5 oz)  Body mass index is 28.43 kg/m².    Intake/Output Summary (Last 24 hours) at 4/4/2022 0953  Last data filed at 4/4/2022 0700  Gross per 24 hour   Intake 2221.69 ml   Output 2375 ml   Net -153.31 ml      Physical Exam  Vitals and nursing note reviewed.   Constitutional:       Appearance: Normal appearance. He is well-developed.   HENT:      Head: Normocephalic and atraumatic.      Nose: Nose normal. No septal deviation.   Eyes:      Conjunctiva/sclera: Conjunctivae normal.      Pupils: Pupils are equal, round, and reactive to light.   Neck:      Thyroid: No thyroid mass.      Vascular: No JVD.      Trachea: No tracheal tenderness or tracheal deviation.   Cardiovascular:      Rate and Rhythm: Normal rate and regular rhythm.      Heart sounds: S1 normal and S2 normal. No murmur heard.    No friction rub. No gallop.   Pulmonary:      Effort: Pulmonary effort is normal.      Breath sounds: Normal breath sounds. No decreased breath sounds, wheezing, rhonchi or rales.   Abdominal:      General: Bowel sounds are  normal. There is no distension.      Palpations: Abdomen is soft. There is no hepatomegaly, splenomegaly or mass.      Tenderness: There is no abdominal tenderness.   Skin:     General: Skin is warm.      Findings: No rash.   Neurological:      Mental Status: He is alert and oriented to person, place, and time.      Cranial Nerves: No cranial nerve deficit.      Sensory: No sensory deficit.      Comments: Right upper extremity with decreased touch sensation, strength 4/5 and some degree of dysmetria noted on finger-to-nose testing.   Psychiatric:         Mood and Affect: Mood normal.         Behavior: Behavior normal.       Significant Labs: All pertinent labs within the past 24 hours have been reviewed.  CBC:   Recent Labs   Lab 04/03/22  0325 04/04/22  0416   WBC 3.70* 3.21*   HGB 14.3 14.9   HCT 42.1 42.7    220     CMP:   Recent Labs   Lab 04/03/22  0325 04/04/22  0416    136   K 3.8 3.6    107   CO2 21* 17*   * 156*   BUN 11 10   CREATININE 0.8 0.8   CALCIUM 9.7 9.6   PROT 6.2 6.4   ALBUMIN 3.6 3.6   BILITOT 0.8 1.0   ALKPHOS 86 92   AST 21 26   ALT 32 32   ANIONGAP 10 12   EGFRNONAA >60 >60     Lipid Panel:   Recent Labs   Lab 04/02/22  1423   CHOL 106*   HDL 36*   LDLCALC 49.2*   TRIG 104   CHOLHDL 34.0       Significant Imaging:   ECHO: Pending.      CT Head:  No acute intracranial pathology.     CTA Head and neck:  1. Normal CT angiogram of the extracranial carotid vasculature and [id number] vasculature.  No hemodynamically significant stenosis involving the left or right internal carotid artery.  2. No imaging evidence of acute/recent major vascular territory cerebral infarction or acute intraparenchymal hemorrhage.  3. 11 mm right thyroid lobe hypodensity.  Consider follow-up outpatient thyroid ultrasound for additional characterization.  4. Minimal smooth short segment 30-40% luminal stenosis within the proximal right vertebral artery at the level of C7.  5. Other  incidental findings as detailed above.     CXR: Interval development of linear atelectasis versus fibrosis in the right midlung zone and linear atelectasis versus fibrosis versus pneumonitis in the left midlung zone.     Repeat CT Heat (Post-TPA):  1. No acute intracranial abnormality appreciated.  2. Tiny foci of remote lacunar infarction within both cerebellar hemispheres inferiorly.  3. Sinus disease.     MRI brain:  1. No acute intracranial abnormality.  2. Small foci of remote lacunar infarction within both cerebellar hemispheres.  3. Sinus disease.  4. Age-appropriate supratentorial cerebral volume loss and chronic microvascular ischemic changes within the periventricular white matter of the supratentorial brain.

## 2022-04-04 NOTE — PROGRESS NOTES
[facility]  Neurology Progress Note    Patient Name: Senthil Chandler  MRN: [id number]  : 1945  TODAY'S DATE: 2022  ADMIT DATE: 2022  8:48 AM                                          CONSULTING PROVIDER: Brittany Gudino MD, Neurologist. Cellphone: [phone]  ATTENDING PROVIDER: Dr. Mckay    Chief Complaint   Patient presents with    Extremity Weakness     Rt. Arm is numb and he cannot move it / started 0800       History of Present Illness     Senthil Chandler is a 76 y.o.  man with past medical history of hypertension, insulin-dependent diabetes mellitus type 2, hyperlipidemia, and prostate cancer who presented with sudden onset right-sided weakness and numbness.  Patient reports that he he woke up in his usual state of health at about 7:00 a.m..  At around 8:00 a.m. he first noticed a mild right lower extremity weakness with buckling of his knee when trying to ambulate.  Then his right arm became severely weak and limp, and he could not feel anything when he touched his right arm, so he called family members who presented to his home and then proceeded to bring him to the emergency department for evaluation.  He was stroke activated, tele neurology evaluated him and he was a candidate so he received tPA.  He was then admitted to the ICU under neurology service for the 1st 24 hours, then will transition to hospital medicine if there are no complications after tPA.    4/3/22: Patient was seen and examined by me today. Symptoms improved, no bleeding reported. No acute events overnight. Transferring patient to hospital medicine.    :  Patient was seen examined by me this morning.  He continues to have right upper extremity weakness and numbness.  No other new symptoms.    Scheduled Meds:   aspirin  81 mg Oral Daily    atorvastatin  80 mg Oral Daily    clopidogreL  75 mg Oral Daily    enoxparin  40 mg Subcutaneous Q24H    famotidine  20 mg Oral  BID    gabapentin  100 mg Oral TID    labetaloL  200 mg Oral BID    lisinopriL  5 mg Oral Daily    magnesium sulfate IVPB  2 g Intravenous Once    mupirocin   Nasal BID    nicotine  1 patch Transdermal Daily    oxybutynin  10 mg Oral Daily    pantoprazole  40 mg Oral Daily    senna-docusate 8.6-50 mg  1 tablet Oral BID    vitamin D  2,000 Units Oral Daily     Continuous Infusions:   sodium chloride 0.9% 75 mL/hr at 04/04/22 0700    niCARdipine 1.5 mg/hr (04/04/22 0700)     PRN Meds:.albuterol-ipratropium, dextrose 10%, dextrose 10%, glucagon (human recombinant), glucose, glucose, insulin aspart U-100, labetalol, magnesium oxide, magnesium oxide, ondansetron, potassium bicarbonate, potassium bicarbonate, potassium bicarbonate, potassium, sodium phosphates, potassium, sodium phosphates, potassium, sodium phosphates, sodium chloride 0.9%      Physical Exam  Current Vitals:  Vitals:    04/04/22 0905   BP: (!) 144/65   Pulse: 74   Resp: (!) 100   Temp:        Physical Exam:  General: AO x4  HEENT: PERRL, EOMI  CV: RRR  Lungs: CTAB  Abdomen: +BS, S, NT, ND    Neurological Exam    MENTAL STATUS EXAM:  Level of alertness: Alert  Level of attention: Attentive w/out deficit  Orientation/Awareness: intact to person, place, time, situation  Language: fluent. Comprehension/repetition/naming intact    CRANIAL NERVE EXAM:  II/III: fundoscopic exam deferred, PERRL; visual fields full to confrontation; no gross deficit on visual acuity  III/IV/VI: EOMI w/out evidence of nystagmus, strabismus, or evoked diplopia  V: no deficits appreciated to pinprick, temp, vibration; masseter strength intact bilaterally  VII:  Mild right nasal labial fold flattening  VIII: no deficits in hearing bilaterally  IX/X: palate @ ML and raises symmetrically  XI: shoulder shrug 5/5 bilaterally; head turn 5/5 bilaterally  XII: tongue to midline w/out asymmetry  No dysarthria noted on exam.    MOTOR EXAM:  Bulk and Tone: normal  throughout  Strength is 4/5 proximally and 4/5 distally in right upper extremity .  Strength is 4 out of 5 in lower extremity on the right.  Left upper and lower extremity 5/5 strength without deficits.  Positive pronator drift on right upper extremity, negative on the left.  Mild intention tremor seen on the left side.    REFLEXES:  Masseter (CN V) Not Tested  2+ in bilateral upper and lower extremities, no Mcmanus's, no clonus. Downgoing plantars.    SENSORY EXAM  Light touch and vibration are mildly decreased on right upper and lower extremity when compared to the left side, rest of the sensation is intact throughout without deficits.    COORDINATION/CEREBELLAR EXAM:  FTN:  Significant dysmetria and ataxia which is out of proportion to weakness on right upper extremity, no dysmetria on left  HTS: No evidence of appendicular ataxia    GAIT:  Deferred for safety.    NIH Stroke Scale      Date: 04/04/2022  Time: 1300  Person Administering Scale: Rito Pool MD    Administer stroke scale items in the order listed. Record performance in each category after each subscale exam. Do not go back and change scores. Follow directions provided for each exam technique. Scores should reflect what the patient does, not what the clinician thinks the patient can do. The clinician should record answers while administering the exam and work quickly. Except where indicated, the patient should not be coached (i.e., repeated requests to patient to make a special effort).      1a  Level of consciousness: 0=alert; keenly responsive   1b. LOC questions:  0=Answers both tasks correctly   1c. LOC commands: 0=Answers both tasks correctly   2.  Best Gaze: 0=normal   3.  Visual: 0=No visual loss   4. Facial Palsy: 0=Normal symmetric movement   5a.  Motor left arm: 0=No drift, limb holds 90 (or 45) degrees for full 10 seconds   5b.  Motor right arm: 1=Drift, limb holds 90 (or 45) degrees but drifts down before full 10 seconds: does not  hit bed   6a. motor left le=No drift, limb holds 90 (or 45) degrees for full 10 seconds   6b  Motor right le=No drift, limb holds 90 (or 45) degrees for full 10 seconds   7. Limb Ataxia: 1=Present in one limb   8.  Sensory: 1=Mild to moderate sensory loss; patient feels pinprick is less sharp or is dull on the affected side; there is a loss of superficial pain with pinprick but patient is aware He is being touched   9. Best Language:  0=No aphasia, normal   10. Dysarthria: 0=Normal   11. Extinction and Inattention: 0=No abnormality    Total:   3       Laboratory Data & Studies    Recent Labs   Lab 22  0858 22  0325 22  041   WBC 3.46* 3.70* 3.21*   HGB 14.5 14.3 14.9    229 220   * 101* 99*       Recent Labs   Lab 22  0859 22  0325 22  0416    138 136   K 4.3 3.8 3.6    107 107   CO2 18* 21* 17*   BUN 16 11 10   * 126* 156*   CALCIUM 9.8 9.7 9.6   MG  --  1.6 1.5*   PHOS  --  2.2* 2.0*       Recent Labs   Lab 22  0859 22  0325 22  0416   PROT 6.6 6.2 6.4   ALBUMIN 3.8 3.6 3.6   BILITOT 0.7 0.8 1.0   AST 21 21 26   ALT 33 32 32   ALKPHOS 90 86 92       Recent Labs   Lab 22  0859   INR 1.0       Recent Labs   Lab 22  0902 22  0859 22  1423   HGBA1C 6.6*  --  6.1*   CHOL 114*  --  106*   TRIG 113  --  104   LDLCALC 51.4*  --  49.2*   HDL 40  --  36*   TSH 0.810 0.596 0.253*       Imaging:  X-Ray Chest AP Single View    Result Date: 2022  EXAMINATION: XR CHEST 1 VIEW CLINICAL HISTORY: stroke; TECHNIQUE: A portable semi upright AP view of the chest was acquired. COMPARISON: Chest x-ray-2019 FINDINGS: The cardiac silhouette is not enlarged.  There is patchy segmental airspace opacity in the left midlung zone suggesting atelectasis versus fibrosis versus pneumonitis.  There is band like opacity in the right midlung zone which could relate to atelectasis or fibrosis.  Elsewhere, the lungs are  clear.  Previously noted segmental airspace opacity at the right lung base is no longer visualized on today's study.  No significant volume of pleural fluid or pneumothorax.  There is degenerative change of the thoracic spine.  There is degenerative change of the right acromioclavicular joint.     Interval development of linear atelectasis versus fibrosis in the right midlung zone and linear atelectasis versus fibrosis versus pneumonitis in the left midlung zone. Electronically signed by: Giancarlo Boggs MD Date:    04/02/2022 Time:    13:46    X-Ray Lumbar Spine 5 View    Result Date: 3/31/2022  Five views of the lumbar spine Clinical history is pain There is mild levoscoliosis of the lumbar spine. The vertebral bodies are of normal height. The intervertebral disc spaces are maintained. There are mild degenerative endplate changes at L5-S1. There is anterior spurring of L5. There is diffuse facet hypertrophy from L2 to S1. There are no pars defects. There is diffuse vascular calcification of the aorta. The SI joints are symmetrical. There are surgical clips within the pelvis. IMPRESSION: Diffuse facet hypertrophy with no acute osseous abnormality Diffuse vascular calcification of the aorta Electronically signed by:  Dianna Mehta MD  3/31/2022 9:36 AM CDT Workstation: [id number]    CT Head Without Contrast    Result Date: 4/2/2022  EXAMINATION: CT HEAD WITHOUT CONTRAST CLINICAL HISTORY: Neuro deficit, acute, stroke suspected; TECHNIQUE: Low dose axial images were obtained through the head.  Coronal and sagittal reformations were also performed. Contrast was not administered. COMPARISON: MRI of the brain from 12/28/2018 FINDINGS: There is no evidence for hemorrhage, midline shift or mass effect.There is no gross evidence to suggest an acute infarct.Mild generalized cortical atrophy is noted. There is mild-to-moderate mucosal thickening seen involving the inferior left frontal sinus. The calvarium is intact.     1.   No acute intracranial pathology Electronically signed by: Kirk Kelly DO Date:    04/02/2022 Time:    08:57    CTA Head and Neck (xpd)    Result Date: 4/2/2022  EXAMINATION: CTA HEAD AND NECK (XPD) CLINICAL HISTORY: Stroke/TIA, determine embolic source; TECHNIQUE: Following the intravenous administration of 75cc of Omnipaque 350 contrast material, 1.25-mm contiguous axial images were acquired through the vasculature of the neck and [place] utilizing the CTA protocol.  Subsequently, coronal and sagittal reconstructed images were generated from the source data.  Curved reformatted images were also acquired through the carotid arteries and great vessel origins .  2-D and 3-D MIP reconstructed images of the [place]  and 3-D volume rendered images of the [place] vasculature were also generated from the source data. COMPARISON: CT head without contrast-04/02/2022 at 08:50 FINDINGS: CTA neck: There is scattered calcified atherosclerotic plaque within the thoracic aortic arch and origin of the left subclavian artery.  The left and right subclavian artery are widely patent without hemodynamically significant stenosis, dissection, or occlusion.  The common carotid arteries are widely patent.  There is calcified atherosclerotic plaque deposition within both carotid bulbs and within the proximal right internal carotid artery.  No focal areas of > 50% hemodynamically significant stenosis, dissection, or occlusion involving the left or right internal carotid arteries.  There is mild calcified plaque deposition observed within the cavernous segment internal carotid arteries.  There are codominant vertebral arteries present which appear widely patent throughout with no evidence of hemodynamically significant stenosis, dissection, or occlusion.  There is minimal 30-40% smooth luminal narrowing observed within the right vertebral artery at the level of C7 (series 606, image 69 and series 2, image 184).  CTA Head: The msmeaw-tw-Rxibto vasculature (including the visualized vertebral arteries, basilar artery, posterior cerebral arteries, middle cerebral arteries, and anterior cerebral arteries) is widely patent with no foci of > 50% hemodynamically significant luminal stenosis, aneurysm formation, or AVM.  No definite dissection.  There are absent posterior communicating arteries as an anatomic variant. Soft tissues Neck: The nasopharynx, hypopharynx, oropharynx, epiglottis, larynx, true vocal cords, and subglottic trachea are unremarkable.  No salivary gland abnormalities appreciated.  No pathologically enlarged lymph nodes in the neck or supraclavicular regions.  There is a 11 mm right thyroid lobe hypodensity present on series 2, image 176.  Consider follow-up outpatient thyroid ultrasound for additional characterization.  The visualized superior mediastinum is unremarkable.  There is emphysematous lung architecture present at the lung apices. Brain: The brain is normally formed with preserved gray-white matter junction differentiation. No evidence of acute/recent major vascular territory cerebral infarction, parenchymal hemorrhage, or intra-axial mass.  There is age-appropriate supratentorial cerebral volume loss.  No enhancing vascular malformation. No hydrocephalus.  No effacement of the skull-base cisterns.  No extra-axial fluid collections or blood products.  No abnormal dural or leptomeningeal enhancement. There is mucoperiosteal thickening observed within the anterior ethmoid air cells and left frontal sinus.  There are postoperative changes of right medial antrectomy.  There are sherie bullosa of the middle turbinates.  The mastoid air cells are clear.  The visualized orbits are unremarkable.  The bony calvarium and visualized facial bones show no acute abnormality. Bones: There is multilevel degenerative change of the cervical spine in the form of marginal osteophyte formation, uncovertebral spurring, and  facet arthropathy.     1. Normal CT angiogram of the extracranial carotid vasculature and moayaf-if-Tvewmm vasculature.  No hemodynamically significant stenosis involving the left or right internal carotid artery. 2. No imaging evidence of acute/recent major vascular territory cerebral infarction or acute intraparenchymal hemorrhage. 3. 11 mm right thyroid lobe hypodensity.  Consider follow-up outpatient thyroid ultrasound for additional characterization. 4. Minimal smooth short segment 30-40% luminal stenosis within the proximal right vertebral artery at the level of C7. 5. Other incidental findings as detailed above. Electronically signed by: Giancarlo Boggs MD Date:    04/02/2022 Time:    10:10          Assessment and Plan:    Senthil Chandler is a 76 y.o.  man with past medical history of hypertension, insulin-dependent diabetes mellitus type 2, hyperlipidemia, and prostate cancer who presented with sudden onset right-sided weakness and numbness that started 1 hour prior to his arrival to the ED. Was stroke activated and evaluated by teleneurology, deemed a candidate, so he received tPA. Initial NIHSS was 5. Now admitted to the ICU for Q1H neurochecks and close monitoring. Neurology admitted patient as requested s/p tPA, now transferred to Hospital Medicine since he remained stable.    Stroke workup:  CT brain:  No acute intracranial abnormality, no bleed  CTA head and neck:  No large vessel occlusion  MRI brain:  Negative for acute stroke, mild microvascular disease  Risk factor stratification: A1C, LDL, TSH  Echocardiogram:  Ordered and pending.      Stroke aborted s/p tPA vs cervical stenosis or radiculopathy  - Admitted to ICU with q1 hour neuro checks, on telemetry, continuous pulse oximetry  - passed dysphagia screen by nursing. On soft mechanical diet with thin liquids until speech evaluation  - nursing staff instructed to perform q.1 hour neuro checks.  If patient worsens mental status or  neurological examination a CT brain should be obtained stat and Neurology called immediately  - Secondary stroke prevention with plavix 75 mg daily, ASA 81 mg daily and atorvastatin 80 mg daily since CT brain 24H s/p tPA was negative for bleed. DAPT for 3 weeks followed by monotherapy with aspirin  - MRI brain negative for acute stroke, may be aborted stroke after tPA administration or stroke mimic.  Ordered C spine MRI to rule out C-spine pathology as a cause of right-sided weakness  - Risk factor stratification with HgbA1C, Fasting Lipid profile, TSH  - 2D echocardiogram to assess cardiac function with bubble study to evaluate for PFO  - Maintain Euvolemia with normal saline IV.  - Maintain Euglycemia with Accuchecks q4 hours and regular Insulin Sliding Scale  - Maintain Euthermia with Tylenol prn temp > 37.2 degrees C.  - Assessment for rehab with PT/OT/SLP evaluation and treatment.      Hypertension  - BP Goal :  Normotensive blood pressure  - Cardiac enzymes and EKG ordered, no ischemia      History of tobacco abuse/COPD  - CXR showed an area of atelectasis, will order incentive spirometry      · Extensively discussed lifestyle modifications as prophylactic measures for stroke prevention including smoking cessation, adequate blood pressure management, healthy diet and regular exercise.    Patient to follow up with [facility] at [phone] within 3 days from discharge.     Stroke education was provided including stroke risk factors modification and any acute neurological changes including weakness, confusion, visual changes to come straight to the ER.     All questions were answered.                             Critical Care:  42 minutes of critical care time has been spent evaluating with the patient. Time includes chart review not limited to diagnostic imaging, labs, and vitals, patient assessment, discussion with family and nursing, current order evaluations, and new order entries.**      Rito  Frank Pool MD  Neurology/Vascular Neurology

## 2022-04-04 NOTE — NURSING
Ambulated in hallway with PT. Tolerated well but became fatigued.     Dr. Pool with neuro at bs. Updated. States OK for pt to move out of ICU

## 2022-04-04 NOTE — PT/OT/SLP EVAL
Speech Language Pathology Evaluation  Cognitive/Bedside Swallow    Patient Name:  Senthil Chandler   MRN:  [id number]  Admitting Diagnosis: Right sided weakness    Recommendations:                  General Recommendations:  Cognitive-linguistic therapy  Diet recommendations:  Regular, Thin   Aspiration Precautions: Standard aspiration precautions   General Precautions: Standard, fall, vision impaired  Communication strategies:  provide increased time to answer    History:     Past Medical History:   Diagnosis Date    Adjustment disorder with anxious mood     Angiodysplasia     Arthritis     Cancer 1990    prostate     Carcinoma in situ of prostate     Carpal tunnel syndrome on left     CKD (chronic kidney disease) stage 1, GFR 90 ml/min or greater     Colon polyp     Cubital tunnel syndrome on left     Depressive disorder     Deviated nasal septum     Diabetes mellitus with ophthalmic complication     Diabetes mellitus, type 2     GERD (gastroesophageal reflux disease)     Hypercholesterolemia     Hyperpotassemia     Hypertension     Iron deficiency anemia     Kidney disease     focal segmental glomerulosclerosis    Lumbago     Malignant neoplasm of colon     Nephrotic syndrome     Neuropathy     Primary osteoarthritis of left elbow     PTSD (post-traumatic stress disorder)     PVD (peripheral vascular disease)     Spinal stenosis, lumbar region without neurogenic claudication     Stomach ulcer     Wears glasses        Past Surgical History:   Procedure Laterality Date    CARPAL TUNNEL RELEASE Left 10/23/2017    COLONOSCOPY N/A 8/12/2016    Procedure: COLONOSCOPY;  Surgeon: Carlos Roman MD;  Location: [facility];  Service: Endoscopy;  Laterality: N/A; repeat in 5 years for surveillance    COLONOSCOPY N/A 2/20/2019    Procedure: COLONOSCOPY;  Surgeon: Carlos Roman MD;  Location: [facility];  Service: Endoscopy;  Laterality: N/A;    COLONOSCOPY N/A 6/30/2020    Procedure:  COLONOSCOPY;  Surgeon: Carlos Roman MD;  Location: [facility] ENDO;  Service: Endoscopy;  Laterality: N/A;    ESOPHAGOGASTRODUODENOSCOPY N/A 2/20/2019    Procedure: EGD (ESOPHAGOGASTRODUODENOSCOPY);  Surgeon: Carlos Roman MD;  Location: [facility] ENDO;  Service: Endoscopy;  Laterality: N/A;    LAPAROSCOPIC CHOLECYSTECTOMY Right 3/25/2019    Procedure: CHOLECYSTECTOMY, LAPAROSCOPIC;  Surgeon: Mark Renee MD;  Location: [facility] OR;  Service: General;  Laterality: Right;    NASAL SEPTUM SURGERY      PROCTECTOMY      PROSTATE SURGERY  1990's    ROBOT-ASSISTED COLECTOMY Right 3/25/2019    Procedure: ROBOTIC COLECTOMY possible conversion to open;  Surgeon: Mark Renee MD;  Location: [facility] OR;  Service: General;  Laterality: Right;    SUBTOTAL COLECTOMY Right 3/25/2019    Procedure: COLECTOMY, PARTIAL;  Surgeon: Mark Renee MD;  Location: [facility] OR;  Service: General;  Laterality: Right;    UPPER GASTROINTESTINAL ENDOSCOPY  08/12/2016    Dr. Roman       Social History: Patient lives alone.    Prior Intubation HX:  None this admit    Modified Barium Swallow: None in Epic    Imaging:  MRI BRAIN WITHOUT CONTRAST   Final Result      1. No acute intracranial abnormality.   2. Small foci of remote lacunar infarction within both cerebellar hemispheres.   3. Sinus disease.   4. Age-appropriate supratentorial cerebral volume loss and chronic microvascular ischemic changes within the periventricular white matter of the supratentorial brain.         Electronically signed by: Giancarlo Boggs MD   Date:    04/03/2022   Time:    11:20      CT Head Without Contrast   Final Result      1. No acute intracranial abnormality appreciated.   2. Tiny foci of remote lacunar infarction within both cerebellar hemispheres inferiorly.   3. Sinus disease.         Electronically signed by: Giancarlo Boggs MD   Date:    04/03/2022   Time:    11:22      X-Ray Chest AP Single View   Final Result      Interval development of linear atelectasis  "versus fibrosis in the right midlung zone and linear atelectasis versus fibrosis versus pneumonitis in the left midlung zone.         Electronically signed by: Giancarlo Boggs MD   Date:    04/02/2022   Time:    13:46      CTA Head and Neck (xpd)   Final Result      1. Normal CT angiogram of the extracranial carotid vasculature and ncwelr-vo-Ylecie vasculature.  No hemodynamically significant stenosis involving the left or right internal carotid artery.   2. No imaging evidence of acute/recent major vascular territory cerebral infarction or acute intraparenchymal hemorrhage.   3. 11 mm right thyroid lobe hypodensity.  Consider follow-up outpatient thyroid ultrasound for additional characterization.   4. Minimal smooth short segment 30-40% luminal stenosis within the proximal right vertebral artery at the level of C7.   5. Other incidental findings as detailed above.         Electronically signed by: Giancarlo Boggs MD   Date:    04/02/2022   Time:    10:10      CT Head Without Contrast   Final Result      1.  No acute intracranial pathology         Electronically signed by: Kirk Kelly DO   Date:    04/02/2022   Time:    08:57      MRI Cervical Spine Without Contrast    (Results Pending)        Prior diet: Regular/thin    Occupation/hobbies/homemaking: Pt reports PLOF as independent with ADLs, ambulatory, manages finances/medication. +driving. Level of education is associates degree. Worked as a .    Subjective     "It's been a rough day."  "It's acting up, acting up, acting up. It won't stop!" (neuropathy pain)    Pain/Comfort:  · Pain Rating 1:  (did not rate)  · Location - Side 1: Bilateral  · Location 1: leg  · Pain Addressed 1: Distraction, Nurse notified    Respiratory Status: Room air    Objective:   Pt seen in ICU for clinical swallow and cognitive communication evaluations. Sitting in chair. Alert and cooperative. Restless and somewhat agitated with c/o painful neuropathy. RN aware.     Pt reports " "mental status at baseline. Denies h/o dementia, however, he recognized MoCA. Care everywhere indicates h/o mild cognitive disorder. Unable to find any prior MoCA scores. At some point pt was prescribed Donepezil 10 mg; unsure if still taking; chart indicates "it wasn't working."    Cognitive Status:    Arousal/Alertness Delayed responses --delayed responses at times  Attention Sustained attention deficit --reduced attention/concentration. Possibly related to pain  Perseveration Present-verbal --occasional verbal perseveration noted  Orientation Oriented x4  Memory Immediate Recall --immediate recall of 4/5 words across 2 attempts, Delayed--recall of 0/5 words following 5 minute filled delay and recall general information --WFL  Problem Solving Categories --Mild difficulty, Sequencing --WFL for 4 words but requires extra time and repetition of task instructions and Solutions --impaired  Safety awareness --impaired  Simple calculation --impaired      Receptive Language:   Comprehension:      Questions Simple yes/no --WFL  Complex yes/no --WFL  Commands  One step --WFL  two step basic commands --WFL  multistep basic commands --WFL  Conversation --Mildly impaired; delayed      Expressive Language:  Verbal:    Automatic Speech  Months of the year --WFL  Repetition Words --WFL and Sentences --WFL  Naming Confrontation --WFL and Divergent responsive --poor; 2 across 1 minute  Conversational speech --mild delays with occasional hesitations with mild word finding deficits at conversational level      Motor Speech:  WFL    Voice:   WFL    Visual-Spatial:  Unable to copy 3D cube. Clock drawing WFL    Reading:   Able to follow written commands     Written Expression:   --Unable to generate self generated sentence. Wrote sentence to dictation without difficulty.     Oral Musculature Evaluation  · Oral Musculature: WFL  · Dentition: present and adequate (few missing)  · Secretion Management: adequate  · Mucosal Quality: " adequate  · Mandibular Strength and Mobility: WFL  · Oral Labial Strength and Mobility: WFL  · Lingual Strength and Mobility: WFL  · Velar Elevation: WFL  · Buccal Strength and Mobility: WFL  · Volitional Cough: adequate  · Volitional Swallow: able to palpate laryngeal rise  · Voice Prior to PO Intake: clear    Bedside Swallow Eval:   Consistencies Assessed:  · Thin liquids --via tsp, cup and straw  · Puree --applesauce  · Mixed consistencies --diced peaches  · Solids --inder cracker     Oral Phase:   · WFL    Pharyngeal Phase:   · no overt clinical signs/symptoms of aspiration    Compensatory Strategies  · None    Treatment: MoCA (Version 8.1) administered with pt achieving a score of 17/30 suggesting cognitive-linguistic impairment. Pt earned 4 of 5 points on visuospatial/executive functions, 3 of 3 points on naming, 2 of 6 points for attention, 2 of 3 points for language, 0 of 2 points for abstraction, 0 of 5 points for delayed recall and 6 of 6 points for orientation.      Assessment:     Senthil Chandler is a 76 y.o. male with an SLP diagnosis of Cognitive-Linguistic Impairment with h/o mild cognitive disorder. Initiate skilled ST; pt lives alone. Oropharyngeal swallow WFL. REC regular textures with thin liquids. POC initiated.     Goals:   Multidisciplinary Problems     SLP Goals        Problem: SLP    Goal Priority Disciplines Outcome   SLP Goal     SLP Ongoing, Progressing   Description: 1. Provide appropriate solutions to functional problems, presented verbally, with MOD I  2. Functional problem solving related to time/money with SPV  3. Recall functional information following 3 minute filled delay with MIN A  4. Sustained attention task x3 minutes with 90% accuracy and no more than 1 redirection                   Plan:     · Patient to be seen:  5 x/week   · Plan of Care expires:  04/18/22  · Plan of Care reviewed with:  patient   · SLP Follow-Up:  Yes       Discharge recommendations:  Discharge  Facility/Level of Care Needs: outpatient speech therapy (home with assistance/supervision from family)   Barriers to Discharge:  Safety Awareness .    Time Tracking:     SLP Treatment Date:   04/04/22  Speech Start Time:  1012  Speech Stop Time:  1050     Speech Total Time (min):  38 min    Billable Minutes: Eval 30 , Eval Swallow and Oral Function 8 and Total Time 38    04/04/2022

## 2022-04-04 NOTE — NURSING
Dr. Mcaky at bs on rounds. Updated. Pt c/o neuropathy to hands and feet. Gabapentin reordered. IVF d'cd. Plan to downgrade to floor today

## 2022-04-04 NOTE — NURSING TRANSFER
Nursing Transfer Note      4/4/2022     Reason patient is being transferred: requires lower level of care    Transfer To: 306    Transfer via wheelchair    Transfer with cardiac monitoring    Transported by RN    Medicines sent: yes    Any special needs or follow-up needed: MRI C-spine    Chart send with patient: Yes    Notified: friend    Patient reassessed at: 4/4/2022 @ 9975 (date, time)    Upon arrival to floor: cardiac monitor applied, patient oriented to room, call bell in reach and bed in lowest position    Nurse Dennis at bs

## 2022-04-04 NOTE — CHAPLAIN
Visited pt in hallway as he was about to work with PT walking; introduced myself and thanked him for his  service; put  sign on door; [facility] says he's [employer]; Will [place] back to visit later. Lord, in your mercy.

## 2022-04-04 NOTE — CARE UPDATE
04/04/22 0740   Patient Assessment/Suction   Level of Consciousness (AVPU) alert   Respiratory Effort Normal   Expansion/Accessory Muscles/Retractions expansion symmetric   All Lung Fields Breath Sounds Anterior:   TAY Breath Sounds clear   LLL Breath Sounds diminished   RUL Breath Sounds clear   RLL Breath Sounds diminished   Rhythm/Pattern, Respiratory pattern regular   Cough Frequency no cough   PRE-TX-O2   O2 Device (Oxygen Therapy) room air   Oxygen Concentration (%) 21   SpO2 (!) 94 %   Pulse Oximetry Type Continuous   $ Pulse Oximetry - Multiple Charge Pulse Oximetry - Multiple   Pulse 82   Resp (!) 28   Positioning HOB elevated 30 degrees   Aerosol Therapy   $ Aerosol Therapy Charges PRN treatment not required   Incentive Spirometer   $ Incentive Spirometer Charges done with encouragement   Incentive Spirometer Predicted Level (mL) 1000   Administration (IS) proper technique demonstrated   Number of Repetitions (IS) 5   Level Incentive Spirometer (mL) 2000   Patient Tolerance (IS) good   POx, nebs prn, IS QS

## 2022-04-04 NOTE — NURSING
S/w MRI tech. Cannot get to pt at this time.     Report called to Dennis on 3rd floor. Pt to be transferred to 3rd floor, room 306.

## 2022-04-04 NOTE — EICU
eLert request for resumption of Gabapentin    Patient complaining of nerve pain and paresthesia for which he usually takes Gabapentin 400 TIC    · Will resume Gabapentin at a lower dose for now so as not to hamper neuro evaluation. Patient s/p TPA

## 2022-04-04 NOTE — PLAN OF CARE
Plan of Care:     Neuro: neuro checks per protocol; notify neurology of acute changes     Aspiration precautions: patient in upright position during oral intake     Bleeding precautions: s/p TPA on 4/2; monitor for signs of bleeding and excessive bruising     Cardiac Monitoring: patient on Cardene infusion; titrate per protocol. Monitor and document vital signs per unit protocol; notify physician of significant changes in vital signs     Fall precautions: Bed alarm activated     Safety precautions: Call light and personal belongings within reach, wheels locked and bed in lowest position     Moisture Management: incontinence pad changed as needed; skin to remain clean, dry, and intact     Shift Summary:  1900: Bedside report received from off-going shift; patient noted to be resting quietly in bed reading Stroke Booklets.    Patient noted to be restless, anxious, agitated, and scratching and wringing hands. Patient reports that his hands were burning, and that it felt like needle pricks. Patient putting fingers in mouth to scratch hands with teeth; behavior strongly discouraged. eICU contacted from room to resume home medication. New orders received for Neurontin 100mg TID. Assisted with bed bath; patient tolerated well. Condom catheter replaced.     0700: Patient reports that Neurontin helped with neuropathy pain. Bedside report given to oncoming shift.

## 2022-04-04 NOTE — PLAN OF CARE
Problem: Physical Therapy  Goal: Physical Therapy Goal  Description: Goals to be met by: 22     Patient will increase functional independence with mobility by performin. Supine to sit with Contact Guard Assistance  2. Bed to chair transfer with Minimal Assistance using Rolling Walker  3. Gait  x 250 feet with Contact Guard Assistance using Rolling Walker.   4. Lower extremity exercise program x 15 reps, with supervision    Outcome: Ongoing, Progressing   Pt seen up in chair- ambulated 150ft with RW CGA with assist for maneuvering RW. Pt to benefit from therapies PT

## 2022-04-04 NOTE — PLAN OF CARE
Problem: Adjustment to Illness (Stroke, Ischemic/Transient Ischemic Attack)  Goal: Optimal Coping  Outcome: Ongoing, Progressing     Problem: Cognitive Impairment (Stroke, Ischemic/Transient Ischemic Attack)  Goal: Optimal Cognitive Function  Outcome: Ongoing, Progressing     Problem: Functional Ability Impaired (Stroke, Ischemic/Transient Ischemic Attack)  Goal: Optimal Functional Ability  Outcome: Ongoing, Progressing     Problem: Urinary Elimination Impaired (Stroke, Ischemic/Transient Ischemic Attack)  Goal: Effective Urinary Elimination  Outcome: Ongoing, Progressing     Problem: Adult Inpatient Plan of Care  Goal: Plan of Care Review  Outcome: Ongoing, Progressing  Goal: Patient-Specific Goal (Individualized)  Outcome: Ongoing, Progressing  Goal: Absence of Hospital-Acquired Illness or Injury  Outcome: Ongoing, Progressing  Goal: Optimal Comfort and Wellbeing  Outcome: Ongoing, Progressing  Goal: Readiness for Transition of Care  Outcome: Ongoing, Progressing     Problem: Diabetes Comorbidity  Goal: Blood Glucose Level Within Targeted Range  Outcome: Ongoing, Progressing     Problem: Fall Injury Risk  Goal: Absence of Fall and Fall-Related Injury  Outcome: Ongoing, Progressing     Problem: Skin Injury Risk Increased  Goal: Skin Health and Integrity  Outcome: Ongoing, Progressing

## 2022-04-04 NOTE — PLAN OF CARE
Problem: Occupational Therapy  Goal: Occupational Therapy Goal  Description: Goals to be met by: 4/25/2022     Patient will increase functional independence with ADLs by performing:    LE Dressing with Modified [place].  Grooming while standing at sink with Modified [place].  Toileting from toilet with Modified [place] for hygiene and clothing management.   Supine to sit with Modified [place].  Toilet transfer to toilet with Modified [place].    Outcome: Ongoing, Progressing

## 2022-04-04 NOTE — NURSING
Pt assisted oob to BSC and then up to chair with standby assist required. 's-140. cardene gtt turned off

## 2022-04-04 NOTE — ASSESSMENT & PLAN NOTE
Status post tPA infusion.  No evidence of acute CVA on MRI and CT scan of the head results.  Case discussed with Dr. Goldman from Neurology.  Telemonitoring.  Neuro-checks..  Fall and seizure precautions.  Continue Aspirin 81 mg po q day. Continue Plavix 75 mg daily. Follow neurology recommendations.  Neuro imaging results reviewed.  Follow 2 D ECHO for evaluation of intra-cardiac thrombus or valvular heart disease and bubble study.  Continue Physical Therapy and Occupational therapy for evaluation and treatment.  Increase gabapentin dose to 800 mg t.i.d..

## 2022-04-04 NOTE — PT/OT/SLP EVAL
Occupational Therapy   Evaluation    Name: Senthil Chandler  MRN: [id number]  Admitting Diagnosis:  Right sided weakness  Recent Surgery: * No surgery found *      Recommendations:     Discharge Recommendations: rehabilitation facility, home health OT  Discharge Equipment Recommendations:  walker, rolling  Barriers to discharge:  Decreased caregiver support    Assessment:     Senthil Chandler is a 76 y.o. male with a medical diagnosis of Right sided weakness.  He presents with severe itching in bilateral feet intermittently during evaluation. Patient demonstrates decreased R hand dexterity, but no significant strength difference bilaterally. Noted minimal unsteadiness when ambulating in room, but further mobility was limited due to severe itching in feet.  Performance deficits affecting function: weakness, impaired endurance, impaired functional mobilty, gait instability, impaired balance, impaired self care skills, impaired sensation.      Rehab Prognosis: Good; patient would benefit from acute skilled OT services to address these deficits and reach maximum level of function.       Plan:     Patient to be seen 4 x/week to address the above listed problems via self-care/home management, therapeutic activities, therapeutic exercises  · Plan of Care Expires: 04/25/22  · Plan of Care Reviewed with: patient    Subjective     Chief Complaint: none  Patient/Family Comments/goals: none    Occupational Profile:  Living Environment: Patient lives alone in a [facility].   Previous level of function: Patient independent with ADLs and mobility.   Equipment Used at Home:  cane, straight  Assistance upon Discharge: Patient limited with support post discharge.     Pain/Comfort:  · Pain Rating 1: 0/10  · Pain Rating Post-Intervention 1: 0/10    Patients cultural, spiritual, Sabianism conflicts given the current situation:      Objective:     Communicated with: nurse Son prior to session.  Patient found up in chair with blood pressure  cuff, peripheral IV, collazo catheter, pulse ox (continuous), telemetry upon OT entry to room.    General Precautions: Standard, fall, vision impaired   Orthopedic Precautions:N/A   Braces: N/A  Respiratory Status: Room air    Occupational Performance:    Bed Mobility:    · Not assessed; patient seated in chair upon arrival.    Functional Mobility/Transfers:  · Patient completed Sit <> Stand Transfer with stand by assistance  with  rolling walker   · Patient completed Toilet Transfer Stand Pivot technique with stand by assistance with  rolling walker    Activities of Daily Living:  · Grooming: stand by assistance with oral and facial hygiene while standing at sink  · Lower Body Dressing: stand by assistance to don/doff socks while seated in chair  · Toileting: supervision with BM hygiene while seated on toilet    Cognitive/Visual Perceptual:  Cognitive/Psychosocial Skills:     -       Oriented to: x4   -       Follows Commands/attention:Follows multistep  commands  -       Communication: clear/fluent  -       Safety awareness/insight to disability: intact   -       Mood/Affect/Coping skills/emotional control: Appropriate to situation and Cooperative  Visual/Perceptual:      -Intact     Physical Exam:  Postural examination/scapula alignment:    -       Rounded shoulders  -       Forward head  Dominant hand:    -       Left  Upper Extremity Range of Motion:     -       Right Upper Extremity: WFL  -       Left Upper Extremity: WFL  Upper Extremity Strength:    -       Right Upper Extremity: 3+/5  -       Left Upper Extremity: 4+/5   Strength:    -       Right Upper Extremity: 3+/5  -       Left Upper Extremity: 4+/5  Fine Motor Coordination:    -       Mild to Moderate impairment to decreased R hand dexterity/weakness  Gross motor coordination:   Mildly impaired due to R side weakness    AMPAC 6 Click ADL:  AMPAC Total Score: 21    Treatment & Education:  OT ed pt on OT role & POC as well as discharge  recommendations.  OT ed patient on safety with walker use for functional mobility with cues for hand placement & sequencing.     Education:    Patient left up in chair with all lines intact, call button in reach and nurse notified    GOALS:   Multidisciplinary Problems     Occupational Therapy Goals        Problem: Occupational Therapy    Goal Priority Disciplines Outcome Interventions   Occupational Therapy Goal     OT, PT/OT Ongoing, Progressing    Description: Goals to be met by: 4/25/2022     Patient will increase functional independence with ADLs by performing:    LE Dressing with Modified Keith.  Grooming while standing at sink with Modified Keith.  Toileting from toilet with Modified Keith for hygiene and clothing management.   Supine to sit with Modified Keith.  Toilet transfer to toilet with Modified Keith.                     History:     Past Medical History:   Diagnosis Date    Adjustment disorder with anxious mood     Angiodysplasia     Arthritis     Cancer 1990    prostate     Carcinoma in situ of prostate     Carpal tunnel syndrome on left     CKD (chronic kidney disease) stage 1, GFR 90 ml/min or greater     Colon polyp     Cubital tunnel syndrome on left     Depressive disorder     Deviated nasal septum     Diabetes mellitus with ophthalmic complication     Diabetes mellitus, type 2     GERD (gastroesophageal reflux disease)     Hypercholesterolemia     Hyperpotassemia     Hypertension     Iron deficiency anemia     Kidney disease     focal segmental glomerulosclerosis    Lumbago     Malignant neoplasm of colon     Nephrotic syndrome     Neuropathy     Primary osteoarthritis of left elbow     PTSD (post-traumatic stress disorder)     PVD (peripheral vascular disease)     Spinal stenosis, lumbar region without neurogenic claudication     Stomach ulcer     Wears glasses        Past Surgical History:   Procedure Laterality Date    CARPAL  TUNNEL RELEASE Left 10/23/2017    COLONOSCOPY N/A 8/12/2016    Procedure: COLONOSCOPY;  Surgeon: Carlos Roman MD;  Location: [facility] ENDO;  Service: Endoscopy;  Laterality: N/A; repeat in 5 years for surveillance    COLONOSCOPY N/A 2/20/2019    Procedure: COLONOSCOPY;  Surgeon: Carlos Roman MD;  Location: [facility] ENDO;  Service: Endoscopy;  Laterality: N/A;    COLONOSCOPY N/A 6/30/2020    Procedure: COLONOSCOPY;  Surgeon: Carlos Roman MD;  Location: [facility] ENDO;  Service: Endoscopy;  Laterality: N/A;    ESOPHAGOGASTRODUODENOSCOPY N/A 2/20/2019    Procedure: EGD (ESOPHAGOGASTRODUODENOSCOPY);  Surgeon: Carlos Roman MD;  Location: [facility] ENDO;  Service: Endoscopy;  Laterality: N/A;    LAPAROSCOPIC CHOLECYSTECTOMY Right 3/25/2019    Procedure: CHOLECYSTECTOMY, LAPAROSCOPIC;  Surgeon: Mark Renee MD;  Location: [facility] OR;  Service: General;  Laterality: Right;    NASAL SEPTUM SURGERY      PROCTECTOMY      PROSTATE SURGERY  1990's    ROBOT-ASSISTED COLECTOMY Right 3/25/2019    Procedure: ROBOTIC COLECTOMY possible conversion to open;  Surgeon: Mark Renee MD;  Location: [facility] OR;  Service: General;  Laterality: Right;    SUBTOTAL COLECTOMY Right 3/25/2019    Procedure: COLECTOMY, PARTIAL;  Surgeon: Mark Renee MD;  Location: [facility] OR;  Service: General;  Laterality: Right;    UPPER GASTROINTESTINAL ENDOSCOPY  08/12/2016    Dr. Roman       Time Tracking:     OT Date of Treatment: 04/04/22  OT Start Time: 0929  OT Stop Time: 1008  OT Total Time (min): 39 min    Billable Minutes:Evaluation 10  Self Care/Home Management 29    4/4/2022

## 2022-04-04 NOTE — PROGRESS NOTES
[facility] Hospital Medicine  Progress Note    Patient Name: Senthil Chandler  MRN: [id number]  Patient Class: IP- Inpatient   Admission Date: 4/2/2022  Length of Stay: 2 days  Attending Physician: No att. providers found  Primary Care Provider: Don Hernandez III, MD        Subjective:     Principal Problem:Right sided weakness        HPI:  Patient is a 76-year-old pleasant  male with past medical history significant for hypertension, hyperlipidemia, diabetes mellitus type 2 and history of prostate carcinoma who was admitted to intensive care unit by Neurology service after patient received tPA infusion for possibility of acute stay CVA.  Patient presented to the emergency room with complaint of sudden onset right sided weakness and numbness.  Patient woke up yesterday morning around 7:00 a.m. and noticed around 8 in a.m. patient 1st noticed right lower extremity weakness with knee buckling upon attempt to ambulate.  Symptoms were much worse, in right upper extremity which was significantly weak with numb feeling all over.  Patient's family brought him to the emergency room where tele neurology service evaluated patient and recommended tPA.  Patient has stayed overnight post tPA infusion and is doing well.  Patient reports improving strength on the right side.  Improvement is much pronounced in right lower extremity than right upper extremity which still feels clumsy and mildly numb.  MRI brain did not report acute cerebrovascular accident.  Patient is not aware of prior history of CVA.  Patient denies any other subjective complaints itis chest pain, shortness a breath, fever, chills, abdominal pain, urinary difficulties or any rash, headache or vision changes.          Overview/Hospital Course:  No notes on file    Interval History:  Patient is in severe distress with bilateral lower extremity peripheral painful neuropathy.  Patient getting much less dose of gabapentin than he  is used to.  Patient is without any mental status changes.  Patient denies any sedation related to gabapentin use.  No new focal neuro deficits reported.  Right upper extremity continues to have numbness feeling with clumsiness.    Review of Systems   Neurological:  Positive for weakness.   All other systems reviewed and are negative.  Objective:     Vital Signs (Most Recent):  Temp: 97.7 °F (36.5 °C) (04/04/22 0800)  Pulse: 74 (04/04/22 0905)  Resp: (!) 100 (04/04/22 0905)  BP: (!) 144/65 (04/04/22 0905)  SpO2: 96 % (04/04/22 0905)   Vital Signs (24h Range):  Temp:  [97.7 °F (36.5 °C)-98.5 °F (36.9 °C)] 97.7 °F (36.5 °C)  Pulse:  [68-96] 74  Resp:  [] 100  SpO2:  [90 %-98 %] 96 %  BP: (109-180)/(65-85) 144/65     Weight: 95.1 kg (209 lb 10.5 oz)  Body mass index is 28.43 kg/m².    Intake/Output Summary (Last 24 hours) at 4/4/2022 0953  Last data filed at 4/4/2022 0700  Gross per 24 hour   Intake 2221.69 ml   Output 2375 ml   Net -153.31 ml      Physical Exam  Vitals and nursing note reviewed.   Constitutional:       Appearance: Normal appearance. He is well-developed.   HENT:      Head: Normocephalic and atraumatic.      Nose: Nose normal. No septal deviation.   Eyes:      Conjunctiva/sclera: Conjunctivae normal.      Pupils: Pupils are equal, round, and reactive to light.   Neck:      Thyroid: No thyroid mass.      Vascular: No JVD.      Trachea: No tracheal tenderness or tracheal deviation.   Cardiovascular:      Rate and Rhythm: Normal rate and regular rhythm.      Heart sounds: S1 normal and S2 normal. No murmur heard.    No friction rub. No gallop.   Pulmonary:      Effort: Pulmonary effort is normal.      Breath sounds: Normal breath sounds. No decreased breath sounds, wheezing, rhonchi or rales.   Abdominal:      General: Bowel sounds are normal. There is no distension.      Palpations: Abdomen is soft. There is no hepatomegaly, splenomegaly or mass.      Tenderness: There is no abdominal tenderness.    Skin:     General: Skin is warm.      Findings: No rash.   Neurological:      Mental Status: He is alert and oriented to person, place, and time.      Cranial Nerves: No cranial nerve deficit.      Sensory: No sensory deficit.      Comments: Right upper extremity with decreased touch sensation, strength 4/5 and some degree of dysmetria noted on finger-to-nose testing.   Psychiatric:         Mood and Affect: Mood normal.         Behavior: Behavior normal.       Significant Labs: All pertinent labs within the past 24 hours have been reviewed.  CBC:   Recent Labs   Lab 04/03/22 0325 04/04/22  0416   WBC 3.70* 3.21*   HGB 14.3 14.9   HCT 42.1 42.7    220     CMP:   Recent Labs   Lab 04/03/22 0325 04/04/22 0416    136   K 3.8 3.6    107   CO2 21* 17*   * 156*   BUN 11 10   CREATININE 0.8 0.8   CALCIUM 9.7 9.6   PROT 6.2 6.4   ALBUMIN 3.6 3.6   BILITOT 0.8 1.0   ALKPHOS 86 92   AST 21 26   ALT 32 32   ANIONGAP 10 12   EGFRNONAA >60 >60     Lipid Panel:   Recent Labs   Lab 04/02/22  1423   CHOL 106*   HDL 36*   LDLCALC 49.2*   TRIG 104   CHOLHDL 34.0       Significant Imaging:   ECHO: Pending.      CT Head:  No acute intracranial pathology.     CTA Head and neck:  1. Normal CT angiogram of the extracranial carotid vasculature and j[id number] vasculature.  No hemodynamically significant stenosis involving the left or right internal carotid artery.  2. No imaging evidence of acute/recent major vascular territory cerebral infarction or acute intraparenchymal hemorrhage.  3. 11 mm right thyroid lobe hypodensity.  Consider follow-up outpatient thyroid ultrasound for additional characterization.  4. Minimal smooth short segment 30-40% luminal stenosis within the proximal right vertebral artery at the level of C7.  5. Other incidental findings as detailed above.     CXR: Interval development of linear atelectasis versus fibrosis in the right midlung zone and linear atelectasis versus fibrosis  versus pneumonitis in the left midlung zone.     Repeat CT Heat (Post-TPA):  1. No acute intracranial abnormality appreciated.  2. Tiny foci of remote lacunar infarction within both cerebellar hemispheres inferiorly.  3. Sinus disease.     MRI brain:  1. No acute intracranial abnormality.  2. Small foci of remote lacunar infarction within both cerebellar hemispheres.  3. Sinus disease.  4. Age-appropriate supratentorial cerebral volume loss and chronic microvascular ischemic changes within the periventricular white matter of the supratentorial brain.      Assessment/Plan:      * Right sided weakness  Status post tPA infusion.  No evidence of acute CVA on MRI and CT scan of the head results.  Case discussed with Dr. Goldman from Neurology.  Telemonitoring.  Neuro-checks..  Fall and seizure precautions.  Continue Aspirin 81 mg po q day. Continue Plavix 75 mg daily. Follow neurology recommendations.  Neuro imaging results reviewed.  Follow 2 D ECHO for evaluation of intra-cardiac thrombus or valvular heart disease and bubble study.  Continue Physical Therapy and Occupational therapy for evaluation and treatment.  Increase gabapentin dose to 800 mg t.i.d..        Nicotine addiction  Smoking cessation counseling performed. Dangers of cigarette smoking were reviewed with patient in detail and patient was encouraged to quit. Nicotine replacement options were discussed for > 3 minutes.        COPD (chronic obstructive pulmonary disease)  Patient's COPD is controlled currently. Continue scheduled inhalers and monitor respiratory status closely.        Hyperlipemia  Continue Lipitor 80 mg daily.      Essential hypertension  Chronic problem. Will continue chronic medications and monitor for any changes, adjusting as needed.  Wean off intravenous Cardene infusion.  Use intravenous hydralazine 10 mg IV q.2 hours p.r.n. systolic blood pressure 160 or above.      Uncontrolled type 2 diabetes mellitus with hyperglycemia  Patient's  FSGs are controlled on current medication regimen.  Last A1c reviewed-   Lab Results   Component Value Date    HGBA1C 6.1 (H) 04/02/2022     Most recent fingerstick glucose reviewed-   Recent Labs   Lab 04/02/22  1404 04/02/22  1539 04/02/22  2119 04/03/22  1126   POCTGLUCOSE 140* 117* 121* 183*     Current correctional scale  Low  Maintain anti-hyperglycemic dose as follows-   Antihyperglycemics (From admission, onward)            Start     Stop Route Frequency Ordered    04/02/22 1751  insulin aspart U-100 pen 0-5 Units         -- SubQ Before meals & nightly PRN 04/02/22 1651        Hold Oral hypoglycemics while patient is in the hospital.        Transfer the patient to medicine telemetry service once okay with Dr. Goldman.  VTE Risk Mitigation (From admission, onward)         Ordered     enoxaparin injection 40 mg  Every 24 hours (non-standard times)         04/03/22 1505     IP VTE HIGH RISK PATIENT  Once         04/02/22 1336     Reason for No Pharmacological VTE Prophylaxis  Once        Question:  Reasons:  Answer:  Physician Provided (leave comment)  Comment:  status post tPA    04/02/22 1336     Place sequential compression device  Until discontinued         04/02/22 1336                Discharge Planning   MATEO: 4/5/22     Code Status: Full Code   Is the patient medically ready for discharge?:     Reason for patient still in hospital (select all that apply): Patient trending condition  Discharge Plan A: Home Health            Critical care time spent on the evaluation and treatment of severe organ dysfunction, review of pertinent labs and imaging studies, discussions with consulting providers and discussions with patient/family: 33 minutes.      Deidre Mckay MD  Department of Hospital Medicine   [facility]

## 2022-04-04 NOTE — PLAN OF CARE
Problem: SLP  Goal: SLP Goal  Description: 1. Provide appropriate solutions to functional problems, presented verbally, with MOD I  2. Functional problem solving related to time/money with SPV  3. Recall functional information following 3 minute filled delay with MIN A  4. Sustained attention task x3 minutes with 90% accuracy and no more than 1 redirection  Outcome: Ongoing, Progressing    Clinical swallowing evaluation completed. All po trials tolerated with no overt s/s swallow dysfunction. REC Regular textures with thin liquids. He presents with cognitive linguistic deficits. Initiate skilled ST as pt lives alone.

## 2022-04-05 ENCOUNTER — TELEPHONE (OUTPATIENT)
Dept: NEUROLOGY | Facility: CLINIC | Age: 77
End: 2022-04-05
Payer: MEDICARE

## 2022-04-05 VITALS
HEIGHT: 72 IN | HEART RATE: 62 BPM | WEIGHT: 209 LBS | DIASTOLIC BLOOD PRESSURE: 79 MMHG | TEMPERATURE: 97 F | OXYGEN SATURATION: 98 % | RESPIRATION RATE: 16 BRPM | BODY MASS INDEX: 28.31 KG/M2 | SYSTOLIC BLOOD PRESSURE: 160 MMHG

## 2022-04-05 LAB
ALBUMIN SERPL BCP-MCNC: 3.7 G/DL (ref 3.5–5.2)
ALP SERPL-CCNC: 88 U/L (ref 55–135)
ALT SERPL W/O P-5'-P-CCNC: 46 U/L (ref 10–44)
ANION GAP SERPL CALC-SCNC: 11 MMOL/L (ref 8–16)
AST SERPL-CCNC: 43 U/L (ref 10–40)
BASOPHILS # BLD AUTO: 0.02 K/UL (ref 0–0.2)
BASOPHILS NFR BLD: 0.8 % (ref 0–1.9)
BILIRUB SERPL-MCNC: 0.8 MG/DL (ref 0.1–1)
BUN SERPL-MCNC: 16 MG/DL (ref 8–23)
CALCIUM SERPL-MCNC: 9.9 MG/DL (ref 8.7–10.5)
CHLORIDE SERPL-SCNC: 106 MMOL/L (ref 95–110)
CO2 SERPL-SCNC: 20 MMOL/L (ref 23–29)
CREAT SERPL-MCNC: 0.9 MG/DL (ref 0.5–1.4)
DIFFERENTIAL METHOD: ABNORMAL
EOSINOPHIL # BLD AUTO: 0.1 K/UL (ref 0–0.5)
EOSINOPHIL NFR BLD: 5.8 % (ref 0–8)
ERYTHROCYTE [DISTWIDTH] IN BLOOD BY AUTOMATED COUNT: 12.3 % (ref 11.5–14.5)
EST. GFR  (AFRICAN AMERICAN): >60 ML/MIN/1.73 M^2
EST. GFR  (NON AFRICAN AMERICAN): >60 ML/MIN/1.73 M^2
GLUCOSE SERPL-MCNC: 179 MG/DL (ref 70–110)
HCT VFR BLD AUTO: 43.4 % (ref 40–54)
HGB BLD-MCNC: 14.8 G/DL (ref 14–18)
IMM GRANULOCYTES # BLD AUTO: 0 K/UL (ref 0–0.04)
IMM GRANULOCYTES NFR BLD AUTO: 0 % (ref 0–0.5)
LYMPHOCYTES # BLD AUTO: 0.9 K/UL (ref 1–4.8)
LYMPHOCYTES NFR BLD: 36.3 % (ref 18–48)
MAGNESIUM SERPL-MCNC: 1.6 MG/DL (ref 1.6–2.6)
MCH RBC QN AUTO: 33.7 PG (ref 27–31)
MCHC RBC AUTO-ENTMCNC: 34.1 G/DL (ref 32–36)
MCV RBC AUTO: 99 FL (ref 82–98)
MONOCYTES # BLD AUTO: 0.4 K/UL (ref 0.3–1)
MONOCYTES NFR BLD: 18.3 % (ref 4–15)
NEUTROPHILS # BLD AUTO: 0.9 K/UL (ref 1.8–7.7)
NEUTROPHILS NFR BLD: 38.8 % (ref 38–73)
NRBC BLD-RTO: 0 /100 WBC
PHOSPHATE SERPL-MCNC: 2.7 MG/DL (ref 2.7–4.5)
PLATELET # BLD AUTO: 226 K/UL (ref 150–450)
PMV BLD AUTO: 8.8 FL (ref 9.2–12.9)
POCT GLUCOSE: 188 MG/DL (ref 70–110)
POCT GLUCOSE: 222 MG/DL (ref 70–110)
POTASSIUM SERPL-SCNC: 4.1 MMOL/L (ref 3.5–5.1)
PROT SERPL-MCNC: 6.5 G/DL (ref 6–8.4)
RBC # BLD AUTO: 4.39 M/UL (ref 4.6–6.2)
SODIUM SERPL-SCNC: 137 MMOL/L (ref 136–145)
WBC # BLD AUTO: 2.4 K/UL (ref 3.9–12.7)

## 2022-04-05 PROCEDURE — 25000003 PHARM REV CODE 250: Performed by: INTERNAL MEDICINE

## 2022-04-05 PROCEDURE — 94799 UNLISTED PULMONARY SVC/PX: CPT

## 2022-04-05 PROCEDURE — 36415 COLL VENOUS BLD VENIPUNCTURE: CPT | Performed by: INTERNAL MEDICINE

## 2022-04-05 PROCEDURE — 85025 COMPLETE CBC W/AUTO DIFF WBC: CPT | Performed by: INTERNAL MEDICINE

## 2022-04-05 PROCEDURE — 80053 COMPREHEN METABOLIC PANEL: CPT | Performed by: INTERNAL MEDICINE

## 2022-04-05 PROCEDURE — S4991 NICOTINE PATCH NONLEGEND: HCPCS | Performed by: INTERNAL MEDICINE

## 2022-04-05 PROCEDURE — 97116 GAIT TRAINING THERAPY: CPT | Mod: CQ

## 2022-04-05 PROCEDURE — 99900035 HC TECH TIME PER 15 MIN (STAT)

## 2022-04-05 PROCEDURE — 83735 ASSAY OF MAGNESIUM: CPT | Performed by: INTERNAL MEDICINE

## 2022-04-05 PROCEDURE — 84100 ASSAY OF PHOSPHORUS: CPT | Performed by: INTERNAL MEDICINE

## 2022-04-05 PROCEDURE — 94761 N-INVAS EAR/PLS OXIMETRY MLT: CPT

## 2022-04-05 RX ORDER — CLOPIDOGREL BISULFATE 75 MG/1
75 TABLET ORAL DAILY
Qty: 21 TABLET | Refills: 0 | OUTPATIENT
Start: 2022-04-06 | End: 2023-03-03

## 2022-04-05 RX ORDER — ASPIRIN 81 MG/1
81 TABLET ORAL DAILY
Refills: 0
Start: 2022-04-06 | End: 2023-03-03

## 2022-04-05 RX ORDER — ATORVASTATIN CALCIUM 80 MG/1
80 TABLET, FILM COATED ORAL DAILY
Qty: 30 TABLET | Refills: 0 | Status: SHIPPED | OUTPATIENT
Start: 2022-04-06 | End: 2022-05-05 | Stop reason: SDUPTHER

## 2022-04-05 RX ADMIN — ASPIRIN 81 MG: 81 TABLET, COATED ORAL at 08:04

## 2022-04-05 RX ADMIN — CLOPIDOGREL 75 MG: 75 TABLET, FILM COATED ORAL at 08:04

## 2022-04-05 RX ADMIN — DOCUSATE SODIUM AND SENNOSIDES 1 TABLET: 8.6; 5 TABLET, FILM COATED ORAL at 08:04

## 2022-04-05 RX ADMIN — PANTOPRAZOLE SODIUM 40 MG: 40 TABLET, DELAYED RELEASE ORAL at 08:04

## 2022-04-05 RX ADMIN — LISINOPRIL 5 MG: 2.5 TABLET ORAL at 08:04

## 2022-04-05 RX ADMIN — LABETALOL HYDROCHLORIDE 200 MG: 200 TABLET, FILM COATED ORAL at 08:04

## 2022-04-05 RX ADMIN — Medication 1 PATCH: at 08:04

## 2022-04-05 RX ADMIN — FAMOTIDINE 20 MG: 20 TABLET ORAL at 08:04

## 2022-04-05 RX ADMIN — GABAPENTIN 800 MG: 400 CAPSULE ORAL at 08:04

## 2022-04-05 RX ADMIN — ATORVASTATIN CALCIUM 80 MG: 40 TABLET, FILM COATED ORAL at 08:04

## 2022-04-05 RX ADMIN — INSULIN ASPART 2 UNITS: 100 INJECTION, SOLUTION INTRAVENOUS; SUBCUTANEOUS at 12:04

## 2022-04-05 RX ADMIN — MUPIROCIN: 20 OINTMENT TOPICAL at 08:04

## 2022-04-05 RX ADMIN — OXYBUTYNIN CHLORIDE 10 MG: 5 TABLET, EXTENDED RELEASE ORAL at 08:04

## 2022-04-05 RX ADMIN — Medication 2000 UNITS: at 08:04

## 2022-04-05 NOTE — CARE UPDATE
04/05/22 1003   Patient Assessment/Suction   Level of Consciousness (AVPU) alert   All Lung Fields Breath Sounds clear   PRE-TX-O2   O2 Device (Oxygen Therapy) room air   SpO2 97 %   Pulse Oximetry Type Intermittent   $ Pulse Oximetry - Multiple Charge Pulse Oximetry - Multiple   Pulse 77   Resp 18   Aerosol Therapy   $ Aerosol Therapy Charges PRN treatment not required   Respiratory Treatment Status (SVN) PRN treatment not required   Incentive Spirometer   $ Incentive Spirometer Charges done with encouragement   Administration (IS) instruction provided, follow-up   Number of Repetitions (IS) 3   Level Incentive Spirometer (mL) 3000   Patient Tolerance (IS) fair  (light head)

## 2022-04-05 NOTE — PLAN OF CARE
[facility] health [facility] careport [facility] per patient choice.    Patient choice form signed.       04/05/22 1233   Post-Acute Status   Post-Acute Authorization Home Health   Home Health Status Referrals Sent   Patient choice form signed by patient/caregiver List with quality metrics by geographic area provided

## 2022-04-05 NOTE — PLAN OF CARE
Patient accepted for  services via careport with [facility]       04/05/22 1241   Post-Acute Status   Post-Acute Authorization Home Health   Home Health Status Set-up Complete/Auth obtained

## 2022-04-05 NOTE — PT/OT/SLP PROGRESS
"Physical Therapy Treatment    Patient Name:  Senthil Chandler   MRN:  [id number]    Recommendations:     Discharge Recommendations:  other (see comments) (following consult with primary PT, pt appropriate for OPPT vs HH)   Discharge Equipment Recommendations: walker, rolling   Barriers to discharge: None    Assessment:     Senthil Chandler is a 76 y.o. male admitted with a medical diagnosis of Right sided weakness.  He presents with the following impairments/functional limitations:  weakness, impaired endurance, impaired functional mobilty, impaired self care skills, decreased upper extremity function.  Pt eager to participate, reporting concern of decreased endurance with hospital stay.  Ambulated 250' with no AD, SBA-CGA, no LOB, with fatigue at end of gait trial. Pt reports prior use of SPC during community ambulation but no AD at home. "I'm not worried about my legs or anything now, I just need my right arm and hand to get better."    Requested to remain seated in loveseat in room, rather than return to bed. Performed one STS from low loveseat surface with no assist and no LOB. Pt remained in loveseat with all needs at hand, call light reviewed, and nurse Collins informed of all.     Rehab Prognosis: Good; patient would benefit from acute skilled PT services to address these deficits and reach maximum level of function.    Recent Surgery: * No surgery found *      Plan:     During this hospitalization, patient to be seen daily to address the identified rehab impairments via gait training, therapeutic activities, therapeutic exercises and progress toward the following goals:    · Plan of Care Expires:  04/17/22    Subjective     Chief Complaint: "My right hand"  Patient/Family Comments/goals: get right hand fully functional  Pain/Comfort:  · Pain Rating 1: 0/10      Objective:     Communicated with nurse Collins prior to session.  Patient found HOB elevated with peripheral IV, telemetry upon PT entry to room. "     General Precautions: Standard, fall, vision impaired   Orthopedic Precautions:N/A   Braces: N/A  Respiratory Status: Room air     Functional Mobility:  · Bed Mobility:     · Supine to Sit: modified independence  · Transfers:     · Sit to Stand:  modified independence with no AD  · Gait: 250' with no AD, SBA-CGA; mild fatigue at end of gait trial      AM-PAC 6 CLICK MOBILITY          Therapeutic Activities and Exercises:   STS from low surface of loveseat with no assist and no LOB    Patient left seated in loveseat with all lines intact, call button in reach and Dennis notified..    GOALS:   Multidisciplinary Problems     Physical Therapy Goals        Problem: Physical Therapy    Goal Priority Disciplines Outcome Goal Variances Interventions   Physical Therapy Goal     PT, PT/OT Ongoing, Progressing     Description: Goals to be met by: 22     Patient will increase functional independence with mobility by performin. Supine to sit with Contact Guard Assistance  2. Bed to chair transfer with Minimal Assistance using Rolling Walker  3. Gait  x 250 feet with Contact Guard Assistance using Rolling Walker.   4. Lower extremity exercise program x 15 reps, with supervision                     Time Tracking:     PT Received On: 22  PT Start Time: 952     PT Stop Time: 1004  PT Total Time (min): 12 min     Billable Minutes: Gait Training 12    Treatment Type: Treatment  PT/PTA: PTA     PTA Visit Number: 1     2022

## 2022-04-05 NOTE — CARE UPDATE
04/04/22 2008   Patient Assessment/Suction   Level of Consciousness (AVPU) alert   Respiratory Effort Normal   Expansion/Accessory Muscles/Retractions expansion symmetric   All Lung Fields Breath Sounds Anterior:;clear   RUL Breath Sounds clear   RLL Breath Sounds clear   Rhythm/Pattern, Respiratory pattern regular   Cough Frequency infrequent   Cough Type congested   PRE-TX-O2   O2 Device (Oxygen Therapy) room air   SpO2 98 %   Pulse Oximetry Type Intermittent   $ Pulse Oximetry - Multiple Charge Pulse Oximetry - Multiple   Pulse 66   Resp 18   Aerosol Therapy   $ Aerosol Therapy Charges PRN treatment not required   Respiratory Treatment Status (SVN) PRN treatment not required   Incentive Spirometer   $ Incentive Spirometer Charges done with encouragement   Incentive Spirometer Predicted Level (mL) 1000   Administration (IS) instruction provided, follow-up   Number of Repetitions (IS) 5   Level Incentive Spirometer (mL) 1800   Patient Tolerance (IS) good

## 2022-04-05 NOTE — SUBJECTIVE & OBJECTIVE
Interval History:  Patient is in severe distress with bilateral lower extremity peripheral painful neuropathy.  Patient getting much less dose of gabapentin than he is used to.  Patient is without any mental status changes.  Patient denies any sedation related to gabapentin use.  No new focal neuro deficits reported.  Right upper extremity continues to have numbness feeling with clumsiness.    Review of Systems   Neurological:  Positive for weakness.   All other systems reviewed and are negative.  Objective:     Vital Signs (Most Recent):  Temp: 97.1 °F (36.2 °C) (04/05/22 0746)  Pulse: 72 (04/05/22 0746)  Resp: 17 (04/05/22 0746)  BP: (!) 156/74 (04/05/22 0746)  SpO2: 96 % (04/05/22 0746)   Vital Signs (24h Range):  Temp:  [96.5 °F (35.8 °C)-97.6 °F (36.4 °C)] 97.1 °F (36.2 °C)  Pulse:  [61-72] 72  Resp:  [] 17  SpO2:  [94 %-99 %] 96 %  BP: (140-179)/(63-99) 156/74     Weight: 94.8 kg (209 lb)  Body mass index is 28.35 kg/m².    Intake/Output Summary (Last 24 hours) at 4/5/2022 0929  Last data filed at 4/4/2022 1500  Gross per 24 hour   Intake 468.88 ml   Output --   Net 468.88 ml        Physical Exam  Vitals and nursing note reviewed.   Constitutional:       Appearance: Normal appearance. He is well-developed.   HENT:      Head: Normocephalic and atraumatic.      Nose: Nose normal. No septal deviation.   Eyes:      Conjunctiva/sclera: Conjunctivae normal.      Pupils: Pupils are equal, round, and reactive to light.   Neck:      Thyroid: No thyroid mass.      Vascular: No JVD.      Trachea: No tracheal tenderness or tracheal deviation.   Cardiovascular:      Rate and Rhythm: Normal rate and regular rhythm.      Heart sounds: S1 normal and S2 normal. No murmur heard.    No friction rub. No gallop.   Pulmonary:      Effort: Pulmonary effort is normal.      Breath sounds: Normal breath sounds. No decreased breath sounds, wheezing, rhonchi or rales.   Abdominal:      General: Bowel sounds are normal. There is no  distension.      Palpations: Abdomen is soft. There is no hepatomegaly, splenomegaly or mass.      Tenderness: There is no abdominal tenderness.   Skin:     General: Skin is warm.      Findings: No rash.   Neurological:      Mental Status: He is alert and oriented to person, place, and time.      Cranial Nerves: No cranial nerve deficit.      Sensory: No sensory deficit.      Comments: Right upper extremity with decreased touch sensation, strength 4/5 and some degree of dysmetria noted on finger-to-nose testing.   Psychiatric:         Mood and Affect: Mood normal.         Behavior: Behavior normal.       Significant Labs: All pertinent labs within the past 24 hours have been reviewed.  CBC:   Recent Labs   Lab 04/04/22 0416 04/05/22 0519   WBC 3.21* 2.40*   HGB 14.9 14.8   HCT 42.7 43.4    226       CMP:   Recent Labs   Lab 04/04/22 0416 04/05/22 0519    137   K 3.6 4.1    106   CO2 17* 20*   * 179*   BUN 10 16   CREATININE 0.8 0.9   CALCIUM 9.6 9.9   PROT 6.4 6.5   ALBUMIN 3.6 3.7   BILITOT 1.0 0.8   ALKPHOS 92 88   AST 26 43*   ALT 32 46*   ANIONGAP 12 11   EGFRNONAA >60 >60       Lipid Panel:   No results for input(s): CHOL, HDL, LDLCALC, TRIG, CHOLHDL in the last 48 hours.      Significant Imaging:   ECHO: Pending.      CT Head:  No acute intracranial pathology.     CTA Head and neck:  1. Normal CT angiogram of the extracranial carotid vasculature and [id number] vasculature.  No hemodynamically significant stenosis involving the left or right internal carotid artery.  2. No imaging evidence of acute/recent major vascular territory cerebral infarction or acute intraparenchymal hemorrhage.  3. 11 mm right thyroid lobe hypodensity.  Consider follow-up outpatient thyroid ultrasound for additional characterization.  4. Minimal smooth short segment 30-40% luminal stenosis within the proximal right vertebral artery at the level of C7.  5. Other incidental findings as detailed  above.     CXR: Interval development of linear atelectasis versus fibrosis in the right midlung zone and linear atelectasis versus fibrosis versus pneumonitis in the left midlung zone.     Repeat CT Heat (Post-TPA):  1. No acute intracranial abnormality appreciated.  2. Tiny foci of remote lacunar infarction within both cerebellar hemispheres inferiorly.  3. Sinus disease.     MRI brain:  1. No acute intracranial abnormality.  2. Small foci of remote lacunar infarction within both cerebellar hemispheres.  3. Sinus disease.  4. Age-appropriate supratentorial cerebral volume loss and chronic microvascular ischemic changes within the periventricular white matter of the supratentorial brain.    ECHO:  The left ventricle is normal in size with moderate eccentric hypertrophy and normal systolic function.  Normal left ventricular diastolic function.  Normal right ventricular size with normal right ventricular systolic function.  Normal central venous pressure (3 mmHg).  There is no evidence of intracardiac shunting.  The sinuses of Valsalva is dilated.  The estimated ejection fraction is 65%.    MRI C-spine:   Mild degenerative change and mild disc bulges with no spinal canal narrowing.  Neural foraminal narrowing as described.  Very minimal positional change C4-C5 likely on a degenerative basis.  Diffuse disc desiccation

## 2022-04-05 NOTE — PROGRESS NOTES
[facility]  Neurology Progress Note    Patient Name: Senthil Chandler  MRN: [id number]  : 1945  TODAY'S DATE: 2022  ADMIT DATE: 2022  8:48 AM                                          CONSULTING PROVIDER: Brittany Gudino MD, Neurologist. Cellphone: [phone]  ATTENDING PROVIDER: Dr. Mckay    Chief Complaint   Patient presents with    Extremity Weakness     Rt. Arm is numb and he cannot move it / started 0800       History of Present Illness     Senthil Chandler is a 76 y.o.  man with past medical history of hypertension, insulin-dependent diabetes mellitus type 2, hyperlipidemia, and prostate cancer who presented with sudden onset right-sided weakness and numbness.  Patient reports that he he woke up in his usual state of health at about 7:00 a.m..  At around 8:00 a.m. he first noticed a mild right lower extremity weakness with buckling of his knee when trying to ambulate.  Then his right arm became severely weak and limp, and he could not feel anything when he touched his right arm, so he called family members who presented to his home and then proceeded to bring him to the emergency department for evaluation.  He was stroke activated, tele neurology evaluated him and he was a candidate so he received tPA.  He was then admitted to the ICU under neurology service for the 1st 24 hours, then will transition to hospital medicine if there are no complications after tPA.    4/3/22: Patient was seen and examined by me today. Symptoms improved, no bleeding reported. No acute events overnight. Transferring patient to hospital medicine.    :  Patient was seen examined by me this morning.  He continues to have right upper extremity weakness and numbness.  No other new symptoms.    :  Patient was seen examined by me this morning.  No overnight issues no new complaints this morning.  Continues to have right-sided UE weakness.    Scheduled Meds:   aspirin  81  mg Oral Daily    atorvastatin  80 mg Oral Daily    clopidogreL  75 mg Oral Daily    enoxparin  40 mg Subcutaneous Q24H    famotidine  20 mg Oral BID    gabapentin  800 mg Oral TID    labetaloL  200 mg Oral BID    lisinopriL  5 mg Oral Daily    mupirocin   Nasal BID    nicotine  1 patch Transdermal Daily    oxybutynin  10 mg Oral Daily    pantoprazole  40 mg Oral Daily    senna-docusate 8.6-50 mg  1 tablet Oral BID    vitamin D  2,000 Units Oral Daily     Continuous Infusions:    PRN Meds:.albuterol-ipratropium, dextrose 10%, dextrose 10%, glucagon (human recombinant), glucose, glucose, insulin aspart U-100, labetalol, magnesium oxide, magnesium oxide, ondansetron, potassium bicarbonate, potassium bicarbonate, potassium bicarbonate, potassium, sodium phosphates, potassium, sodium phosphates, potassium, sodium phosphates, sodium chloride 0.9%      Physical Exam  Current Vitals:  Vitals:    04/05/22 0746   BP: (!) 156/74   Pulse: 72   Resp: 17   Temp: 97.1 °F (36.2 °C)       Physical Exam:  General: AO x4  HEENT: PERRL, EOMI  CV: RRR  Lungs: CTAB  Abdomen: +BS, S, NT, ND    Neurological Exam    MENTAL STATUS EXAM:  Level of alertness: Alert  Level of attention: Attentive w/out deficit  Orientation/Awareness: intact to person, place, time, situation  Language: fluent. Comprehension/repetition/naming intact    CRANIAL NERVE EXAM:  II/III: fundoscopic exam deferred, PERRL; visual fields full to confrontation; no gross deficit on visual acuity  III/IV/VI: EOMI w/out evidence of nystagmus, strabismus, or evoked diplopia  V: no deficits appreciated to pinprick, temp, vibration; masseter strength intact bilaterally  VII:  Mild right nasal labial fold flattening  VIII: no deficits in hearing bilaterally  IX/X: palate @ ML and raises symmetrically  XI: shoulder shrug 5/5 bilaterally; head turn 5/5 bilaterally  XII: tongue to midline w/out asymmetry  No dysarthria noted on exam.    MOTOR EXAM:  Bulk and Tone: normal  throughout  Strength is 4/5 proximally and 4/5 distally in right upper extremity .  Strength is 4 out of 5 in lower extremity on the right.  Left upper and lower extremity 5/5 strength without deficits.  Positive pronator drift on right upper extremity, negative on the left.  Mild intention tremor seen on the left side.    REFLEXES:  Masseter (CN V) Not Tested  2+ in bilateral upper and lower extremities, no Mcmanus's, no clonus. Downgoing plantars.    SENSORY EXAM  Light touch and vibration are mildly decreased on right upper and lower extremity when compared to the left side, rest of the sensation is intact throughout without deficits.    COORDINATION/CEREBELLAR EXAM:  FTN:  Significant dysmetria and ataxia which is out of proportion to weakness on right upper extremity, no dysmetria on left  HTS: No evidence of appendicular ataxia    GAIT:  Deferred for safety.    NIH Stroke Scale      Date: 04/05/2022  Time: 1300  Person Administering Scale: Rito Pool MD    Administer stroke scale items in the order listed. Record performance in each category after each subscale exam. Do not go back and change scores. Follow directions provided for each exam technique. Scores should reflect what the patient does, not what the clinician thinks the patient can do. The clinician should record answers while administering the exam and work quickly. Except where indicated, the patient should not be coached (i.e., repeated requests to patient to make a special effort).      1a  Level of consciousness: 0=alert; keenly responsive   1b. LOC questions:  0=Answers both tasks correctly   1c. LOC commands: 0=Answers both tasks correctly   2.  Best Gaze: 0=normal   3.  Visual: 0=No visual loss   4. Facial Palsy: 0=Normal symmetric movement   5a.  Motor left arm: 0=No drift, limb holds 90 (or 45) degrees for full 10 seconds   5b.  Motor right arm: 1=Drift, limb holds 90 (or 45) degrees but drifts down before full 10 seconds: does not  hit bed   6a. motor left le=No drift, limb holds 90 (or 45) degrees for full 10 seconds   6b  Motor right le=No drift, limb holds 90 (or 45) degrees for full 10 seconds   7. Limb Ataxia: 1=Present in one limb   8.  Sensory: 1=Mild to moderate sensory loss; patient feels pinprick is less sharp or is dull on the affected side; there is a loss of superficial pain with pinprick but patient is aware He is being touched   9. Best Language:  0=No aphasia, normal   10. Dysarthria: 0=Normal   11. Extinction and Inattention: 0=No abnormality    Total:   3       Laboratory Data & Studies    Recent Labs   Lab 22  0519   WBC 3.70* 3.21* 2.40*   HGB 14.3 14.9 14.8    220 226   * 99* 99*       Recent Labs   Lab 226 22  0519    136 137   K 3.8 3.6 4.1    107 106   CO2 21* 17* 20*   BUN 11 10 16   * 156* 179*   CALCIUM 9.7 9.6 9.9   MG 1.6 1.5* 1.6   PHOS 2.2* 2.0* 2.7       Recent Labs   Lab 22  0416 22  0519   PROT 6.2 6.4 6.5   ALBUMIN 3.6 3.6 3.7   BILITOT 0.8 1.0 0.8   AST 21 26 43*   ALT 32 32 46*   ALKPHOS 86 92 88       Recent Labs   Lab 22  0859   INR 1.0       Recent Labs   Lab 22  0902 22  0859 22  1423   HGBA1C 6.6*  --  6.1*   CHOL 114*  --  106*   TRIG 113  --  104   LDLCALC 51.4*  --  49.2*   HDL 40  --  36*   TSH 0.810 0.596 0.253*       Imaging:  X-Ray Chest AP Single View    Result Date: 2022  EXAMINATION: XR CHEST 1 VIEW CLINICAL HISTORY: stroke; TECHNIQUE: A portable semi upright AP view of the chest was acquired. COMPARISON: Chest x-ray-2019 FINDINGS: The cardiac silhouette is not enlarged.  There is patchy segmental airspace opacity in the left midlung zone suggesting atelectasis versus fibrosis versus pneumonitis.  There is band like opacity in the right midlung zone which could relate to atelectasis or fibrosis.  Elsewhere, the lungs are  clear.  Previously noted segmental airspace opacity at the right lung base is no longer visualized on today's study.  No significant volume of pleural fluid or pneumothorax.  There is degenerative change of the thoracic spine.  There is degenerative change of the right acromioclavicular joint.     Interval development of linear atelectasis versus fibrosis in the right midlung zone and linear atelectasis versus fibrosis versus pneumonitis in the left midlung zone. Electronically signed by: Giancarlo Boggs MD Date:    04/02/2022 Time:    13:46    X-Ray Lumbar Spine 5 View    Result Date: 3/31/2022  Five views of the lumbar spine Clinical history is pain There is mild levoscoliosis of the lumbar spine. The vertebral bodies are of normal height. The intervertebral disc spaces are maintained. There are mild degenerative endplate changes at L5-S1. There is anterior spurring of L5. There is diffuse facet hypertrophy from L2 to S1. There are no pars defects. There is diffuse vascular calcification of the aorta. The SI joints are symmetrical. There are surgical clips within the pelvis. IMPRESSION: Diffuse facet hypertrophy with no acute osseous abnormality Diffuse vascular calcification of the aorta Electronically signed by:  Dianna Mehta MD  3/31/2022 9:36 AM CDT Workstation: [id number]    CT Head Without Contrast    Result Date: 4/2/2022  EXAMINATION: CT HEAD WITHOUT CONTRAST CLINICAL HISTORY: Neuro deficit, acute, stroke suspected; TECHNIQUE: Low dose axial images were obtained through the head.  Coronal and sagittal reformations were also performed. Contrast was not administered. COMPARISON: MRI of the brain from 12/28/2018 FINDINGS: There is no evidence for hemorrhage, midline shift or mass effect.There is no gross evidence to suggest an acute infarct.Mild generalized cortical atrophy is noted. There is mild-to-moderate mucosal thickening seen involving the inferior left frontal sinus. The calvarium is intact.     1.   No acute intracranial pathology Electronically signed by: Kirk Kelly DO Date:    04/02/2022 Time:    08:57    CTA Head and Neck (xpd)    Result Date: 4/2/2022  EXAMINATION: CTA HEAD AND NECK (XPD) CLINICAL HISTORY: Stroke/TIA, determine embolic source; TECHNIQUE: Following the intravenous administration of 75cc of Omnipaque 350 contrast material, 1.25-mm contiguous axial images were acquired through the vasculature of the neck and [place] utilizing the CTA protocol.  Subsequently, coronal and sagittal reconstructed images were generated from the source data.  Curved reformatted images were also acquired through the carotid arteries and great vessel origins .  2-D and 3-D MIP reconstructed images of the [place]  and 3-D volume rendered images of the [place] vasculature were also generated from the source data. COMPARISON: CT head without contrast-04/02/2022 at 08:50 FINDINGS: CTA neck: There is scattered calcified atherosclerotic plaque within the thoracic aortic arch and origin of the left subclavian artery.  The left and right subclavian artery are widely patent without hemodynamically significant stenosis, dissection, or occlusion.  The common carotid arteries are widely patent.  There is calcified atherosclerotic plaque deposition within both carotid bulbs and within the proximal right internal carotid artery.  No focal areas of > 50% hemodynamically significant stenosis, dissection, or occlusion involving the left or right internal carotid arteries.  There is mild calcified plaque deposition observed within the cavernous segment internal carotid arteries.  There are codominant vertebral arteries present which appear widely patent throughout with no evidence of hemodynamically significant stenosis, dissection, or occlusion.  There is minimal 30-40% smooth luminal narrowing observed within the right vertebral artery at the level of C7 (series 606, image 69 and series 2, image 184).  CTA Head: The ahyqgi-qg-Ctwdds vasculature (including the visualized vertebral arteries, basilar artery, posterior cerebral arteries, middle cerebral arteries, and anterior cerebral arteries) is widely patent with no foci of > 50% hemodynamically significant luminal stenosis, aneurysm formation, or AVM.  No definite dissection.  There are absent posterior communicating arteries as an anatomic variant. Soft tissues Neck: The nasopharynx, hypopharynx, oropharynx, epiglottis, larynx, true vocal cords, and subglottic trachea are unremarkable.  No salivary gland abnormalities appreciated.  No pathologically enlarged lymph nodes in the neck or supraclavicular regions.  There is a 11 mm right thyroid lobe hypodensity present on series 2, image 176.  Consider follow-up outpatient thyroid ultrasound for additional characterization.  The visualized superior mediastinum is unremarkable.  There is emphysematous lung architecture present at the lung apices. Brain: The brain is normally formed with preserved gray-white matter junction differentiation. No evidence of acute/recent major vascular territory cerebral infarction, parenchymal hemorrhage, or intra-axial mass.  There is age-appropriate supratentorial cerebral volume loss.  No enhancing vascular malformation. No hydrocephalus.  No effacement of the skull-base cisterns.  No extra-axial fluid collections or blood products.  No abnormal dural or leptomeningeal enhancement. There is mucoperiosteal thickening observed within the anterior ethmoid air cells and left frontal sinus.  There are postoperative changes of right medial antrectomy.  There are sherie bullosa of the middle turbinates.  The mastoid air cells are clear.  The visualized orbits are unremarkable.  The bony calvarium and visualized facial bones show no acute abnormality. Bones: There is multilevel degenerative change of the cervical spine in the form of marginal osteophyte formation, uncovertebral spurring, and  facet arthropathy.     1. Normal CT angiogram of the extracranial carotid vasculature and assiin-io-Lcbnun vasculature.  No hemodynamically significant stenosis involving the left or right internal carotid artery. 2. No imaging evidence of acute/recent major vascular territory cerebral infarction or acute intraparenchymal hemorrhage. 3. 11 mm right thyroid lobe hypodensity.  Consider follow-up outpatient thyroid ultrasound for additional characterization. 4. Minimal smooth short segment 30-40% luminal stenosis within the proximal right vertebral artery at the level of C7. 5. Other incidental findings as detailed above. Electronically signed by: Giancarlo Boggs MD Date:    04/02/2022 Time:    10:10          Assessment and Plan:    Senthil Chandler is a 76 y.o.  man with past medical history of hypertension, insulin-dependent diabetes mellitus type 2, hyperlipidemia, and prostate cancer who presented with sudden onset right-sided weakness and numbness that started 1 hour prior to his arrival to the ED. Was stroke activated and evaluated by teleneurology, deemed a candidate, so he received tPA. Initial NIHSS was 5. Now admitted to the ICU for Q1H neurochecks and close monitoring. Neurology admitted patient as requested s/p tPA, now transferred to Hospital Medicine since he remained stable.    Stroke workup:  CT brain:  No acute intracranial abnormality, no bleed  CTA head and neck:  No large vessel occlusion  MRI brain:  Negative for acute stroke, mild microvascular disease  Risk factor stratification: A1C, LDL, TSH  Echocardiogram:  EF 65%, no PFO, normal left atrium.      Stroke aborted s/p tPA vs cervical stenosis or radiculopathy  - Admitted to ICU with q1 hour neuro checks, on telemetry, continuous pulse oximetry  - passed dysphagia screen by nursing. On soft mechanical diet with thin liquids until speech evaluation  - nursing staff instructed to perform q.1 hour neuro checks.  If patient worsens  mental status or neurological examination a CT brain should be obtained stat and Neurology called immediately  - Secondary stroke prevention with plavix 75 mg daily, ASA 81 mg daily and atorvastatin 80 mg daily since CT brain 24H s/p tPA was negative for bleed. DAPT for 3 weeks followed by monotherapy with aspirin  - MRI brain negative for acute stroke, may be aborted stroke after tPA administration or stroke mimic.  MRI C-spine showed Mild degenerative changes and mild disc bulges with no spinal canal narrowing. There is C3-4 moderate and C4-5 Severe R neural foraminal narrowing.  - Recommend outpatient EMG NCV and neurosurgery eval  - Risk factor stratification with HgbA1C, Fasting Lipid profile, TSH  - 2D echocardiogram to assess cardiac function with bubble study to evaluate for PFO  - Maintain Euvolemia with normal saline IV.  - Maintain Euglycemia with Accuchecks q4 hours and regular Insulin Sliding Scale  - Maintain Euthermia with Tylenol prn temp > 37.2 degrees C.  - Assessment for rehab with PT/OT/SLP evaluation and treatment.        Hypertension  - BP Goal :  Normotensive blood pressure  - Cardiac enzymes and EKG ordered, no ischemia      History of tobacco abuse/COPD  - CXR showed an area of atelectasis, will order incentive spirometry      · Extensively discussed lifestyle modifications as prophylactic measures for stroke prevention including smoking cessation, adequate blood pressure management, healthy diet and regular exercise.    Patient to follow up with [facility] at [phone] within 3 days from discharge.     Stroke education was provided including stroke risk factors modification and any acute neurological changes including weakness, confusion, visual changes to come straight to the ER.     All questions were answered.                             36 minutes of care time has been spent evaluating with the patient. Time includes chart review not limited to diagnostic imaging, labs,  and vitals, patient assessment, discussion with family and nursing, current order evaluations, and new order entries.**      Rito Pool MD  Neurology/Vascular Neurology

## 2022-04-05 NOTE — PLAN OF CARE
Problem: Physical Therapy  Goal: Physical Therapy Goal  Description: Goals to be met by: 22     Patient will increase functional independence with mobility by performin. Supine to sit with Contact Guard Assistance  2. Bed to chair transfer with Minimal Assistance using Rolling Walker  3. Gait  x 250 feet with Contact Guard Assistance using Rolling Walker.   4. Lower extremity exercise program x 15 reps, with supervision    Outcome: Ongoing, Progressing

## 2022-04-05 NOTE — PLAN OF CARE
Plan of care reviewed with pt. Pt verbalized understanding. Safety maintained with side rails up x3, bed wheels locked, bed in lowest positioned, bed alarm on, call light in reach. Patient remains free of falls. Tele [id number] NSR. Blood glucose 188, no coverage needed.

## 2022-04-05 NOTE — PLAN OF CARE
Patient cleared for discharge from case management.       04/05/22 1358   Final Note   Assessment Type Final Discharge Note   Anticipated Discharge Disposition Home-Health   What phone number can be called within the next 1-3 days to see how you are doing after discharge? [phone]

## 2022-04-05 NOTE — DISCHARGE SUMMARY
[facility] Medicine  Discharge Summary      Patient Name: Senthil Chandler  MRN: [id number]  Patient Class: IP- Inpatient  Admission Date: 4/2/2022  Hospital Length of Stay: 3 days  Discharge Date and Time:  04/05/2022 11:30 AM  Attending Physician: Roosevelt Loja MD   Discharging Provider: Deidre Mckay MD  Primary Care Provider: Don Hernandez III, MD      HPI:   Patient is a 76-year-old pleasant  male with past medical history significant for hypertension, hyperlipidemia, diabetes mellitus type 2 and history of prostate carcinoma who was admitted to intensive care unit by Neurology service after patient received tPA infusion for possibility of acute stay CVA.  Patient presented to the emergency room with complaint of sudden onset right sided weakness and numbness.  Patient woke up yesterday morning around 7:00 a.m. and noticed around 8 in a.m. patient 1st noticed right lower extremity weakness with knee buckling upon attempt to ambulate.  Symptoms were much worse, in right upper extremity which was significantly weak with numb feeling all over.  Patient's family brought him to the emergency room where tele neurology service evaluated patient and recommended tPA.  Patient has stayed overnight post tPA infusion and is doing well.  Patient reports improving strength on the right side.  Improvement is much pronounced in right lower extremity than right upper extremity which still feels clumsy and mildly numb.  MRI brain did not report acute cerebrovascular accident.  Patient is not aware of prior history of CVA.  Patient denies any other subjective complaints itis chest pain, shortness a breath, fever, chills, abdominal pain, urinary difficulties or any rash, headache or vision changes.          * No surgery found *      Hospital Course:   Patient was admitted to intensive care unit under Neurology service from intensive care unit after receiving tPA infusion.  Patient  was closely monitored.  Repeat CT scan of the head without contrast and MRI brain did not reveal any acute CVA.  Patient was evaluated by Physical therapy, Occupational therapy and speech therapy.  Patient continued complaining of right upper extremity numbness feeling.  Patient underwent MRI C spine suggesting right upper extremity radiculopathy for which patient is encouraged to follow-up with neurosurgeon as outpatient.  Medication compliance and lifestyle modification discussed with the patient.  Patient to continue taking dual antiplatelet therapy for 3 weeks and subsequently patient will continue aspirin 81 mg daily along with statin therapy.  Discharge plan of care reviewed with the patient.  Home health services and home physical therapy and occupational therapy has been arranged.  Patient will closely follow-up with neurologist and neurosurgeon as outpatient.  Patient was also counseled regarding dangers of cigarette smoking and smoking cessation counseling performed.    Goals of Care Treatment Preferences:  Code Status: Full Code      Consults:   Consults (From admission, onward)        Status Ordering Provider     Inpatient consult to Hospitalist  Once        Provider:  Ni Mckay MD    Acknowledged NI MCKAY     Inpatient consult to Physical Medicine Rehab  Once        Provider:  (Not yet assigned)    Acknowledged NI MCKAY     Inpatient consult to Social Work/Case Management  Once        Provider:  (Not yet assigned)    Completed JAZMYNE CARTER     Consult to Telemedicine - Acute Stroke  Once        Provider:  (Not yet assigned)    Acknowledged NI MCKAY        CT Head:  No acute intracranial pathology.     CTA Head and neck:  1. Normal CT angiogram of the extracranial carotid vasculature and pxzjye-tj-Bwafnb vasculature.  No hemodynamically significant stenosis involving the left or right internal carotid artery.  2. No imaging evidence of acute/recent major vascular territory cerebral  infarction or acute intraparenchymal hemorrhage.  3. 11 mm right thyroid lobe hypodensity.  Consider follow-up outpatient thyroid ultrasound for additional characterization.  4. Minimal smooth short segment 30-40% luminal stenosis within the proximal right vertebral artery at the level of C7.  5. Other incidental findings as detailed above.     CXR: Interval development of linear atelectasis versus fibrosis in the right midlung zone and linear atelectasis versus fibrosis versus pneumonitis in the left midlung zone.     Repeat CT Heat (Post-TPA):  1. No acute intracranial abnormality appreciated.  2. Tiny foci of remote lacunar infarction within both cerebellar hemispheres inferiorly.  3. Sinus disease.     MRI brain:  1. No acute intracranial abnormality.  2. Small foci of remote lacunar infarction within both cerebellar hemispheres.  3. Sinus disease.  4. Age-appropriate supratentorial cerebral volume loss and chronic microvascular ischemic changes within the periventricular white matter of the supratentorial brain.     ECHO:  · The left ventricle is normal in size with moderate eccentric hypertrophy and normal systolic function.  · Normal left ventricular diastolic function.  · Normal right ventricular size with normal right ventricular systolic function.  · Normal central venous pressure (3 mmHg).  · There is no evidence of intracardiac shunting.  · The sinuses of Valsalva is dilated.  · The estimated ejection fraction is 65%.     MRI C-spine:   Mild degenerative change and mild disc bulges with no spinal canal narrowing.  Neural foraminal narrowing as described.  Very minimal positional change C4-C5 likely on a degenerative basis.  Diffuse disc desiccation    Final Active Diagnoses:    Diagnosis Date Noted POA    PRINCIPAL PROBLEM:  Right sided weakness [R53.1] 04/03/2022 Yes    Nicotine addiction [F17.200] 04/03/2022 Yes    COPD (chronic obstructive pulmonary disease) [J44.9] 03/26/2019 Yes    Uncontrolled  "type 2 diabetes mellitus with hyperglycemia [E11.65] 03/25/2019 Yes    Essential hypertension [I10] 03/25/2019 Yes    Hyperlipemia [E78.5] 03/25/2019 Yes      Problems Resolved During this Admission:       Discharged Condition: good    Disposition: Home or Self Care    Follow Up:   Follow-up Information     Don Hernandez III, MD Follow up in 1 week(s).    Specialty: Family Medicine  Contact information:  [address]  [address]  [place] [address]  [phone]             Rito Pool MD Follow up in 2 week(s).    Specialty: Neurology  Contact information:  [address]  [facility] [place] [address]  [phone]             Senthil Nobles MD Follow up in 1 week(s).    Specialties: Orthopedic Surgery, Surgery  Why: Re: Right sided cervical radiculopathy symptoms  Contact information:  [address]  [address]  [place] [address]  [phone]                       Patient Instructions:      WALKER FOR HOME USE     Order Specific Question Answer Comments   Type of Walker: Adult (5'4"-6'6")    With wheels? Yes    Height: 6' (1.829 m)    Weight: 94.8 kg (209 lb)    Length of need (1-99 months): 99    Does patient have medical equipment at home? cane, straight    Please check all that apply: Patient's condition impairs ambulation.      Ambulatory referral/consult to Home Health   Standing Status: Future   Referral Priority: Routine Referral Type: Home Health Care   Referral Reason: Specialty Services Required   Requested Specialty: Home Health Services   Number of Visits Requested: 1     Diet Cardiac     Diet diabetic     Notify your health care provider if you experience any of the following:  temperature >100.4     Notify your health care provider if you experience any of the following:  persistent dizziness, light-headedness, or visual disturbances     Notify your health care provider if you experience any of the following:  increased confusion or weakness     Notify your health care " provider if you experience any of the following:  persistent nausea and vomiting or diarrhea     Notify your health care provider if you experience any of the following:  severe uncontrolled pain     Activity as tolerated   Order Comments: Fall precautions       Significant Diagnostic Studies: Labs:   CMP   Recent Labs   Lab 04/04/22 0416 04/05/22 0519    137   K 3.6 4.1    106   CO2 17* 20*   * 179*   BUN 10 16   CREATININE 0.8 0.9   CALCIUM 9.6 9.9   PROT 6.4 6.5   ALBUMIN 3.6 3.7   BILITOT 1.0 0.8   ALKPHOS 92 88   AST 26 43*   ALT 32 46*   ANIONGAP 12 11   ESTGFRAFRICA >60 >60   EGFRNONAA >60 >60   , CBC   Recent Labs   Lab 04/04/22 0416 04/05/22 0519   WBC 3.21* 2.40*   HGB 14.9 14.8   HCT 42.7 43.4    226   , INR   Lab Results   Component Value Date    INR 1.0 04/02/2022    and Lipid Panel   Lab Results   Component Value Date    CHOL 106 (L) 04/02/2022    HDL 36 (L) 04/02/2022    LDLCALC 49.2 (L) 04/02/2022    TRIG 104 04/02/2022    CHOLHDL 34.0 04/02/2022       Pending Diagnostic Studies:     None         Medications:  Reconciled Home Medications:      Medication List      START taking these medications    aspirin 81 MG EC tablet  Commonly known as: ECOTRIN  Take 1 tablet (81 mg total) by mouth once daily.  Start taking on: April 6, 2022     clopidogreL 75 mg tablet  Commonly known as: PLAVIX  Take 1 tablet (75 mg total) by mouth once daily.  Start taking on: April 6, 2022        CHANGE how you take these medications    atorvastatin 80 MG tablet  Commonly known as: LIPITOR  Take 1 tablet (80 mg total) by mouth once daily.  Start taking on: April 6, 2022  What changed:   · medication strength  · how much to take        CONTINUE taking these medications    artificial saliva (cmce-lytes) Sprp  Take by mouth. 1 tsp. Daily prn dry mouth     carboxymethylcellulose 0.5 % Dpet  Commonly known as: REFRESH PLUS  Place 1 drop into both eyes 5 (five) times daily.     * cholecalciferol  (vitamin D3) 50 mcg (2,000 unit) Tab  Commonly known as: VITAMIN D3  Take by mouth once daily.     * cholecalciferol (vitamin D3) 50 mcg (2,000 unit) Tab  Commonly known as: VITAMIN D3  Take 1 tablet by mouth once daily.     fluticasone propionate 50 mcg/actuation Dsdv  Commonly known as: FLOVENT DISKUS  Inhale 2 sprays into the lungs once daily.     gabapentin 400 MG capsule  Commonly known as: NEURONTIN  Take 400 mg by mouth 3 (three) times daily. 2 qa/ 2 qnoon/ 3 qpm . Take 800 mg morning, 800 mg noon, 1200 mg pm     GAVILYTE-G 236-22.74-6.74 -5.86 gram suspension  Generic drug: polyethylene glycol  USE AS DIRECTED     insulin glargine 100 unit/mL injection  Commonly known as: Lantus  Inject 25 Units into the skin once daily. Is supposed to be taking it at night, but takes it in the morning     labetaloL 200 MG tablet  Commonly known as: NORMODYNE  Take 1 tablet by mouth 2 (two) times daily.     liraglutide 0.6 mg/0.1 mL (18 mg/3 mL) subq PNIJ 0.6 mg/0.1 mL (18 mg/3 mL) Pnij pen  Commonly known as: VICTOZA 2-IVAN  Inject 1.8 mg into the skin once daily.     lisinopriL 10 MG tablet  Take 0.5 tablets by mouth once daily.     oxybutynin 10 MG 24 hr tablet  Commonly known as: DITROPAN-XL  Take 1 tablet by mouth once daily.     pantoprazole 40 MG tablet  Commonly known as: PROTONIX  Take 1 tablet (40 mg total) by mouth once daily. Before breakfast     traZODone 100 MG tablet  Commonly known as: DESYREL  Take 200 mg by mouth every evening.     urea 50 % Crea  Apply bid         * This list has 2 medication(s) that are the same as other medications prescribed for you. Read the directions carefully, and ask your doctor or other care provider to review them with you.            STOP taking these medications    hydroCHLOROthiazide 25 MG tablet  Commonly known as: HYDRODIURIL     HYDROcodone-acetaminophen 5-325 mg per tablet  Commonly known as: NORCO            Indwelling Lines/Drains at time of discharge:    Lines/Drains/Airways     None                 Time spent on the discharge of patient: 33 minutes         Deidre Mckay MD  Department of Hospital Medicine  [facility]

## 2022-04-06 DIAGNOSIS — E11.9 TYPE 2 DIABETES MELLITUS WITHOUT COMPLICATION: ICD-10-CM

## 2022-04-06 PROCEDURE — G0180 MD CERTIFICATION HHA PATIENT: HCPCS | Mod: ,,, | Performed by: FAMILY MEDICINE

## 2022-04-06 PROCEDURE — G0180 PR HOME HEALTH MD CERTIFICATION: ICD-10-PCS | Mod: ,,, | Performed by: FAMILY MEDICINE

## 2022-04-07 ENCOUNTER — PATIENT OUTREACH (OUTPATIENT)
Dept: ADMINISTRATIVE | Facility: HOSPITAL | Age: 77
End: 2022-04-07
Payer: MEDICARE

## 2022-04-07 NOTE — PROGRESS NOTES
COMPLETE X-RAY OF CHEST RECEIVED AND SCANNED INTO CHART.  [facility].

## 2022-04-07 NOTE — LETTER
AUTHORIZATION FOR RELEASE OF   CONFIDENTIAL INFORMATION    [facility]    We are seeing Senthil Chandler, date of birth 1945, in the clinic at [facility]. Don Hernandez III, MD is the patient's PCP. Senthil Chandler has an outstanding lab/procedure at the time we reviewed his chart. In order to help keep his health information updated, he has authorized us to request the following medical record(s):           CT OF CHEST         Please fax [facility] to [facility], Clinton H Sharp III, MD,[phone]    Thank you in advance,      Renae GELLER  Care Coordinator  [place] [facility] [address] [place] [address]  Phone [phone]  Fax [phone]          Patient Name: Senthil Chandler  : 1945  Patient Phone #: [phone]

## 2022-04-10 ENCOUNTER — PATIENT OUTREACH (OUTPATIENT)
Dept: ADMINISTRATIVE | Facility: CLINIC | Age: 77
End: 2022-04-10
Payer: MEDICARE

## 2022-04-10 DIAGNOSIS — R53.1 RIGHT SIDED WEAKNESS: Primary | ICD-10-CM

## 2022-04-10 NOTE — PROGRESS NOTES
Received a Day 5 escalation from patient, contacted patient via telephone. Patient stated he was unsure of whom had been visiting his home and if he had a f/u appt. Patient does have a scheduled f/u on 4/13 but stated he is unable to drive. Unable to schedule patient for a NP home visit due to no availability. NP home visit referral submitted, patient notified and verbalized understanding.

## 2022-04-11 ENCOUNTER — TELEPHONE (OUTPATIENT)
Dept: FAMILY MEDICINE | Facility: CLINIC | Age: 77
End: 2022-04-11
Payer: MEDICARE

## 2022-04-11 NOTE — TELEPHONE ENCOUNTER
Made appt Thursday afternoon    ----- Message from Roddy Garcia sent at 4/11/2022  3:06 PM CDT -----  Regarding: appointment  Contact: patient  Type:  Sooner Appointment Request    Caller is requesting a sooner appointment.  Caller declined first available appointment listed below.  Caller will not accept being placed on the waitlist and is requesting a message be sent to doctor.    Name of Caller:    When is the first available appointment? April 13th  Symptoms:  hospital follow up  Best Call Back Number:  [phone] (home)     Patient want to keep the same date just a late time

## 2022-04-13 ENCOUNTER — PATIENT MESSAGE (OUTPATIENT)
Dept: ADMINISTRATIVE | Facility: HOSPITAL | Age: 77
End: 2022-04-13
Payer: MEDICARE

## 2022-04-14 ENCOUNTER — OFFICE VISIT (OUTPATIENT)
Dept: FAMILY MEDICINE | Facility: CLINIC | Age: 77
End: 2022-04-14
Payer: MEDICARE

## 2022-04-14 ENCOUNTER — LAB VISIT (OUTPATIENT)
Dept: LAB | Facility: HOSPITAL | Age: 77
End: 2022-04-14
Attending: FAMILY MEDICINE
Payer: MEDICARE

## 2022-04-14 VITALS
TEMPERATURE: 98 F | BODY MASS INDEX: 28.71 KG/M2 | DIASTOLIC BLOOD PRESSURE: 82 MMHG | OXYGEN SATURATION: 97 % | SYSTOLIC BLOOD PRESSURE: 134 MMHG | HEIGHT: 72 IN | WEIGHT: 212 LBS | HEART RATE: 78 BPM

## 2022-04-14 DIAGNOSIS — E11.9 TYPE 2 DIABETES MELLITUS WITHOUT COMPLICATION: ICD-10-CM

## 2022-04-14 DIAGNOSIS — Z51.81 VISIT FOR MONITORING PLAVIX THERAPY: ICD-10-CM

## 2022-04-14 DIAGNOSIS — R14.2 ERUCTATION: ICD-10-CM

## 2022-04-14 DIAGNOSIS — E53.8 VITAMIN B 12 DEFICIENCY: ICD-10-CM

## 2022-04-14 DIAGNOSIS — E11.42 TYPE 2 DIABETES MELLITUS WITH DIABETIC POLYNEUROPATHY, WITH LONG-TERM CURRENT USE OF INSULIN: ICD-10-CM

## 2022-04-14 DIAGNOSIS — Z79.4 TYPE 2 DIABETES MELLITUS WITH DIABETIC POLYNEUROPATHY, WITH LONG-TERM CURRENT USE OF INSULIN: ICD-10-CM

## 2022-04-14 DIAGNOSIS — M54.12 CERVICAL RADICULOPATHY: Primary | ICD-10-CM

## 2022-04-14 DIAGNOSIS — K92.1 BLACK STOOLS: ICD-10-CM

## 2022-04-14 DIAGNOSIS — R20.2 RIGHT LEG PARESTHESIAS: ICD-10-CM

## 2022-04-14 DIAGNOSIS — Z87.19 HISTORY OF HIATAL HERNIA: ICD-10-CM

## 2022-04-14 DIAGNOSIS — R20.2 PARESTHESIA OF BOTH HANDS: ICD-10-CM

## 2022-04-14 DIAGNOSIS — Z79.82 ASPIRIN LONG-TERM USE: ICD-10-CM

## 2022-04-14 DIAGNOSIS — Z79.02 VISIT FOR MONITORING PLAVIX THERAPY: ICD-10-CM

## 2022-04-14 DIAGNOSIS — M54.9 DORSALGIA, UNSPECIFIED: ICD-10-CM

## 2022-04-14 LAB
ALBUMIN/CREAT UR: 1.8 UG/MG (ref 0–30)
BASOPHILS # BLD AUTO: 0.02 K/UL (ref 0–0.2)
BASOPHILS NFR BLD: 0.4 % (ref 0–1.9)
CREAT UR-MCNC: 226 MG/DL (ref 23–375)
DIFFERENTIAL METHOD: ABNORMAL
EOSINOPHIL # BLD AUTO: 0.2 K/UL (ref 0–0.5)
EOSINOPHIL NFR BLD: 5 % (ref 0–8)
ERYTHROCYTE [DISTWIDTH] IN BLOOD BY AUTOMATED COUNT: 12.8 % (ref 11.5–14.5)
HCT VFR BLD AUTO: 43.1 % (ref 40–54)
HGB BLD-MCNC: 14.7 G/DL (ref 14–18)
IMM GRANULOCYTES # BLD AUTO: 0.02 K/UL (ref 0–0.04)
IMM GRANULOCYTES NFR BLD AUTO: 0.4 % (ref 0–0.5)
LYMPHOCYTES # BLD AUTO: 1.4 K/UL (ref 1–4.8)
LYMPHOCYTES NFR BLD: 29.8 % (ref 18–48)
MCH RBC QN AUTO: 33.5 PG (ref 27–31)
MCHC RBC AUTO-ENTMCNC: 34.1 G/DL (ref 32–36)
MCV RBC AUTO: 98 FL (ref 82–98)
MICROALBUMIN UR DL<=1MG/L-MCNC: 4 UG/ML
MONOCYTES # BLD AUTO: 0.4 K/UL (ref 0.3–1)
MONOCYTES NFR BLD: 8.2 % (ref 4–15)
NEUTROPHILS # BLD AUTO: 2.7 K/UL (ref 1.8–7.7)
NEUTROPHILS NFR BLD: 56.2 % (ref 38–73)
NRBC BLD-RTO: 0 /100 WBC
PLATELET # BLD AUTO: 241 K/UL (ref 150–450)
PMV BLD AUTO: 8.2 FL (ref 9.2–12.9)
RBC # BLD AUTO: 4.39 M/UL (ref 4.6–6.2)
WBC # BLD AUTO: 4.76 K/UL (ref 3.9–12.7)

## 2022-04-14 PROCEDURE — 85025 COMPLETE CBC W/AUTO DIFF WBC: CPT | Performed by: FAMILY MEDICINE

## 2022-04-14 PROCEDURE — 3079F DIAST BP 80-89 MM HG: CPT | Mod: CPTII,S$GLB,, | Performed by: FAMILY MEDICINE

## 2022-04-14 PROCEDURE — 1101F PR PT FALLS ASSESS DOC 0-1 FALLS W/OUT INJ PAST YR: ICD-10-PCS | Mod: CPTII,S$GLB,, | Performed by: FAMILY MEDICINE

## 2022-04-14 PROCEDURE — 36415 COLL VENOUS BLD VENIPUNCTURE: CPT | Performed by: FAMILY MEDICINE

## 2022-04-14 PROCEDURE — 1126F AMNT PAIN NOTED NONE PRSNT: CPT | Mod: CPTII,S$GLB,, | Performed by: FAMILY MEDICINE

## 2022-04-14 PROCEDURE — 82570 ASSAY OF URINE CREATININE: CPT | Performed by: FAMILY MEDICINE

## 2022-04-14 PROCEDURE — 3075F PR MOST RECENT SYSTOLIC BLOOD PRESS GE 130-139MM HG: ICD-10-PCS | Mod: CPTII,S$GLB,, | Performed by: FAMILY MEDICINE

## 2022-04-14 PROCEDURE — 3079F PR MOST RECENT DIASTOLIC BLOOD PRESSURE 80-89 MM HG: ICD-10-PCS | Mod: CPTII,S$GLB,, | Performed by: FAMILY MEDICINE

## 2022-04-14 PROCEDURE — 1101F PT FALLS ASSESS-DOCD LE1/YR: CPT | Mod: CPTII,S$GLB,, | Performed by: FAMILY MEDICINE

## 2022-04-14 PROCEDURE — 99214 PR OFFICE/OUTPT VISIT, EST, LEVL IV, 30-39 MIN: ICD-10-PCS | Mod: S$GLB,,, | Performed by: FAMILY MEDICINE

## 2022-04-14 PROCEDURE — 1126F PR PAIN SEVERITY QUANTIFIED, NO PAIN PRESENT: ICD-10-PCS | Mod: CPTII,S$GLB,, | Performed by: FAMILY MEDICINE

## 2022-04-14 PROCEDURE — 99214 OFFICE O/P EST MOD 30 MIN: CPT | Mod: S$GLB,,, | Performed by: FAMILY MEDICINE

## 2022-04-14 PROCEDURE — 1160F PR REVIEW ALL MEDS BY PRESCRIBER/CLIN PHARMACIST DOCUMENTED: ICD-10-PCS | Mod: CPTII,S$GLB,, | Performed by: FAMILY MEDICINE

## 2022-04-14 PROCEDURE — 82043 UR ALBUMIN QUANTITATIVE: CPT | Performed by: FAMILY MEDICINE

## 2022-04-14 PROCEDURE — 3044F HG A1C LEVEL LT 7.0%: CPT | Mod: CPTII,S$GLB,, | Performed by: FAMILY MEDICINE

## 2022-04-14 PROCEDURE — 3288F FALL RISK ASSESSMENT DOCD: CPT | Mod: CPTII,S$GLB,, | Performed by: FAMILY MEDICINE

## 2022-04-14 PROCEDURE — 3075F SYST BP GE 130 - 139MM HG: CPT | Mod: CPTII,S$GLB,, | Performed by: FAMILY MEDICINE

## 2022-04-14 PROCEDURE — 3044F PR MOST RECENT HEMOGLOBIN A1C LEVEL <7.0%: ICD-10-PCS | Mod: CPTII,S$GLB,, | Performed by: FAMILY MEDICINE

## 2022-04-14 PROCEDURE — 1159F PR MEDICATION LIST DOCUMENTED IN MEDICAL RECORD: ICD-10-PCS | Mod: CPTII,S$GLB,, | Performed by: FAMILY MEDICINE

## 2022-04-14 PROCEDURE — 1159F MED LIST DOCD IN RCRD: CPT | Mod: CPTII,S$GLB,, | Performed by: FAMILY MEDICINE

## 2022-04-14 PROCEDURE — 1160F RVW MEDS BY RX/DR IN RCRD: CPT | Mod: CPTII,S$GLB,, | Performed by: FAMILY MEDICINE

## 2022-04-14 PROCEDURE — 3288F PR FALLS RISK ASSESSMENT DOCUMENTED: ICD-10-PCS | Mod: CPTII,S$GLB,, | Performed by: FAMILY MEDICINE

## 2022-04-14 RX ORDER — INSULIN GLARGINE 100 [IU]/ML
25 INJECTION, SOLUTION SUBCUTANEOUS DAILY
Qty: 5 EACH | Refills: 0 | Status: SHIPPED | OUTPATIENT
Start: 2022-04-14 | End: 2023-04-14

## 2022-04-14 RX ORDER — PANTOPRAZOLE SODIUM 40 MG/1
40 TABLET, DELAYED RELEASE ORAL DAILY
Qty: 90 TABLET | Refills: 1 | Status: SHIPPED | OUTPATIENT
Start: 2022-04-14 | End: 2023-03-09

## 2022-04-14 RX ORDER — PEN NEEDLE, DIABETIC 32GX 5/32"
NEEDLE, DISPOSABLE MISCELLANEOUS
Qty: 50 EACH | Refills: 1 | Status: SHIPPED | OUTPATIENT
Start: 2022-04-14

## 2022-04-15 NOTE — PROGRESS NOTES
Coordination of care visit.  Developed right upper extremity weakness in conjunction with his already known right lower extremity issues.  Went to the emergency room.  Was told he had a stroke.  Given tP A. Subsequent MRI showed no stroke.  But cervical disc disease.  radiculopathy from that.  The MRI did show only some old lacunar infarcts.  CT showed some sinus disease also.  C4-5 disc disease with C5 radiculopathy.  Using his aspirin and placed on Plavix now 2.  Despite the negative MR I. As paresthesias in both hands.  Possibly carpal tunnel versus neck related.  Continued right leg paresthesias which were questioning whether disc disease or prior shingles.  Does have mild B12 deficiency of 327.  Diabetes mellitus type 2. A1c is good at 6.6.  TSH is okay.  Cholesterol is good 114 with an HDL of 40 and LDL of 51 PSA is 0.61.  He has had some black loose stools since he has gone on the Plavix.  But he has used a few doses of Pepto-Bismol.    Physical examination vital signs noted.  No acute distress.  neck range of motion is okay minimal pain with extension.   is symmetrical.  Negative Tinel's.  Chest clear to auscultation.  Heart regular rate and rhythm.  abdomen bowel sounds positive soft and nontender.  Extremities are without edema.  rectal exam performed.  Brown stool which is Hemoccult negative.  Subjective:       Patient ID: Senthil Chandler is a 76 y.o. male.    Chief Complaint: Follow-up (3 week)    HPI  Review of Systems    Objective:      Physical Exam    Assessment:       1. Cervical radiculopathy    2. Vitamin B 12 deficiency    3. Visit for monitoring Plavix therapy    4. Aspirin long-term use    5. Paresthesia of both hands    6. Right leg paresthesias    7. Black stools    8. Type 2 diabetes mellitus with diabetic polyneuropathy, with long-term current use of insulin    9. Dorsalgia, unspecified    10. Eructation    11. History of hiatal hernia        Plan:       Cervical radiculopathy  -      "EMG W/ ULTRASOUND AND NERVE CONDUCTION TEST 2 Extremities; Future  -     Ambulatory referral/consult to Orthopedics; Future; Expected date: 04/21/2022    Vitamin B 12 deficiency    Visit for monitoring Plavix therapy    Aspirin long-term use  -     CBC Auto Differential; Future; Expected date: 04/28/2022    Paresthesia of both hands    Right leg paresthesias  -     Ambulatory referral/consult to Orthopedics; Future; Expected date: 04/21/2022    Black stools  -     CBC Auto Differential; Future; Expected date: 04/28/2022    Type 2 diabetes mellitus with diabetic polyneuropathy, with long-term current use of insulin    Dorsalgia, unspecified  -     MRI Lumbar Spine Without Contrast; Future; Expected date: 04/15/2022    Eructation  -     pantoprazole (PROTONIX) 40 MG tablet; Take 1 tablet (40 mg total) by mouth once daily. Before breakfast  Dispense: 90 tablet; Refill: 1    History of hiatal hernia  -     pantoprazole (PROTONIX) 40 MG tablet; Take 1 tablet (40 mg total) by mouth once daily. Before breakfast  Dispense: 90 tablet; Refill: 1    Other orders  -     insulin (LANTUS SOLOSTAR U-100 INSULIN) glargine 100 units/mL (3mL) SubQ pen; Inject 25 Units into the skin once daily.  Dispense: 5 each; Refill: 0  -     pen needle, diabetic 32 gauge x 5/16" Ndle; Use one needle per day with his lantus solostar pen  Dispense: 50 each; Refill: 1      He has old lacunar infarcts.  But no current stroke.  Cervical radiculopathy involving the right upper extremity.  Continue the aspirin and go ahead and finish out the initial dose of Plavix.  The paresthesias of the hands from recommend we get nerve conduction studies to see how much of this is carpal tunnel syndrome.  He also did not understand he was supposed to see Dr Senthil mireles.  We will send him there.  He also needs an MRI of the lumbar spine to see if his right lower extremity is radicular also.  Stool is Hemoccult negative.  But will get a stat CBC to be sure he is not " bleeding.  Change his Lantus from violence 2:00 p.m. this will be easier for him.  Continue 25 units per day per kilos.  Pen needles 32 gauge by 5/16 inch.  Protonix 40 daily 90 with a refill.  Follow-up here in 2 weeks.

## 2022-04-20 ENCOUNTER — TELEPHONE (OUTPATIENT)
Dept: NEUROLOGY | Facility: CLINIC | Age: 77
End: 2022-04-20
Payer: MEDICARE

## 2022-04-20 ENCOUNTER — TELEPHONE (OUTPATIENT)
Dept: PAIN MEDICINE | Facility: CLINIC | Age: 77
End: 2022-04-20
Payer: MEDICARE

## 2022-04-20 ENCOUNTER — OFFICE VISIT (OUTPATIENT)
Dept: ORTHOPEDICS | Facility: CLINIC | Age: 77
End: 2022-04-20
Payer: MEDICARE

## 2022-04-20 VITALS — BODY MASS INDEX: 28.71 KG/M2 | WEIGHT: 212 LBS | HEIGHT: 72 IN

## 2022-04-20 DIAGNOSIS — G81.91 RIGHT HEMIPARESIS: ICD-10-CM

## 2022-04-20 DIAGNOSIS — M47.812 FACET ARTHRITIS OF CERVICAL REGION: ICD-10-CM

## 2022-04-20 DIAGNOSIS — M50.30 DEGENERATIVE DISC DISEASE, CERVICAL: Primary | ICD-10-CM

## 2022-04-20 DIAGNOSIS — Z86.73 RECENT CEREBROVASCULAR ACCIDENT (CVA): ICD-10-CM

## 2022-04-20 DIAGNOSIS — M54.16 LUMBAR RADICULOPATHY: ICD-10-CM

## 2022-04-20 PROCEDURE — 3288F FALL RISK ASSESSMENT DOCD: CPT | Mod: S$GLB,,, | Performed by: ORTHOPAEDIC SURGERY

## 2022-04-20 PROCEDURE — 1125F AMNT PAIN NOTED PAIN PRSNT: CPT | Mod: S$GLB,,, | Performed by: ORTHOPAEDIC SURGERY

## 2022-04-20 PROCEDURE — 99203 OFFICE O/P NEW LOW 30 MIN: CPT | Mod: S$GLB,,, | Performed by: ORTHOPAEDIC SURGERY

## 2022-04-20 PROCEDURE — 3044F PR MOST RECENT HEMOGLOBIN A1C LEVEL <7.0%: ICD-10-PCS | Mod: S$GLB,,, | Performed by: ORTHOPAEDIC SURGERY

## 2022-04-20 PROCEDURE — 99203 PR OFFICE/OUTPT VISIT, NEW, LEVL III, 30-44 MIN: ICD-10-PCS | Mod: S$GLB,,, | Performed by: ORTHOPAEDIC SURGERY

## 2022-04-20 PROCEDURE — 1101F PR PT FALLS ASSESS DOC 0-1 FALLS W/OUT INJ PAST YR: ICD-10-PCS | Mod: S$GLB,,, | Performed by: ORTHOPAEDIC SURGERY

## 2022-04-20 PROCEDURE — 1111F PR DISCHARGE MEDS RECONCILED W/ CURRENT OUTPATIENT MED LIST: ICD-10-PCS | Mod: S$GLB,,, | Performed by: ORTHOPAEDIC SURGERY

## 2022-04-20 PROCEDURE — 1159F MED LIST DOCD IN RCRD: CPT | Mod: S$GLB,,, | Performed by: ORTHOPAEDIC SURGERY

## 2022-04-20 PROCEDURE — 1111F DSCHRG MED/CURRENT MED MERGE: CPT | Mod: S$GLB,,, | Performed by: ORTHOPAEDIC SURGERY

## 2022-04-20 PROCEDURE — 1101F PT FALLS ASSESS-DOCD LE1/YR: CPT | Mod: S$GLB,,, | Performed by: ORTHOPAEDIC SURGERY

## 2022-04-20 PROCEDURE — 3044F HG A1C LEVEL LT 7.0%: CPT | Mod: S$GLB,,, | Performed by: ORTHOPAEDIC SURGERY

## 2022-04-20 PROCEDURE — 1160F RVW MEDS BY RX/DR IN RCRD: CPT | Mod: S$GLB,,, | Performed by: ORTHOPAEDIC SURGERY

## 2022-04-20 PROCEDURE — 1159F PR MEDICATION LIST DOCUMENTED IN MEDICAL RECORD: ICD-10-PCS | Mod: S$GLB,,, | Performed by: ORTHOPAEDIC SURGERY

## 2022-04-20 PROCEDURE — 1125F PR PAIN SEVERITY QUANTIFIED, PAIN PRESENT: ICD-10-PCS | Mod: S$GLB,,, | Performed by: ORTHOPAEDIC SURGERY

## 2022-04-20 PROCEDURE — 1160F PR REVIEW ALL MEDS BY PRESCRIBER/CLIN PHARMACIST DOCUMENTED: ICD-10-PCS | Mod: S$GLB,,, | Performed by: ORTHOPAEDIC SURGERY

## 2022-04-20 PROCEDURE — 3288F PR FALLS RISK ASSESSMENT DOCUMENTED: ICD-10-PCS | Mod: S$GLB,,, | Performed by: ORTHOPAEDIC SURGERY

## 2022-04-20 NOTE — PROGRESS NOTES
Subjective:       Patient ID: Senthil Chandler is a 76 y.o. male.    Chief Complaint: Pain of the Neck (Patient is having cervical pain, he states that he had a stroke, weakness in right arm, numbness in hand, this started about three weeks ago, right arm shakes, stiffness in his neck, awakens from sleep.)      History of Present Illness    Prior to meeting with the patient I reviewed the medical [facility]. This included reviewing the previous progress notes from our office, review of the patient's last appointment with their primary care provider, review of any visits to the emergency room, and review of any pain management appointments or procedures.   Patient is here for initial evaluation with chronic neck pain right greater than left.  He has never formally been evaluated or treated for this in the past.  It is worse when he wakes up in the morning or when he sleeping at night.  Once he is up and moving around it loosens up.    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    The patient also complains of significant right lower extremity pruritus.  So significant that it prevents him from being able to drive.  Some body has determined that this might be lumbar radiculopathy and has ordered a lumbar MRI which is scheduled for May 5, 2022.  Patient does admit to a history of low back pain that is intermittent and at this time not a significant contributor of symptomatology.    Current Medications  Current Outpatient Medications   Medication Sig Dispense Refill    artificial saliva, cmce-lytes, SprP Take by mouth. 1 tsp. Daily prn dry mouth      aspirin (ECOTRIN) 81 MG EC tablet Take 1 tablet (81 mg total) by mouth once daily.  0    atorvastatin (LIPITOR) 80 MG tablet Take 1 tablet (80 mg total) by mouth once daily. 30 tablet 0    carboxymethylcellulose (REFRESH PLUS) 0.5 % Dpet Place 1 drop into both eyes 5 (five) times daily.       "cholecalciferol, vitamin D3, (VITAMIN D3) 2,000 unit Tab Take 1 tablet by mouth once daily.      clopidogreL (PLAVIX) 75 mg tablet Take 1 tablet (75 mg total) by mouth once daily. 21 tablet 0    fluticasone 50 mcg/actuation DsDv Inhale 2 sprays into the lungs once daily.      gabapentin (NEURONTIN) 400 MG capsule Take 400 mg by mouth 3 (three) times daily. 2 qa/ 2 qnoon/ 3 qpm . Take 800 mg morning, 800 mg noon, 1200 mg pm      GAVILYTE-G 236-22.74-6.74 -5.86 gram suspension USE AS DIRECTED  0    insulin (LANTUS SOLOSTAR U-100 INSULIN) glargine 100 units/mL (3mL) SubQ pen Inject 25 Units into the skin once daily. 5 each 0    labetaloL (NORMODYNE) 200 MG tablet Take 1 tablet by mouth 2 (two) times daily.      liraglutide 0.6 mg/0.1 mL, 18 mg/3 mL, subq PNIJ (VICTOZA 2-IVAN) 0.6 mg/0.1 mL (18 mg/3 mL) PnIj Inject 1.8 mg into the skin once daily.      lisinopril 10 MG tablet Take 0.5 tablets by mouth once daily.      oxybutynin (DITROPAN-XL) 10 MG 24 hr tablet Take 1 tablet by mouth once daily.      pantoprazole (PROTONIX) 40 MG tablet Take 1 tablet (40 mg total) by mouth once daily. Before breakfast 90 tablet 1    pen needle, diabetic 32 gauge x 5/16" Ndle Use one needle per day with his lantus solostar pen 50 each 1    trazodone (DESYREL) 100 MG tablet Take 200 mg by mouth every evening.      urea 50 % Crea Apply bid       No current facility-administered medications for this visit.       Allergies  Review of patient's allergies indicates:  No Known Allergies    Past Medical History  Past Medical History:   Diagnosis Date    Adjustment disorder with anxious mood     Angiodysplasia     Arthritis     Cancer 1990    prostate     Carcinoma in situ of prostate     Carpal tunnel syndrome on left     CKD (chronic kidney disease) stage 1, GFR 90 ml/min or greater     Colon polyp     Cubital tunnel syndrome on left     Depressive disorder     Deviated nasal septum     Diabetes mellitus with ophthalmic " complication     Diabetes mellitus, type 2     GERD (gastroesophageal reflux disease)     Hypercholesterolemia     Hyperpotassemia     Hypertension     Iron deficiency anemia     Kidney disease     focal segmental glomerulosclerosis    Lumbago     Malignant neoplasm of colon     Nephrotic syndrome     Neuropathy     Primary osteoarthritis of left elbow     PTSD (post-traumatic stress disorder)     PVD (peripheral vascular disease)     Spinal stenosis, lumbar region without neurogenic claudication     Stomach ulcer     Wears glasses        Surgical History  Past Surgical History:   Procedure Laterality Date    CARPAL TUNNEL RELEASE Left 10/23/2017    COLONOSCOPY N/A 8/12/2016    Procedure: COLONOSCOPY;  Surgeon: Carlos Roman MD;  Location: [facility];  Service: Endoscopy;  Laterality: N/A; repeat in 5 years for surveillance    COLONOSCOPY N/A 2/20/2019    Procedure: COLONOSCOPY;  Surgeon: Carlos Roman MD;  Location: [facility];  Service: Endoscopy;  Laterality: N/A;    COLONOSCOPY N/A 6/30/2020    Procedure: COLONOSCOPY;  Surgeon: Carlos Roman MD;  Location: [facility];  Service: Endoscopy;  Laterality: N/A;    ESOPHAGOGASTRODUODENOSCOPY N/A 2/20/2019    Procedure: EGD (ESOPHAGOGASTRODUODENOSCOPY);  Surgeon: Carlos Roman MD;  Location: [facility];  Service: Endoscopy;  Laterality: N/A;    LAPAROSCOPIC CHOLECYSTECTOMY Right 3/25/2019    Procedure: CHOLECYSTECTOMY, LAPAROSCOPIC;  Surgeon: Mark Renee MD;  Location: [facility] OR;  Service: General;  Laterality: Right;    NASAL SEPTUM SURGERY      PROCTECTOMY      PROSTATE SURGERY  1990's    ROBOT-ASSISTED COLECTOMY Right 3/25/2019    Procedure: ROBOTIC COLECTOMY possible conversion to open;  Surgeon: Mark Renee MD;  Location: [facility] OR;  Service: General;  Laterality: Right;    SUBTOTAL COLECTOMY Right 3/25/2019    Procedure: COLECTOMY, PARTIAL;  Surgeon: Mark Renee MD;  Location: [facility] OR;  Service: General;   Laterality: Right;    UPPER GASTROINTESTINAL ENDOSCOPY  08/12/2016    Dr. Coleman       Family History:   Family History   Problem Relation Age of Onset    Leukemia Mother     Leukemia Father     Colon cancer Neg Hx     Crohn's disease Neg Hx     Ulcerative colitis Neg Hx     Stomach cancer Neg Hx     Esophageal cancer Neg Hx        Social History:   Social History     Socioeconomic History    Marital status: Single   Tobacco Use    Smoking status: Heavy Tobacco Smoker     Packs/day: 1.00     Years: 52.00     Pack years: 52.00     Types: Cigarettes    Smokeless tobacco: Never Used   Substance and Sexual Activity    Alcohol use: Yes     Comment: occasional- maybe one glass of wine a month    Drug use: No       Hospitalization/Major Diagnostic Procedure:     Review of Systems     General/Constitutional:  Chills denies. Fatigue denies. Fever denies. Weight gain denies. Weight loss denies.    Respiratory:  Shortness of breath denies.    Cardiovascular:  Chest pain denies.    Gastrointestinal:  Constipation denies. Diarrhea denies. Nausea denies. Vomiting denies.     Hematology:  Easy bruising denies. Prolonged bleeding denies.     Genitourinary:  Frequent urination denies. Pain in lower back denies. Painful urination denies.     Musculoskeletal:  See HPI for details    Skin:  Rash denies.    Neurologic:  Dizziness denies. Gait abnormalities denies. Seizures denies. Tingling/Numbess denies.    Psychiatric:  Anxiety denies. Depressed mood denies.     Objective:   Vital Signs: There were no vitals filed for this visit.     Physical Exam      General Examination:     Constitutional: The patient is alert and oriented to lace person and time. Mood is pleasant.     Head/Face: Normal facial features normal eyebrows    Eyes: Normal extraocular motion bilaterally    Lungs: Respirations are equal and unlabored    Gait is coordinated.    Cardiovascular: There are no swelling or varicosities present.    Lymphatic:  "Negative for adenopathy    Skin: Normal    Neurological: Level of consciousness normal. Oriented to place person and time and situation    Psychiatric: Oriented to time place person and situation    Patient has an antalgic gait with right lower extremity spasticity and right upper extremity flaccid this.  Cervical range of motion within normal limits without any significant pain visualized today.  The right upper extremity demonstrates significant weakness in all planes mobility and muscle group secondary to hemiparesis because of his recent CVA.  However the patient states there is some improvement in his range of motion and strength with physical therapy/occupational therapy.    XRAY Report/ Interpretation :  Cervical MRI reviewed the patient the office today.  It was done on 04/04/2022 and demonstrates lack of a normal lordotic curvature with multilevel but mild degenerative disc disease and facet arthropathy and no significant stenosis.    Lumbar MRI has been ordered and is scheduled for May 4,  2022.      Assessment:       1. Degenerative disc disease, cervical    2. Facet arthritis of cervical region    3. Lumbar radiculopathy        Plan:       Senthil was seen today for pain.    Diagnoses and all orders for this visit:    Degenerative disc disease, cervical  -     X-Ray Cervical Spine AP And Lateral  -     Ambulatory referral/consult to Pain Clinic; Future    Facet arthritis of cervical region  -     Ambulatory referral/consult to Orthopedics  -     Ambulatory referral/consult to Pain Clinic; Future    Lumbar radiculopathy    Other orders  -     Cancel: X-Ray Lumbar Spine Ap And Lateral  -     Cancel: X-Ray Pelvis Routine AP         No follow-ups on file.  A physician's assistant served in the capacity as a "scribe" for this patient encounter  A "face to face" encounter occurred with Dr. Nobles on this date  The treatment plan and medical decision making is outlined below:  At this time I would recommend " referral to Pain Management for the chronic neck pain that has been recently exacerbated.  More than likely the pain management doctor will recommend cervical medial branch blocks with progression towards rhizotomy.  The only optical that I am might see regarding this would be the fact that he is currently on blood thinners due to his recent thrombolic CVA.  Proceed with the MRI for his lumbar spine as scheduled.  But right lower extremity pruritus is not a real specific or common type of lumbar radiculopathy.  Treatment options were discussed with regards to the nature of the medical condition. Conservative pain intervention and surgical options were discussed in detail. The probability of success of each separate treatment option was discussed. The patient expressed a clear understanding of the treatment options. With regards to surgery, the procedure risk, benefits, complications, and outcomes were discussed. No guarantees were given with regards to surgical outcome.   The risk of complications, morbidity, and mortality of patient management decisions have been made at the time of this visit. These are associated with the patient's problems, diagnostic procedures and treatment options. This includes the possible management options selected and those considered but not selected by the patient after shared medical decision making we discussed with the patient.   This note was created using Dragon voice recognition software that occasionally misinterpreted phrases or words.

## 2022-04-20 NOTE — TELEPHONE ENCOUNTER
Called patient to schedule EMG study in the [facility].  Patient would like to have test performed in the [facility].  I informed patient a message was sent to the [place] [facility] to call him to schedule test.  He understood and agreed

## 2022-04-20 NOTE — TELEPHONE ENCOUNTER
Type Date User Summary Attachment   Provider Comments 04/20/2022  1:28 PM Senthil Nobles MD Provider Comments -   Note    Eval and treat Cerivcal                     procedure or eval?

## 2022-05-03 ENCOUNTER — PATIENT OUTREACH (OUTPATIENT)
Dept: ADMINISTRATIVE | Facility: OTHER | Age: 77
End: 2022-05-03
Payer: MEDICARE

## 2022-05-03 DIAGNOSIS — E11.65 UNCONTROLLED TYPE 2 DIABETES MELLITUS WITH HYPERGLYCEMIA: Primary | ICD-10-CM

## 2022-05-03 NOTE — PROGRESS NOTES
Chart was reviewed for overdue Proactive [facility] Encounters (NILAY)  topics  Updates were requested from care everywhere  Health Maintenance has been updated  [place] immunization registry triggered  Eye screening ordered

## 2022-05-04 ENCOUNTER — HOSPITAL ENCOUNTER (OUTPATIENT)
Dept: RADIOLOGY | Facility: HOSPITAL | Age: 77
Discharge: HOME | End: 2022-05-04
Attending: FAMILY MEDICINE
Payer: MEDICARE

## 2022-05-04 DIAGNOSIS — M54.9 DORSALGIA, UNSPECIFIED: ICD-10-CM

## 2022-05-04 PROCEDURE — 72148 MRI LUMBAR SPINE W/O DYE: CPT | Mod: TC

## 2022-05-05 ENCOUNTER — TELEPHONE (OUTPATIENT)
Dept: FAMILY MEDICINE | Facility: CLINIC | Age: 77
End: 2022-05-05
Payer: MEDICARE

## 2022-05-05 ENCOUNTER — OFFICE VISIT (OUTPATIENT)
Dept: FAMILY MEDICINE | Facility: CLINIC | Age: 77
End: 2022-05-05
Payer: MEDICARE

## 2022-05-05 VITALS
SYSTOLIC BLOOD PRESSURE: 132 MMHG | OXYGEN SATURATION: 97 % | WEIGHT: 208 LBS | DIASTOLIC BLOOD PRESSURE: 86 MMHG | TEMPERATURE: 99 F | HEIGHT: 72 IN | BODY MASS INDEX: 28.17 KG/M2 | HEART RATE: 68 BPM

## 2022-05-05 DIAGNOSIS — E11.42 TYPE 2 DIABETES MELLITUS WITH DIABETIC POLYNEUROPATHY, WITH LONG-TERM CURRENT USE OF INSULIN: ICD-10-CM

## 2022-05-05 DIAGNOSIS — R19.7 DIARRHEA, UNSPECIFIED TYPE: ICD-10-CM

## 2022-05-05 DIAGNOSIS — E78.5 HYPERLIPIDEMIA, UNSPECIFIED HYPERLIPIDEMIA TYPE: Primary | ICD-10-CM

## 2022-05-05 DIAGNOSIS — Z79.82 ASPIRIN LONG-TERM USE: ICD-10-CM

## 2022-05-05 DIAGNOSIS — M54.16 LUMBAR RADICULOPATHY: ICD-10-CM

## 2022-05-05 DIAGNOSIS — M54.12 CERVICAL RADICULOPATHY: ICD-10-CM

## 2022-05-05 DIAGNOSIS — M25.522 LEFT ELBOW PAIN: ICD-10-CM

## 2022-05-05 DIAGNOSIS — Z79.4 TYPE 2 DIABETES MELLITUS WITH DIABETIC POLYNEUROPATHY, WITH LONG-TERM CURRENT USE OF INSULIN: ICD-10-CM

## 2022-05-05 DIAGNOSIS — I69.30 LATE EFFECT OF LACUNAR INFARCTION: ICD-10-CM

## 2022-05-05 DIAGNOSIS — G56.02 CARPAL TUNNEL SYNDROME OF LEFT WRIST: ICD-10-CM

## 2022-05-05 PROCEDURE — 3075F SYST BP GE 130 - 139MM HG: CPT | Mod: CPTII,S$GLB,, | Performed by: FAMILY MEDICINE

## 2022-05-05 PROCEDURE — 3288F PR FALLS RISK ASSESSMENT DOCUMENTED: ICD-10-PCS | Mod: CPTII,S$GLB,, | Performed by: FAMILY MEDICINE

## 2022-05-05 PROCEDURE — 99499 RISK ADDL DX/OHS AUDIT: ICD-10-PCS | Mod: S$GLB,,, | Performed by: FAMILY MEDICINE

## 2022-05-05 PROCEDURE — 3044F HG A1C LEVEL LT 7.0%: CPT | Mod: CPTII,S$GLB,, | Performed by: FAMILY MEDICINE

## 2022-05-05 PROCEDURE — 1111F PR DISCHARGE MEDS RECONCILED W/ CURRENT OUTPATIENT MED LIST: ICD-10-PCS | Mod: CPTII,S$GLB,, | Performed by: FAMILY MEDICINE

## 2022-05-05 PROCEDURE — 1101F PR PT FALLS ASSESS DOC 0-1 FALLS W/OUT INJ PAST YR: ICD-10-PCS | Mod: CPTII,S$GLB,, | Performed by: FAMILY MEDICINE

## 2022-05-05 PROCEDURE — 1111F DSCHRG MED/CURRENT MED MERGE: CPT | Mod: CPTII,S$GLB,, | Performed by: FAMILY MEDICINE

## 2022-05-05 PROCEDURE — 3288F FALL RISK ASSESSMENT DOCD: CPT | Mod: CPTII,S$GLB,, | Performed by: FAMILY MEDICINE

## 2022-05-05 PROCEDURE — 3079F DIAST BP 80-89 MM HG: CPT | Mod: CPTII,S$GLB,, | Performed by: FAMILY MEDICINE

## 2022-05-05 PROCEDURE — 99214 OFFICE O/P EST MOD 30 MIN: CPT | Mod: S$GLB,,, | Performed by: FAMILY MEDICINE

## 2022-05-05 PROCEDURE — 1101F PT FALLS ASSESS-DOCD LE1/YR: CPT | Mod: CPTII,S$GLB,, | Performed by: FAMILY MEDICINE

## 2022-05-05 PROCEDURE — 1126F PR PAIN SEVERITY QUANTIFIED, NO PAIN PRESENT: ICD-10-PCS | Mod: CPTII,S$GLB,, | Performed by: FAMILY MEDICINE

## 2022-05-05 PROCEDURE — 99499 UNLISTED E&M SERVICE: CPT | Mod: S$GLB,,, | Performed by: FAMILY MEDICINE

## 2022-05-05 PROCEDURE — 3075F PR MOST RECENT SYSTOLIC BLOOD PRESS GE 130-139MM HG: ICD-10-PCS | Mod: CPTII,S$GLB,, | Performed by: FAMILY MEDICINE

## 2022-05-05 PROCEDURE — 1126F AMNT PAIN NOTED NONE PRSNT: CPT | Mod: CPTII,S$GLB,, | Performed by: FAMILY MEDICINE

## 2022-05-05 PROCEDURE — 3044F PR MOST RECENT HEMOGLOBIN A1C LEVEL <7.0%: ICD-10-PCS | Mod: CPTII,S$GLB,, | Performed by: FAMILY MEDICINE

## 2022-05-05 PROCEDURE — 99214 PR OFFICE/OUTPT VISIT, EST, LEVL IV, 30-39 MIN: ICD-10-PCS | Mod: S$GLB,,, | Performed by: FAMILY MEDICINE

## 2022-05-05 PROCEDURE — 3079F PR MOST RECENT DIASTOLIC BLOOD PRESSURE 80-89 MM HG: ICD-10-PCS | Mod: CPTII,S$GLB,, | Performed by: FAMILY MEDICINE

## 2022-05-05 RX ORDER — VANCOMYCIN HYDROCHLORIDE 125 MG/1
125 CAPSULE ORAL 4 TIMES DAILY
Qty: 40 CAPSULE | Refills: 0 | Status: SHIPPED | OUTPATIENT
Start: 2022-05-05 | End: 2022-06-08

## 2022-05-05 RX ORDER — ATORVASTATIN CALCIUM 20 MG/1
20 TABLET, FILM COATED ORAL DAILY
Qty: 90 TABLET | Refills: 1 | Status: SHIPPED | OUTPATIENT
Start: 2022-05-05 | End: 2023-05-05

## 2022-05-05 RX ORDER — VANCOMYCIN HYDROCHLORIDE 125 MG/1
125 CAPSULE ORAL 4 TIMES DAILY
COMMUNITY
End: 2022-05-05 | Stop reason: SDUPTHER

## 2022-05-05 NOTE — TELEPHONE ENCOUNTER
"Pt stated his arm went numb this morning he said he spoke to [facility] nurse and they told him call an ambulance he said "HELL NO". I was letting him know about lumbar results but coming in 310 today he will try and find a ride     ----- Message from Don Hernandez III, MD sent at 5/4/2022  9:06 PM CDT -----  ABNORMAL followup to discuss further action.    "

## 2022-05-06 ENCOUNTER — HOSPITAL ENCOUNTER (OUTPATIENT)
Dept: RADIOLOGY | Facility: HOSPITAL | Age: 77
Discharge: HOME | End: 2022-05-06
Attending: FAMILY MEDICINE
Payer: MEDICARE

## 2022-05-06 ENCOUNTER — OFFICE VISIT (OUTPATIENT)
Dept: PAIN MEDICINE | Facility: CLINIC | Age: 77
End: 2022-05-06
Payer: MEDICARE

## 2022-05-06 VITALS
WEIGHT: 208 LBS | HEIGHT: 72 IN | HEART RATE: 74 BPM | BODY MASS INDEX: 28.17 KG/M2 | DIASTOLIC BLOOD PRESSURE: 76 MMHG | SYSTOLIC BLOOD PRESSURE: 119 MMHG

## 2022-05-06 DIAGNOSIS — M47.812 FACET ARTHRITIS OF CERVICAL REGION: ICD-10-CM

## 2022-05-06 DIAGNOSIS — M25.522 LEFT ELBOW PAIN: ICD-10-CM

## 2022-05-06 DIAGNOSIS — M50.30 DEGENERATIVE DISC DISEASE, CERVICAL: ICD-10-CM

## 2022-05-06 DIAGNOSIS — I69.30 HISTORY OF CEREBROVASCULAR ACCIDENT (CVA) WITH RESIDUAL DEFICIT: Primary | ICD-10-CM

## 2022-05-06 PROCEDURE — 1159F PR MEDICATION LIST DOCUMENTED IN MEDICAL RECORD: ICD-10-PCS | Mod: CPTII,S$GLB,, | Performed by: ANESTHESIOLOGY

## 2022-05-06 PROCEDURE — 99204 PR OFFICE/OUTPT VISIT, NEW, LEVL IV, 45-59 MIN: ICD-10-PCS | Mod: S$GLB,,, | Performed by: ANESTHESIOLOGY

## 2022-05-06 PROCEDURE — 3288F FALL RISK ASSESSMENT DOCD: CPT | Mod: CPTII,S$GLB,, | Performed by: ANESTHESIOLOGY

## 2022-05-06 PROCEDURE — 1125F PR PAIN SEVERITY QUANTIFIED, PAIN PRESENT: ICD-10-PCS | Mod: CPTII,S$GLB,, | Performed by: ANESTHESIOLOGY

## 2022-05-06 PROCEDURE — 3078F DIAST BP <80 MM HG: CPT | Mod: CPTII,S$GLB,, | Performed by: ANESTHESIOLOGY

## 2022-05-06 PROCEDURE — 3044F HG A1C LEVEL LT 7.0%: CPT | Mod: CPTII,S$GLB,, | Performed by: ANESTHESIOLOGY

## 2022-05-06 PROCEDURE — 3078F PR MOST RECENT DIASTOLIC BLOOD PRESSURE < 80 MM HG: ICD-10-PCS | Mod: CPTII,S$GLB,, | Performed by: ANESTHESIOLOGY

## 2022-05-06 PROCEDURE — 1101F PR PT FALLS ASSESS DOC 0-1 FALLS W/OUT INJ PAST YR: ICD-10-PCS | Mod: CPTII,S$GLB,, | Performed by: ANESTHESIOLOGY

## 2022-05-06 PROCEDURE — 99999 PR PBB SHADOW E&M-EST. PATIENT-LVL IV: CPT | Mod: PBBFAC,,, | Performed by: ANESTHESIOLOGY

## 2022-05-06 PROCEDURE — 73070 X-RAY EXAM OF ELBOW: CPT | Mod: TC,PO,LT

## 2022-05-06 PROCEDURE — 3044F PR MOST RECENT HEMOGLOBIN A1C LEVEL <7.0%: ICD-10-PCS | Mod: CPTII,S$GLB,, | Performed by: ANESTHESIOLOGY

## 2022-05-06 PROCEDURE — 1159F MED LIST DOCD IN RCRD: CPT | Mod: CPTII,S$GLB,, | Performed by: ANESTHESIOLOGY

## 2022-05-06 PROCEDURE — 99999 PR PBB SHADOW E&M-EST. PATIENT-LVL IV: ICD-10-PCS | Mod: PBBFAC,,, | Performed by: ANESTHESIOLOGY

## 2022-05-06 PROCEDURE — 1125F AMNT PAIN NOTED PAIN PRSNT: CPT | Mod: CPTII,S$GLB,, | Performed by: ANESTHESIOLOGY

## 2022-05-06 PROCEDURE — 3288F PR FALLS RISK ASSESSMENT DOCUMENTED: ICD-10-PCS | Mod: CPTII,S$GLB,, | Performed by: ANESTHESIOLOGY

## 2022-05-06 PROCEDURE — 3074F PR MOST RECENT SYSTOLIC BLOOD PRESSURE < 130 MM HG: ICD-10-PCS | Mod: CPTII,S$GLB,, | Performed by: ANESTHESIOLOGY

## 2022-05-06 PROCEDURE — 1101F PT FALLS ASSESS-DOCD LE1/YR: CPT | Mod: CPTII,S$GLB,, | Performed by: ANESTHESIOLOGY

## 2022-05-06 PROCEDURE — 3074F SYST BP LT 130 MM HG: CPT | Mod: CPTII,S$GLB,, | Performed by: ANESTHESIOLOGY

## 2022-05-06 PROCEDURE — 99204 OFFICE O/P NEW MOD 45 MIN: CPT | Mod: S$GLB,,, | Performed by: ANESTHESIOLOGY

## 2022-05-06 NOTE — PROGRESS NOTES
"Referring Physician: Senthil Nobles MD    PCP: Don Hernandez III, MD    CC: right arm weakness and numbness; neck pain    HPI:   Senthil Chandler is a 76 y.o. male with PMH significant for hx of CVA on ASA, DM II on insulin, and HTN presents as a referral for the evaluation of right arm weakness and numbness and neck pain. The patient reports that the main reason he presents today is because he believes his current home therapy is inadequate and not intense enough to help him regain function of his right arm. While he does endorse of neck pain, the patient reports that is not his primary concern currently.     The patient reports that his symptoms began approximately in April 2022 when he woke up and was unable to use his right arm. He reports of weakness and numbness in his RUE for a few days. The patient reports that this episode was attributed to possible CVA. Per chart review, the patient received tPA at this time. The patient has been participating in PT at home with some improvement but he states that PT is not involved enough to improve his symptoms. The patient denies of "new" pain since this episode. In regards to his pain, the patient localizes his pain to the right side of his neck. The patient denies of radiation of his neck pain. The patient describes his pain as a sharp type of pain. The patient reports of numbness in his right 2nd and 3rd digit. The patient reports that his pain is a 0/10 as he is currently sitting. Patient denies of any urinary/fecal incontinence or saddle anesthesia,. The patient reports of weakness in his RUE since the episode in April.     Aggravating factors: standing and walking, neck movement    Mitigating factors: N/A    Relevant Surgeries: no; patient denies of prior back surgery    Interventional Therapies: no    : Not applicable      Non-pharmacologic Treatment:     · Physical Therapy: yes; currently doing at home PT  · Ice/Heat: yes; prefers heat over ice  · TENS: " yes; owns a unit and has used in the past  · Massage: yes  · Chiropractic care: yes  · Acupuncture: yes         Pain Medications:         · Currently taking: gabapentin 800/800/1200 mg, OTC Tylenol    · Has tried in the past:    · Opioids: yes  · NSAIDS: yes  · Tylenol: yes  · Muscle relaxants: no  · TCAs: no  · SNRIs: no  · Anticonvulsants: yes  · topical creams: yes; topical rubs with mild benefit     Anticoagulation: yes; ASA and Plavix    ROS:  Review of Systems   Constitutional: Negative for chills and fever.   HENT: Negative for tinnitus.    Eyes: Negative for visual disturbance.   Respiratory: Negative for shortness of breath.    Cardiovascular: Negative for chest pain.   Gastrointestinal: Negative for nausea and vomiting.   Genitourinary: Negative for difficulty urinating.   Musculoskeletal: Positive for neck pain and neck stiffness.   Skin: Negative for rash.   Allergic/Immunologic: Negative for immunocompromised state.   Neurological: Positive for weakness and numbness. Negative for syncope.   Hematological: Does not bruise/bleed easily.   Psychiatric/Behavioral: Negative for suicidal ideas.        Past Medical History:   Diagnosis Date    Adjustment disorder with anxious mood     Angiodysplasia     Arthritis     Cancer 1990    prostate     Carcinoma in situ of prostate     Carpal tunnel syndrome on left     CKD (chronic kidney disease) stage 1, GFR 90 ml/min or greater     Colon polyp     Cubital tunnel syndrome on left     Depressive disorder     Deviated nasal septum     Diabetes mellitus with ophthalmic complication     Diabetes mellitus, type 2     GERD (gastroesophageal reflux disease)     Hypercholesterolemia     Hyperpotassemia     Hypertension     Iron deficiency anemia     Kidney disease     focal segmental glomerulosclerosis    Lumbago     Malignant neoplasm of colon     Nephrotic syndrome     Neuropathy     Primary osteoarthritis of left elbow     PTSD (post-traumatic  stress disorder)     PVD (peripheral vascular disease)     Spinal stenosis, lumbar region without neurogenic claudication     Stomach ulcer     Wears glasses      Past Surgical History:   Procedure Laterality Date    CARPAL TUNNEL RELEASE Left 10/23/2017    COLONOSCOPY N/A 8/12/2016    Procedure: COLONOSCOPY;  Surgeon: Carlos Roman MD;  Location: [facility] ENDO;  Service: Endoscopy;  Laterality: N/A; repeat in 5 years for surveillance    COLONOSCOPY N/A 2/20/2019    Procedure: COLONOSCOPY;  Surgeon: Carlos Roman MD;  Location: [facility] ENDO;  Service: Endoscopy;  Laterality: N/A;    COLONOSCOPY N/A 6/30/2020    Procedure: COLONOSCOPY;  Surgeon: Carlos Roman MD;  Location: [facility] ENDO;  Service: Endoscopy;  Laterality: N/A;    ESOPHAGOGASTRODUODENOSCOPY N/A 2/20/2019    Procedure: EGD (ESOPHAGOGASTRODUODENOSCOPY);  Surgeon: Carlos Roman MD;  Location: [facility] ENDO;  Service: Endoscopy;  Laterality: N/A;    LAPAROSCOPIC CHOLECYSTECTOMY Right 3/25/2019    Procedure: CHOLECYSTECTOMY, LAPAROSCOPIC;  Surgeon: Mark Renee MD;  Location: [facility] OR;  Service: General;  Laterality: Right;    NASAL SEPTUM SURGERY      PROCTECTOMY      PROSTATE SURGERY  1990's    ROBOT-ASSISTED COLECTOMY Right 3/25/2019    Procedure: ROBOTIC COLECTOMY possible conversion to open;  Surgeon: Mark Renee MD;  Location: [facility] OR;  Service: General;  Laterality: Right;    SUBTOTAL COLECTOMY Right 3/25/2019    Procedure: COLECTOMY, PARTIAL;  Surgeon: Mark Renee MD;  Location: [facility] OR;  Service: General;  Laterality: Right;    UPPER GASTROINTESTINAL ENDOSCOPY  08/12/2016    Dr. Roman     Family History   Problem Relation Age of Onset    Leukemia Mother     Leukemia Father     Colon cancer Neg Hx     Crohn's disease Neg Hx     Ulcerative colitis Neg Hx     Stomach cancer Neg Hx     Esophageal cancer Neg Hx      Social History     Socioeconomic History    Marital status: Single   Tobacco Use    Smoking  status: Heavy Tobacco Smoker     Packs/day: 1.00     Years: 52.00     Pack years: 52.00     Types: Cigarettes    Smokeless tobacco: Never Used   Substance and Sexual Activity    Alcohol use: Yes     Comment: occasional- maybe one glass of wine a month    Drug use: No         Allergies: See med card    Vitals:    05/06/22 1311   BP: 119/76   Pulse: 74   Weight: 94.3 kg (208 lb)   Height: 6' (1.829 m)   PainSc:   4   PainLoc: Back         Physical exam:  Physical Exam  Vitals and nursing note reviewed.   Constitutional:       Appearance: He is not diaphoretic.   HENT:      Head: Normocephalic and atraumatic.   Eyes:      General:         Right eye: No discharge.         Left eye: No discharge.      Conjunctiva/sclera: Conjunctivae normal.   Cardiovascular:      Rate and Rhythm: Normal rate.   Pulmonary:      Effort: Pulmonary effort is normal. No respiratory distress.      Breath sounds: Normal breath sounds.   Abdominal:      Palpations: Abdomen is soft.   Skin:     General: Skin is warm and dry.      Findings: No rash.   Neurological:      Mental Status: He is alert and oriented to person, place, and time.   Psychiatric:         Mood and Affect: Mood and affect normal.         Cognition and Memory: Memory normal.         Judgment: Judgment normal.          UPPER EXTREMITIES: Normal alignment, normal range of motion, no atrophy, no skin changes,  hair growth and nail growth normal and equal bilaterally. No swelling, no tenderness.    LOWER EXTREMITIES:  Normal alignment, normal range of motion, no atrophy, no skin changes,  hair growth and nail growth normal and equal bilaterally. No swelling, no tenderness.    CERVICAL SPINE:  Cervical spine: ROM is full in flexion, extension and lateral rotation without increased pain.  ((--)) Spurling's maneuver - positive for axial neck pain  Myofascial exam: No Tenderness to palpation across cervical paraspinous region bilaterally.    CRANIAL NERVES:  II:  PERRL bilaterally,    III,IV,VI: EOMI.    V:  Facial sensation equal bilaterally  VII:  Facial motor function normal.  VIII:  Hearing equal to finger rub bilaterally  IX/X: Gag normal, palate symmetric  XI:  Shoulder shrug equal, head turn equal  XII:  Tongue midline without fasciculations      MOTOR: Tone and bulk: normal     MUSCLE STRENGTH:  Hip Flexion: Right 5/5, Left 5/5  Hip Extension: Right 5/5, Left 5/5  Leg Flexion: Right 5/5, Left 5/5  Leg Extension: Right 5/5, Left 5/5  Plantar Flexion: Right 5/5, Left 5/5  Dorsiflexion: Right 5/5, Left 5/5    SENSATION: Light touch and pinprick intact bilaterally  REFLEXES: normal, symmetric, nonbrisk.  Toes down, no clonus. Negative ambriz's sign bilaterally.  GAIT: normal rise, base, steps, and arm swing.        Imaging:  MRI Cervical Spine without contrast (4/4/2022):  Whole body heights are maintained with no acute compression and there is no abnormal marrow signal suggesting acute fracture or osseous destruction.  Diffuse disc desiccation noted.  Very minimal anterior positioning of C 4 relative to C5 that is most likely on a degenerative basis.  Mild degenerative changes appearing slightly more so C5-C6.     C2-C3: No disc protrusion or spinal canal or neural foraminal narrowing     C3-C4: Slight uncovertebral spurring with accompanying disc bulge without spinal canal narrowing.  Moderate bilateral neural foraminal narrowing     C4-C5 slight uncovertebral spurring and accompanying disc bulge.  Facet arthropathy.  No spinal canal narrowing.  Moderate severe right neural foraminal narrowing.  Just mild left neural foraminal narrowing     C5-C6: Mild facet arthropathy and slight uncovertebral spurring.  No spinal canal narrowing.  Mild to moderate right neural foraminal narrowing     C6-C7: Very small posterior midline disc bulge/protrusion with just very mild impression anteriorly on the thecal sac without appreciable significant spinal canal narrowing and just very mild bilateral  neural foraminal narrowing     The cervical spinal cord is intrinsically normal in appearance.  Craniocervical junction as visualized unremarkable appearance     Impression:     Mild degenerative change and mild disc bulges with no spinal canal narrowing.  Neural foraminal narrowing as described.  Very minimal positional change C4-C5 likely on a degenerative basis.  Diffuse disc desiccation    MRI Lumbar Spine without contrast (5/4/2022):  FINDINGS: Comparison to multiple prior exams. There is mild leftward convex lumbar spinal curvature apex L2, with normal lordotic curvature and vertebral body alignment, and no acute fractures or evidence of a diffuse marrow replacement process. Mild to moderate degenerative loss of disc height and signal involves multiple levels, with the conus medullaris normal in signal, terminating at L1.     At T12-L1 and L1-L2, there is no significant disc bulging or focal disc herniation, with no spinal canal stenosis. There is mild facet arthropathy at L1-L2.     At L2-L3, there is minimal broad-based disc bulging without focal disc herniation, with moderate bilateral facet arthropathy and ligamentum flavum thickening. No significant spinal canal stenosis.     At L3-L4, there is mild broad-based disc bulging, with severe bilateral facet arthropathy and ligamentum flavum thickening, resulting in mild spinal canal stenosis. There is moderate bilateral neural foraminal narrowing right greater than left.     At L4-L5, there are postoperative changes of remote laminectomy, with broad-based disc bulging and T2 hyperintense longitudinal central annular fissuring. There is severe bilateral facet arthropathy with T2 hyperintense facet joint effusions and synovial cyst formation, without spinal canal stenosis. Facet arthropathy contributes to severe bilateral neural foraminal stenosis.     At L5-S1, there is a broad-based disc protrusion with T2 hyperintense annular fissuring, with facet arthropathy  and ligamentum flavum thickening. There is no significant spinal canal stenosis, with moderate bilateral neural foraminal narrowing, and potential exiting bilateral S1 nerve root impingement.     There are no signal abnormalities of the paraspinal soft tissues or the paraspinal ligaments. Circumscribed T2 hyperintensities in both kidneys are suggestive of simple cysts.     IMPRESSION:  1. Mild spinal canal stenosis at L3-L4.  2. Severe bilateral facet osteoarthrosis at L4-L5, with severe bilateral neural foraminal narrowing.  3. Broad-based disc protrusion at L5-S1, with potential bilateral S1 exiting nerve root impingement.  4. Previous lumbar laminectomy L4-L5.    Assessment: Senthil Chandler is a 76 y.o. male with PMH significant for hx of CVA on ASA, DM II on insulin, and HTN presents as a referral for the evaluation of right arm weakness and numbness and neck pain. The patient reports that the main reason he presents today is because he believes his current home therapy is inadequate and not intense enough to help him regain function of his right arm. While he does endorse of neck pain, the patient reports that is not his primary concern currently. Treatment plan outlined below.     Plan:  - OT referral for strength and conditioning s/p possible CVA requiring tPA.   - Instructed the patient to return if he would like to get his pain addressed. Patient expressed understanding  - I have stressed the importance of physical activity and a home exercise plan to help with chronic pain and improve health.  - Continue current pain regimen as prescribed  - RTC PRN    Thank you for referring this interesting patient, and I look forward to continuing to collaborate in his care.    Chari Briceño MD  Pain Management

## 2022-05-07 PROBLEM — Z79.82 ASPIRIN LONG-TERM USE: Status: ACTIVE | Noted: 2022-05-07

## 2022-05-07 PROBLEM — M54.16 LUMBAR RADICULOPATHY: Status: ACTIVE | Noted: 2022-05-07

## 2022-05-07 PROBLEM — I69.30 LATE EFFECT OF LACUNAR INFARCTION: Status: ACTIVE | Noted: 2022-05-07

## 2022-05-07 PROBLEM — M54.12 CERVICAL RADICULOPATHY: Status: ACTIVE | Noted: 2022-05-07

## 2022-05-07 PROBLEM — E11.42 TYPE 2 DIABETES MELLITUS WITH DIABETIC POLYNEUROPATHY, WITH LONG-TERM CURRENT USE OF INSULIN: Status: ACTIVE | Noted: 2022-05-07

## 2022-05-07 PROBLEM — Z79.4 TYPE 2 DIABETES MELLITUS WITH DIABETIC POLYNEUROPATHY, WITH LONG-TERM CURRENT USE OF INSULIN: Status: ACTIVE | Noted: 2022-05-07

## 2022-05-07 NOTE — PROGRESS NOTES
Subjective:       Patient ID: Senthil Chandler is a 76 y.o. male.    Chief Complaint: Arm Pain (Left arm)    Right arm pain since the radiculopathy came up.  Numbness in the left hand today.  Diarrhea for month or so now.  Stop the Plavix.  Also questioning the high Lipitor dose and he was placed on in the hospital.  Left elbow had old surgery on this.  Correct deformity.  Old lacunar infarcts on his scan of his head.  But no new CVA.  Has cervical and lumbar radiculopathies.  S1 nerve impingements.  Sent to Dr. Briceño by Dr. Nobles.  Has carpal tunnel syndrome on also.  Diabetes mellitus type 2 on insulin.  Using 25 units.  Morning sugar  very good.  He quit the Plavix.  On his aspirin 81 per day.  Hyperlipidemia given the high-dose Lipitor in the hospital in they thought he had had a stroke.  But turned out to be radiculopathy.  Diarrhea 3 or 4 times a day for the past 2 months.  Started while he was in the hospital.  No abdominal pain.    Physical examination vital signs are noted.  No acute distress.  Positive Tinel's.  This scar over the medial elbow.  Good range of motion without pain.  Neck without bruit.  Chest clear.  Heart regular rate rhythm.   abdomen bowel sounds are positive soft and nontender.  Extremities are without edema positive pulses.    Review of Systems    Objective:      Physical Exam    Assessment:       1. Hyperlipidemia, unspecified hyperlipidemia type    2. Aspirin long-term use    3. Type 2 diabetes mellitus with diabetic polyneuropathy, with long-term current use of insulin    4. Left elbow pain    5. Carpal tunnel syndrome of left wrist    6. Cervical radiculopathy    7. Lumbar radiculopathy    8. Diarrhea, unspecified type    9. Late effect of lacunar infarction        Plan:       Hyperlipidemia, unspecified hyperlipidemia type    Aspirin long-term use    Type 2 diabetes mellitus with diabetic polyneuropathy, with long-term current use of insulin    Left elbow pain  -     X-Ray  Elbow 2 Views Left; Future; Expected date: 05/05/2022    Carpal tunnel syndrome of left wrist    Cervical radiculopathy    Lumbar radiculopathy    Diarrhea, unspecified type    Late effect of lacunar infarction    Other orders  -     atorvastatin (LIPITOR) 20 MG tablet; Take 1 tablet (20 mg total) by mouth once daily.  Dispense: 90 tablet; Refill: 1  -     vancomycin (VANCOCIN) 125 MG capsule; Take 1 capsule (125 mg total) by mouth 4 (four) times daily.  Dispense: 40 capsule; Refill: 0      Osman crease is atorvastatin back down 20 mg.  Stool culture.  Stool for Clostridium difficile.  Vancomycin 125 mg q.i.d. pending the C diff.  EMG nerve conduction studies of the upper extremity.  There has been some confusion regarding this.  It appears to be scheduled already.  But he is unaware.  X-ray the left elbow.

## 2022-05-09 LAB
C JEJUNI+C COLI AG STL QL: NORMAL
E COLI SXT STL QL IA: NORMAL
SALM + SHIG STL CULT: NORMAL

## 2022-05-11 ENCOUNTER — CLINICAL SUPPORT (OUTPATIENT)
Dept: REHABILITATION | Facility: HOSPITAL | Age: 77
End: 2022-05-11
Attending: ANESTHESIOLOGY
Payer: MEDICARE

## 2022-05-11 ENCOUNTER — OFFICE VISIT (OUTPATIENT)
Dept: PHYSICAL MEDICINE AND REHAB | Facility: CLINIC | Age: 77
End: 2022-05-11
Payer: MEDICARE

## 2022-05-11 DIAGNOSIS — M50.30 DEGENERATIVE DISC DISEASE, CERVICAL: ICD-10-CM

## 2022-05-11 DIAGNOSIS — I69.30 HISTORY OF CEREBROVASCULAR ACCIDENT (CVA) WITH RESIDUAL DEFICIT: ICD-10-CM

## 2022-05-11 DIAGNOSIS — M54.12 CERVICAL RADICULOPATHY: ICD-10-CM

## 2022-05-11 DIAGNOSIS — R29.898 WEAKNESS OF EXTREMITY: Primary | ICD-10-CM

## 2022-05-11 DIAGNOSIS — M47.812 FACET ARTHRITIS OF CERVICAL REGION: ICD-10-CM

## 2022-05-11 LAB — C DIFF GDH STL QL: NORMAL

## 2022-05-11 PROCEDURE — 99499 UNLISTED E&M SERVICE: CPT | Mod: S$GLB,,, | Performed by: PHYSICAL MEDICINE & REHABILITATION

## 2022-05-11 PROCEDURE — 97110 THERAPEUTIC EXERCISES: CPT | Mod: PN

## 2022-05-11 PROCEDURE — 97161 PT EVAL LOW COMPLEX 20 MIN: CPT | Mod: PN

## 2022-05-11 PROCEDURE — 99499 NO LOS: ICD-10-PCS | Mod: S$GLB,,, | Performed by: PHYSICAL MEDICINE & REHABILITATION

## 2022-05-11 PROCEDURE — 3044F PR MOST RECENT HEMOGLOBIN A1C LEVEL <7.0%: ICD-10-PCS | Mod: CPTII,S$GLB,, | Performed by: PHYSICAL MEDICINE & REHABILITATION

## 2022-05-11 PROCEDURE — 3044F HG A1C LEVEL LT 7.0%: CPT | Mod: CPTII,S$GLB,, | Performed by: PHYSICAL MEDICINE & REHABILITATION

## 2022-05-11 NOTE — PLAN OF CARE
[facility]  Physical Therapy Neurological Rehabilitation Initial Evaluation    Name: Senthil Chandler  Clinic Number: [id number]    Therapy Diagnosis:   Encounter Diagnoses   Name Primary?    Degenerative disc disease, cervical     Facet arthritis of cervical region     History of cerebrovascular accident (CVA) with residual deficit     Weakness of extremity Yes     Physician: Chari Briceño MD    Physician Orders: PT Eval and Treat   Medical Diagnosis from Referral: Degenerative disc disease, cervical; facet arthritis, cervical;  Hx of CVA with residuals deficits  Evaluation Date: 5/11/2022  Authorization Period Expiration: 12/31/22  Plan of Care Expiration: 6/30/22  Visit # / Visits authorized: 1/ 1    Time In: 1200  Time Out: 1245  Total Billable Time: 45 minutes    Precautions: Standard    Subjective   Date of onset: 4/2/22  History of current condition - Senthil reports: sudeen onset of right arm weakness 4/2/22 with loss of sensation. Admitted for 4 day in hospital and when discharged received Home health therapy. Referred for OP. Patient is upset that it is not his neck but his arm that's the problem.     Medical History:   Past Medical History:   Diagnosis Date    Adjustment disorder with anxious mood     Angiodysplasia     Arthritis     Cancer 1990    prostate     Carcinoma in situ of prostate     Carpal tunnel syndrome on left     CKD (chronic kidney disease) stage 1, GFR 90 ml/min or greater     Colon polyp     Cubital tunnel syndrome on left     Depressive disorder     Deviated nasal septum     Diabetes mellitus with ophthalmic complication     Diabetes mellitus, type 2     GERD (gastroesophageal reflux disease)     Hypercholesterolemia     Hyperpotassemia     Hypertension     Iron deficiency anemia     Kidney disease     focal segmental glomerulosclerosis    Lumbago     Malignant neoplasm of colon     Nephrotic syndrome     Neuropathy     Primary  osteoarthritis of left elbow     PTSD (post-traumatic stress disorder)     PVD (peripheral vascular disease)     Spinal stenosis, lumbar region without neurogenic claudication     Stomach ulcer     Wears glasses        Surgical History:   Senthil Chandler  has a past surgical history that includes Prostate surgery (1990's); Nasal septum surgery; Carpal tunnel release (Left, 10/23/2017); Proctectomy; Colonoscopy (N/A, 8/12/2016); Upper gastrointestinal endoscopy (08/12/2016); Esophagogastroduodenoscopy (N/A, 2/20/2019); Colonoscopy (N/A, 2/20/2019); Robot-assisted colectomy (Right, 3/25/2019); Subtotal colectomy (Right, 3/25/2019); Laparoscopic cholecystectomy (Right, 3/25/2019); and Colonoscopy (N/A, 6/30/2020).    Medications:   Senthil has a current medication list which includes the following prescription(s): artificial saliva (cmce-lytes), aspirin, atorvastatin, carboxymethylcellulose, cholecalciferol (vitamin d3), clopidogrel, fluticasone propionate, gabapentin, gavilyte-g, lantus solostar u-100 insulin, labetalol, liraglutide 0.6 mg/0.1 ml (18 mg/3 ml) subq pnij, lisinopril, oxybutynin, pantoprazole, pen needle, diabetic, trazodone, urea, and vancomycin.    Allergies:   Review of patient's allergies indicates:  No Known Allergies     Imaging, : MRI cervical spine 4/4/22 Whole body heights are maintained with no acute compression and there is no abnormal marrow signal suggesting acute fracture or osseous destruction.  Diffuse disc desiccation noted.  Very minimal anterior positioning of C 4 relative to C5 that is most likely on a degenerative basis.  Mild degenerative changes appearing slightly more so C5-C6.   C2-C3: No disc protrusion or spinal canal or neural foraminal narrowing   C3-C4: Slight uncovertebral spurring with accompanying disc bulge without spinal canal narrowing.  Moderate bilateral neural foraminal narrowing   C4-C5 slight uncovertebral spurring and accompanying disc bulge.  Facet  "arthropathy.  No spinal canal narrowing.  Moderate severe right neural foraminal narrowing.  Just mild left neural foraminal narrowing   C5-C6: Mild facet arthropathy and slight uncovertebral spurring.  No spinal canal narrowing.  Mild to moderate right neural foraminal narrowing   C6-C7: Very small posterior midline disc bulge/protrusion with just very mild impression anteriorly on the thecal sac without appreciable significant spinal canal narrowing and just very mild bilateral neural foraminal narrowing   The cervical spinal cord is intrinsically normal in appearance.  Craniocervical junction as visualized unremarkable appearance    Prior Therapy: In p[atient and [facility]  Social History: lives alone   Falls: none   DME:  cane   Home Environment: no steps/stairs   Exercise Routine / History: none; plays in the [employer]   Family Present at time of Eval: none   Occupation:   Prior Level of Function: Independent  Current Level of Function: difficulty opening jars/grasping;    Pain: None    Patient's goals: " I want to restore the use of my right hand and arm,"    Objective     Posture: + scoliosis left shoulder lower  Palpation: no bony tenderness noted  Sensation: decreased pin prick sensation to #2 #3 finger R hand  Range of Motion:  BUE's  WFL  Cervical WFL with end range rigidity on lateral flexion.  Upper  Extremities  Right  Left  Pain/Dysfunction with Movement    Shoulder PROM MMT PROM MMT    flexion WFL 3+ WFL 4    Abduction WFL 3= WFL 4    External rotation WFL 3 WFL 4    Internal rotation WFL 3 WFL 4    Elbow        Flexion WFL 3+ WFL 4    Extension WFL 3 WFL 4    Forearm        pronation WFL 3 WFL 4    supination WFL 3+ WFL 4    Wrist        Extension WFL 3+ WFL 5    Flexion WFL 3+ WFL 5    Radial deviation WFL 3+ WFL 4    Ulnar deviation WFL 3+ WFL 4       Left hand dominant  Handgrip (dynamometer):   R 20 #s     Left 30#s    Flexibility: no joint limitations  Gait: Without AD  Analysis:no " apparent deviation  Bed Mobility:Independent  Transfers: Independent  Special Tests: Spurling's (-), Drop arm (-); Empty can (-)    CMS Impairment/Limitation/Restriction for FOTO neuromusucular Survey    Therapist reviewed FOTO scores for Senthil Chandler on 5/11/2022.   FOTO documents entered into [facility] - see Media section.    Limitation Score: 48%         TREATMENT   Treatment Time In: 1230  Treatment Time Out: 1245  Total Treatment time separate from Evaluation:15  Minutes    Therapeutic Exercises for 15 minutes to develop home exercises program. Including:  Theraband Exercises shoulder flex/abd  Theraband Exerrcise elbow flexion  Foreram supination/pronation     Home Exercises and Patient Education Provided    Education provided:   -Diuscussed Plan of Care; Home Exercises Instructions.    Written Home Exercises Provided: yes.  Exercises were reviewed and Senthil was able to demonstrate them prior to the end of the session.  Senthil demonstrated good  understanding of the education provided.     See EMR under Patient Instructions for exercises provided 5/11/2022.    Assessment   Senthil is a 76 y.o. male referred to outpatient Physical Therapy with a medical diagnosis of Cervical degenerative disc, facet arthritis and CVA with residual deficits.. Patient presents with c/o R arm weakness,     Patient prognosis is Good.   Patient will benefit from skilled outpatient Physical Therapy to address the deficits stated above and in the chart below, provide patient/family education, and to maximize patient's level of independence.     Plan of care discussed with patient: Yes  Patient's spiritual, cultural and educational needs considered and patient is agreeable to the plan of care and goals as stated below:     Anticipated Barriers for therapy: none    Medical Necessity is demonstrated by the following  History  Co-morbidities and personal factors that may impact the plan of care Co-morbidities:   history of CVA    Personal  Factors:   coping style     low   Examination  Body Structures and Functions, activity limitations and participation restrictions that may impact the plan of care Body Regions:   neck  upper extremities    Body Systems:    ROM  strength    Participation Restrictions:   none    Activity limitations:   Learning and applying knowledge  no deficits    General Tasks and Commands  no deficits    Communication  no deficits    Mobility  lifting and carrying objects  fine hand use (grasping/picking up)    Self care  no deficits    Domestic Life  no deficits    Interactions/Relationships  no deficits    Life Areas  no deficits    Community and Social Life  no deficits         low   Clinical Presentation stable and uncomplicated low   Decision Making/ Complexity Score: low     Goals:  Short Term Goals: 2 weeks   1. Patient will report compliance to Home exercises.  2. Patient will tolerate 10' on UBE for 10'  3. Patient will be able to demonstrate arm chair push ups with no assist  of the Lower extremities.    Long Term Goals: 6 weeks   1. Patient will demonstrate 4/5 gross motor strength RUE.  2. Handgrip 25 #s to right on dynamometry.  3. Patient will demonstrate overall increased function with self assessment FOTO score  58/100 or greater.    Plan   Plan of care Certification: 5/11/2022 to 6/30/22.    Outpatient Physical Therapy 2 times weekly for 6 weeks to include the following interventions: Manual Therapy, Neuromuscular Re-ed, Therapeutic Activities and Therapeutic Exercise.     Thomas العراقي, PT

## 2022-05-16 ENCOUNTER — CLINICAL SUPPORT (OUTPATIENT)
Dept: REHABILITATION | Facility: HOSPITAL | Age: 77
End: 2022-05-16
Attending: ANESTHESIOLOGY
Payer: MEDICARE

## 2022-05-16 DIAGNOSIS — M50.30 DEGENERATIVE DISC DISEASE, CERVICAL: Primary | ICD-10-CM

## 2022-05-16 DIAGNOSIS — I69.30 HISTORY OF CEREBROVASCULAR ACCIDENT (CVA) WITH RESIDUAL DEFICIT: ICD-10-CM

## 2022-05-16 DIAGNOSIS — M47.812 FACET ARTHRITIS OF CERVICAL REGION: ICD-10-CM

## 2022-05-16 PROCEDURE — 97110 THERAPEUTIC EXERCISES: CPT | Mod: PN

## 2022-05-16 NOTE — PROGRESS NOTES
[facility] OUTPATIENT THERAPY AND WELLNESS   Physical Therapy Treatment Note     Name: Senthil Chandler  Clinic Number: [id number]    Therapy Diagnosis:   Encounter Diagnoses   Name Primary?    Degenerative disc disease, cervical Yes    Facet arthritis of cervical region     History of cerebrovascular accident (CVA) with residual deficit      Physician: Chari Briceño MD    Visit Date: 5/16/2022    Physician Orders: PT Eval and Treat   Medical Diagnosis from Referral: Degenerative disc disease, cervical; facet arthritis, cervical;  Hx of CVA with residuals deficits  Evaluation Date: 5/11/2022  Authorization Period Expiration: 12/31/22  Plan of Care Expiration: 6/30/22  Visit # / Visits authorized: 1/20     Time In: 1430  Time Out: 1515  Total Billable Time: 45 minutes     Precautions: Standard    SUBJECTIVE     Pt reports: myindex and middle finger has poor control with movements..  He was compliant with home exercise program.  Response to previous treatment: on initial visit.  Functional change: none    Pain: 0/10  Location: not applicable      OBJECTIVE     Difficulty with finger placement in theraweb.    Treatment     Senthil received the treatments listed below:      therapeutic exercises to develop strength and coordination  for 45 minutes including:  Scapular mobility: retraction 10; elevation/depression 15; shoulder circles 10/10 clockwise/counterclockwise  Neck ROM : lateral flexion 5/5; rotation 5/5  Shoulder isometrics at 90 deg flexion 5; abduction 5  R arm PNF patterns D1 15/15    D2  15/15  R elbow flexion manual resist 10 x 2;  Extension 10 x 2  5 disc lever forearm pronation 15; supination 15  5 disc lever wrist radial deviation 15; ulnar deviation 15  Wrist extension manual resist 15;  Flexion 15  Theraweb finger extension 15;  Finger flexion 15  Individual finger flexion manual resist 10 each R hand.    Patient Education and Home Exercises     Home Exercises Provided and Patient Education Provided      Education provided:   -Hand wringing of towel for functional handgrip     Written Home Exercises Provided: Patient instructed to cont prior HEP. Exercises were reviewed and Senthil was able to demonstrate them prior to the end of the session.  Senthil demonstrated good  understanding of the education provided. See EMR under Patient Instructions for exercises provided during therapy sessions    ASSESSMENT     Patient demonstrated functional strength with easy fatiguability and incoordination especially right finger flexors.  Tolerated Rx well.  Senthil Is progressing well towards his goals.   Pt prognosis is Good.     Pt will continue to benefit from skilled outpatient physical therapy to address the deficits listed in the problem list box on initial evaluation, provide pt/family education and to maximize pt's level of independence in the home and community environment.     Pt's spiritual, cultural and educational needs considered and pt agreeable to plan of care and goals.     Anticipated barriers to physical therapy: none    Goals:    1. Patient will report compliance to Home exercises. (ongoing)  2. Patient will tolerate 10' on UBE for 10'    (ongoing)  3. Patient will be able to demonstrate arm chair push ups with no assist  of the Lower extremities.  (ongoing)     Long Term Goals:   1. Patient will demonstrate 4/5 groiss motor strength RUE.  (ongoing)  2. Handgrip 25 #s to right on dynamometry.  (ongoing)  3. Patient will demonstrate overall increased function with self assessment FOTO score  58/100 or greater. (ongoing)       PLAN     Continue Plan of care.  Emphasis on handgrip and finger coordination    Thomas العراقي, PT

## 2022-05-16 NOTE — PATIENT INSTRUCTIONS
FLEXION: Sitting - Resistance Band (Active)        Sit with right arm down. Against yellow resistance band, lift arm forward and up as high as possible, keeping elbow straight.  Complete ___ sets of ___ repetitions. Perform ___ sessions per day.    Copyright © I. All rights reserved.      ABDUCTION: Sitting - Resistance Band (Active)        Sit with right arm down. Against yellow resistance band, lift arm out to side and up as high as possible, keeping elbow straight.  Complete ___ sets of ___ repetitions. Perform ___ sessions per day.    Copyright © I. All rights reserved.      Bicep: Reverse Curl (Eccentric), (Resistance Band)        Raise affected arm, palm down, holding resistance band until elbow is bent to 100°. Turn palm up. Lower slowly 3-5 seconds. Use ________ resistance band.  ___ reps per set, ___ sets per day, ___ days per week.     https://ecce.[employer].us/44     Copyright © I. All rights reserved.      Deviation, Radial: Hammer        Left forearm on thigh, wrist extending off knee, thumb up, hold one end of ____ lb weight. Lower front end of weight as if hammering.  Repeat ____ times per set. Do ____ sets per session. Do ____ sessions per week.      Copyright © I. All rights reserved.      Forearm Supination: Resisted        With right palm down, stabilize forearm on thigh with other hand. Keep tubing to inside of hand and roll palm up as far as possible.  Repeat ____ times per set. Relax. Do ____ sets per session. Do ____ sessions per day.    Copyright © I. All rights reserved.      Forearm Supination: Isometric        Resist upward rotation of left hand with other hand. Hold ____ seconds. Relax.  Repeat ____ times per set. Do ____ sets per session. Do ____ sessions per day.    Copyright © [employer]. All rights reserved.      Finger Flexion: Resisted        Apply light resistance with other hand while curling fingers of left hand.  Repeat ____ times per set. Do ____ sets per session. Do ____  sessions per day.    Copyright © [employer]. All rights reserved.      Hand Weakness: Wringing Towel        Avoid wringing towels by twisting. Loop towel around sink faucet as if braiding and pull gently, or let drip dry.

## 2022-05-18 ENCOUNTER — CLINICAL SUPPORT (OUTPATIENT)
Dept: REHABILITATION | Facility: HOSPITAL | Age: 77
End: 2022-05-18
Attending: ANESTHESIOLOGY
Payer: MEDICARE

## 2022-05-18 DIAGNOSIS — M50.30 DEGENERATIVE DISC DISEASE, CERVICAL: Primary | ICD-10-CM

## 2022-05-18 DIAGNOSIS — M47.812 FACET ARTHRITIS OF CERVICAL REGION: ICD-10-CM

## 2022-05-18 DIAGNOSIS — I69.30 HISTORY OF CEREBROVASCULAR ACCIDENT (CVA) WITH RESIDUAL DEFICIT: ICD-10-CM

## 2022-05-18 PROCEDURE — 97110 THERAPEUTIC EXERCISES: CPT | Mod: PN

## 2022-05-18 NOTE — PROGRESS NOTES
[facility]   Physical Therapy Treatment Note     Name: Senthil Chandler  Clinic Number: [id number]    Therapy Diagnosis:   Encounter Diagnoses   Name Primary?    Degenerative disc disease, cervical Yes    Facet arthritis of cervical region     History of cerebrovascular accident (CVA) with residual deficit      Physician: Chari Briceño MD    Visit Date: 5/18/2022    Physician Orders: PT Eval and Treat   Medical Diagnosis from Referral: Degenerative disc disease, cervical; facet arthritis, cervical;  Hx of CVA with residuals deficits  Evaluation Date: 5/11/2022  Authorization Period Expiration: 12/31/22  Plan of Care Expiration: 6/30/22  Visit # / Visits authorized: 1/20     Time In: 1430  Time Out: 1515  Total Billable Time: 45 minutes     Precautions: Standard    SUBJECTIVE     Pt reports: no new subjective complaints..  He was compliant with home exercise program.  Response to previous treatment: tolerated Rx  Functional change: none    Pain: 0/10  Location: not applicable      OBJECTIVE     + tremors on resistive functional movements of the forearm/wrist.    Treatment     Senthil received the treatments listed below:      therapeutic exercises to develop strength and coordination  for 45 minutes including:  UBE 5'/2'  Forward/backward  Shoulder isometrics at 90 deg flexion 5; abduction 5  R arm PNF patterns D1 15/15    D2  15/15  R elbow flexion manual resist 10 x 2;  Extension 10 x 2  5 disc lever forearm pronation 15; supination 15  5 disc lever wrist radial deviation 15; ulnar deviation 15  Wrist extension manual resist 15;  Flexion 15  Theraweb finger extension 15;  Finger flexion 15  Individual finger flexion manual resist 10 each R hand.    Patient Education and Home Exercises     Home Exercises Provided and Patient Education Provided     Education provided:   -Upper extremity pathway of innervation.     Written Home Exercises Provided: Patient instructed to cont prior HEP.  Exercises were reviewed and Senthil was able to demonstrate them prior to the end of the session.  Senthil demonstrated good  understanding of the education provided. See EMR under Patient Instructions for exercises provided during therapy sessions    ASSESSMENT     Patient  Has mild trembling when performing resistive functional movmenets.  Tolerated Rx well.  Senthil Is progressing well towards his goals.   Pt prognosis is Good.     Pt will continue to benefit from skilled outpatient physical therapy to address the deficits listed in the problem list box on initial evaluation, provide pt/family education and to maximize pt's level of independence in the home and community environment.     Pt's spiritual, cultural and educational needs considered and pt agreeable to plan of care and goals.     Anticipated barriers to physical therapy: none    Goals:    1. Patient will report compliance to Home exercises. (ongoing)  2. Patient will tolerate 10' on UBE for 10'    (ongoing)  3. Patient will be able to demonstrate arm chair push ups with no assist  of the Lower extremities.  (ongoing)     Long Term Goals:   1. Patient will demonstrate 4/5 groiss motor strength RUE.  (ongoing)  2. Handgrip 25 #s to right on dynamometry.  (ongoing)  3. Patient will demonstrate overall increased function with self assessment FOTO score  58/100 or greater. (ongoing)       PLAN     Continue Plan of care.  Emphasis on handgrip and finger coordination    Thomas العراقي, PT

## 2022-05-25 ENCOUNTER — CLINICAL SUPPORT (OUTPATIENT)
Dept: REHABILITATION | Facility: HOSPITAL | Age: 77
End: 2022-05-25
Attending: ANESTHESIOLOGY
Payer: MEDICARE

## 2022-05-25 DIAGNOSIS — M50.30 DEGENERATIVE DISC DISEASE, CERVICAL: Primary | ICD-10-CM

## 2022-05-25 DIAGNOSIS — I69.30 HISTORY OF CEREBROVASCULAR ACCIDENT (CVA) WITH RESIDUAL DEFICIT: ICD-10-CM

## 2022-05-25 DIAGNOSIS — M47.812 FACET ARTHRITIS OF CERVICAL REGION: ICD-10-CM

## 2022-05-25 PROCEDURE — 97110 THERAPEUTIC EXERCISES: CPT | Mod: PN

## 2022-05-25 NOTE — PROGRESS NOTES
[facility]   Physical Therapy Treatment Note     Name: Senthil Chandler  Clinic Number: [id number]    Therapy Diagnosis:   Encounter Diagnoses   Name Primary?    Degenerative disc disease, cervical Yes    Facet arthritis of cervical region     History of cerebrovascular accident (CVA) with residual deficit      Physician: Chari Briceño MD    Visit Date: 5/25/2022    Physician Orders: PT Eval and Treat   Medical Diagnosis from Referral: Degenerative disc disease, cervical; facet arthritis, cervical;  Hx of CVA with residuals deficits  Evaluation Date: 5/11/2022  Authorization Period Expiration: 12/31/22  Plan of Care Expiration: 6/30/22  Visit # / Visits authorized: 3/20     Time In: 1200  Time Out: 1245  Total Billable Time: 45 minutes     Precautions: Standard    SUBJECTIVE     Pt reports: diifficulty with fine motor grasp right hand.  He was compliant with home exercise program.  Response to previous treatment: tolerated Rx  Functional change: none    Pain: 0/10  Location: not applicable      OBJECTIVE     Neck ROM WFL    Treatment     Senthil received the treatments listed below:      therapeutic exercises to develop strength and coordination  for 45 minutes including:  UBE 5'/2'  Forward/backward  Neck ROM: rotation 5/5 , lateral flexion 5/5  Shoulder isometrics at 90 deg flexion 5; abduction 5  R arm PNF patterns D1 15/15    D2  15/15 green theraband  3# wand exercise shoulder flexion 15; abduction 15  R elbow flexion 6#s wand 20r forearm pronation 15; supination 15  2#s wand wrist radial deviation 10; ulnar deviation 10  Theraweb finger extension 15;  Finger flexion 15  Individual #3, #4 finger flexion yellow clip 10 each R hand.    Patient Education and Home Exercises     Home Exercises Provided and Patient Education Provided     Education provided:   prehension  R hand     Written Home Exercises Provided: Patient instructed to cont prior HEP. Exercises were reviewed and  Senthil was able to demonstrate them prior to the end of the session.  Senthil demonstrated good  understanding of the education provided. See EMR under Patient Instructions for exercises provided during therapy sessions    ASSESSMENT     Patient elicits mild discomforts to left shoulder and elbow after using UBE. No aggravation to right UE..  Tolerated Rx well.  Senthil Is progressing well towards his goals.   Pt prognosis is Good.     Pt will continue to benefit from skilled outpatient physical therapy to address the deficits listed in the problem list box on initial evaluation, provide pt/family education and to maximize pt's level of independence in the home and community environment.     Pt's spiritual, cultural and educational needs considered and pt agreeable to plan of care and goals.     Anticipated barriers to physical therapy: none    Goals:    1. Patient will report compliance to Home exercises. (ongoing)  2. Patient will tolerate 10' on UBE for 10'    (ongoing)  3. Patient will be able to demonstrate arm chair push ups with no assist  of the Lower extremities.  (ongoing)     Long Term Goals:   1. Patient will demonstrate 4/5 groiss motor strength RUE.  (ongoing)  2. Handgrip 25 #s to right on dynamometry.  (ongoing)  3. Patient will demonstrate overall increased function with self assessment FOTO score  58/100 or greater. (ongoing)       PLAN     Continue Plan of care.  Emphasis on handgrip and finger coordination  Include neck/shoulder girdle muscle ralaxation    Thomas العراقي, PT

## 2022-05-26 ENCOUNTER — OFFICE VISIT (OUTPATIENT)
Dept: FAMILY MEDICINE | Facility: CLINIC | Age: 77
End: 2022-05-26
Payer: MEDICARE

## 2022-05-26 VITALS
SYSTOLIC BLOOD PRESSURE: 126 MMHG | OXYGEN SATURATION: 98 % | HEART RATE: 78 BPM | WEIGHT: 210.5 LBS | TEMPERATURE: 99 F | DIASTOLIC BLOOD PRESSURE: 78 MMHG | BODY MASS INDEX: 28.51 KG/M2 | HEIGHT: 72 IN

## 2022-05-26 DIAGNOSIS — R19.7 DIARRHEA, UNSPECIFIED TYPE: Primary | ICD-10-CM

## 2022-05-26 DIAGNOSIS — Z79.82 ASPIRIN LONG-TERM USE: ICD-10-CM

## 2022-05-26 DIAGNOSIS — E78.5 HYPERLIPIDEMIA, UNSPECIFIED HYPERLIPIDEMIA TYPE: ICD-10-CM

## 2022-05-26 DIAGNOSIS — R41.89 COGNITIVE IMPAIRMENT: ICD-10-CM

## 2022-05-26 DIAGNOSIS — Z79.4 TYPE 2 DIABETES MELLITUS WITH DIABETIC POLYNEUROPATHY, WITH LONG-TERM CURRENT USE OF INSULIN: ICD-10-CM

## 2022-05-26 DIAGNOSIS — M54.16 LUMBAR RADICULOPATHY: ICD-10-CM

## 2022-05-26 DIAGNOSIS — E53.8 VITAMIN B 12 DEFICIENCY: ICD-10-CM

## 2022-05-26 DIAGNOSIS — M54.12 CERVICAL RADICULOPATHY: ICD-10-CM

## 2022-05-26 DIAGNOSIS — E11.42 TYPE 2 DIABETES MELLITUS WITH DIABETIC POLYNEUROPATHY, WITH LONG-TERM CURRENT USE OF INSULIN: ICD-10-CM

## 2022-05-26 PROCEDURE — 3044F HG A1C LEVEL LT 7.0%: CPT | Mod: CPTII,S$GLB,, | Performed by: FAMILY MEDICINE

## 2022-05-26 PROCEDURE — 99214 OFFICE O/P EST MOD 30 MIN: CPT | Mod: S$GLB,,, | Performed by: FAMILY MEDICINE

## 2022-05-26 PROCEDURE — 3044F PR MOST RECENT HEMOGLOBIN A1C LEVEL <7.0%: ICD-10-PCS | Mod: CPTII,S$GLB,, | Performed by: FAMILY MEDICINE

## 2022-05-26 PROCEDURE — 3074F SYST BP LT 130 MM HG: CPT | Mod: CPTII,S$GLB,, | Performed by: FAMILY MEDICINE

## 2022-05-26 PROCEDURE — 1101F PT FALLS ASSESS-DOCD LE1/YR: CPT | Mod: CPTII,S$GLB,, | Performed by: FAMILY MEDICINE

## 2022-05-26 PROCEDURE — 99499 RISK ADDL DX/OHS AUDIT: ICD-10-PCS | Mod: S$GLB,,, | Performed by: FAMILY MEDICINE

## 2022-05-26 PROCEDURE — 3078F PR MOST RECENT DIASTOLIC BLOOD PRESSURE < 80 MM HG: ICD-10-PCS | Mod: CPTII,S$GLB,, | Performed by: FAMILY MEDICINE

## 2022-05-26 PROCEDURE — 1126F PR PAIN SEVERITY QUANTIFIED, NO PAIN PRESENT: ICD-10-PCS | Mod: CPTII,S$GLB,, | Performed by: FAMILY MEDICINE

## 2022-05-26 PROCEDURE — 1159F MED LIST DOCD IN RCRD: CPT | Mod: CPTII,S$GLB,, | Performed by: FAMILY MEDICINE

## 2022-05-26 PROCEDURE — 99499 UNLISTED E&M SERVICE: CPT | Mod: S$GLB,,, | Performed by: FAMILY MEDICINE

## 2022-05-26 PROCEDURE — 3288F FALL RISK ASSESSMENT DOCD: CPT | Mod: CPTII,S$GLB,, | Performed by: FAMILY MEDICINE

## 2022-05-26 PROCEDURE — 1160F PR REVIEW ALL MEDS BY PRESCRIBER/CLIN PHARMACIST DOCUMENTED: ICD-10-PCS | Mod: CPTII,S$GLB,, | Performed by: FAMILY MEDICINE

## 2022-05-26 PROCEDURE — 1126F AMNT PAIN NOTED NONE PRSNT: CPT | Mod: CPTII,S$GLB,, | Performed by: FAMILY MEDICINE

## 2022-05-26 PROCEDURE — 99214 PR OFFICE/OUTPT VISIT, EST, LEVL IV, 30-39 MIN: ICD-10-PCS | Mod: S$GLB,,, | Performed by: FAMILY MEDICINE

## 2022-05-26 PROCEDURE — 1101F PR PT FALLS ASSESS DOC 0-1 FALLS W/OUT INJ PAST YR: ICD-10-PCS | Mod: CPTII,S$GLB,, | Performed by: FAMILY MEDICINE

## 2022-05-26 PROCEDURE — 1159F PR MEDICATION LIST DOCUMENTED IN MEDICAL RECORD: ICD-10-PCS | Mod: CPTII,S$GLB,, | Performed by: FAMILY MEDICINE

## 2022-05-26 PROCEDURE — 3074F PR MOST RECENT SYSTOLIC BLOOD PRESSURE < 130 MM HG: ICD-10-PCS | Mod: CPTII,S$GLB,, | Performed by: FAMILY MEDICINE

## 2022-05-26 PROCEDURE — 3078F DIAST BP <80 MM HG: CPT | Mod: CPTII,S$GLB,, | Performed by: FAMILY MEDICINE

## 2022-05-26 PROCEDURE — 3288F PR FALLS RISK ASSESSMENT DOCUMENTED: ICD-10-PCS | Mod: CPTII,S$GLB,, | Performed by: FAMILY MEDICINE

## 2022-05-26 PROCEDURE — 1160F RVW MEDS BY RX/DR IN RCRD: CPT | Mod: CPTII,S$GLB,, | Performed by: FAMILY MEDICINE

## 2022-05-26 RX ORDER — DONEPEZIL HYDROCHLORIDE 10 MG/1
10 TABLET, FILM COATED ORAL NIGHTLY
Qty: 90 TABLET | Refills: 1 | Status: SHIPPED | OUTPATIENT
Start: 2022-05-26 | End: 2022-12-03

## 2022-05-29 PROBLEM — E53.8 VITAMIN B 12 DEFICIENCY: Status: ACTIVE | Noted: 2022-05-29

## 2022-05-29 PROBLEM — R41.89 COGNITIVE IMPAIRMENT: Status: ACTIVE | Noted: 2022-05-29

## 2022-05-30 ENCOUNTER — TELEPHONE (OUTPATIENT)
Dept: GASTROENTEROLOGY | Facility: CLINIC | Age: 77
End: 2022-05-30
Payer: MEDICARE

## 2022-05-30 NOTE — PROGRESS NOTES
Going to physical therapy and feels he is a little better.  Diarrhea.  C diff negative stool culture and sensitivity negative.  Was okay this week but then 3 times at night had diarrhea last week.  He is not sure if he took the vancomycin I prescribed.  Also cervical and lumbar radiculopathies.  He did not do the EMG and nerve conduction studies.  Appears that he went for the test on May 11th.  Then refused the test after he got there.  For .  Weak.  Also B12 deficiency.  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  Morning sugars running anywhere from 43872.  Hyperlipidemia.  Using aspirin.  Some cognitive impairment.  Some symptoms before but has worsened.    Physical examination vital signs noted.  Elderly male no acute distress.  Neck without bruit.  Chest clear.  Heart regular rate rhythm.  Abdomen bowel sounds are positive soft and nontender.  Extremities without edema.  Some decrease in  upper extremities.  Subjective:       Patient ID: Senthil Chandler is a 76 y.o. male.    Chief Complaint: Follow-up    HPI  Review of Systems    Objective:      Physical Exam    Assessment:       1. Diarrhea, unspecified type    2. Cervical radiculopathy    3. Lumbar radiculopathy    4. Vitamin B 12 deficiency    5. Aspirin long-term use    6. Type 2 diabetes mellitus with diabetic polyneuropathy, with long-term current use of insulin    7. Hyperlipidemia, unspecified hyperlipidemia type    8. Cognitive impairment        Plan:       Diarrhea, unspecified type  -     Ambulatory referral/consult to Gastroenterology; Future; Expected date: 06/02/2022    Cervical radiculopathy    Lumbar radiculopathy    Vitamin B 12 deficiency    Aspirin long-term use    Type 2 diabetes mellitus with diabetic polyneuropathy, with long-term current use of insulin    Hyperlipidemia, unspecified hyperlipidemia type    Cognitive impairment    Other orders  -     donepeziL (ARICEPT) 10 MG tablet; Take 1 tablet (10 mg total) by  mouth every evening.  Dispense: 90 tablet; Refill: 1    To Gastroenterology regarding the diarrhea.  Reschedule the EMG and nerve conduction studies.  Sublingual B12 5000 per day.  Aricept 10 mg daily 90 with a refill.  Follow-up after the EMG and nerve conduction studies.

## 2022-05-31 ENCOUNTER — TELEPHONE (OUTPATIENT)
Dept: GASTROENTEROLOGY | Facility: CLINIC | Age: 77
End: 2022-05-31
Payer: MEDICARE

## 2022-06-01 ENCOUNTER — OFFICE VISIT (OUTPATIENT)
Dept: ORTHOPEDICS | Facility: CLINIC | Age: 77
End: 2022-06-01
Payer: MEDICARE

## 2022-06-01 ENCOUNTER — CLINICAL SUPPORT (OUTPATIENT)
Dept: REHABILITATION | Facility: HOSPITAL | Age: 77
End: 2022-06-01
Attending: ANESTHESIOLOGY
Payer: MEDICARE

## 2022-06-01 VITALS — WEIGHT: 210 LBS | HEIGHT: 72 IN | BODY MASS INDEX: 28.44 KG/M2

## 2022-06-01 DIAGNOSIS — I69.30 HISTORY OF CEREBROVASCULAR ACCIDENT (CVA) WITH RESIDUAL DEFICIT: ICD-10-CM

## 2022-06-01 DIAGNOSIS — M47.812 FACET ARTHRITIS OF CERVICAL REGION: ICD-10-CM

## 2022-06-01 DIAGNOSIS — M50.30 DEGENERATIVE DISC DISEASE, CERVICAL: Primary | ICD-10-CM

## 2022-06-01 PROCEDURE — 1101F PT FALLS ASSESS-DOCD LE1/YR: CPT | Mod: CPTII,S$GLB,, | Performed by: ORTHOPAEDIC SURGERY

## 2022-06-01 PROCEDURE — 1159F MED LIST DOCD IN RCRD: CPT | Mod: CPTII,S$GLB,, | Performed by: ORTHOPAEDIC SURGERY

## 2022-06-01 PROCEDURE — 99213 OFFICE O/P EST LOW 20 MIN: CPT | Mod: S$GLB,,, | Performed by: ORTHOPAEDIC SURGERY

## 2022-06-01 PROCEDURE — 1125F PR PAIN SEVERITY QUANTIFIED, PAIN PRESENT: ICD-10-PCS | Mod: CPTII,S$GLB,, | Performed by: ORTHOPAEDIC SURGERY

## 2022-06-01 PROCEDURE — 3044F HG A1C LEVEL LT 7.0%: CPT | Mod: CPTII,S$GLB,, | Performed by: ORTHOPAEDIC SURGERY

## 2022-06-01 PROCEDURE — 99213 PR OFFICE/OUTPT VISIT, EST, LEVL III, 20-29 MIN: ICD-10-PCS | Mod: S$GLB,,, | Performed by: ORTHOPAEDIC SURGERY

## 2022-06-01 PROCEDURE — 1125F AMNT PAIN NOTED PAIN PRSNT: CPT | Mod: CPTII,S$GLB,, | Performed by: ORTHOPAEDIC SURGERY

## 2022-06-01 PROCEDURE — 3288F FALL RISK ASSESSMENT DOCD: CPT | Mod: CPTII,S$GLB,, | Performed by: ORTHOPAEDIC SURGERY

## 2022-06-01 PROCEDURE — 1160F RVW MEDS BY RX/DR IN RCRD: CPT | Mod: CPTII,S$GLB,, | Performed by: ORTHOPAEDIC SURGERY

## 2022-06-01 PROCEDURE — 1101F PR PT FALLS ASSESS DOC 0-1 FALLS W/OUT INJ PAST YR: ICD-10-PCS | Mod: CPTII,S$GLB,, | Performed by: ORTHOPAEDIC SURGERY

## 2022-06-01 PROCEDURE — 3288F PR FALLS RISK ASSESSMENT DOCUMENTED: ICD-10-PCS | Mod: CPTII,S$GLB,, | Performed by: ORTHOPAEDIC SURGERY

## 2022-06-01 PROCEDURE — 1160F PR REVIEW ALL MEDS BY PRESCRIBER/CLIN PHARMACIST DOCUMENTED: ICD-10-PCS | Mod: CPTII,S$GLB,, | Performed by: ORTHOPAEDIC SURGERY

## 2022-06-01 PROCEDURE — 3044F PR MOST RECENT HEMOGLOBIN A1C LEVEL <7.0%: ICD-10-PCS | Mod: CPTII,S$GLB,, | Performed by: ORTHOPAEDIC SURGERY

## 2022-06-01 PROCEDURE — 1159F PR MEDICATION LIST DOCUMENTED IN MEDICAL RECORD: ICD-10-PCS | Mod: CPTII,S$GLB,, | Performed by: ORTHOPAEDIC SURGERY

## 2022-06-01 PROCEDURE — 97110 THERAPEUTIC EXERCISES: CPT | Mod: PN

## 2022-06-01 NOTE — PROGRESS NOTES
[facility]   Physical Therapy Treatment Note     Name: Senthil Chandler  Clinic Number: [id number]    Therapy Diagnosis:   Encounter Diagnoses   Name Primary?    Degenerative disc disease, cervical Yes    Facet arthritis of cervical region     History of cerebrovascular accident (CVA) with residual deficit      Physician: Chari Briceño MD    Visit Date: 6/1/2022    Physician Orders: PT Eval and Treat   Medical Diagnosis from Referral: Degenerative disc disease, cervical; facet arthritis, cervical;  Hx of CVA with residuals deficits  Evaluation Date: 5/11/2022  Authorization Period Expiration: 12/31/22  Plan of Care Expiration: 6/30/22  Visit # / Visits authorized: 5/20     Time In: 1245  Time Out: 1330  Total Billable Time: 45 minutes     Precautions: Standard    FOTO:  58/100    SUBJECTIVE     Pt reports: problem with fine hand coorcination  He was compliant with home exercise program.  Response to previous treatment: tolerated Rx  Functional change: none    Pain: 0/10  Location: not applicable      OBJECTIVE     Neck ROM WFL    Treatment     Senthil received the treatments listed below:      therapeutic exercises to develop strength and coordination  for 45 minutes including:  UBE 4'/2'  Forward/backward  Neck ROM: rotation 5/5 , lateral flexion 5/5  Shoulder isometrics at 90 deg flexion 5; abduction 5  Elbow flex extt manual resist 10/10  R elbow flexion 6#s wand 20r forearm pronation 20; supination 20  Active manual resistance  wrist radial deviation 10; ulnar deviation 10, flexion 10, extension 10  Theraweb finger extension 15;  Finger flexion 15  Manual resistance individual PIP flexion isometric hold  15 each finger; DIP flexion,  15 each finger; MCP flexion 15 each finger    Patient Education and Home Exercises     Home Exercises Provided and Patient Education Provided     Education provided:   Discussed using tennis balls for firm handgrip.    Written Home Exercises Provided:  Patient instructed to cont prior HEP. Exercises were reviewed and Senthil was able to demonstrate them prior to the end of the session.  Senthil demonstrated good  understanding of the education provided. See EMR under Patient Instructions for exercises provided during therapy sessions    ASSESSMENT     Patient 's exercise emphasis on #2 & #3 finger flexors of right hand. Quick check on neck range of motion and scapular mobility with instructions to do self setretchin at home.  Tolerated Rx well.  Senthil Is progressing well towards his goals.   Pt prognosis is Good.     Pt will continue to benefit from skilled outpatient physical therapy to address the deficits listed in the problem list box on initial evaluation, provide pt/family education and to maximize pt's level of independence in the home and community environment.     Pt's spiritual, cultural and educational needs considered and pt agreeable to plan of care and goals.     Anticipated barriers to physical therapy: none    Goals:    1. Patient will report compliance to Home exercises. (met)  2. Patient will tolerate 10' on UBE for 10'    (ongoing)  3. Patient will be able to demonstrate arm chair push ups with no assist  of the Lower extremities.  (ongoing)     Long Term Goals:   1. Patient will demonstrate 4/5 groiss motor strength RUE.  (ongoing)  2. Handgrip 25 #s to right on dynamometry.  (ongoing)  3. Patient will demonstrate overall increased function with self assessment FOTO score  58/100 or greater. (ongoing)       PLAN     Finger flexor exercises.  Continue Plan of care.    Thomas العراقي, PT

## 2022-06-01 NOTE — PROGRESS NOTES
Subjective:       Patient ID: Senthil Chandler is a 76 y.o. male.    Chief Complaint: Pain of the Neck (Patient states that his neck isn't bothering him, the ONLY thing bothering his is his hand, trouble bending index finger, he is in physical therapy.)      History of Present Illness    Prior to meeting with the patient I reviewed the medical chart in [facility]. This included reviewing the previous progress notes from our office, review of the patient's last appointment with their primary care provider, review of any visits to the emergency room, and review of any pain management appointments or procedures.   Patient returns for follow-up.  We previously saw him for his neck and his back but he states I really do not know why I am here.  When asked about any ongoing neck pain he denies any neck pain at this time he is in physical therapy I suspect for his stroke he does have some weakness in the right arm.    Current Medications  Current Outpatient Medications   Medication Sig Dispense Refill    artificial saliva, cmce-lytes, SprP Take by mouth. 1 tsp. Daily prn dry mouth      aspirin (ECOTRIN) 81 MG EC tablet Take 1 tablet (81 mg total) by mouth once daily.  0    atorvastatin (LIPITOR) 20 MG tablet Take 1 tablet (20 mg total) by mouth once daily. 90 tablet 1    carboxymethylcellulose (REFRESH PLUS) 0.5 % Dpet Place 1 drop into both eyes 5 (five) times daily.      cholecalciferol, vitamin D3, (VITAMIN D3) 2,000 unit Tab Take 1 tablet by mouth once daily.      clopidogreL (PLAVIX) 75 mg tablet Take 1 tablet (75 mg total) by mouth once daily. 21 tablet 0    donepeziL (ARICEPT) 10 MG tablet Take 1 tablet (10 mg total) by mouth every evening. 90 tablet 1    fluticasone 50 mcg/actuation DsDv Inhale 2 sprays into the lungs once daily.      gabapentin (NEURONTIN) 400 MG capsule Take 400 mg by mouth 3 (three) times daily. 2 qa/ 2 qnoon/ 3 qpm . Take 800 mg morning, 800 mg noon, 1200 mg pm      GAVILYTE-G  "236-22.74-6.74 -5.86 gram suspension USE AS DIRECTED  0    insulin (LANTUS SOLOSTAR U-100 INSULIN) glargine 100 units/mL (3mL) SubQ pen Inject 25 Units into the skin once daily. 5 each 0    labetaloL (NORMODYNE) 200 MG tablet Take 1 tablet by mouth 2 (two) times daily.      liraglutide 0.6 mg/0.1 mL, 18 mg/3 mL, subq PNIJ (VICTOZA 2-IVAN) 0.6 mg/0.1 mL (18 mg/3 mL) PnIj Inject 1.8 mg into the skin once daily.      lisinopril 10 MG tablet Take 0.5 tablets by mouth once daily.      oxybutynin (DITROPAN-XL) 10 MG 24 hr tablet Take 1 tablet by mouth once daily.      pantoprazole (PROTONIX) 40 MG tablet Take 1 tablet (40 mg total) by mouth once daily. Before breakfast 90 tablet 1    pen needle, diabetic 32 gauge x 5/16" Ndle Use one needle per day with his lantus solostar pen 50 each 1    trazodone (DESYREL) 100 MG tablet Take 200 mg by mouth every evening.      urea 50 % Crea Apply bid      vancomycin (VANCOCIN) 125 MG capsule Take 1 capsule (125 mg total) by mouth 4 (four) times daily. 40 capsule 0     No current facility-administered medications for this visit.       Allergies  Review of patient's allergies indicates:  No Known Allergies    Past Medical History  Past Medical History:   Diagnosis Date    Adjustment disorder with anxious mood     Angiodysplasia     Arthritis     Cancer 1990    prostate     Carcinoma in situ of prostate     Carpal tunnel syndrome on left     CKD (chronic kidney disease) stage 1, GFR 90 ml/min or greater     Colon polyp     Cubital tunnel syndrome on left     Depressive disorder     Deviated nasal septum     Diabetes mellitus with ophthalmic complication     Diabetes mellitus, type 2     GERD (gastroesophageal reflux disease)     Hypercholesterolemia     Hyperpotassemia     Hypertension     Iron deficiency anemia     Kidney disease     focal segmental glomerulosclerosis    Lumbago     Malignant neoplasm of colon     Nephrotic syndrome     Neuropathy     " Primary osteoarthritis of left elbow     PTSD (post-traumatic stress disorder)     PVD (peripheral vascular disease)     Spinal stenosis, lumbar region without neurogenic claudication     Stomach ulcer     Wears glasses        Surgical History  Past Surgical History:   Procedure Laterality Date    CARPAL TUNNEL RELEASE Left 10/23/2017    COLONOSCOPY N/A 8/12/2016    Procedure: COLONOSCOPY;  Surgeon: Carlos Rmoan MD;  Location: [facility] ENDO;  Service: Endoscopy;  Laterality: N/A; repeat in 5 years for surveillance    COLONOSCOPY N/A 2/20/2019    Procedure: COLONOSCOPY;  Surgeon: Carlos Roman MD;  Location: [facility] ENDO;  Service: Endoscopy;  Laterality: N/A;    COLONOSCOPY N/A 6/30/2020    Procedure: COLONOSCOPY;  Surgeon: Carlos Roman MD;  Location: [facility] ENDO;  Service: Endoscopy;  Laterality: N/A;    ESOPHAGOGASTRODUODENOSCOPY N/A 2/20/2019    Procedure: EGD (ESOPHAGOGASTRODUODENOSCOPY);  Surgeon: Carlos Roman MD;  Location: [facility] ENDO;  Service: Endoscopy;  Laterality: N/A;    LAPAROSCOPIC CHOLECYSTECTOMY Right 3/25/2019    Procedure: CHOLECYSTECTOMY, LAPAROSCOPIC;  Surgeon: Mark Renee MD;  Location: [facility] OR;  Service: General;  Laterality: Right;    NASAL SEPTUM SURGERY      PROCTECTOMY      PROSTATE SURGERY  1990's    ROBOT-ASSISTED COLECTOMY Right 3/25/2019    Procedure: ROBOTIC COLECTOMY possible conversion to open;  Surgeon: Mark Renee MD;  Location: [facility] OR;  Service: General;  Laterality: Right;    SUBTOTAL COLECTOMY Right 3/25/2019    Procedure: COLECTOMY, PARTIAL;  Surgeon: Mark Renee MD;  Location: [facility] OR;  Service: General;  Laterality: Right;    UPPER GASTROINTESTINAL ENDOSCOPY  08/12/2016    Dr. Roman       Family History:   Family History   Problem Relation Age of Onset    Leukemia Mother     Leukemia Father     Colon cancer Neg Hx     Crohn's disease Neg Hx     Ulcerative colitis Neg Hx     Stomach cancer Neg Hx     Esophageal cancer Neg Hx         Social History:   Social History     Socioeconomic History    Marital status: Single   Tobacco Use    Smoking status: Heavy Tobacco Smoker     Packs/day: 1.00     Years: 52.00     Pack years: 52.00     Types: Cigarettes    Smokeless tobacco: Never Used   Substance and Sexual Activity    Alcohol use: Yes     Comment: occasional- maybe one glass of wine a month    Drug use: No       Hospitalization/Major Diagnostic Procedure:     Review of Systems     General/Constitutional:  Chills denies. Fatigue denies. Fever denies. Weight gain denies. Weight loss denies.    Respiratory:  Shortness of breath denies.    Cardiovascular:  Chest pain denies.    Gastrointestinal:  Constipation denies. Diarrhea denies. Nausea denies. Vomiting denies.     Hematology:  Easy bruising denies. Prolonged bleeding denies.     Genitourinary:  Frequent urination denies. Pain in lower back denies. Painful urination denies.     Musculoskeletal:  See HPI for details    Skin:  Rash denies.    Neurologic:  Dizziness denies. Gait abnormalities denies. Seizures denies. Tingling/Numbess denies.    Psychiatric:  Anxiety denies. Depressed mood denies.     Objective:   Vital Signs: There were no vitals filed for this visit.     Physical Exam      General Examination:     Constitutional: The patient is alert and oriented to lace person and time. Mood is pleasant.     Head/Face: Normal facial features normal eyebrows    Eyes: Normal extraocular motion bilaterally    Lungs: Respirations are equal and unlabored    Gait is coordinated.    Cardiovascular: There are no swelling or varicosities present.    Lymphatic: Negative for adenopathy    Skin: Normal    Neurological: Level of consciousness normal. Oriented to place person and time and situation    Psychiatric: Oriented to time place person and situation    Cervical range of motion is normal without pain  XRAY Report/ Interpretation:  Prior cervical MRI was reviewed with the  patient      Assessment:       1. Degenerative disc disease, cervical    2. Facet arthritis of cervical region        Plan:       Senthil was seen today for pain.    Diagnoses and all orders for this visit:    Degenerative disc disease, cervical    Facet arthritis of cervical region         Follow up if symptoms worsen or fail to improve.    Patient has no symptoms referable to his neck and I have explained to him that we really do not need to see him in the future unless neck pain returns and we would send him to for pain management injections.  He appears to be a little bit confused.  We spent some time clarify why he was referred here.  Since he is asymptomatic regarding his neck there are no future appointment is made.  Treatment options were discussed with regards to the nature of the medical condition. Conservative pain intervention and surgical options were discussed in detail. The probability of success of each separate treatment option was discussed. The patient expressed a clear understanding of the treatment options. With regards to surgery, the procedure risk, benefits, complications, and outcomes were discussed. No guarantees were given with regards to surgical outcome.   The risk of complications, morbidity, and mortality of patient management decisions have been made at the time of this visit. These are associated with the patient's problems, diagnostic procedures and treatment options. This includes the possible management options selected and those considered but not selected by the patient after shared medical decision making we discussed with the patient.     This note was created using Dragon voice recognition software that occasionally misinterpreted phrases or words.

## 2022-06-06 ENCOUNTER — TELEPHONE (OUTPATIENT)
Dept: REHABILITATION | Facility: HOSPITAL | Age: 77
End: 2022-06-06
Payer: MEDICARE

## 2022-06-08 ENCOUNTER — OFFICE VISIT (OUTPATIENT)
Dept: GASTROENTEROLOGY | Facility: CLINIC | Age: 77
End: 2022-06-08
Payer: MEDICARE

## 2022-06-08 VITALS — WEIGHT: 207.25 LBS | HEIGHT: 72 IN | BODY MASS INDEX: 28.07 KG/M2

## 2022-06-08 DIAGNOSIS — Z85.038 HISTORY OF COLON CANCER: ICD-10-CM

## 2022-06-08 DIAGNOSIS — Z87.19 HISTORY OF GASTROESOPHAGEAL REFLUX (GERD): ICD-10-CM

## 2022-06-08 DIAGNOSIS — Z87.898 HISTORY OF DIARRHEA: Primary | ICD-10-CM

## 2022-06-08 DIAGNOSIS — Z90.49 HISTORY OF COLON RESECTION: ICD-10-CM

## 2022-06-08 DIAGNOSIS — Z86.73 HISTORY OF STROKE: ICD-10-CM

## 2022-06-08 DIAGNOSIS — Z87.898 HISTORY OF SYNCOPE: ICD-10-CM

## 2022-06-08 DIAGNOSIS — R79.89 ELEVATED LFTS: ICD-10-CM

## 2022-06-08 PROCEDURE — 1160F RVW MEDS BY RX/DR IN RCRD: CPT | Mod: CPTII,S$GLB,, | Performed by: NURSE PRACTITIONER

## 2022-06-08 PROCEDURE — 99999 PR PBB SHADOW E&M-EST. PATIENT-LVL IV: ICD-10-PCS | Mod: PBBFAC,,, | Performed by: NURSE PRACTITIONER

## 2022-06-08 PROCEDURE — 99999 PR PBB SHADOW E&M-EST. PATIENT-LVL IV: CPT | Mod: PBBFAC,,, | Performed by: NURSE PRACTITIONER

## 2022-06-08 PROCEDURE — 1100F PR PT FALLS ASSESS DOC 2+ FALLS/FALL W/INJURY/YR: ICD-10-PCS | Mod: CPTII,S$GLB,, | Performed by: NURSE PRACTITIONER

## 2022-06-08 PROCEDURE — 1159F PR MEDICATION LIST DOCUMENTED IN MEDICAL RECORD: ICD-10-PCS | Mod: CPTII,S$GLB,, | Performed by: NURSE PRACTITIONER

## 2022-06-08 PROCEDURE — 3288F PR FALLS RISK ASSESSMENT DOCUMENTED: ICD-10-PCS | Mod: CPTII,S$GLB,, | Performed by: NURSE PRACTITIONER

## 2022-06-08 PROCEDURE — 1159F MED LIST DOCD IN RCRD: CPT | Mod: CPTII,S$GLB,, | Performed by: NURSE PRACTITIONER

## 2022-06-08 PROCEDURE — 99213 OFFICE O/P EST LOW 20 MIN: CPT | Mod: S$GLB,,, | Performed by: NURSE PRACTITIONER

## 2022-06-08 PROCEDURE — 1125F PR PAIN SEVERITY QUANTIFIED, PAIN PRESENT: ICD-10-PCS | Mod: CPTII,S$GLB,, | Performed by: NURSE PRACTITIONER

## 2022-06-08 PROCEDURE — 1125F AMNT PAIN NOTED PAIN PRSNT: CPT | Mod: CPTII,S$GLB,, | Performed by: NURSE PRACTITIONER

## 2022-06-08 PROCEDURE — 1100F PTFALLS ASSESS-DOCD GE2>/YR: CPT | Mod: CPTII,S$GLB,, | Performed by: NURSE PRACTITIONER

## 2022-06-08 PROCEDURE — 99213 PR OFFICE/OUTPT VISIT, EST, LEVL III, 20-29 MIN: ICD-10-PCS | Mod: S$GLB,,, | Performed by: NURSE PRACTITIONER

## 2022-06-08 PROCEDURE — 3044F PR MOST RECENT HEMOGLOBIN A1C LEVEL <7.0%: ICD-10-PCS | Mod: CPTII,S$GLB,, | Performed by: NURSE PRACTITIONER

## 2022-06-08 PROCEDURE — 3044F HG A1C LEVEL LT 7.0%: CPT | Mod: CPTII,S$GLB,, | Performed by: NURSE PRACTITIONER

## 2022-06-08 PROCEDURE — 1160F PR REVIEW ALL MEDS BY PRESCRIBER/CLIN PHARMACIST DOCUMENTED: ICD-10-PCS | Mod: CPTII,S$GLB,, | Performed by: NURSE PRACTITIONER

## 2022-06-08 PROCEDURE — 3288F FALL RISK ASSESSMENT DOCD: CPT | Mod: CPTII,S$GLB,, | Performed by: NURSE PRACTITIONER

## 2022-06-08 NOTE — PROGRESS NOTES
Subjective:       Patient ID: Senthil Chandler is a 76 y.o. male Body mass index is 28.11 kg/m².    Chief Complaint: Other (History of diarrhea)    This patient is established with Dr. Coleman & myself.    Patient reports he is not sure why he is here since diarrhea has resolved.    GI Problem  Primary symptoms do not include fever, weight loss, fatigue, abdominal pain, nausea, vomiting, diarrhea (had diarrhea when he was in the hospital for stroke in 4/2022, has resolved; currently bowel movements are once daily of formed stool), melena, hematemesis, hematochezia or dysuria.   The illness does not include chills, anorexia, dysphagia, odynophagia, bloating or constipation. Associated symptoms comments: Completed vancomycin prescription as prescribed in 5/2022. Significant associated medical issues include GERD (controlled with taking protonix 40 mg once daily), gallstones, PUD and bowel resection. Associated medical issues do not include inflammatory bowel disease.     Review of Systems   Constitutional: Negative for appetite change, chills, fatigue, fever and weight loss.   HENT: Negative for sore throat and trouble swallowing.    Respiratory: Negative for cough, choking and shortness of breath.    Cardiovascular: Negative for chest pain.   Gastrointestinal: Negative for abdominal pain, anal bleeding, anorexia, bloating, blood in stool, constipation, diarrhea (had diarrhea when he was in the hospital for stroke in 4/2022, has resolved; currently bowel movements are once daily of formed stool), dysphagia, hematemesis, hematochezia, melena, nausea, rectal pain and vomiting.        Reports ate some bad seafood about 2 weeks ago, had diarrhea and he hit his head and thinks he blacked out; seeing PCP   Genitourinary: Negative for difficulty urinating, dysuria and flank pain.   Neurological: Negative for weakness.       Past Medical History:   Diagnosis Date    Adjustment disorder with anxious mood     Angiodysplasia      Arthritis     Cancer 1990    prostate     Carcinoma in situ of prostate     Carpal tunnel syndrome on left     CKD (chronic kidney disease) stage 1, GFR 90 ml/min or greater     Colon polyp     Cubital tunnel syndrome on left     Depressive disorder     Deviated nasal septum     Diabetes mellitus with ophthalmic complication     Diabetes mellitus, type 2     Gallstone     GERD (gastroesophageal reflux disease)     Hypercholesterolemia     Hyperpotassemia     Hypertension     Iron deficiency anemia     Kidney disease     focal segmental glomerulosclerosis    Lumbago     Malignant neoplasm of colon     Nephrotic syndrome     Neuropathy     Primary osteoarthritis of left elbow     PTSD (post-traumatic stress disorder)     PVD (peripheral vascular disease)     Spinal stenosis, lumbar region without neurogenic claudication     Stomach ulcer     Wears glasses      Past Surgical History:   Procedure Laterality Date    CARPAL TUNNEL RELEASE Left 10/23/2017    COLONOSCOPY N/A 08/12/2016    Procedure: COLONOSCOPY;  Surgeon: Carlos Roman MD;  Location: [facility] ENDO;  Service: Endoscopy;  Laterality: N/A; repeat in 5 years for surveillance    COLONOSCOPY N/A 02/20/2019    Procedure: COLONOSCOPY;  Surgeon: Carlos Roman MD;  Location: [facility] ENDO;  Service: Endoscopy;  Laterality: N/A;    COLONOSCOPY N/A 06/30/2020    Procedure: COLONOSCOPY;  Surgeon: Carlos Roman MD;  Location: [facility] ENDO;  Service: Endoscopy;  Laterality: N/A; colon polyps removed; Patent end-to-side ileo-colonic anastomosis, with healthy appearing mucosa; A few non-bleeding colonic angiodysplastic lesions, hemorrhoids; repeat in 3 years for surveillance; biopsy:    ESOPHAGOGASTRODUODENOSCOPY N/A 02/20/2019    Procedure: EGD (ESOPHAGOGASTRODUODENOSCOPY);  Surgeon: Carlos Roman MD;  Location: [facility] ENDO;  Service: Endoscopy;  Laterality: N/A;    LAPAROSCOPIC CHOLECYSTECTOMY Right 03/25/2019    Procedure:  CHOLECYSTECTOMY, LAPAROSCOPIC;  Surgeon: Mark Renee MD;  Location: [facility] OR;  Service: General;  Laterality: Right;    NASAL SEPTUM SURGERY      PROCTECTOMY      PROSTATE SURGERY  1990's    ROBOT-ASSISTED COLECTOMY Right 03/25/2019    Procedure: ROBOTIC COLECTOMY possible conversion to open;  Surgeon: Mark Renee MD;  Location: [facility] OR;  Service: General;  Laterality: Right;    SUBTOTAL COLECTOMY Right 03/25/2019    Procedure: COLECTOMY, PARTIAL;  Surgeon: Mark Renee MD;  Location: [facility] OR;  Service: General;  Laterality: Right;    UPPER GASTROINTESTINAL ENDOSCOPY  08/12/2016    Dr. Coleman     Family History   Problem Relation Age of Onset    Leukemia Mother     Leukemia Father     Colon cancer Neg Hx     Crohn's disease Neg Hx     Ulcerative colitis Neg Hx     Stomach cancer Neg Hx     Esophageal cancer Neg Hx      Social History     Tobacco Use    Smoking status: Heavy Tobacco Smoker     Packs/day: 1.00     Years: 52.00     Pack years: 52.00     Types: Cigarettes    Smokeless tobacco: Never Used   Substance Use Topics    Alcohol use: Yes     Comment: occasional- maybe one glass of wine a month    Drug use: No     Wt Readings from Last 10 Encounters:   06/08/22 94 kg (207 lb 3.7 oz)   06/01/22 95.3 kg (210 lb)   05/26/22 95.5 kg (210 lb 8 oz)   05/06/22 94.3 kg (208 lb)   05/05/22 94.3 kg (208 lb)   04/20/22 96.2 kg (212 lb)   04/14/22 96.2 kg (212 lb)   04/04/22 94.8 kg (209 lb)   03/30/22 98.4 kg (217 lb)   06/30/20 98.9 kg (218 lb)     Lab Results   Component Value Date    WBC 4.76 04/14/2022    HGB 14.7 04/14/2022    HCT 43.1 04/14/2022    MCV 98 04/14/2022     04/14/2022     CMP  Sodium   Date Value Ref Range Status   04/05/2022 137 136 - 145 mmol/L Final   10/23/2017 138 134 - 144 mmol/L      Potassium   Date Value Ref Range Status   04/05/2022 4.1 3.5 - 5.1 mmol/L Final     Chloride   Date Value Ref Range Status   04/05/2022 106 95 - 110 mmol/L Final   10/23/2017 101  98 - 110 mmol/L      CO2   Date Value Ref Range Status   04/05/2022 20 (L) 23 - 29 mmol/L Final     Glucose   Date Value Ref Range Status   04/05/2022 179 (H) 70 - 110 mg/dL Final   10/23/2017 242 (H) 70 - 99 mg/dL      BUN   Date Value Ref Range Status   04/05/2022 16 8 - 23 mg/dL Final     Creatinine   Date Value Ref Range Status   04/05/2022 0.9 0.5 - 1.4 mg/dL Final   10/23/2017 1.05 0.60 - 1.40 mg/dL      Calcium   Date Value Ref Range Status   04/05/2022 9.9 8.7 - 10.5 mg/dL Final     Total Protein   Date Value Ref Range Status   04/05/2022 6.5 6.0 - 8.4 g/dL Final     Albumin   Date Value Ref Range Status   04/05/2022 3.7 3.5 - 5.2 g/dL Final   10/17/2017 4.4 3.1 - 4.7 g/dL      Total Bilirubin   Date Value Ref Range Status   04/05/2022 0.8 0.1 - 1.0 mg/dL Final     Comment:     For infants and newborns, interpretation of results should be based  on gestational age, weight and in agreement with clinical  observations.    Premature Infant recommended reference ranges:  Up to 24 hours.............<8.0 mg/dL  Up to 48 hours............<12.0 mg/dL  3-5 days..................<15.0 mg/dL  6-29 days.................<15.0 mg/dL       Alkaline Phosphatase   Date Value Ref Range Status   04/05/2022 88 55 - 135 U/L Final     AST   Date Value Ref Range Status   04/05/2022 43 (H) 10 - 40 U/L Final     ALT   Date Value Ref Range Status   04/05/2022 46 (H) 10 - 44 U/L Final     Anion Gap   Date Value Ref Range Status   04/05/2022 11 8 - 16 mmol/L Final     eGFR if    Date Value Ref Range Status   04/05/2022 >60 >60 mL/min/1.73 m^2 Final     eGFR if non    Date Value Ref Range Status   04/05/2022 >60 >60 mL/min/1.73 m^2 Final     Comment:     Calculation used to obtain the estimated glomerular filtration  rate (eGFR) is the CKD-EPI equation.        5/10/2022 C. Diff negative  2/14/2019 stool studies reviewed  2/14/2019 celiac serology WNL    Reviewed prior medical records including radiology  report of 2/27/2019 CT abdomen pelvis; & endoscopy history (see surgical history).    Objective:      Physical Exam  Vitals and nursing note reviewed.   Constitutional:       General: He is not in acute distress.     Appearance: Normal appearance. He is well-developed. He is not diaphoretic.   HENT:      Mouth/Throat:      Mouth: Mucous membranes are moist.      Pharynx: Oropharynx is clear. No oropharyngeal exudate or posterior oropharyngeal erythema.   Eyes:      General: No scleral icterus.     Conjunctiva/sclera: Conjunctivae normal.      Pupils: Pupils are equal, round, and reactive to light.   Pulmonary:      Effort: Pulmonary effort is normal. No respiratory distress.      Breath sounds: Normal breath sounds. No wheezing.   Abdominal:      General: Bowel sounds are normal. There is no distension or abdominal bruit.      Palpations: Abdomen is soft. Abdomen is not rigid. There is no mass.      Tenderness: There is no abdominal tenderness. There is no guarding or rebound. Negative signs include Rai's sign and McBurney's sign.   Skin:     General: Skin is warm and dry.      Coloration: Skin is not pale.      Findings: No erythema or rash.      Comments: Non-jaundiced   Neurological:      Mental Status: He is alert and oriented to person, place, and time.   Psychiatric:         Behavior: Behavior normal.         Thought Content: Thought content normal.         Judgment: Judgment normal.         Assessment:       1. History of diarrhea    2. History of colon cancer    3. History of colon resection    4. Elevated LFTs    5. History of stroke    6. History of syncope    7. History of gastroesophageal reflux (GERD)        Plan:       History of diarrhea  - recommended OTC probiotic, such as Florastor or Culturelle, taken as directed on packaging  - avoid lactose, alcohol, & caffeine  - avoid known triggers  - if diarrhea recurs, recommend patient follow-up for continued evaluation and management    History of  colon cancer & History of colon resection  - next surveillance colonoscopy due 6/2023 as advised by Dr. Coleman    Elevated LFTs  - minimize/avoid alcohol and tylenol products, & follow-up with PCP for continued evaluation and management; if specialist is needed, recommend seeing hepatology.    History of stroke & History of syncope  -     Ambulatory referral/consult to Neurology; Future; Expected date: 06/15/2022  - if symptoms recur, recommend giong to the ER for further evaluation and management    History of gastroesophageal reflux (GERD)   -   CONTINUE pantoprazole (PROTONIX) 40 MG tablet; Take 1 tablet (40 mg total) by mouth once daily. Before breakfast  - avoid/minimize use of NSAIDs- since they can cause GI upset, bleeding and/or ulcers. If NSAID must be taken, recommend take with food.    Follow up in about 1 month (around 7/8/2022), or if symptoms worsen or fail to improve.      If no improvement in symptoms or symptoms worsen, call/follow-up at clinic or go to ER.        29 minutes of total time spent on the encounter, which includes face to face time and non-face to face time preparing to see the patient (eg, review of tests), Obtaining and/or reviewing separately obtained history, Documenting clinical information in the electronic or other health record, Independently interpreting results (not separately reported) and communicating results to the patient/family/caregiver, or Care coordination (not separately reported).

## 2022-06-08 NOTE — PATIENT INSTRUCTIONS
Uncertain Causes of Diarrhea (Adult)    Diarrhea is when stools are loose and watery. This can be caused by:  Viral infections  Bacterial infections  Food poisoning  Parasites  Irritable bowel syndrome (IBS)  Inflammatory bowel diseases such as ulcerative colitis, Crohn's disease, and celiac disease  Food intolerance, such as to lactose, the sugar found in milk and milk products  Reaction to medicines like antibiotics, laxatives, cancer drugs, and antacids  Along with diarrhea, you may also have:  Abdominal pain and cramping  Nausea and vomiting  Loss of bowel control  Fever and chills  Bloody stools  In some cases, antibiotics may help to treat diarrhea. You may have a stool sample test. This is done to see what is causing your diarrhea, and if antibiotics will help treat it. The results of a stool sample test may take up to 2 days. The healthcare provider may not give you antibiotics until he or she has the stool test results.  Diarrhea can cause dehydration. This is the loss of too much water and other fluids from the body. When this occurs, body fluid must be replaced. This can be done with oral rehydration solutions. Oral rehydration solutions are available at drugstores and grocery stores without a prescription.  Home care  Follow all instructions given by your healthcare provider. Rest at home for the next 24 hours, or until you feel better. Avoid caffeine, tobacco, and alcohol. These can make diarrhea, cramping, and pain worse.  If taking medicines:  Dont take over-the-counter diarrhea or nausea medicines unless your healthcare provider tells you to.  You may use acetaminophen or NSAID medicines like ibuprofen or naproxen to reduce pain and fever. Dont use these if you have chronic liver or kidney disease, or ever had a stomach ulcer or gastrointestinal bleeding. Don't use NSAID medicines if you are already taking one for another condition (like arthritis) or are on daily aspirin therapy (such as for heart  disease or after a stroke). Talk with your healthcare provider first.  If antibiotics were prescribed, be sure you take them until they are finished. Dont stop taking them even when you feel better. Antibiotics must be taken as a full course.  To prevent the spread of illness:  Remember that washing with soap and water and using alcohol-based  is the best way to prevent the spread of infection.  Clean the toilet after each use.  Wash your hands before eating.  Wash your hands before and after preparing food. Keep in mind that people with diarrhea or vomiting should not prepare food for others.  Wash your hands after using cutting boards, countertops, and knives that have been in contact with raw foods.  Wash and then peel fruits and vegetables.  Keep uncooked meats away from cooked and ready-to-eat foods.  Use a food thermometer when cooking. Cook poultry to at least 165°F (74°C). Cook ground meat (beef, veal, pork, lamb) to at least 160°F (71°C). Cook fresh beef, veal, lamb, and pork to at least 145°F (63°C).  Dont eat raw or undercooked eggs (poached or kush side up), poultry, meat, or unpasteurized milk and juices.  Food and drinks  The main goal while treating vomiting or diarrhea is to prevent dehydration. This is done by taking small amounts of liquids often.  Keep in mind that liquids are more important than food right now.  Drink only small amounts of liquids at a time.  Dont force yourself to eat, especially if you are having cramping, vomiting, or diarrhea. Dont eat large amounts at a time, even if you are hungry.  If you eat, avoid fatty, greasy, spicy, or fried foods.  Dont eat dairy foods or drink milk if you have diarrhea. These can make diarrhea worse.  During the first 24 hours you can try:  Oral rehydration solutions. Do not use sports drinks. They have too much sugar and not enough electrolytes.  Soft drinks without caffeine  Ginger ale  Water (plain or flavored)  Decaf tea or  coffee  Clear broth, consommé, or bouillon  Gelatin, popsicles, or frozen fruit juice bars  The second 24 hours, if you are feeling better, you can add:  Hot cereal, plain toast, bread, rolls, or crackers  Plain noodles, rice, mashed potatoes, chicken noodle soup, or rice soup  Unsweetened canned fruit (no pineapple)  Bananas  As you recover:  Limit fat intake to less than 15 grams per day. Dont eat margarine, butter, oils, mayonnaise, sauces, gravies, fried foods, peanut butter, meat, poultry, or fish.  Limit fiber. Dont eat raw or cooked vegetables, fresh fruits except bananas, or bran cereals.  Limit caffeine and chocolate.  Limit dairy.  Dont use spices or seasonings except salt.  Go back to your normal diet over time, as you feel better and your symptoms improve.  If the symptoms come back, go back to a simple diet or clear liquids.  Follow-up care  Follow up with your healthcare provider, or as advised. If a stool sample was taken or cultures were done, call the healthcare provider for the results as instructed.  Call 911  Call 911 if you have any of these symptoms:  Trouble breathing  Confusion  Extreme drowsiness or trouble walking  Loss of consciousness  Rapid heart rate  Chest pain  Stiff neck  Seizure  When to seek medical advice  Call your healthcare provider right away if any of these occur:  Abdominal pain that gets worse  Constant lower right abdominal pain  Continued vomiting and inability to keep liquids down  Diarrhea more than 5 times a day  Blood in vomit or stool  Dark urine or no urine for 8 hours, dry mouth and tongue, tiredness, weakness, or dizziness  Drowsiness  New rash  You dont get better in 2 to 3 days  Fever of 100.4°F (38°C) or higher that doesnt get lower with medicine  Date Last Reviewed: 1/3/2016  © 3694-3397 [employer]. [address] [place] [address], [place] [address]. All rights reserved. This information is not intended as a substitute for professional medical care.  Always follow your healthcare professional's instructions.

## 2022-06-10 ENCOUNTER — EXTERNAL HOME HEALTH (OUTPATIENT)
Dept: HOME HEALTH SERVICES | Facility: HOSPITAL | Age: 77
End: 2022-06-10
Payer: MEDICARE

## 2022-06-14 ENCOUNTER — CLINICAL SUPPORT (OUTPATIENT)
Dept: REHABILITATION | Facility: HOSPITAL | Age: 77
End: 2022-06-14
Attending: ANESTHESIOLOGY
Payer: MEDICARE

## 2022-06-14 DIAGNOSIS — M50.30 DEGENERATIVE DISC DISEASE, CERVICAL: Primary | ICD-10-CM

## 2022-06-14 DIAGNOSIS — I69.30 HISTORY OF CEREBROVASCULAR ACCIDENT (CVA) WITH RESIDUAL DEFICIT: ICD-10-CM

## 2022-06-14 DIAGNOSIS — M47.812 FACET ARTHRITIS OF CERVICAL REGION: ICD-10-CM

## 2022-06-14 PROCEDURE — 97110 THERAPEUTIC EXERCISES: CPT | Mod: PN,CQ

## 2022-06-14 NOTE — PROGRESS NOTES
[facility]   Physical Therapy Treatment Note     Name: Senthil Chandler  Clinic Number: [id number]    Therapy Diagnosis:   Encounter Diagnoses   Name Primary?    Degenerative disc disease, cervical Yes    Facet arthritis of cervical region     History of cerebrovascular accident (CVA) with residual deficit      Physician: Chari Briceño MD    Visit Date: 6/14/2022    Physician Orders: PT Eval and Treat   Medical Diagnosis from Referral: Degenerative disc disease, cervical; facet arthritis, cervical;  Hx of CVA with residuals deficits  Evaluation Date: 5/11/2022  Authorization Period Expiration: 12/31/22  Plan of Care Expiration: 6/30/22  Visit # / Visits authorized: 5/20  PTA Visit #: 1/5     FOTO:  Eval- 52/100    Time In: 1025  Time Out: 1115  Total Billable Time: 50 minutes    SUBJECTIVE     Pt reports: he is having no pain but still has problems with weakness in his Right upper extremity .  He was not compliant with home exercise program.  Response to previous treatment: no complaints  Functional change: none reported    Pain: 0/10  Location:     OBJECTIVE     Objective Measures updated at progress report unless specified.     Treatment     Senthil received the treatments listed below:      therapeutic exercises to develop strength, endurance, ROM, flexibility and posture for 50 minutes including:    Upper Body Ergometer 3'/3'      Neck ROM: flexion /extension x 10 reps,  Rotation x 10 reps, lateral flexion x 10 reps     Right upper extremity Elbow flexion/ extension 3# 3 x 10 reps (verbal cuing for control of range of motion)  Right upper extremity Elbow pronation/supination 3# 3 x 10 reps    wrist exercises:  radial deviation 3# 20   ulnar deviation 3# x 20  flexion 3# x 20   Extension 3# x 20    Theraweb finger extension 15;  Finger flexion 15  Red gripmaster each digit x 20 reps Right hand   Green therabar curls up/down x 20 reps each      Patient Education and Home Exercises  "    Home Exercises Provided and Patient Education Provided     Education provided:   - Trying to control muscle contraction when performing each exercises.    Written Home Exercises Provided: yes. Exercises were reviewed and Senthil was able to demonstrate them prior to the end of the session.  Senthil demonstrated good  understanding of the education provided. See EMR under Patient Instructions for exercises provided during therapy sessions    ASSESSMENT     Senthil provided good effort and participation toward therapeutic interventions today with focus on upper extremity strengthening.  Pt continue to experience weakness in Right upper extremity.  Difficulty with controlling movement but able to perform exercises better with slower movement.  Right upper extremity "shaking" with several exercises.    Senthil Is progressing well towards his goals.   Pt prognosis is Good.     Pt will continue to benefit from skilled outpatient physical therapy to address the deficits listed in the problem list box on initial evaluation, provide pt/family education and to maximize pt's level of independence in the home and community environment.     Pt's spiritual, cultural and educational needs considered and pt agreeable to plan of care and goals.     Anticipated barriers to physical therapy: none       Goals:    1. Patient will report compliance to Home exercises. (met)  2. Patient will tolerate 10' on UBE for 10'    (ongoing)  3. Patient will be able to demonstrate arm chair push ups with no assist  of the Lower extremities.  (ongoing)     Long Term Goals:   1. Patient will demonstrate 4/5 groiss motor strength RUE.  (ongoing)  2. Handgrip 25 #s to right on dynamometry.  (ongoing)  3. Patient will demonstrate overall increased function with self assessment FOTO score  58/100 or greater. (ongoing)    PLAN   Plan of care Certification: 5/11/2022 to 6/30/22.     Outpatient Physical Therapy 2 times weekly for 6 weeks to include the following " interventions: Manual Therapy, Neuromuscular Re-ed, Therapeutic Activities and Therapeutic Exercise.     Continue with therapeutic exercises to increase strength in Right upper extremity per Plan of Care.    Shanice Mccain, PTA

## 2022-06-16 NOTE — PROGRESS NOTES
PT/PTA met face to face to discuss pt's treatment plan and progress towards established goals. Pt will be seen by a physical therapist minimally every 6th visit or every 30 days.      Shanice Mccain PTA

## 2022-07-12 ENCOUNTER — HOSPITAL ENCOUNTER (EMERGENCY)
Facility: HOSPITAL | Age: 77
Discharge: HOME | End: 2022-07-12
Attending: EMERGENCY MEDICINE
Payer: MEDICARE

## 2022-07-12 VITALS
TEMPERATURE: 100 F | DIASTOLIC BLOOD PRESSURE: 72 MMHG | WEIGHT: 207 LBS | RESPIRATION RATE: 18 BRPM | SYSTOLIC BLOOD PRESSURE: 151 MMHG | OXYGEN SATURATION: 99 % | HEART RATE: 84 BPM | BODY MASS INDEX: 28.04 KG/M2 | HEIGHT: 72 IN

## 2022-07-12 DIAGNOSIS — U07.1 COVID-19: Primary | ICD-10-CM

## 2022-07-12 DIAGNOSIS — U07.1 COVID-19 VIRUS DETECTED: ICD-10-CM

## 2022-07-12 DIAGNOSIS — R05.9 COUGH: ICD-10-CM

## 2022-07-12 LAB — SARS-COV-2 RDRP RESP QL NAA+PROBE: POSITIVE

## 2022-07-12 PROCEDURE — U0002 COVID-19 LAB TEST NON-CDC: HCPCS | Performed by: PHYSICIAN ASSISTANT

## 2022-07-12 PROCEDURE — 99283 EMERGENCY DEPT VISIT LOW MDM: CPT | Mod: 25

## 2022-07-12 NOTE — ED PROVIDER NOTES
Encounter Date: 7/12/2022       History     Chief Complaint   Patient presents with    Cough     Productive cough for the past several days. Denies fever.      Patient is a 76 year old male who presents with productive cough for five days. He has PMH significant for CKD, DM, GERD, hypertension, PVD and hx of colon CA. Patient reports nasal congestion and progressively worsening cough. He reports the cough has become increasingly productive over the last few days. He denied fever. He is vaccinated for COVID. He recently had his son in town and went to family reunions with multiple people but denied known sick contact. He reports smoking a pack of cigarettes a day.     The history is provided by the patient.     Review of patient's allergies indicates:  No Known Allergies  Past Medical History:   Diagnosis Date    Adjustment disorder with anxious mood     Angiodysplasia     Arthritis     Cancer 1990    prostate     Carcinoma in situ of prostate     Carpal tunnel syndrome on left     CKD (chronic kidney disease) stage 1, GFR 90 ml/min or greater     Colon polyp     Cubital tunnel syndrome on left     Depressive disorder     Deviated nasal septum     Diabetes mellitus with ophthalmic complication     Diabetes mellitus, type 2     Gallstone     GERD (gastroesophageal reflux disease)     Hypercholesterolemia     Hyperpotassemia     Hypertension     Iron deficiency anemia     Kidney disease     focal segmental glomerulosclerosis    Lumbago     Malignant neoplasm of colon     Nephrotic syndrome     Neuropathy     Primary osteoarthritis of left elbow     PTSD (post-traumatic stress disorder)     PVD (peripheral vascular disease)     Spinal stenosis, lumbar region without neurogenic claudication     Stomach ulcer     Wears glasses      Past Surgical History:   Procedure Laterality Date    CARPAL TUNNEL RELEASE Left 10/23/2017    COLONOSCOPY N/A 08/12/2016    Procedure: COLONOSCOPY;  Surgeon:  Carlos Roman MD;  Location: [facility] ENDO;  Service: Endoscopy;  Laterality: N/A; repeat in 5 years for surveillance    COLONOSCOPY N/A 02/20/2019    Procedure: COLONOSCOPY;  Surgeon: Carlos Roman MD;  Location: [facility] ENDO;  Service: Endoscopy;  Laterality: N/A;    COLONOSCOPY N/A 06/30/2020    Procedure: COLONOSCOPY;  Surgeon: Carlos Rmoan MD;  Location: [facility] ENDO;  Service: Endoscopy;  Laterality: N/A; colon polyps removed; Patent end-to-side ileo-colonic anastomosis, with healthy appearing mucosa; A few non-bleeding colonic angiodysplastic lesions, hemorrhoids; repeat in 3 years for surveillance; biopsy:    ESOPHAGOGASTRODUODENOSCOPY N/A 02/20/2019    Procedure: EGD (ESOPHAGOGASTRODUODENOSCOPY);  Surgeon: Carlos Roman MD;  Location: [facility] ENDO;  Service: Endoscopy;  Laterality: N/A;    LAPAROSCOPIC CHOLECYSTECTOMY Right 03/25/2019    Procedure: CHOLECYSTECTOMY, LAPAROSCOPIC;  Surgeon: Mark Renee MD;  Location: [facility] OR;  Service: General;  Laterality: Right;    NASAL SEPTUM SURGERY      PROCTECTOMY      PROSTATE SURGERY  1990's    ROBOT-ASSISTED COLECTOMY Right 03/25/2019    Procedure: ROBOTIC COLECTOMY possible conversion to open;  Surgeon: Mark Renee MD;  Location: [facility] OR;  Service: General;  Laterality: Right;    SUBTOTAL COLECTOMY Right 03/25/2019    Procedure: COLECTOMY, PARTIAL;  Surgeon: Mark Renee MD;  Location: [facility] OR;  Service: General;  Laterality: Right;    UPPER GASTROINTESTINAL ENDOSCOPY  08/12/2016    Dr. Roman     Family History   Problem Relation Age of Onset    Leukemia Mother     Leukemia Father     Colon cancer Neg Hx     Crohn's disease Neg Hx     Ulcerative colitis Neg Hx     Stomach cancer Neg Hx     Esophageal cancer Neg Hx      Social History     Tobacco Use    Smoking status: Heavy Tobacco Smoker     Packs/day: 1.00     Years: 52.00     Pack years: 52.00     Types: Cigarettes    Smokeless tobacco: Never Used   Substance Use Topics     Alcohol use: Yes     Comment: occasional- maybe one glass of wine a month    Drug use: No     Review of Systems   Constitutional: Negative for activity change, appetite change, fatigue and fever.   HENT: Positive for congestion. Negative for rhinorrhea and sore throat.    Eyes: Negative for visual disturbance.   Respiratory: Positive for cough. Negative for chest tightness, shortness of breath and wheezing.    Cardiovascular: Negative for chest pain and palpitations.   Gastrointestinal: Negative for diarrhea, nausea and vomiting.   Musculoskeletal: Negative for arthralgias and myalgias.   Skin: Negative for rash.   Neurological: Negative for weakness, light-headedness and headaches.       Physical Exam     Initial Vitals [07/12/22 0949]   BP Pulse Resp Temp SpO2   (!) 115/56 89 17 99.5 °F (37.5 °C) 95 %      MAP       --         Physical Exam    Nursing note and vitals reviewed.  Constitutional: Vital signs are normal. He appears well-developed and well-nourished. He is cooperative.  Non-toxic appearance. He does not have a sickly appearance.   HENT:   Head: Normocephalic and atraumatic.   Right Ear: External ear normal.   Left Ear: External ear normal.   Nose: Nose normal.   Mouth/Throat: Uvula is midline and oropharynx is clear and moist.   Eyes: Conjunctivae and lids are normal.   Neck: Neck supple.   Normal range of motion.   Full passive range of motion without pain.     Cardiovascular: Normal rate, regular rhythm and normal heart sounds. Exam reveals no gallop and no friction rub.    No murmur heard.  Pulmonary/Chest: Breath sounds normal. He has no wheezes. He has no rhonchi. He has no rales.   Musculoskeletal:      Cervical back: Full passive range of motion without pain, normal range of motion and neck supple.     Neurological: He is alert.   Skin: Skin is warm, dry and intact. No rash noted.         ED Course   Procedures  Labs Reviewed   SARS-COV-2 RNA AMPLIFICATION, QUAL - Abnormal; Notable for the  following components:       Result Value    SARS-CoV-2 RNA, Amplification, Qual Positive (*)     All other components within normal limits          Imaging Results          X-Ray Chest 1 View (Final result)  Result time 07/12/22 10:48:19    Final result by Navi Mercer MD (07/12/22 10:48:19)                 Impression:      Negative chest.      Electronically signed by: Navi Mercer MD  Date:    07/12/2022  Time:    10:48             Narrative:    EXAMINATION:  XR CHEST 1 VIEW    CLINICAL HISTORY:  Cough, unspecified    TECHNIQUE:  Single frontal view of the chest was performed.    COMPARISON:  04/02/2022    FINDINGS:  The cardiomediastinal silhouette is within normal limits.  The lungs are well expanded without consolidation or pleural effusion.                                 Medications - No data to display  Medical Decision Making:   History:   Old Medical Records: I decided to obtain old medical records.  Clinical Tests:   Lab Tests: Ordered and Reviewed  Radiological Study: Reviewed and Ordered       APC / Resident Notes:   Urgent evaluation of a well appearing 76 year old male who presents with cough and congestion. Vital signs are stable. Clear and equal breath sounds bilaterally. Oxygen saturation is stable. I doubt pneumonia. No sign of otitis media. Denied GI complaints. Patient is noted to be COVID positive. Discussed his current medications and he states he is not currently taking plavix that he was started on it during a hospital stay but was taking off of it when he followed up with his PCP. Discussed holding statin. Symptomatic treatment at home. Discussed results with patient. Return precautions given. Based on my clinical evaluation, I do not appreciate any immediate, emergent, or life threatening condition or etiology that warrants additional workup today and feel that the patient can be discharged with close follow up care.  Patient is to follow up with their primary care provider.  All questions answered.           Attending Attestation:     Physician Attestation Statement for NP/PA:   I discussed this assessment and plan of this patient with the NP/PA, but I did not personally examine the patient. The face to face encounter was performed by the NP/PA.    Other NP/PA Attestation Additions:      Medical Decision Making: Senthil Chandler is a 76 y.o. male presenting with upper respiratory symptoms consistent with COVID-19 infection.  I doubt bacterial pneumonia or sepsis.  There is no increased work of breathing or sign of toxicity.  There is no hypoxia.  I do not he requires admission for respiratory monitoring or other emergent therapy.  He is appropriate for outpatient close follow-up with antiviral therapy for COVID.  Paxlovid initiated with prescription given and contraindicated medications instructed to be held.  Follow-up with PCP.  Detailed return precautions reviewed.             ED Course as of 07/12/22 1852 Tue Jul 12, 2022   1041 CXR:  R basilar atelectasis. (my read) [MR]      ED Course User Index  [MR] Nate Santiago MD             Clinical Impression:   Final diagnoses:  [R05.9] Cough  [U07.1] COVID-19 (Primary)          ED Disposition Condition    Discharge Stable        ED Prescriptions     Medication Sig Dispense Start Date End Date Auth. Provider    nirmatrelvir-ritonavir 150 mg x 2- 100 mg copackaged tablets (EUA) Take 3 tablets by mouth 2 (two) times daily for 5 days. Each dose contains 2 nirmatrelvir (pink tablets) and 1 ritonavir (white tablet). Take all 3 tablets together 30 tablet 7/12/2022 7/17/2022 Kecia Mcgrath PA-C        Follow-up Information     Follow up With Specialties Details Why Contact Info    Don Hernandez III, MD Family Medicine   [address]  [address]  [place] [address] [phone]      [facility] Emergency Dept Emergency Medicine  As needed [facility] [address]  [facility] [address] [phone]            Kecia Mcgrath PA-C  07/12/22 8653

## 2022-07-12 NOTE — DISCHARGE INSTRUCTIONS
Hold your cholesterol medication atorvastatin (lipitor) while taking the COVID medication and then for five days.   Follow up with your primary care provider.  For worsening symptoms, chest pain, shortness of breath, increased abdominal pain, high grade fever, stroke or stroke like symptoms, immediately go to the nearest Emergency Room or call 911 as soon as possible.

## 2022-07-19 ENCOUNTER — TELEPHONE (OUTPATIENT)
Dept: FAMILY MEDICINE | Facility: CLINIC | Age: 77
End: 2022-07-19
Payer: MEDICARE

## 2022-08-08 ENCOUNTER — TELEPHONE (OUTPATIENT)
Dept: FAMILY MEDICINE | Facility: CLINIC | Age: 77
End: 2022-08-08
Payer: MEDICARE

## 2022-08-08 NOTE — TELEPHONE ENCOUNTER
----- Message from iTgre Enciso sent at 8/8/2022  3:47 PM CDT -----  Contact: Self  Type: Needs Medical Advice  Who Called:  Patient  Best Call Back Number: [phone]   Additional Information:  [facility] ED changed atorvastatin (LIPITOR) [facility] MG [facility] to [facility]. Needs Dr DEUTSCH to notify [facility] of dosage [facility] back to 20mg for prescription refill

## 2022-08-24 ENCOUNTER — PATIENT MESSAGE (OUTPATIENT)
Dept: ADMINISTRATIVE | Facility: HOSPITAL | Age: 77
End: 2022-08-24
Payer: MEDICARE

## 2022-10-13 DIAGNOSIS — E11.9 TYPE 2 DIABETES MELLITUS WITHOUT COMPLICATION: ICD-10-CM

## 2022-12-12 NOTE — SUBJECTIVE & OBJECTIVE
Past Medical History:   Diagnosis Date    Adjustment disorder with anxious mood     Angiodysplasia     Arthritis     Cancer 1990    prostate     Carcinoma in situ of prostate     Carpal tunnel syndrome on left     CKD (chronic kidney disease) stage 1, GFR 90 ml/min or greater     Colon polyp     Cubital tunnel syndrome on left     Depressive disorder     Deviated nasal septum     Diabetes mellitus with ophthalmic complication     Diabetes mellitus, type 2     GERD (gastroesophageal reflux disease)     Hypercholesterolemia     Hyperpotassemia     Hypertension     Iron deficiency anemia     Kidney disease     focal segmental glomerulosclerosis    Lumbago     Malignant neoplasm of colon     Nephrotic syndrome     Neuropathy     Primary osteoarthritis of left elbow     PTSD (post-traumatic stress disorder)     PVD (peripheral vascular disease)     Spinal stenosis, lumbar region without neurogenic claudication     Stomach ulcer     Wears glasses        Past Surgical History:   Procedure Laterality Date    CARPAL TUNNEL RELEASE Left 10/23/2017    COLONOSCOPY N/A 2/20/2019    [facility]    COLONOSCOPY N/A 8/12/2016    [facility]    EGD (ESOPHAGOGASTRODUODENOSCOPY) N/A 2/20/2019    [facility]    ESOPHAGOGASTRODUODENOSCOPY (EGD) N/A 8/12/2016    [facility] END[facility]    NASAL SEPTUM SURGERY      PROCTECTOMY      PROSTATE SURGERY  1990's    UPPER GASTROINTESTINAL ENDOSCOPY  08/12/2016    Dr. Roman       Review of patient's allergies indicates:  No Known Allergies    No current facility-administered medications on file prior to encounter.      Current Outpatient Medications on File Prior to Encounter   Medication Sig    atorvastatin (LIPITOR) 20 MG tablet Take 10 mg by mouth once daily.    bicalutamide (CASODEX) 50 MG Tab Take 50 mg by mouth once daily.     carboxymethylcellulose (REFRESH PLUS) 0.5 % Dpet Place 1 drop into both eyes 5 (five) times daily.    cholecalciferol, vitamin D3, 2,000 unit Tab Take by mouth once daily.    fluticasone 50 mcg/actuation DsDv Inhale 2 sprays into the lungs once daily.    gabapentin (NEURONTIN) 400 MG capsule Take 400 mg by mouth 3 (three) times daily. 2 qa/ 2 qnoon/ 3 qpm    GAVILYTE-G 236-22.74-6.74 -5.86 gram suspension USE AS DIRECTED    hydrochlorothiazide (HYDRODIURIL) 25 MG tablet Take 25 mg by mouth once daily.    insulin glargine (LANTUS) 100 unit/mL injection Inject into the skin once daily.     labetalol (NORMODYNE) 300 MG tablet Take 300 mg by mouth once.     lisinopril 10 MG tablet Take 0.5 tablets by mouth once daily.    trazodone (DESYREL) 100 MG tablet Take 150 mg by mouth every evening.     urea 50 % Crea Apply bid    venlafaxine (EFFEXOR) 100 MG Tab Take 100 mg by mouth 3 (three) times daily.    artificial saliva, cmce-lytes, SprP Take by mouth. 1 tsp. Daily prn dry mouth    liraglutide 0.6 mg/0.1 mL, 18 mg/3 mL, subq PNIJ (VICTOZA 2-IVAN) 0.6 mg/0.1 mL (18 mg/3 mL) PnIj once daily.    pantoprazole (PROTONIX) 40 MG tablet Take 1 tablet (40 mg total) by mouth once daily. Before breakfast     Family History     Problem Relation (Age of Onset)    Leukemia Mother, Father        Tobacco Use    Smoking status: Heavy Tobacco Smoker     Packs/day: 1.00     Years: 52.00     Pack years: 52.00     Types: Cigarettes    Smokeless tobacco: Never Used   Substance and Sexual Activity    Alcohol use: Yes     Comment: occasional- maybe one glass of wine a month    Drug use: No    Sexual activity: Not on file     Review of Systems   Constitutional: Negative for activity change and fever.   HENT: Negative for ear discharge, facial swelling and sore throat.    Eyes: Negative for photophobia, pain and visual disturbance.   Respiratory: Negative for apnea, cough and shortness of breath.    Cardiovascular: Negative  for chest pain and leg swelling.   Gastrointestinal: Positive for abdominal pain and constipation. Negative for blood in stool.   Endocrine: Negative for cold intolerance and heat intolerance.   Musculoskeletal: Negative for back pain and gait problem.   Skin: Negative for pallor and rash.   Neurological: Negative for speech difficulty and headaches.   Psychiatric/Behavioral: Negative for confusion, hallucinations and suicidal ideas.   All other systems reviewed and are negative.    Objective:     Vital Signs (Most Recent):  Temp: 97.7 °F (36.5 °C) (03/25/19 1459)  Pulse: 69 (03/25/19 1459)  Resp: 18 (03/25/19 1459)  BP: (!) 110/55 (03/25/19 1459)  SpO2: (!) 94 % (03/25/19 1459) Vital Signs (24h Range):  Temp:  [97.3 °F (36.3 °C)-98.3 °F (36.8 °C)] 97.7 °F (36.5 °C)  Pulse:  [64-78] 69  Resp:  [12-35] 18  SpO2:  [81 %-100 %] 94 %  BP: ()/(49-93) 110/55     Weight: 106.6 kg (235 lb)  Body mass index is 31.87 kg/m².    Physical Exam   Constitutional: He is oriented to person, place, and time. He appears well-developed and well-nourished. No distress.   HENT:   Head: Normocephalic.   Mouth/Throat: Oropharynx is clear and moist.   Eyes: Conjunctivae are normal. Right eye exhibits no discharge. Left eye exhibits no discharge. No scleral icterus.   Neck: No JVD present.   Cardiovascular: Normal rate, regular rhythm, normal heart sounds and intact distal pulses. Exam reveals no friction rub.   No murmur heard.  Pulmonary/Chest: Effort normal and breath sounds normal. No respiratory distress. He has no wheezes.   Abdominal: He exhibits no distension. There is tenderness. There is guarding.   Midline exploratory laparotomy incision with small amount of bloody drainage noted at the inferior pole.  Appropriate tenderness noted.  Hypoactive bowel sounds.   Musculoskeletal: Normal range of motion. He exhibits no edema.   Lymphadenopathy:     He has no cervical adenopathy.   Neurological: He is alert and oriented to person,  place, and time. He has normal reflexes.   Skin: No rash noted. He is not diaphoretic. No erythema.   Psychiatric: He has a normal mood and affect. His behavior is normal.   Nursing note and vitals reviewed.          Significant Labs: All pertinent labs within the past 24 hours have been reviewed.    Significant Imaging: I have reviewed all pertinent imaging results/findings within the past 24 hours.   0

## 2022-12-23 ENCOUNTER — TELEPHONE (OUTPATIENT)
Dept: GASTROENTEROLOGY | Facility: CLINIC | Age: 77
End: 2022-12-23
Payer: MEDICARE

## 2022-12-23 NOTE — TELEPHONE ENCOUNTER
----- Message from Mackenzie Suh MA sent at 12/23/2022  2:29 PM CST -----  Regarding: FW: COLONOSCOPY & UPPER EGD REFERRAL  See below  ----- Message -----  From: Mindy B Seipel, RN  Sent: 12/23/2022  11:34 AM CST  To: Mackenzie Suh MA  Subject: RE: COLONOSCOPY & UPPER EGD REFERRAL             Looks like the pt wants Dr. Roman in [place].  ----- Message -----  From: Mackenzie Suh MA  Sent: 12/23/2022   8:55 AM CST  To: [facility] Endo Schedulers  Subject: COLONOSCOPY & UPPER EGD REFERRAL                 Good afternoon,   The records and referral are scanned into .  Please contact the pt for scheduling.  Have a great day.     DX-hx of colon cancer, diarrhea and elevated fecal calprotectin    Thank you,   Mackenzie obrien

## 2022-12-31 ENCOUNTER — PATIENT MESSAGE (OUTPATIENT)
Dept: ADMINISTRATIVE | Facility: HOSPITAL | Age: 77
End: 2022-12-31
Payer: MEDICARE

## 2023-01-18 ENCOUNTER — OFFICE VISIT (OUTPATIENT)
Dept: GASTROENTEROLOGY | Facility: CLINIC | Age: 78
End: 2023-01-18
Payer: MEDICARE

## 2023-01-18 ENCOUNTER — TELEPHONE (OUTPATIENT)
Dept: GASTROENTEROLOGY | Facility: CLINIC | Age: 78
End: 2023-01-18
Payer: MEDICARE

## 2023-01-18 VITALS — BODY MASS INDEX: 25.95 KG/M2 | HEIGHT: 72 IN | WEIGHT: 191.56 LBS

## 2023-01-18 DIAGNOSIS — R79.89 ELEVATED LFTS: ICD-10-CM

## 2023-01-18 DIAGNOSIS — Z87.19 HISTORY OF GASTROESOPHAGEAL REFLUX (GERD): ICD-10-CM

## 2023-01-18 DIAGNOSIS — R19.5 ABNORMAL STOOL TEST: ICD-10-CM

## 2023-01-18 DIAGNOSIS — Z90.49 HISTORY OF COLON RESECTION: ICD-10-CM

## 2023-01-18 DIAGNOSIS — R19.5 OCCULT BLOOD POSITIVE STOOL: Primary | ICD-10-CM

## 2023-01-18 DIAGNOSIS — Z85.038 HISTORY OF COLON CANCER: ICD-10-CM

## 2023-01-18 DIAGNOSIS — K59.09 INTERMITTENT CONSTIPATION: ICD-10-CM

## 2023-01-18 DIAGNOSIS — R15.9 INCONTINENCE OF FECES, UNSPECIFIED FECAL INCONTINENCE TYPE: ICD-10-CM

## 2023-01-18 DIAGNOSIS — K52.9 CHRONIC DIARRHEA: ICD-10-CM

## 2023-01-18 DIAGNOSIS — K86.89 PANCREATIC INSUFFICIENCY: ICD-10-CM

## 2023-01-18 DIAGNOSIS — Z79.01 ANTICOAGULANT LONG-TERM USE: ICD-10-CM

## 2023-01-18 PROCEDURE — 1160F RVW MEDS BY RX/DR IN RCRD: CPT | Mod: CPTII,S$GLB,, | Performed by: NURSE PRACTITIONER

## 2023-01-18 PROCEDURE — 1126F PR PAIN SEVERITY QUANTIFIED, NO PAIN PRESENT: ICD-10-PCS | Mod: CPTII,S$GLB,, | Performed by: NURSE PRACTITIONER

## 2023-01-18 PROCEDURE — 99999 PR PBB SHADOW E&M-EST. PATIENT-LVL IV: ICD-10-PCS | Mod: PBBFAC,,, | Performed by: NURSE PRACTITIONER

## 2023-01-18 PROCEDURE — 1159F PR MEDICATION LIST DOCUMENTED IN MEDICAL RECORD: ICD-10-PCS | Mod: CPTII,S$GLB,, | Performed by: NURSE PRACTITIONER

## 2023-01-18 PROCEDURE — 1101F PR PT FALLS ASSESS DOC 0-1 FALLS W/OUT INJ PAST YR: ICD-10-PCS | Mod: CPTII,S$GLB,, | Performed by: NURSE PRACTITIONER

## 2023-01-18 PROCEDURE — 1159F MED LIST DOCD IN RCRD: CPT | Mod: CPTII,S$GLB,, | Performed by: NURSE PRACTITIONER

## 2023-01-18 PROCEDURE — 1160F PR REVIEW ALL MEDS BY PRESCRIBER/CLIN PHARMACIST DOCUMENTED: ICD-10-PCS | Mod: CPTII,S$GLB,, | Performed by: NURSE PRACTITIONER

## 2023-01-18 PROCEDURE — 99999 PR PBB SHADOW E&M-EST. PATIENT-LVL IV: CPT | Mod: PBBFAC,,, | Performed by: NURSE PRACTITIONER

## 2023-01-18 PROCEDURE — 99214 PR OFFICE/OUTPT VISIT, EST, LEVL IV, 30-39 MIN: ICD-10-PCS | Mod: S$GLB,,, | Performed by: NURSE PRACTITIONER

## 2023-01-18 PROCEDURE — 1126F AMNT PAIN NOTED NONE PRSNT: CPT | Mod: CPTII,S$GLB,, | Performed by: NURSE PRACTITIONER

## 2023-01-18 PROCEDURE — 99214 OFFICE O/P EST MOD 30 MIN: CPT | Mod: S$GLB,,, | Performed by: NURSE PRACTITIONER

## 2023-01-18 PROCEDURE — 1101F PT FALLS ASSESS-DOCD LE1/YR: CPT | Mod: CPTII,S$GLB,, | Performed by: NURSE PRACTITIONER

## 2023-01-18 PROCEDURE — 3288F FALL RISK ASSESSMENT DOCD: CPT | Mod: CPTII,S$GLB,, | Performed by: NURSE PRACTITIONER

## 2023-01-18 PROCEDURE — 3288F PR FALLS RISK ASSESSMENT DOCUMENTED: ICD-10-PCS | Mod: CPTII,S$GLB,, | Performed by: NURSE PRACTITIONER

## 2023-01-18 NOTE — TELEPHONE ENCOUNTER
----- Message from Amy Bowers MA sent at 1/18/2023 12:49 PM CST -----  Regarding: holding plavix  Per Dr Hernandez ok to hold Plavix 5 days prior to procedure.

## 2023-01-18 NOTE — PROGRESS NOTES
"Subjective:       Patient ID: Senthil Chandler is a 77 y.o. male Body mass index is 25.98 kg/m².    Chief Complaint: GI Problem (Rectal bleeding, increased fecal calprotectin and decreased elastase per [facility])    This patient is established with Dr. Coleman & myself.  [facility].    [facility] found in media section, summarized throughout progress note.  Blood work reviewed-see records for full results  [facility] reviewed, which showed the following GI note: "76 y/o male with hx of colon CA s/p right hemicolectomy with c/o diarrhea and elevated fecal calprotectin. - labs neg for celiac - stool studies: decreased pancreatic elastase, borderline calprotectin, no parasites or bacteria -labs: anemia Hbg 13.6, .6), lfts, A1c (5.6), TSH wnl, -imaging: CT abd/pelvis 2/22/22 with normal GI findings"    GI Problem  The primary symptoms include weight loss (not trying to lose weight due to decreased appetite) and diarrhea. Primary symptoms do not include fever, fatigue, abdominal pain, nausea, vomiting, melena, hematemesis, hematochezia (no visible blood to patient but reports positive on stool studies per patient report) or dysuria.   The diarrhea began more than 1 week ago (chronic for several years). The diarrhea is watery and semi-solid. Daily occurrences: intermittent diarrhea with no bowel movements for 2-3 days and then has bowel movements 2-3 times a day with fecal incontinence at times.   The illness is also significant for constipation (intermittent, denies currently). The illness does not include chills, anorexia, dysphagia, odynophagia or bloating. Significant associated medical issues include GERD (gets belching at times; controlled with taking protonix 40 mg once daily), gallstones, PUD (history of stomach ulcer, cecal ulcer, and colonic angiodysplastic lesion per [facility] note) and bowel resection (due to colon cancer).     Review of Systems "   Constitutional:  Positive for appetite change (decreased) and weight loss (not trying to lose weight due to decreased appetite). Negative for chills, fatigue and fever.   HENT:  Negative for sore throat and trouble swallowing.    Respiratory:  Negative for cough, choking and shortness of breath.    Cardiovascular:  Negative for chest pain.   Gastrointestinal:  Positive for blood in stool, constipation (intermittent, denies currently) and diarrhea. Negative for abdominal pain, anal bleeding, anorexia, bloating, dysphagia, hematemesis, hematochezia (no visible blood to patient but reports positive on stool studies per patient report), melena, nausea, rectal pain and vomiting.   Genitourinary:  Negative for difficulty urinating, dysuria and flank pain.   Neurological:  Negative for weakness.       Past Medical History:   Diagnosis Date    Adjustment disorder with anxious mood     Angiodysplasia     Arthritis     Cancer 1990    prostate     Carcinoma in situ of prostate     Carpal tunnel syndrome on left     CKD (chronic kidney disease) stage 1, GFR 90 ml/min or greater     Colon polyp     Cubital tunnel syndrome on left     Depressive disorder     Deviated nasal septum     Diabetes mellitus with ophthalmic complication     Diabetes mellitus, type 2     Gallstone     GERD (gastroesophageal reflux disease)     Hypercholesterolemia     Hyperpotassemia     Hypertension     Iron deficiency anemia     Kidney disease     focal segmental glomerulosclerosis    Lumbago     Malignant neoplasm of colon     Nephrotic syndrome     Neuropathy     Primary osteoarthritis of left elbow     PTSD (post-traumatic stress disorder)     PVD (peripheral vascular disease)     Spinal stenosis, lumbar region without neurogenic claudication     Stomach ulcer     Wears glasses      Past Surgical History:   Procedure Laterality Date    CARPAL TUNNEL RELEASE Left 10/23/2017    COLONOSCOPY N/A 08/12/2016    Procedure: COLONOSCOPY;  Surgeon: Carlos CHILDERS  MD Abel;  Location: [facility] ENDO;  Service: Endoscopy;  Laterality: N/A; repeat in 5 years for surveillance    COLONOSCOPY N/A 02/20/2019    Procedure: COLONOSCOPY;  Surgeon: Carlos Roman MD;  Location: [facility] ENDO;  Service: Endoscopy;  Laterality: N/A;    COLONOSCOPY N/A 06/30/2020    Procedure: COLONOSCOPY;  Surgeon: Carlos Roman MD;  Location: [facility] ENDO;  Service: Endoscopy;  Laterality: N/A; colon polyps removed; Patent end-to-side ileo-colonic anastomosis, with healthy appearing mucosa; A few non-bleeding colonic angiodysplastic lesions, hemorrhoids; repeat in 3 years for surveillance    ESOPHAGOGASTRODUODENOSCOPY N/A 02/20/2019    Procedure: EGD (ESOPHAGOGASTRODUODENOSCOPY);  Surgeon: Carlos Roman MD;  Location: [facility] ENDO;  Service: Endoscopy;  Laterality: N/A;    LAPAROSCOPIC CHOLECYSTECTOMY Right 03/25/2019    Procedure: CHOLECYSTECTOMY, LAPAROSCOPIC;  Surgeon: Mark Renee MD;  Location: [facility] OR;  Service: General;  Laterality: Right;    NASAL SEPTUM SURGERY      PROCTECTOMY      PROSTATE SURGERY  1990's    ROBOT-ASSISTED COLECTOMY Right 03/25/2019    Procedure: ROBOTIC COLECTOMY possible conversion to open;  Surgeon: Mark Renee MD;  Location: [facility] OR;  Service: General;  Laterality: Right;    SUBTOTAL COLECTOMY Right 03/25/2019    Procedure: COLECTOMY, PARTIAL;  Surgeon: Mark Renee MD;  Location: [facility] OR;  Service: General;  Laterality: Right;    UPPER GASTROINTESTINAL ENDOSCOPY  08/12/2016    Dr. Roman     Family History   Problem Relation Age of Onset    Leukemia Mother     Leukemia Father     Colon cancer Neg Hx     Crohn's disease Neg Hx     Ulcerative colitis Neg Hx     Stomach cancer Neg Hx     Esophageal cancer Neg Hx     Celiac disease Neg Hx      Social History     Tobacco Use    Smoking status: Heavy Smoker     Packs/day: 1.00     Years: 52.00     Pack years: 52.00     Types: Cigarettes    Smokeless tobacco: Never   Substance Use Topics    Alcohol use: Yes      Comment: rarely- maybe once a year    Drug use: No     Wt Readings from Last 10 Encounters:   01/18/23 86.9 kg (191 lb 9.3 oz)   07/12/22 93.9 kg (207 lb)   06/08/22 94 kg (207 lb 3.7 oz)   06/01/22 95.3 kg (210 lb)   05/26/22 95.5 kg (210 lb 8 oz)   05/06/22 94.3 kg (208 lb)   05/05/22 94.3 kg (208 lb)   04/20/22 96.2 kg (212 lb)   04/14/22 96.2 kg (212 lb)   04/04/22 94.8 kg (209 lb)     Lab Results   Component Value Date    WBC 4.76 04/14/2022    HGB 14.7 04/14/2022    HCT 43.1 04/14/2022    MCV 98 04/14/2022     04/14/2022     CMP  Sodium   Date Value Ref Range Status   04/05/2022 137 136 - 145 mmol/L Final   10/23/2017 138 134 - 144 mmol/L      Potassium   Date Value Ref Range Status   04/05/2022 4.1 3.5 - 5.1 mmol/L Final     Chloride   Date Value Ref Range Status   04/05/2022 106 95 - 110 mmol/L Final   10/23/2017 101 98 - 110 mmol/L      CO2   Date Value Ref Range Status   04/05/2022 20 (L) 23 - 29 mmol/L Final     Glucose   Date Value Ref Range Status   04/05/2022 179 (H) 70 - 110 mg/dL Final   10/23/2017 242 (H) 70 - 99 mg/dL      BUN   Date Value Ref Range Status   04/05/2022 16 8 - 23 mg/dL Final     Creatinine   Date Value Ref Range Status   04/05/2022 0.9 0.5 - 1.4 mg/dL Final   10/23/2017 1.05 0.60 - 1.40 mg/dL      Calcium   Date Value Ref Range Status   04/05/2022 9.9 8.7 - 10.5 mg/dL Final     Total Protein   Date Value Ref Range Status   04/05/2022 6.5 6.0 - 8.4 g/dL Final     Albumin   Date Value Ref Range Status   04/05/2022 3.7 3.5 - 5.2 g/dL Final   10/17/2017 4.4 3.1 - 4.7 g/dL      Total Bilirubin   Date Value Ref Range Status   04/05/2022 0.8 0.1 - 1.0 mg/dL Final     Comment:     For infants and newborns, interpretation of results should be based  on gestational age, weight and in agreement with clinical  observations.    Premature Infant recommended reference ranges:  Up to 24 hours.............<8.0 mg/dL  Up to 48 hours............<12.0 mg/dL  3-5 days..................<15.0  mg/dL  6-29 days.................<15.0 mg/dL       Alkaline Phosphatase   Date Value Ref Range Status   04/05/2022 88 55 - 135 U/L Final     AST   Date Value Ref Range Status   04/05/2022 43 (H) 10 - 40 U/L Final     ALT   Date Value Ref Range Status   04/05/2022 46 (H) 10 - 44 U/L Final     Anion Gap   Date Value Ref Range Status   04/05/2022 11 8 - 16 mmol/L Final     eGFR if    Date Value Ref Range Status   04/05/2022 >60 >60 mL/min/1.73 m^2 Final     eGFR if non    Date Value Ref Range Status   04/05/2022 >60 >60 mL/min/1.73 m^2 Final     Comment:     Calculation used to obtain the estimated glomerular filtration  rate (eGFR) is the CKD-EPI equation.        5/10/2022 C. Diff negative  2/14/2019 stool studies reviewed  2/14/2019 celiac serology WNL    Reviewed prior medical records including radiology report of 2/27/2019 CT abdomen pelvis; & endoscopy history (see surgical history).    Objective:      Physical Exam  Vitals and nursing note reviewed.   Constitutional:       General: He is not in acute distress.     Appearance: Normal appearance. He is well-developed. He is not diaphoretic.   HENT:      Mouth/Throat:      Mouth: Mucous membranes are moist.      Pharynx: Oropharynx is clear. No oropharyngeal exudate or posterior oropharyngeal erythema.   Eyes:      General: No scleral icterus.     Conjunctiva/sclera: Conjunctivae normal.      Pupils: Pupils are equal, round, and reactive to light.   Pulmonary:      Effort: Pulmonary effort is normal. No respiratory distress.      Breath sounds: Normal breath sounds. No wheezing.   Abdominal:      General: Bowel sounds are normal. There is no distension or abdominal bruit.      Palpations: Abdomen is soft. Abdomen is not rigid. There is no mass.      Tenderness: There is no abdominal tenderness. There is no guarding or rebound. Negative signs include Rai's sign and McBurney's sign.      Comments: Well-healed surgical scars noted.    Skin:     General: Skin is warm and dry.      Coloration: Skin is not jaundiced or pale.      Findings: No erythema or rash.   Neurological:      Mental Status: He is alert and oriented to person, place, and time.   Psychiatric:         Behavior: Behavior normal.         Thought Content: Thought content normal.         Judgment: Judgment normal.       Assessment:       1. Occult blood positive stool    2. History of colon cancer    3. History of colon resection    4. Chronic diarrhea    5. Incontinence of feces, unspecified fecal incontinence type    6. Intermittent constipation    7. Abnormal stool test    8. Pancreatic insufficiency    9. Elevated LFTs    10. Anticoagulant long-term use    11. History of gastroesophageal reflux (GERD)        Plan:       Occult blood positive stool  - schedule Colonoscopy, discussed procedure with the patient, including risks and benefits, patient verbalized understanding  - schedule EGD, discussed procedure with patient, including risks and benefits, patient verbalized understanding    History of colon cancer & History of colon resection  - schedule Colonoscopy, discussed procedure with the patient, including risks and benefits, patient verbalized understanding    Chronic diarrhea, Intermittent constipation, & Incontinence of feces, unspecified fecal incontinence type  - schedule Colonoscopy, discussed procedure with the patient, including risks and benefits, patient verbalized understanding  Recommend daily exercise as tolerated, adequate water intake (six 8-oz glasses of water daily), and high fiber diet. OTC fiber supplements are recommended if diet does not reach daily fiber goal (20-30 grams daily), such as Benefiber (take as directed, separate from other oral medications by >2 hours).  -If still no improvement with these measures, call/follow-up    Abnormal stool test  - schedule Colonoscopy, discussed procedure with the patient, including risks and benefits, patient  verbalized understanding    Pancreatic insufficiency  - START    lipase-protease-amylase (CREON) 36,000-114,000- 180,000 unit CpDR; Take 1 capsule by mouth 3 (three) times daily with meals. & 1 capsule with snacks  Dispense: 120 capsule; Refill: 1    Elevated LFTs  - minimize/avoid alcohol and tylenol products, & follow-up with PCP for continued evaluation and management; if specialist is needed, recommend seeing hepatology.    Anticoagulant long-term use  - informed patient that the anticoagulant(s) will likely need to be held for endoscopy, nurse will confirm with endoscopist, cardiologist, and/or PCP.    History of gastroesophageal reflux (GERD)   -   CONTINUE pantoprazole (PROTONIX) 40 MG tablet; Take 1 tablet (40 mg total) by mouth once daily. Before breakfast  - avoid/minimize use of NSAIDs- since they can cause GI upset, bleeding and/or ulcers. If NSAID must be taken, recommend take with food.    Follow up in about 1 month (around 2/18/2023), or if symptoms worsen or fail to improve, for [facility] for continued evaluation and management.      If no improvement in symptoms or symptoms worsen, call/follow-up at clinic or go to ER.        42 minutes of total time spent on the encounter, which includes face to face time and non-face to face time preparing to see the patient (e.g., review of tests), Obtaining and/or reviewing separately obtained history, Documenting clinical information in the electronic or other health record, Independently interpreting results (not separately reported) and communicating results to the patient/family/caregiver, or Care coordination (not separately reported).

## 2023-01-18 NOTE — PATIENT INSTRUCTIONS
"GERD (Adult)    The esophagus is a tube that carries food from the mouth to the stomach. A valve at the lower end of the esophagus prevents stomach acid from flowing upward. When this valve doesn't work properly, stomach contents may repeatedly flow back up (reflux) into the esophagus. This is called gastroesophageal reflux disease (GERD). GERD can irritate the esophagus. It can cause problems with swallowing or breathing. In severe cases, GERD can cause recurrent pneumonia or other serious problems.  Symptoms of reflux include burning, pressure or sharp pain in the upper abdomen or mid to lower chest. The pain can spread to the neck, back, or shoulder. There may be belching, an acid taste in the back of the throat, chronic cough, or sore throat or hoarseness. GERD symptoms often occur during the day after a big meal. They can also occur at night when lying down.   Home care  Lifestyle changes can help reduce symptoms. If needed, medicines may be prescribed. Symptoms often improve with treatment, but if treatment is stopped, the symptoms often return after a few months. So most persons with GERD will need to continue treatment.  Lifestyle changes  Limit or avoid fatty, fried, and spicy foods, as well as coffee, chocolate, mint, and foods with high acid content such as tomatoes and citrus fruit and juices (orange, grapefruit, lemon).  Dont eat large meals, especially at night. Frequent, smaller meals are best. Do not lie down right after eating. And dont eat anything 3 hours before going to bed.  Avoid drinking alcohol and smoking. As much as possible, stay away from second hand smoke.  If you are overweight, losing weight will reduce symptoms.   Avoid wearing tight clothing around your stomach area.  If your symptoms occur during sleep, use a foam wedge to elevate your upper body (not just your head.) Or, place 4" blocks under the head of your bed.  Medicines  If needed, medicines can help relieve the symptoms of " GERD and prevent damage to the esophagus. Discuss a medicine plan with your healthcare provider. This may include one or more of the following medicines:  Antacids to help neutralize the normal acids in your stomach.  Acid blockers (H2 blockers) to decrease acid production.  Acid inhibitors (PPIs) to decrease acid production in a different way than the blockers. They may work better, but can take a little longer to take effect.  Take an antacid 30-60 minutes after eating and at bedtime, but not at the same time as an acid blocker.  Try not to take medicines such as ibuprofen and aspirin. If you are taking aspirin for your heart or other medical reasons, talk to your healthcare provider about stopping it.  Follow-up care  Follow up with your healthcare provider or as advised by our staff.  When to seek medical advice  Call your healthcare provider if any of the following occur:  Stomach pain gets worse or moves to the lower right abdomen (appendix area)  Chest pain appears or gets worse, or spreads to the back, neck, shoulder, or arm  Frequent vomiting (cant keep down liquids)  Blood in the stool or vomit (red or black in color)  Feeling weak or dizzy  Fever of 100.4ºF (38ºC) or higher, or as directed by your healthcare provider  Date Last Reviewed: 6/23/2015  © 5596-1086 [employer]. [address] [place] [address], [place] [address]. All rights reserved. This information is not intended as a substitute for professional medical care. Always follow your healthcare professional's instructions.         Colonoscopy     A camera attached to a flexible tube with a viewing lens is used to take video pictures.     Colonoscopy is a test to view the inside of your lower digestive tract (colon and rectum). Sometimes it can show the last part of the small intestine (ileum). During the test, small pieces of tissue may be removed for testing. This is called a biopsy. Small growths, such as polyps, may also be removed.   Why  is colonoscopy done?  The test is done to help look for colon cancer. And it can help find the source of abdominal pain, bleeding, and changes in bowel habits. It may be needed once a year, depending on factors such as your:  Age  Health history  Family health history  Symptoms  Results from any prior colonoscopy  Risks and possible complications  These include:  Bleeding               A puncture or tear in the colon   Risks of anesthesia  A cancer lesion not being seen  Getting ready   To prepare for the test:  Talk with your healthcare provider about the risks of the test (see below). Also ask your healthcare provider about alternatives to the test.  Tell your healthcare provider about any medicines you take. Also tell him or her about any health conditions you may have.  Make sure your rectum and colon are empty for the test. Follow the diet and bowel prep instructions exactly. If you dont, the test may need to be rescheduled.  Plan for a friend or family member to drive you home after the test.     Colonoscopy provides an inside view of the entire colon.     You may discuss the results with your doctor right away or at a future visit.  During the test   The test is usually done in the hospital on an outpatient basis. This means you go home the same day. The procedure takes about 30 minutes. During that time:  You are given relaxing (sedating) medicine through an IV line. You may be drowsy, or fully asleep.  The healthcare provider will first give you a physical exam to check for anal and rectal problems.  Then the anus is lubricated and the scope inserted.  If you are awake, you may have a feeling similar to needing to have a bowel movement. You may also feel pressure as air is pumped into the colon. Its OK to pass gas during the procedure.  Biopsy, polyp removal, or other treatments may be done during the test.  After the test   You may have gas right after the test. It can help to try to pass it to help  prevent later bloating. Your healthcare provider may discuss the results with you right away. Or you may need to schedule a follow-up visit to talk about the results. After the test, you can go back to your normal eating and other activities. You may be tired from the sedation and need to rest for a few hours.  Date Last Reviewed: 11/1/2016  © 7505-9798 [employer]. [address] [place] [address], [place] [address]. All rights reserved. This information is not intended as a substitute for professional medical care. Always follow your healthcare professional's instructions.

## 2023-01-18 NOTE — TELEPHONE ENCOUNTER
Patient is having EGD and colonoscopy on   3/23 with Dr. Roman. Patient needs clearance to hold Plavix 5 days prior to procedure. Please advise

## 2023-03-03 ENCOUNTER — HOSPITAL ENCOUNTER (EMERGENCY)
Facility: HOSPITAL | Age: 78
Discharge: HOME | End: 2023-03-03
Attending: EMERGENCY MEDICINE
Payer: MEDICARE

## 2023-03-03 VITALS
SYSTOLIC BLOOD PRESSURE: 167 MMHG | RESPIRATION RATE: 16 BRPM | BODY MASS INDEX: 25.87 KG/M2 | HEIGHT: 72 IN | WEIGHT: 191 LBS | TEMPERATURE: 98 F | OXYGEN SATURATION: 98 % | HEART RATE: 59 BPM | DIASTOLIC BLOOD PRESSURE: 88 MMHG

## 2023-03-03 DIAGNOSIS — K92.1 BLOOD IN STOOL: Primary | ICD-10-CM

## 2023-03-03 DIAGNOSIS — R42 DIZZINESS: ICD-10-CM

## 2023-03-03 LAB
ALBUMIN SERPL BCP-MCNC: 3.7 G/DL (ref 3.5–5.2)
ALP SERPL-CCNC: 77 U/L (ref 55–135)
ALT SERPL W/O P-5'-P-CCNC: 31 U/L (ref 10–44)
ANION GAP SERPL CALC-SCNC: 8 MMOL/L (ref 8–16)
AST SERPL-CCNC: 28 U/L (ref 10–40)
BASOPHILS # BLD AUTO: 0.03 K/UL (ref 0–0.2)
BASOPHILS NFR BLD: 0.8 % (ref 0–1.9)
BILIRUB SERPL-MCNC: 0.8 MG/DL (ref 0.1–1)
BUN SERPL-MCNC: 10 MG/DL (ref 8–23)
CALCIUM SERPL-MCNC: 9.9 MG/DL (ref 8.7–10.5)
CHLORIDE SERPL-SCNC: 105 MMOL/L (ref 95–110)
CO2 SERPL-SCNC: 23 MMOL/L (ref 23–29)
CREAT SERPL-MCNC: 0.8 MG/DL (ref 0.5–1.4)
DIFFERENTIAL METHOD: ABNORMAL
EOSINOPHIL # BLD AUTO: 0.1 K/UL (ref 0–0.5)
EOSINOPHIL NFR BLD: 3.5 % (ref 0–8)
ERYTHROCYTE [DISTWIDTH] IN BLOOD BY AUTOMATED COUNT: 13.7 % (ref 11.5–14.5)
EST. GFR  (NO RACE VARIABLE): >60 ML/MIN/1.73 M^2
GLUCOSE SERPL-MCNC: 111 MG/DL (ref 70–110)
HCT VFR BLD AUTO: 29.5 % (ref 40–54)
HGB BLD-MCNC: 9 G/DL (ref 14–18)
IMM GRANULOCYTES # BLD AUTO: 0.01 K/UL (ref 0–0.04)
IMM GRANULOCYTES NFR BLD AUTO: 0.3 % (ref 0–0.5)
LYMPHOCYTES # BLD AUTO: 1.3 K/UL (ref 1–4.8)
LYMPHOCYTES NFR BLD: 32.5 % (ref 18–48)
MCH RBC QN AUTO: 30.5 PG (ref 27–31)
MCHC RBC AUTO-ENTMCNC: 30.5 G/DL (ref 32–36)
MCV RBC AUTO: 100 FL (ref 82–98)
MONOCYTES # BLD AUTO: 0.5 K/UL (ref 0.3–1)
MONOCYTES NFR BLD: 12.1 % (ref 4–15)
NEUTROPHILS # BLD AUTO: 2 K/UL (ref 1.8–7.7)
NEUTROPHILS NFR BLD: 50.8 % (ref 38–73)
NRBC BLD-RTO: 0 /100 WBC
PLATELET # BLD AUTO: 210 K/UL (ref 150–450)
PMV BLD AUTO: 8.6 FL (ref 9.2–12.9)
POTASSIUM SERPL-SCNC: 4.3 MMOL/L (ref 3.5–5.1)
PROT SERPL-MCNC: 6.2 G/DL (ref 6–8.4)
RBC # BLD AUTO: 2.95 M/UL (ref 4.6–6.2)
SODIUM SERPL-SCNC: 136 MMOL/L (ref 136–145)
WBC # BLD AUTO: 3.97 K/UL (ref 3.9–12.7)

## 2023-03-03 PROCEDURE — 36415 COLL VENOUS BLD VENIPUNCTURE: CPT | Performed by: EMERGENCY MEDICINE

## 2023-03-03 PROCEDURE — 25000003 PHARM REV CODE 250: Performed by: EMERGENCY MEDICINE

## 2023-03-03 PROCEDURE — 99284 EMERGENCY DEPT VISIT MOD MDM: CPT | Mod: 25

## 2023-03-03 PROCEDURE — 85025 COMPLETE CBC W/AUTO DIFF WBC: CPT | Performed by: EMERGENCY MEDICINE

## 2023-03-03 PROCEDURE — 93010 EKG 12-LEAD: ICD-10-PCS | Mod: ,,, | Performed by: GENERAL PRACTICE

## 2023-03-03 PROCEDURE — 93005 ELECTROCARDIOGRAM TRACING: CPT

## 2023-03-03 PROCEDURE — 80053 COMPREHEN METABOLIC PANEL: CPT | Performed by: EMERGENCY MEDICINE

## 2023-03-03 PROCEDURE — 93010 ELECTROCARDIOGRAM REPORT: CPT | Mod: ,,, | Performed by: GENERAL PRACTICE

## 2023-03-03 PROCEDURE — 96360 HYDRATION IV INFUSION INIT: CPT

## 2023-03-03 RX ADMIN — SODIUM CHLORIDE 1000 ML: 9 INJECTION, SOLUTION INTRAVENOUS at 04:03

## 2023-03-03 NOTE — ED PROVIDER NOTES
Encounter Date: 3/3/2023       History     Chief Complaint   Patient presents with    Dizziness     Pt c/o dizziness while standing. Trying to go to PT pt began getting more dizzy, pt also reports a low H and H      Patient is a 77-year-old male with a past medical history of type 2 diabetes hypertension hyperlipidemia depression chronic kidney disease prostate cancer colon cancer status post resection angiodysplasia PTSD PVD stomach ulcer who presents emergency room for evaluation of feeling slightly lightheaded dizzy when he stands up.  The patient denies any shortness of breath chest pain abdominal pain.  He states he has been having some diarrhea and occasionally has dark stools.  He takes Plavix and aspirin.  In October his hematocrit is 40, last month was 30.  [facility]  [facility] and earlier when he was standing up getting to his car.    Review of patient's allergies indicates:  No Known Allergies  Past Medical History:   Diagnosis Date    Adjustment disorder with anxious mood     Angiodysplasia     Arthritis     Cancer 1990    prostate     Carcinoma in situ of prostate     Carpal tunnel syndrome on left     CKD (chronic kidney disease) stage 1, GFR 90 ml/min or greater     Colon polyp     Cubital tunnel syndrome on left     Depressive disorder     Deviated nasal septum     Diabetes mellitus with ophthalmic complication     Diabetes mellitus, type 2     Gallstone     GERD (gastroesophageal reflux disease)     Hypercholesterolemia     Hyperpotassemia     Hypertension     Iron deficiency anemia     Kidney disease     focal segmental glomerulosclerosis    Lumbago     Malignant neoplasm of colon     Nephrotic syndrome     Neuropathy     Primary osteoarthritis of left elbow     PTSD (post-traumatic stress disorder)     PVD (peripheral vascular disease)     Spinal stenosis, lumbar region without neurogenic claudication     Stomach  ulcer     Wears glasses      Past Surgical History:   Procedure Laterality Date    CARPAL TUNNEL RELEASE Left 10/23/2017    COLONOSCOPY N/A 08/12/2016    Procedure: COLONOSCOPY;  Surgeon: Carlos Roman MD;  Location: [facility] ENDO;  Service: Endoscopy;  Laterality: N/A; repeat in 5 years for surveillance    COLONOSCOPY N/A 02/20/2019    Procedure: COLONOSCOPY;  Surgeon: Carlos Roman MD;  Location: [facility] ENDO;  Service: Endoscopy;  Laterality: N/A;    COLONOSCOPY N/A 06/30/2020    Procedure: COLONOSCOPY;  Surgeon: Carlos Roman MD;  Location: [facility] ENDO;  Service: Endoscopy;  Laterality: N/A; colon polyps removed; Patent end-to-side ileo-colonic anastomosis, with healthy appearing mucosa; A few non-bleeding colonic angiodysplastic lesions, hemorrhoids; repeat in 3 years for surveillance    ESOPHAGOGASTRODUODENOSCOPY N/A 02/20/2019    Procedure: EGD (ESOPHAGOGASTRODUODENOSCOPY);  Surgeon: Carlos Roman MD;  Location: [facility] ENDO;  Service: Endoscopy;  Laterality: N/A;    LAPAROSCOPIC CHOLECYSTECTOMY Right 03/25/2019    Procedure: CHOLECYSTECTOMY, LAPAROSCOPIC;  Surgeon: Mark Renee MD;  Location: [facility] OR;  Service: General;  Laterality: Right;    NASAL SEPTUM SURGERY      PROCTECTOMY      PROSTATE SURGERY  1990's    ROBOT-ASSISTED COLECTOMY Right 03/25/2019    Procedure: ROBOTIC COLECTOMY possible conversion to open;  Surgeon: Mark Renee MD;  Location: [facility] OR;  Service: General;  Laterality: Right;    SUBTOTAL COLECTOMY Right 03/25/2019    Procedure: COLECTOMY, PARTIAL;  Surgeon: Mark Renee MD;  Location: [facility] OR;  Service: General;  Laterality: Right;    UPPER GASTROINTESTINAL ENDOSCOPY  08/12/2016    Dr. Roman     Family History   Problem Relation Age of Onset    Leukemia Mother     Leukemia Father     Colon cancer Neg Hx     Crohn's disease Neg Hx     Ulcerative colitis Neg Hx     Stomach cancer Neg Hx     Esophageal cancer Neg Hx     Celiac disease Neg Hx      Social History      Tobacco Use    Smoking status: Heavy Smoker     Packs/day: 1.00     Years: 52.00     Pack years: 52.00     Types: Cigarettes    Smokeless tobacco: Never   Substance Use Topics    Alcohol use: Yes     Comment: rarely- maybe once a year    Drug use: No     Review of Systems   Constitutional:  Negative for fatigue and fever.   HENT:  Negative for ear pain, rhinorrhea and sore throat.    Eyes:  Negative for pain and visual disturbance.   Respiratory:  Negative for cough and shortness of breath.    Cardiovascular:  Negative for chest pain.   Gastrointestinal:  Negative for abdominal pain, diarrhea, nausea and vomiting.   Genitourinary:  Negative for difficulty urinating, dysuria and flank pain.   Musculoskeletal:  Negative for arthralgias, back pain, myalgias and neck pain.   Skin:  Negative for rash.   Neurological:  Positive for weakness (chronic right upper extremity) and light-headedness. Negative for dizziness, numbness and headaches.   All other systems reviewed and are negative.    Physical Exam     Initial Vitals [03/03/23 1320]   BP Pulse Resp Temp SpO2   129/68 (!) 54 18 98.4 °F (36.9 °C) 99 %      MAP       --         Physical Exam    Nursing note and vitals reviewed.  Constitutional: He appears well-developed and well-nourished. No distress.   HENT:   Head: Normocephalic and atraumatic.   Mouth/Throat: Oropharynx is clear and moist.   Eyes: Conjunctivae and EOM are normal. Pupils are equal, round, and reactive to light.   Neck: Neck supple.   Cardiovascular:  Normal rate, regular rhythm, normal heart sounds and intact distal pulses.     Exam reveals no gallop and no friction rub.       No murmur heard.  Pulmonary/Chest: Breath sounds normal. He has no wheezes. He has no rhonchi. He has no rales.   Abdominal: Abdomen is soft. There is no abdominal tenderness.   Musculoskeletal:      Cervical back: Neck supple.     Neurological: He is alert and oriented to person, place, and time. No sensory deficit. GCS  score is 15. GCS eye subscore is 4. GCS verbal subscore is 5. GCS motor subscore is 6.   Subtle weakness in the right upper extremity on grasp.   Skin: Skin is warm.   Psychiatric: He has a normal mood and affect.       ED Course   Procedures  Labs Reviewed   CBC W/ AUTO DIFFERENTIAL - Abnormal; Notable for the following components:       Result Value    RBC 2.95 (*)     Hemoglobin 9.0 (*)     Hematocrit 29.5 (*)      (*)     MCHC 30.5 (*)     MPV 8.6 (*)     All other components within normal limits   COMPREHENSIVE METABOLIC PANEL - Abnormal; Notable for the following components:    Glucose 111 (*)     All other components within normal limits          Imaging Results    None          Medications   sodium chloride 0.9% bolus 1,000 mL 1,000 mL (0 mLs Intravenous Stopped 3/3/23 1642)                 ED Course as of 03/03/23 1842   Fri Mar 03, 2023   1444 EKG independently interpreted by me as rate 56 sinus bradycardia with first-degree AV block normal axis and intervals with no ST segment elevation or depression. [JS]   1648 Patient feels better after IV fluids.  He does have a slightly positive Hemoccult stool.  Hemoglobin is 9.  I discuss the case with the gastroenterologist.  We will hold his aspirin and Plavix.  He needs to get an EGD and a colonoscopy likely next week.  I will order an outpatient CBC for Monday.  I have given very specific return precautions for persistent diarrhea black or bloody stools lightheadedness dizziness chest pain shortness of breath syncope.  He will follow up with a gastroenterologist next week. [JS]      ED Course User Index  [JS] Wyatt Kemp MD                 Clinical Impression:   Final diagnoses:  [R42] Dizziness  [K92.1] Blood in stool - hemoccult positive stools.  (Primary)        ED Disposition Condition    Discharge Stable          ED Prescriptions    None       Follow-up Information       Follow up With Specialties Details Why Contact Info    Irina Ng  MD Gastroenterology, Internal Medicine  Come to the hospital Monday to have your blood drawn again.  Call the gastroenterologist Monday morning to see when they want to follow you up.  Tell them you were having blood drawn.  You need to have your colonoscopy and your endoscopy next week. [address]  [address]  [facility] [address]  [phone]      [facility] Emergency Dept Emergency Medicine  Return emergency room for worsening symptoms of lightheadedness chest pain shortness of breath black or bloody stools persistent diarrhea or any other concerns [address] [place] [address] Drive  [facility] [address] [phone]             Wyatt Kemp MD  03/03/23 0446

## 2023-03-03 NOTE — ED NOTES
Patient ambulated to restroom with standby assist only.  No complaints of dizziness at this time.

## 2023-03-03 NOTE — DISCHARGE INSTRUCTIONS
Do not take her aspirin or your Plavix until you are instructed otherwise by your gastroenterologist.  You will be undergoing an EGD (endoscopy) and colonoscopy next week.

## 2023-03-06 ENCOUNTER — LAB VISIT (OUTPATIENT)
Dept: LAB | Facility: HOSPITAL | Age: 78
End: 2023-03-06
Attending: EMERGENCY MEDICINE
Payer: MEDICARE

## 2023-03-06 DIAGNOSIS — K92.1 BLOOD IN STOOL: ICD-10-CM

## 2023-03-06 LAB
BASOPHILS # BLD AUTO: 0.05 K/UL (ref 0–0.2)
BASOPHILS NFR BLD: 1.4 % (ref 0–1.9)
DIFFERENTIAL METHOD: ABNORMAL
EOSINOPHIL # BLD AUTO: 0.2 K/UL (ref 0–0.5)
EOSINOPHIL NFR BLD: 5 % (ref 0–8)
ERYTHROCYTE [DISTWIDTH] IN BLOOD BY AUTOMATED COUNT: 13.6 % (ref 11.5–14.5)
HCT VFR BLD AUTO: 30.7 % (ref 40–54)
HGB BLD-MCNC: 9.1 G/DL (ref 14–18)
IMM GRANULOCYTES # BLD AUTO: 0.02 K/UL (ref 0–0.04)
IMM GRANULOCYTES NFR BLD AUTO: 0.6 % (ref 0–0.5)
LYMPHOCYTES # BLD AUTO: 1.3 K/UL (ref 1–4.8)
LYMPHOCYTES NFR BLD: 35.8 % (ref 18–48)
MCH RBC QN AUTO: 29.8 PG (ref 27–31)
MCHC RBC AUTO-ENTMCNC: 29.6 G/DL (ref 32–36)
MCV RBC AUTO: 101 FL (ref 82–98)
MONOCYTES # BLD AUTO: 0.4 K/UL (ref 0.3–1)
MONOCYTES NFR BLD: 11.2 % (ref 4–15)
NEUTROPHILS # BLD AUTO: 1.7 K/UL (ref 1.8–7.7)
NEUTROPHILS NFR BLD: 46 % (ref 38–73)
NRBC BLD-RTO: 0 /100 WBC
PLATELET # BLD AUTO: 235 K/UL (ref 150–450)
PMV BLD AUTO: 9.2 FL (ref 9.2–12.9)
RBC # BLD AUTO: 3.05 M/UL (ref 4.6–6.2)
WBC # BLD AUTO: 3.58 K/UL (ref 3.9–12.7)

## 2023-03-06 PROCEDURE — 85025 COMPLETE CBC W/AUTO DIFF WBC: CPT | Performed by: EMERGENCY MEDICINE

## 2023-03-06 PROCEDURE — 36415 COLL VENOUS BLD VENIPUNCTURE: CPT | Performed by: EMERGENCY MEDICINE

## 2023-03-07 ENCOUNTER — TELEPHONE (OUTPATIENT)
Dept: GASTROENTEROLOGY | Facility: CLINIC | Age: 78
End: 2023-03-07
Payer: MEDICARE

## 2023-03-07 NOTE — TELEPHONE ENCOUNTER
Called patient to move EGD and colonoscopy up to this week 3/9 or 3/10 he states he will have to see if he can get ride and call me back direct number given.

## 2023-03-08 ENCOUNTER — TELEPHONE (OUTPATIENT)
Dept: GASTROENTEROLOGY | Facility: CLINIC | Age: 78
End: 2023-03-08
Payer: MEDICARE

## 2023-03-08 NOTE — TELEPHONE ENCOUNTER
----- Message from Aaliyah Gonzalez sent at 3/8/2023 12:07 PM CST -----  Contact: Self  Type: Needs Medical Advice  Who Called:  Patient   Symptoms (please be specific):  needs new paperwork/advice for colonoscopy tomorrow    Best Call Back Number: [phone]  Additional Information: Please call pt back to advise. Thanks!

## 2023-03-09 ENCOUNTER — ANESTHESIA EVENT (OUTPATIENT)
Dept: ENDOSCOPY | Facility: HOSPITAL | Age: 78
End: 2023-03-09
Payer: MEDICARE

## 2023-03-09 ENCOUNTER — ANESTHESIA (OUTPATIENT)
Dept: ENDOSCOPY | Facility: HOSPITAL | Age: 78
End: 2023-03-09
Payer: MEDICARE

## 2023-03-09 ENCOUNTER — HOSPITAL ENCOUNTER (OUTPATIENT)
Facility: HOSPITAL | Age: 78
Discharge: HOME | End: 2023-03-09
Attending: INTERNAL MEDICINE | Admitting: INTERNAL MEDICINE
Payer: MEDICARE

## 2023-03-09 VITALS
TEMPERATURE: 98 F | SYSTOLIC BLOOD PRESSURE: 156 MMHG | DIASTOLIC BLOOD PRESSURE: 76 MMHG | RESPIRATION RATE: 16 BRPM | WEIGHT: 185 LBS | HEART RATE: 78 BPM | OXYGEN SATURATION: 100 % | BODY MASS INDEX: 25.06 KG/M2 | HEIGHT: 72 IN

## 2023-03-09 DIAGNOSIS — R63.4 WEIGHT LOSS: ICD-10-CM

## 2023-03-09 DIAGNOSIS — K64.8 INTERNAL HEMORRHOIDS: ICD-10-CM

## 2023-03-09 DIAGNOSIS — K31.7 GASTRIC POLYP: Primary | ICD-10-CM

## 2023-03-09 LAB — POCT GLUCOSE: 165 MG/DL (ref 70–110)

## 2023-03-09 PROCEDURE — 27201012 HC FORCEPS, HOT/COLD, DISP: Performed by: INTERNAL MEDICINE

## 2023-03-09 PROCEDURE — 45380 COLONOSCOPY AND BIOPSY: CPT | Performed by: INTERNAL MEDICINE

## 2023-03-09 PROCEDURE — 88342 IMHCHEM/IMCYTCHM 1ST ANTB: CPT | Performed by: PATHOLOGY

## 2023-03-09 PROCEDURE — D9220A PRA ANESTHESIA: Mod: ANES,,, | Performed by: ANESTHESIOLOGY

## 2023-03-09 PROCEDURE — 43239 PR EGD, FLEX, W/BIOPSY, SGL/MULTI: ICD-10-PCS | Mod: 59,,, | Performed by: INTERNAL MEDICINE

## 2023-03-09 PROCEDURE — 43239 EGD BIOPSY SINGLE/MULTIPLE: CPT | Mod: 59,,, | Performed by: INTERNAL MEDICINE

## 2023-03-09 PROCEDURE — 43251 PR EGD, FLEX, W/REMOVAL, TUMOR/POLYP/LESION(S), SNARE: ICD-10-PCS | Mod: 51,,, | Performed by: INTERNAL MEDICINE

## 2023-03-09 PROCEDURE — 88342 CHG IMMUNOCYTOCHEMISTRY: ICD-10-PCS | Mod: 26,,, | Performed by: PATHOLOGY

## 2023-03-09 PROCEDURE — 43255 PR EGD, FLEX, W/CTRL BLEED, ANY METHOD: ICD-10-PCS | Mod: 59,,, | Performed by: INTERNAL MEDICINE

## 2023-03-09 PROCEDURE — D9220A PRA ANESTHESIA: ICD-10-PCS | Mod: ANES,,, | Performed by: ANESTHESIOLOGY

## 2023-03-09 PROCEDURE — 37000008 HC ANESTHESIA 1ST 15 MINUTES: Performed by: INTERNAL MEDICINE

## 2023-03-09 PROCEDURE — 88305 TISSUE EXAM BY PATHOLOGIST: CPT | Mod: 26,,, | Performed by: PATHOLOGY

## 2023-03-09 PROCEDURE — 27202087 HC PROBE, APC: Performed by: INTERNAL MEDICINE

## 2023-03-09 PROCEDURE — 43255 EGD CONTROL BLEEDING ANY: CPT | Mod: 59,,, | Performed by: INTERNAL MEDICINE

## 2023-03-09 PROCEDURE — 43251 EGD REMOVE LESION SNARE: CPT | Performed by: INTERNAL MEDICINE

## 2023-03-09 PROCEDURE — 45380 PR COLONOSCOPY,BIOPSY: ICD-10-PCS | Mod: 22,,, | Performed by: INTERNAL MEDICINE

## 2023-03-09 PROCEDURE — 88305 TISSUE EXAM BY PATHOLOGIST: CPT | Mod: 59 | Performed by: PATHOLOGY

## 2023-03-09 PROCEDURE — 43251 EGD REMOVE LESION SNARE: CPT | Mod: 51,,, | Performed by: INTERNAL MEDICINE

## 2023-03-09 PROCEDURE — 43239 EGD BIOPSY SINGLE/MULTIPLE: CPT | Mod: 59 | Performed by: INTERNAL MEDICINE

## 2023-03-09 PROCEDURE — D9220A PRA ANESTHESIA: Mod: CRNA,,, | Performed by: NURSE ANESTHETIST, CERTIFIED REGISTERED

## 2023-03-09 PROCEDURE — 27201089 HC SNARE, DISP (ANY): Performed by: INTERNAL MEDICINE

## 2023-03-09 PROCEDURE — 88342 IMHCHEM/IMCYTCHM 1ST ANTB: CPT | Mod: 26,,, | Performed by: PATHOLOGY

## 2023-03-09 PROCEDURE — 25000003 PHARM REV CODE 250: Performed by: INTERNAL MEDICINE

## 2023-03-09 PROCEDURE — 43255 EGD CONTROL BLEEDING ANY: CPT | Mod: 59 | Performed by: INTERNAL MEDICINE

## 2023-03-09 PROCEDURE — 37000009 HC ANESTHESIA EA ADD 15 MINS: Performed by: INTERNAL MEDICINE

## 2023-03-09 PROCEDURE — D9220A PRA ANESTHESIA: ICD-10-PCS | Mod: CRNA,,, | Performed by: NURSE ANESTHETIST, CERTIFIED REGISTERED

## 2023-03-09 PROCEDURE — 82962 GLUCOSE BLOOD TEST: CPT | Performed by: INTERNAL MEDICINE

## 2023-03-09 PROCEDURE — 25000003 PHARM REV CODE 250: Performed by: NURSE ANESTHETIST, CERTIFIED REGISTERED

## 2023-03-09 PROCEDURE — 45380 COLONOSCOPY AND BIOPSY: CPT | Mod: 22,,, | Performed by: INTERNAL MEDICINE

## 2023-03-09 PROCEDURE — 63600175 PHARM REV CODE 636 W HCPCS: Performed by: NURSE ANESTHETIST, CERTIFIED REGISTERED

## 2023-03-09 PROCEDURE — 88305 TISSUE EXAM BY PATHOLOGIST: ICD-10-PCS | Mod: 26,,, | Performed by: PATHOLOGY

## 2023-03-09 RX ORDER — LIDOCAINE HCL/PF 100 MG/5ML
SYRINGE (ML) INTRAVENOUS
Status: DISCONTINUED | OUTPATIENT
Start: 2023-03-09 | End: 2023-03-09

## 2023-03-09 RX ORDER — SODIUM CHLORIDE 9 MG/ML
INJECTION, SOLUTION INTRAVENOUS CONTINUOUS
Status: DISCONTINUED | OUTPATIENT
Start: 2023-03-09 | End: 2023-03-09 | Stop reason: HOSPADM

## 2023-03-09 RX ORDER — PROPOFOL 10 MG/ML
VIAL (ML) INTRAVENOUS
Status: DISCONTINUED | OUTPATIENT
Start: 2023-03-09 | End: 2023-03-09

## 2023-03-09 RX ADMIN — PROPOFOL 50 MG: 10 INJECTION, EMULSION INTRAVENOUS at 08:03

## 2023-03-09 RX ADMIN — PROPOFOL 50 MG: 10 INJECTION, EMULSION INTRAVENOUS at 07:03

## 2023-03-09 RX ADMIN — SODIUM CHLORIDE: 9 INJECTION, SOLUTION INTRAVENOUS at 07:03

## 2023-03-09 RX ADMIN — LIDOCAINE HYDROCHLORIDE 100 MG: 20 INJECTION INTRAVENOUS at 07:03

## 2023-03-09 RX ADMIN — PROPOFOL 100 MG: 10 INJECTION, EMULSION INTRAVENOUS at 07:03

## 2023-03-09 NOTE — ANESTHESIA PREPROCEDURE EVALUATION
03/09/2023  Senthil Chandler is a 77 y.o., male.    Pre-op Assessment    I have reviewed the Patient Summary Reports.    I have reviewed the Nursing Notes. I have reviewed the NPO Status.   I have reviewed the Medications.     Review of Systems  Anesthesia Hx:  No problems with previous Anesthesia    Social:  Smoker Smoking Status: Heavy Tobacco Smoker - 52 pack years  Smokeless Tobacco Status: Never Used  Alcohol use: Yes, unspecified volume  Drug use: No       Hematology/Oncology:        Oncology Comments: Malignant neoplasm of colon  Malignant neoplasm of colon   Cardiovascular:   Hypertension Denies MI.  Denies CAD.    Denies CABG/stent.   Denies Angina.    Pulmonary:   Pneumonia COPD, moderate Denies Asthma.  Denies Recent URI.    Renal/:   Chronic Renal Disease    Hepatic/GI:   PUD, GERD, poorly controlled Denies Liver Disease.    Musculoskeletal:   Arthritis     Neurological:   Denies TIA. CVA Neuromuscular Disease, Denies Seizures.    Endocrine:   Diabetes, poorly controlled, type 2 Denies Hypothyroidism.    Psych:   Psychiatric History          Physical Exam  General:  Well nourished      Airway/Jaw/Neck:  Airway Findings: Mouth Opening: Normal   Tongue: Normal   General Airway Assessment: Adult, Good Oropharynx Findings: Normal Mallampati: II  Improves to II with phonation.  TM Distance: 4-6 cm   Neck Findings: Normal     Dental:  Dental Findings: In tact     Chest/Lungs:  Chest/Lungs Findings: Clear to auscultation, Normal Respiratory Rate      Heart/Vascular:  Heart Findings: Rate: Normal  Rhythm: Regular Rhythm  Sounds: Normal  Heart murmur: negative    Abdomen:  Abdomen Findings:     Musculoskeletal:  Musculoskeletal Findings:    Skin:  Skin Findings:     Mental Status:  Mental Status Findings:  Cooperative, Alert and Oriented         Anesthesia Plan  Type of Anesthesia, risks & benefits  discussed:  Anesthesia Type:  general    Patient's Preference:   Plan Factors:          Intra-op Monitoring Plan: standard ASA monitors  Intra-op Monitoring Plan Comments:   Post Op Pain Control Plan:   Post Op Pain Control Plan Comments:     Induction:   IV  Beta Blocker:  Patient is not currently on a Beta-Blocker (No further documentation required).       Informed Consent: Informed consent signed with the Patient and all parties understand the risks and agree with anesthesia plan.  All questions answered.  Anesthesia consent signed with patient.  ASA Score: 3     Day of Surgery Review of History & Physical: I have interviewed and examined the patient. I have reviewed the patient's H&P dated:  There are no significant changes.            Ready For Surgery From Anesthesia Perspective.           Physical Exam  General: Well nourished    Airway:  Mallampati: II / II  Mouth Opening: Normal  TM Distance: 4-6 cm  Tongue: Normal    Dental:  In tact    Chest/Lungs:  Clear to auscultation, Normal Respiratory Rate    Heart:  Rate: Normal  Rhythm: Regular Rhythm  Sounds: Normal          Anesthesia Plan  Type of Anesthesia, risks & benefits discussed:    Anesthesia Type: general  Intra-op Monitoring Plan: standard ASA monitors  Induction:  IV  Informed Consent: Informed consent signed with the Patient and all parties understand the risks and agree with anesthesia plan.  All questions answered.   ASA Score: 3  Day of Surgery Review of History & Physical: I have interviewed and examined the patient. I have reviewed the patient's H&P dated:     Ready For Surgery From Anesthesia Perspective.       .

## 2023-03-09 NOTE — ANESTHESIA POSTPROCEDURE EVALUATION
Anesthesia Post Evaluation    Patient: Senthil Chandler    Procedure(s) Performed: Procedure(s) (LRB):  EGD (ESOPHAGOGASTRODUODENOSCOPY) (N/A)  COLONOSCOPY (N/A)    Final Anesthesia Type: general      Patient location during evaluation: PACU  Patient participation: Yes- Able to Participate  Level of consciousness: awake and alert and oriented  Post-procedure vital signs: reviewed and stable  Pain management: adequate  Airway patency: patent    PONV status at discharge: No PONV  Anesthetic complications: no      Cardiovascular status: blood pressure returned to baseline  Respiratory status: unassisted, spontaneous ventilation and room air  Hydration status: euvolemic  Follow-up not needed.          Vitals Value Taken Time   /75 03/09/23 0843   Temp 36.4 °C (97.5 °F) 03/09/23 0837   Pulse 84 03/09/23 0846   Resp 16 03/09/23 0842   SpO2 95 % 03/09/23 0846   Vitals shown include unvalidated device data.      No case tracking events are documented in the log.      Pain/Gilda Score: Gilda Score: 10 (3/9/2023  8:42 AM)

## 2023-03-09 NOTE — TRANSFER OF CARE
Anesthesia Transfer of Care Note    Patient: Senthil Chandler    Procedure(s) Performed: Procedure(s) (LRB):  EGD (ESOPHAGOGASTRODUODENOSCOPY) (N/A)  COLONOSCOPY (N/A)    Patient location: PACU    Anesthesia Type: general    Transport from OR: Transported from OR on 2-3 L/min O2 by NC with adequate spontaneous ventilation    Post pain: adequate analgesia    Post assessment: no apparent anesthetic complications and tolerated procedure well    Post vital signs: stable    Level of consciousness: awake, alert and oriented    Nausea/Vomiting: no nausea/vomiting    Complications: none    Transfer of care protocol was followed      Last vitals:   Visit Vitals  BP (!) 174/81 (BP Location: Left arm, Patient Position: Lying)   Pulse 76   Temp 36.4 °C (97.5 °F) (Skin)   Resp 16   Ht 6' (1.829 m)   Wt 83.9 kg (185 lb)   SpO2 96%   BMI 25.09 kg/m²

## 2023-03-09 NOTE — [employer]OVATION PATIENT INSTRUCTIONS
Discharge Summary/Instructions after an Endoscopic Procedure  Patient Name: Senthil Chandler  Patient MRN: [id number]  Patient YOB: 1945  Thursday, March 9, 2023  Carlos Coleman MD  Dear patient,  As a result of recent federal legislation (The Federal Cures Act), you may   receive lab or pathology results from your procedure in your [facility]   account before your physician is able to contact you. Your physician or   their representative will relay the results to you with their   recommendations at their soonest availability.  Thank you,  RESTRICTIONS:  During your procedure today, you received medications for sedation.  These   medications may affect your judgment, balance and coordination.  Therefore,   for 24 hours, you have the following restrictions:   - DO NOT drive a car, operate machinery, make legal/financial decisions,   sign important papers or drink alcohol.    ACTIVITY:  Today: no heavy lifting, straining or running due to procedural   sedation/anesthesia.  The following day: return to full activity including work.  DIET:  Eat and drink normally unless instructed otherwise.     TREATMENT FOR COMMON SIDE EFFECTS:  - Mild abdominal pain, nausea, belching, bloating or excessive gas:  rest,   eat lightly and use a heating pad.  - Sore Throat: treat with throat lozenges and/or gargle with warm salt   water.  - Because air was used during the procedure, expelling large amounts of air   from your rectum or belching is normal.  - If a bowel prep was taken, you may not have a bowel movement for 1-3 days.    This is normal.  SYMPTOMS TO WATCH FOR AND REPORT TO YOUR PHYSICIAN:  1. Abdominal pain or bloating, other than gas cramps.  2. Chest pain.  3. Back pain.  4. Signs of infection such as: chills or fever occurring within 24 hours   after the procedure.  5. Rectal bleeding, which would show as bright red, maroon, or black stools.   (A tablespoon of blood from the rectum is not serious, especially if    hemorrhoids are present.)  6. Vomiting.  7. Weakness or dizziness.  GO DIRECTLY TO THE NEAREST EMERGENCY ROOM IF YOU HAVE ANY OF THE FOLLOWING:      Difficulty breathing              Chills and/or fever over 101 F   Persistent vomiting and/or vomiting blood   Severe abdominal pain   Severe chest pain   Black, tarry stools   Bleeding- more than one tablespoon   Any other symptom or condition that you feel may need urgent attention  Your doctor recommends these additional instructions:  If any biopsies were taken, your doctors clinic will contact you in 1 to 2   weeks with any results.  - Patient has a contact number available for emergencies.  The signs and   symptoms of potential delayed complications were discussed with the   patient.  Return to normal activities tomorrow.  Written discharge   instructions were provided to the patient.   - High fiber diet.   - Continue present medications.   - Await pathology results.   - Repeat colonoscopy in 3 years for surveillance.   - Discharge patient to home (ambulatory).   - Return to GI office at appointment to be scheduled.  For questions, problems or results please call your physician - Carlos Coleman MD at Work:  [phone].  [facility], EMERGENCY ROOM PHONE NUMBER: [phone]  IF A COMPLICATION OR EMERGENCY SITUATION ARISES AND YOU ARE UNABLE TO REACH   YOUR PHYSICIAN - GO DIRECTLY TO THE EMERGENCY ROOM.  Carlos Coleman MD  3/9/2023 8:33:12 AM  This report has been verified and signed electronically.  Dear patient,  As a result of recent federal legislation (The Federal Cures Act), you may   receive lab or pathology results from your procedure in your [facility]   account before your physician is able to contact you. Your physician or   their representative will relay the results to you with their   recommendations at their soonest availability.  Thank you,  PROVATION

## 2023-03-09 NOTE — PLAN OF CARE
Vss, ritchie po fluids, denies pain, ambulates easily. IV removed, catheter intact. Discharge instructions provided and states understanding. States ready to go home.  Discharged from facility with family per wheelchair.

## 2023-03-09 NOTE — H&P
CC: Rectal bleeding, change in bowel habits    77 year old male with above. States that symptoms are ongoing, no alleviating/exacerbating factors. Positive personal history of colorectal CA. Positive personal history of polyps. No current bleeding, + weight loss.     ROS:  No headache, no fever/chills, no chest pain/SOB, no nausea/vomiting/diarrhea/constipation/GI bleeding/abdominal pain, no dysuria/hematuria.    VSSAF   Exam:   Alert and oriented x 3; no apparent distress   PERRLA, sclera anicteric  CV: Regular rate/rhythm, normal PMI   Lungs: Clear bilaterally with no wheeze/rales   Abdomen: Soft, NT/ND, normal bowel sounds   Ext: No cyanosis, clubbing     Impression:   As above    Plan:   Proceed with endoscopy. Further recs to follow.

## 2023-03-09 NOTE — PROVATION PATIENT INSTRUCTIONS
Discharge Summary/Instructions after an Endoscopic Procedure  Patient Name: Senthil Chandler  Patient MRN: [id number]  Patient YOB: 1945  Thursday, March 9, 2023  Carlos Coleman MD  Dear patient,  As a result of recent federal legislation (The Federal Cures Act), you may   receive lab or pathology results from your procedure in your [facility]   account before your physician is able to contact you. Your physician or   their representative will relay the results to you with their   recommendations at their soonest availability.  Thank you,  RESTRICTIONS:  During your procedure today, you received medications for sedation.  These   medications may affect your judgment, balance and coordination.  Therefore,   for 24 hours, you have the following restrictions:   - DO NOT drive a car, operate machinery, make legal/financial decisions,   sign important papers or drink alcohol.    ACTIVITY:  Today: no heavy lifting, straining or running due to procedural   sedation/anesthesia.  The following day: return to full activity including work.  DIET:  Eat and drink normally unless instructed otherwise.     TREATMENT FOR COMMON SIDE EFFECTS:  - Mild abdominal pain, nausea, belching, bloating or excessive gas:  rest,   eat lightly and use a heating pad.  - Sore Throat: treat with throat lozenges and/or gargle with warm salt   water.  - Because air was used during the procedure, expelling large amounts of air   from your rectum or belching is normal.  - If a bowel prep was taken, you may not have a bowel movement for 1-3 days.    This is normal.  SYMPTOMS TO WATCH FOR AND REPORT TO YOUR PHYSICIAN:  1. Abdominal pain or bloating, other than gas cramps.  2. Chest pain.  3. Back pain.  4. Signs of infection such as: chills or fever occurring within 24 hours   after the procedure.  5. Rectal bleeding, which would show as bright red, maroon, or black stools.   (A tablespoon of blood from the rectum is not serious, especially if    hemorrhoids are present.)  6. Vomiting.  7. Weakness or dizziness.  GO DIRECTLY TO THE NEAREST EMERGENCY ROOM IF YOU HAVE ANY OF THE FOLLOWING:      Difficulty breathing              Chills and/or fever over 101 F   Persistent vomiting and/or vomiting blood   Severe abdominal pain   Severe chest pain   Black, tarry stools   Bleeding- more than one tablespoon   Any other symptom or condition that you feel may need urgent attention  Your doctor recommends these additional instructions:  If any biopsies were taken, your doctors clinic will contact you in 1 to 2   weeks with any results.  - Patient has a contact number available for emergencies.  The signs and   symptoms of potential delayed complications were discussed with the   patient.  Return to normal activities tomorrow.  Written discharge   instructions were provided to the patient.   - Resume previous diet.   - Continue present medications.   - No aspirin, ibuprofen, naproxen, or other non-steroidal anti-inflammatory   drugs.   - Await pathology results.   - Discharge patient to home (ambulatory).   - Follow an antireflux regimen.   - Perform a colonoscopy today.   - Return to GI office after studies are complete.  For questions, problems or results please call your physician - Carlos Coleman MD at Work:  [phone].  [facility], EMERGENCY ROOM PHONE NUMBER: [phone]  IF A COMPLICATION OR EMERGENCY SITUATION ARISES AND YOU ARE UNABLE TO REACH   YOUR PHYSICIAN - GO DIRECTLY TO THE EMERGENCY ROOM.  Carlos Coleman MD  3/9/2023 8:03:46 AM  This report has been verified and signed electronically.  Dear patient,  As a result of recent federal legislation (The Federal Cures Act), you may   receive lab or pathology results from your procedure in your [facility]   account before your physician is able to contact you. Your physician or   their representative will relay the results to you with their   recommendations at their soonest  availability.  Thank you,  PROVATION

## 2023-03-13 ENCOUNTER — HOSPITAL ENCOUNTER (EMERGENCY)
Facility: HOSPITAL | Age: 78
Discharge: HOME | End: 2023-03-13
Attending: EMERGENCY MEDICINE
Payer: MEDICARE

## 2023-03-13 VITALS
OXYGEN SATURATION: 94 % | RESPIRATION RATE: 18 BRPM | HEIGHT: 72 IN | SYSTOLIC BLOOD PRESSURE: 178 MMHG | BODY MASS INDEX: 25.06 KG/M2 | DIASTOLIC BLOOD PRESSURE: 77 MMHG | TEMPERATURE: 98 F | WEIGHT: 185 LBS | HEART RATE: 89 BPM

## 2023-03-13 DIAGNOSIS — J18.9 PNEUMONIA OF BOTH LUNGS DUE TO INFECTIOUS ORGANISM, UNSPECIFIED PART OF LUNG: Primary | ICD-10-CM

## 2023-03-13 DIAGNOSIS — R05.9 COUGH: ICD-10-CM

## 2023-03-13 LAB
INFLUENZA A, MOLECULAR: NEGATIVE
INFLUENZA B, MOLECULAR: NEGATIVE
SARS-COV-2 RDRP RESP QL NAA+PROBE: NEGATIVE
SPECIMEN SOURCE: NORMAL

## 2023-03-13 PROCEDURE — 63600175 PHARM REV CODE 636 W HCPCS: Performed by: EMERGENCY MEDICINE

## 2023-03-13 PROCEDURE — 99284 EMERGENCY DEPT VISIT MOD MDM: CPT | Mod: 25

## 2023-03-13 PROCEDURE — U0002 COVID-19 LAB TEST NON-CDC: HCPCS | Performed by: NURSE PRACTITIONER

## 2023-03-13 PROCEDURE — 25000003 PHARM REV CODE 250: Performed by: EMERGENCY MEDICINE

## 2023-03-13 PROCEDURE — 96365 THER/PROPH/DIAG IV INF INIT: CPT

## 2023-03-13 PROCEDURE — 87502 INFLUENZA DNA AMP PROBE: CPT | Performed by: NURSE PRACTITIONER

## 2023-03-13 RX ORDER — AMOXICILLIN AND CLAVULANATE POTASSIUM 875; 125 MG/1; MG/1
1 TABLET, FILM COATED ORAL 2 TIMES DAILY
Qty: 14 TABLET | Refills: 0 | Status: SHIPPED | OUTPATIENT
Start: 2023-03-13

## 2023-03-13 RX ORDER — BENZONATATE 100 MG/1
100 CAPSULE ORAL 3 TIMES DAILY PRN
Qty: 20 CAPSULE | Refills: 0 | Status: SHIPPED | OUTPATIENT
Start: 2023-03-13 | End: 2023-03-23

## 2023-03-13 RX ADMIN — CEFTRIAXONE 1 G: 1 INJECTION, POWDER, FOR SOLUTION INTRAMUSCULAR; INTRAVENOUS at 04:03

## 2023-03-13 NOTE — ED NOTES
Pt in with c/o cough and over all weakness since Thursday when he had a colonoscopy and endoscopy. Pt states he was fine until the procedure and that after the procedure he developed a productive cough with thick, clear sputum. Pt with n o other complaints. Pt awake, alert and ambulatory, no distress noted.

## 2023-03-13 NOTE — FIRST PROVIDER EVALUATION
Emergency Department TeleTriage Encounter Note      CHIEF COMPLAINT    Chief Complaint   Patient presents with    Dizziness     Sent by Dr Jaimes        VITAL SIGNS   Initial Vitals [03/13/23 1420]   BP Pulse Resp Temp SpO2   (!) 174/75 92 18 99.8 °F (37.7 °C) 99 %      MAP       --            ALLERGIES    Review of patient's allergies indicates:  No Known Allergies    PROVIDER TRIAGE NOTE  TeleTriage Note: Senthil Chandler, a nontoxic/well appearing, 77 y.o. male, presented to the ED with c/o SOB (daily smoker), coughing, productive sputum and low grade fevers since yesterday. Called by Dr. Jaimes and told to come to the ED but he doesn't know why.     All ED beds are full at present; patient notified of this status.  Patient seen and medically screened by Nurse Practitioner via teletriage. Orders initiated at triage to expedite care.  Patient is stable to return to the waiting room and will be placed in an ED bed when available.  Care will be transferred to an alternate provider when patient has been placed in an Exam Room from the [facility] for physical exam, additional orders, and disposition.  2:24 PM Shanell Gutierres DNP, FNP-C        ORDERS  Labs Reviewed - No data to display    ED Orders (720h ago, onward)      Start Ordered     Status Ordering Provider    03/13/23 1427 03/13/23 1427  COVID-19 Rapid Screening  STAT         Ordered SHANELL GUTIERRES    03/13/23 1427 03/13/23 1427  Influenza A & B by Molecular  STAT         Ordered SHANELL GUTIERRES    03/13/23 1427 03/13/23 1427  X-Ray Chest PA And Lateral  1 time imaging         Ordered SHANELL GUTIERRES              Virtual Visit Note: The provider triage portion of this emergency department evaluation and documentation was performed via [employer], a HIPAA-compliant telemedicine application, in concert with a tele-presenter in the room. A face to face patient evaluation with one of my colleagues will occur once the patient is placed in an emergency  department room.      DISCLAIMER: This note was prepared with [employer] voice recognition transcription software. Garbled syntax, mangled pronouns, and other bizarre constructions may be attributed to that software system.

## 2023-03-13 NOTE — ED NOTES
Discharge instructions reviewed with pt. Instructed to  meds and begin taking as written, pt voiced understanding. Pt awake, alert and ambulatory, no distress noted.

## 2023-03-13 NOTE — ED PROVIDER NOTES
Encounter Date: 3/13/2023    SCRIBE #1 NOTE: I, Cristiana Cordova, am scribing for, and in the presence of,  Aravind Gifford MD.     History     Chief Complaint   Patient presents with    Dizziness     Sent by Dr Jaimes      Time seen by provider: 3:48 PM on 03/13/2023    Senthil Chandler is a 77 y.o. male who presents to the ED with an onset of productive cough that began 3 days ago. Patient states he has chronic SOB and currently smokes 1.5 packs a day. The patient denies fever, nausea, vomiting, diarrhea or any other symptoms at this time. He has a PMHx of diabetes, HTN, iron deficiency anemia, PVD, angiodysplasia, nephrotic syndrome, kidney disease, hypercholesterolemia, CKD, hyperpotassemia, COPD, and stroke. He has no pertinent PSHx.    The history is provided by the patient.   Review of patient's allergies indicates:  No Known Allergies  Past Medical History:   Diagnosis Date    Adjustment disorder with anxious mood     Angiodysplasia     Arthritis     Cancer 1990    prostate     Carcinoma in situ of prostate     Carpal tunnel syndrome on left     CKD (chronic kidney disease) stage 1, GFR 90 ml/min or greater     Colon polyp     COPD (chronic obstructive pulmonary disease)     Cubital tunnel syndrome on left     Depressive disorder     Deviated nasal septum     Diabetes mellitus with ophthalmic complication     Diabetes mellitus, type 2     Gallstone     GERD (gastroesophageal reflux disease)     Hypercholesterolemia     Hyperpotassemia     Hypertension     Iron deficiency anemia     Kidney disease     focal segmental glomerulosclerosis    Lumbago     Malignant neoplasm of colon     Nephrotic syndrome     Neuropathy     Primary osteoarthritis of left elbow     PTSD (post-traumatic stress disorder)     PVD (peripheral vascular disease)     Spinal stenosis, lumbar region without neurogenic claudication     Stomach ulcer     Stroke     Wears glasses      Past Surgical History:   Procedure Laterality Date     CARPAL TUNNEL RELEASE Left 10/23/2017    COLONOSCOPY N/A 08/12/2016    Procedure: COLONOSCOPY;  Surgeon: Carlos Roman MD;  Location: [facility] ENDO;  Service: Endoscopy;  Laterality: N/A; repeat in 5 years for surveillance    COLONOSCOPY N/A 02/20/2019    Procedure: COLONOSCOPY;  Surgeon: Carlos Roman MD;  Location: [facility] ENDO;  Service: Endoscopy;  Laterality: N/A;    COLONOSCOPY N/A 06/30/2020    Procedure: COLONOSCOPY;  Surgeon: Carlos Roman MD;  Location: [facility] ENDO;  Service: Endoscopy;  Laterality: N/A; colon polyps removed; Patent end-to-side ileo-colonic anastomosis, with healthy appearing mucosa; A few non-bleeding colonic angiodysplastic lesions, hemorrhoids; repeat in 3 years for surveillance    COLONOSCOPY N/A 3/9/2023    Procedure: COLONOSCOPY;  Surgeon: Carlos Roman MD;  Location: [facility] ENDO;  Service: Endoscopy;  Laterality: N/A;    ESOPHAGOGASTRODUODENOSCOPY N/A 02/20/2019    Procedure: EGD (ESOPHAGOGASTRODUODENOSCOPY);  Surgeon: Carlos Roman MD;  Location: [facility] ENDO;  Service: Endoscopy;  Laterality: N/A;    ESOPHAGOGASTRODUODENOSCOPY N/A 3/9/2023    Procedure: EGD (ESOPHAGOGASTRODUODENOSCOPY);  Surgeon: Carlos Roman MD;  Location: [facility] ENDO;  Service: Endoscopy;  Laterality: N/A;    LAPAROSCOPIC CHOLECYSTECTOMY Right 03/25/2019    Procedure: CHOLECYSTECTOMY, LAPAROSCOPIC;  Surgeon: Mark Renee MD;  Location: [facility] OR;  Service: General;  Laterality: Right;    NASAL SEPTUM SURGERY      PROCTECTOMY      PROSTATE SURGERY  1990's    ROBOT-ASSISTED COLECTOMY Right 03/25/2019    Procedure: ROBOTIC COLECTOMY possible conversion to open;  Surgeon: Mark Renee MD;  Location: [facility] OR;  Service: General;  Laterality: Right;    SUBTOTAL COLECTOMY Right 03/25/2019    Procedure: COLECTOMY, PARTIAL;  Surgeon: Mark Renee MD;  Location: [facility] OR;  Service: General;  Laterality: Right;    UPPER GASTROINTESTINAL ENDOSCOPY  08/12/2016    Dr. Roman     Family History   Problem  Relation Age of Onset    Leukemia Mother     Leukemia Father     Colon cancer Neg Hx     Crohn's disease Neg Hx     Ulcerative colitis Neg Hx     Stomach cancer Neg Hx     Esophageal cancer Neg Hx     Celiac disease Neg Hx      Social History     Tobacco Use    Smoking status: Heavy Smoker     Packs/day: 1.50     Years: 52.00     Pack years: 78.00     Types: Cigarettes    Smokeless tobacco: Never   Substance Use Topics    Alcohol use: Yes     Comment: rarely- maybe once a year    Drug use: No     Review of Systems   Constitutional:  Negative for fever.   HENT:  Negative for sore throat.    Respiratory:  Positive for cough (productive) and shortness of breath (chronic).    Cardiovascular:  Negative for chest pain.   Gastrointestinal:  Negative for diarrhea, nausea and vomiting.   Genitourinary:  Negative for dysuria.   Musculoskeletal:  Negative for back pain.   Skin:  Negative for rash.   Neurological:  Negative for weakness.   Hematological:  Does not bruise/bleed easily.     Physical Exam     Initial Vitals [03/13/23 1420]   BP Pulse Resp Temp SpO2   (!) 174/75 92 18 99.8 °F (37.7 °C) 99 %      MAP       --         Physical Exam    Nursing note and vitals reviewed.  Constitutional: He appears well-developed and well-nourished. He is not diaphoretic. No distress.   HENT:   Head: Normocephalic and atraumatic.   Eyes: EOM are normal. Pupils are equal, round, and reactive to light.   Neck: Neck supple.   Normal range of motion.  Cardiovascular:  Normal rate, regular rhythm, normal heart sounds and intact distal pulses.     Exam reveals no gallop and no friction rub.       No murmur heard.  Pulmonary/Chest: Breath sounds normal. No respiratory distress. He has no wheezes. He has no rhonchi. He has no rales.   Abdominal: Abdomen is soft. Bowel sounds are normal. There is no abdominal tenderness. There is no rebound and no guarding.   Musculoskeletal:         General: Normal range of motion.      Cervical back: Normal  range of motion and neck supple.     Neurological: He is alert and oriented to person, place, and time.   Skin: Skin is warm.   Psychiatric: He has a normal mood and affect. His behavior is normal. Judgment and thought content normal.       ED Course   Procedures  Labs Reviewed   INFLUENZA A & B BY MOLECULAR   SARS-COV-2 RNA AMPLIFICATION, QUAL          Imaging Results              X-Ray Chest PA And Lateral (Final result)  Result time 03/13/23 15:06:25      Final result by Elza Wharton MD (03/13/23 15:06:25)                   Impression:      Bibasilar infiltrates right more so than left new compared to the prior exam      Electronically signed by: Elza Wharton MD  Date:    03/13/2023  Time:    15:06               Narrative:    EXAMINATION:  XR CHEST PA AND LATERAL    CLINICAL HISTORY:  Cough, unspecified    TECHNIQUE:  PA and lateral views of the chest were performed.    COMPARISON:  04/02/2022    FINDINGS:  The heart is not enlarged.  There is no pleural effusion.  Bibasilar infiltrates right more so than left which appear new compared to the prior exam.                                       Medications   cefTRIAXone (ROCEPHIN) 1 g in dextrose 5 % in water (D5W) 5 % 50 mL IVPB (MB+) (0 g Intravenous Stopped 3/13/23 1490)     Medical Decision Making:   History:   Old Medical Records: I decided to obtain old medical records.  Initial Assessment:   77-year-old male presents with a cough.  Differential Diagnosis:   Initial differential diagnosis included but not limited to pneumonia, bronchitis, and viral illness.  Independently Interpreted Test(s):   I have ordered and independently interpreted X-rays - see prior notes.  Clinical Tests:   Lab Tests: Ordered and Reviewed  Radiological Study: Ordered and Reviewed  ED Management:  The patient was emergently evaluated in the emergency department, his evaluation was significant for an elderly male with a normal lung exam.  The patient's x-ray does show a  bibasilar infiltrative pattern, consistent with pneumonia per Radiology.  The patient was treated with a dose of IV Rocephin here for his pneumonia.  The patient is not hypoxic here in the emergency department.  The patient is stable for discharge home and does not require admission for his pneumonia at this time.  He will be discharged home with p.o. Augmentin and he is referred to primary care for follow-up.  Additionally, he is discharged home with p.o. Tessalon Perles for his cough.        Scribe Attestation:   Scribe #1: I performed the above scribed service and the documentation accurately describes the services I performed. I attest to the accuracy of the note.                 I, Dr. Aravind Gifford, personally performed the services described in this documentation. All medical record entries made by the scribe were at my direction and in my presence.  I have reviewed the chart and agree that the record reflects my personal performance and is accurate and complete. Aravind Gifford MD.  7:30 PM 03/13/2023    Clinical Impression:   Final diagnoses:  [R05.9] Cough  [J18.9] Pneumonia of both lungs due to infectious organism, unspecified part of lung (Primary)        ED Disposition Condition    Discharge Stable          ED Prescriptions       Medication Sig Dispense Start Date End Date Auth. Provider    amoxicillin-clavulanate 875-125mg (AUGMENTIN) 875-125 mg per tablet Take 1 tablet by mouth 2 (two) times daily. 14 tablet 3/13/2023 -- Aravind Gifford MD    benzonatate (TESSALON) 100 MG capsule Take 1 capsule (100 mg total) by mouth 3 (three) times daily as needed for Cough. 20 capsule 3/13/2023 3/23/2023 Aravind Gifford MD          Follow-up Information       Follow up With Specialties Details Why Contact Info    Don Hernandez III, MD Family Medicine Schedule an appointment as soon as possible for a visit   [address] [place] [address]  [address] [place], [place] [id number] [phone]               Aravind Gifford  MD  03/13/23 1931

## 2023-03-14 NOTE — PROGRESS NOTES
Please notify patient that biopsies reviewed and showed no bacteria.  Continue current meds and follow up as previously planned.    Colon biopsies were unremarkable.

## 2023-03-15 LAB
FINAL PATHOLOGIC DIAGNOSIS: NORMAL
GROSS: NORMAL
Lab: NORMAL
SUPPLEMENTAL DIAGNOSIS: NORMAL

## 2023-03-30 ENCOUNTER — TELEPHONE (OUTPATIENT)
Dept: FAMILY MEDICINE | Facility: CLINIC | Age: 78
End: 2023-03-30
Payer: MEDICARE

## 2023-03-31 ENCOUNTER — HOSPITAL ENCOUNTER (EMERGENCY)
Facility: HOSPITAL | Age: 78
Discharge: HOME | End: 2023-03-31
Attending: EMERGENCY MEDICINE
Payer: MEDICARE

## 2023-03-31 VITALS
BODY MASS INDEX: 24.24 KG/M2 | SYSTOLIC BLOOD PRESSURE: 120 MMHG | HEART RATE: 70 BPM | HEIGHT: 72 IN | OXYGEN SATURATION: 98 % | RESPIRATION RATE: 20 BRPM | WEIGHT: 179 LBS | DIASTOLIC BLOOD PRESSURE: 67 MMHG | TEMPERATURE: 98 F

## 2023-03-31 DIAGNOSIS — R42 DIZZINESS: ICD-10-CM

## 2023-03-31 DIAGNOSIS — K52.9 CHRONIC DIARRHEA: Primary | ICD-10-CM

## 2023-03-31 DIAGNOSIS — R42 POSITIONAL LIGHTHEADEDNESS: ICD-10-CM

## 2023-03-31 LAB
ALBUMIN SERPL BCP-MCNC: 4 G/DL (ref 3.5–5.2)
ALP SERPL-CCNC: 80 U/L (ref 55–135)
ALT SERPL W/O P-5'-P-CCNC: 22 U/L (ref 10–44)
ANION GAP SERPL CALC-SCNC: 10 MMOL/L (ref 8–16)
AST SERPL-CCNC: 26 U/L (ref 10–40)
BACTERIA #/AREA URNS HPF: ABNORMAL /HPF
BASOPHILS # BLD AUTO: 0.02 K/UL (ref 0–0.2)
BASOPHILS NFR BLD: 0.5 % (ref 0–1.9)
BILIRUB SERPL-MCNC: 0.5 MG/DL (ref 0.1–1)
BILIRUB UR QL STRIP: NEGATIVE
BUN SERPL-MCNC: 22 MG/DL (ref 8–23)
CALCIUM SERPL-MCNC: 10.2 MG/DL (ref 8.7–10.5)
CHLORIDE SERPL-SCNC: 106 MMOL/L (ref 95–110)
CLARITY UR: CLEAR
CO2 SERPL-SCNC: 17 MMOL/L (ref 23–29)
COLOR UR: YELLOW
CREAT SERPL-MCNC: 1.1 MG/DL (ref 0.5–1.4)
DIFFERENTIAL METHOD: ABNORMAL
EOSINOPHIL # BLD AUTO: 0.1 K/UL (ref 0–0.5)
EOSINOPHIL NFR BLD: 1.9 % (ref 0–8)
ERYTHROCYTE [DISTWIDTH] IN BLOOD BY AUTOMATED COUNT: 16 % (ref 11.5–14.5)
EST. GFR  (NO RACE VARIABLE): >60 ML/MIN/1.73 M^2
GLUCOSE SERPL-MCNC: 111 MG/DL (ref 70–110)
GLUCOSE UR QL STRIP: NEGATIVE
HCT VFR BLD AUTO: 32.3 % (ref 40–54)
HCV AB SERPL QL IA: NORMAL
HGB BLD-MCNC: 9.5 G/DL (ref 14–18)
HGB UR QL STRIP: NEGATIVE
HIV 1+2 AB+HIV1 P24 AG SERPL QL IA: NORMAL
HYALINE CASTS #/AREA URNS LPF: 2 /LPF
IMM GRANULOCYTES # BLD AUTO: 0.01 K/UL (ref 0–0.04)
IMM GRANULOCYTES NFR BLD AUTO: 0.2 % (ref 0–0.5)
KETONES UR QL STRIP: NEGATIVE
LEUKOCYTE ESTERASE UR QL STRIP: NEGATIVE
LYMPHOCYTES # BLD AUTO: 1.6 K/UL (ref 1–4.8)
LYMPHOCYTES NFR BLD: 37.5 % (ref 18–48)
MAGNESIUM SERPL-MCNC: 1.6 MG/DL (ref 1.6–2.6)
MCH RBC QN AUTO: 28.3 PG (ref 27–31)
MCHC RBC AUTO-ENTMCNC: 29.4 G/DL (ref 32–36)
MCV RBC AUTO: 96 FL (ref 82–98)
MICROSCOPIC COMMENT: ABNORMAL
MONOCYTES # BLD AUTO: 0.5 K/UL (ref 0.3–1)
MONOCYTES NFR BLD: 12.2 % (ref 4–15)
NEUTROPHILS # BLD AUTO: 2 K/UL (ref 1.8–7.7)
NEUTROPHILS NFR BLD: 47.7 % (ref 38–73)
NITRITE UR QL STRIP: NEGATIVE
NRBC BLD-RTO: 0 /100 WBC
PH UR STRIP: 6 [PH] (ref 5–8)
PLATELET # BLD AUTO: 327 K/UL (ref 150–450)
PMV BLD AUTO: 8.5 FL (ref 9.2–12.9)
POCT GLUCOSE: 131 MG/DL (ref 70–110)
POTASSIUM SERPL-SCNC: 4.6 MMOL/L (ref 3.5–5.1)
PROT SERPL-MCNC: 7.5 G/DL (ref 6–8.4)
PROT UR QL STRIP: ABNORMAL
RBC # BLD AUTO: 3.36 M/UL (ref 4.6–6.2)
RBC #/AREA URNS HPF: 1 /HPF (ref 0–4)
SODIUM SERPL-SCNC: 133 MMOL/L (ref 136–145)
SP GR UR STRIP: 1.03 (ref 1–1.03)
SQUAMOUS #/AREA URNS HPF: 2 /HPF
URN SPEC COLLECT METH UR: ABNORMAL
UROBILINOGEN UR STRIP-ACNC: NEGATIVE EU/DL
WBC # BLD AUTO: 4.19 K/UL (ref 3.9–12.7)
WBC #/AREA URNS HPF: 2 /HPF (ref 0–5)

## 2023-03-31 PROCEDURE — 81000 URINALYSIS NONAUTO W/SCOPE: CPT | Performed by: PHYSICIAN ASSISTANT

## 2023-03-31 PROCEDURE — 82962 GLUCOSE BLOOD TEST: CPT

## 2023-03-31 PROCEDURE — 93010 EKG 12-LEAD: ICD-10-PCS | Mod: ,,, | Performed by: INTERNAL MEDICINE

## 2023-03-31 PROCEDURE — 93010 ELECTROCARDIOGRAM REPORT: CPT | Mod: ,,, | Performed by: INTERNAL MEDICINE

## 2023-03-31 PROCEDURE — 80053 COMPREHEN METABOLIC PANEL: CPT | Performed by: PHYSICIAN ASSISTANT

## 2023-03-31 PROCEDURE — 93005 ELECTROCARDIOGRAM TRACING: CPT

## 2023-03-31 PROCEDURE — 83735 ASSAY OF MAGNESIUM: CPT | Performed by: EMERGENCY MEDICINE

## 2023-03-31 PROCEDURE — 85025 COMPLETE CBC W/AUTO DIFF WBC: CPT | Performed by: PHYSICIAN ASSISTANT

## 2023-03-31 PROCEDURE — 99284 EMERGENCY DEPT VISIT MOD MDM: CPT | Mod: 25

## 2023-03-31 PROCEDURE — 25000003 PHARM REV CODE 250: Performed by: EMERGENCY MEDICINE

## 2023-03-31 PROCEDURE — 87389 HIV-1 AG W/HIV-1&-2 AB AG IA: CPT | Performed by: EMERGENCY MEDICINE

## 2023-03-31 PROCEDURE — 96360 HYDRATION IV INFUSION INIT: CPT

## 2023-03-31 PROCEDURE — 86803 HEPATITIS C AB TEST: CPT | Performed by: EMERGENCY MEDICINE

## 2023-03-31 RX ADMIN — SODIUM CHLORIDE 1000 ML: 9 INJECTION, SOLUTION INTRAVENOUS at 03:03

## 2023-03-31 NOTE — TELEPHONE ENCOUNTER
Spoke with pt told to go to er since he was passing out dizzy deficating dehydrated asap to er have someone bring him the patient states he will do.

## 2023-03-31 NOTE — ED NOTES
"Pt identifiers checked and accurate with Senthil Chandler     Pt presents to ED with complaints of diarrhea and dizziness worsening x 1 week. Pt reports these symptoms "ever since I can remember" and has been seen for same symptoms in ED and by PCP. Pt recently had colonoscopy and upper GI imaging performed. Pt denies all other complaints at this time.     "

## 2023-03-31 NOTE — TELEPHONE ENCOUNTER
----- Message from Shanell Lynch sent at 3/30/2023  1:02 PM CDT -----  Contact: patient  Type:  Sooner Appointment Request    Caller is requesting a sooner appointment.  Caller declined first available appointment listed below.  Caller will not accept being placed on the waitlist and is requesting a message be sent to doctor.    Name of Caller:  patient  When is the first available appointment?  5/15  Symptoms:  passing out, dizziness, deficating, dehydrating   Best Call Back Number:  [phone]  Additional Information:

## 2023-03-31 NOTE — ED PROVIDER NOTES
Encounter Date: 3/31/2023    SCRIBE #1 NOTE: I, Barbara Jaimes, am scribing for, and in the presence of,  Nate Santiago MD.     History     Chief Complaint   Patient presents with    Dizziness     Dizziness for one week..  dehydrated      Time seen by provider: 3:10 PM on 03/31/2023    Senthil Chandler is a 77 y.o. male with a PMHx of DM II, CKD, COPD, FSGS, and spinal stenosis who presents to the ED for evaluation of persistent dizziness described as a lightheadedness that onset 1 week ago.  Patient reports constant diarrhea with 8-9 episodes per day.  History obtained from an independent historian:  Relative reports the patient has a decreased appetite with multiple episodes of diarrhea daily, which often leads to dehydration.  He also notes the patient has residual bilateral hand weakness s/p a stroke and is a current everyday smoker.  Patient has followed with Dr. Roman in GI for similar issues.  Review of external records performed:  Patient had a colonoscopy performed on 03/09/2023 showing diverticulosis in the sigmoid colon and in the descending colon.  He was also seen earlier this month for potential PNA, for which he was prescribed Augmentin.  The patient denies leg swelling, N/V, painful urination, or any other symptoms at this time.  Additional PSHx includes EGD, colectomy, upper gastrointestinal endoscopy, and cholecystectomy.    The history is provided by the patient and a relative.   Review of patient's allergies indicates:  No Known Allergies  Past Medical History:   Diagnosis Date    Adjustment disorder with anxious mood     Angiodysplasia     Arthritis     Cancer 1990    prostate     Carcinoma in situ of prostate     Carpal tunnel syndrome on left     CKD (chronic kidney disease) stage 1, GFR 90 ml/min or greater     Colon polyp     COPD (chronic obstructive pulmonary disease)     Cubital tunnel syndrome on left     Depressive disorder     Deviated nasal septum     Diabetes mellitus with  ophthalmic complication     Diabetes mellitus, type 2     Gallstone     GERD (gastroesophageal reflux disease)     Hypercholesterolemia     Hyperpotassemia     Hypertension     Iron deficiency anemia     Kidney disease     focal segmental glomerulosclerosis    Lumbago     Malignant neoplasm of colon     Nephrotic syndrome     Neuropathy     Primary osteoarthritis of left elbow     PTSD (post-traumatic stress disorder)     PVD (peripheral vascular disease)     Spinal stenosis, lumbar region without neurogenic claudication     Stomach ulcer     Stroke     Wears glasses      Past Surgical History:   Procedure Laterality Date    CARPAL TUNNEL RELEASE Left 10/23/2017    COLONOSCOPY N/A 08/12/2016    Procedure: COLONOSCOPY;  Surgeon: Carlos Roman MD;  Location: [facility] ENDO;  Service: Endoscopy;  Laterality: N/A; repeat in 5 years for surveillance    COLONOSCOPY N/A 02/20/2019    Procedure: COLONOSCOPY;  Surgeon: Carlos Roman MD;  Location: [facility] ENDO;  Service: Endoscopy;  Laterality: N/A;    COLONOSCOPY N/A 06/30/2020    Procedure: COLONOSCOPY;  Surgeon: Carlos Roman MD;  Location: [facility] ENDO;  Service: Endoscopy;  Laterality: N/A; colon polyps removed; Patent end-to-side ileo-colonic anastomosis, with healthy appearing mucosa; A few non-bleeding colonic angiodysplastic lesions, hemorrhoids; repeat in 3 years for surveillance    COLONOSCOPY N/A 3/9/2023    Procedure: COLONOSCOPY;  Surgeon: Carlos Roman MD;  Location: [facility] ENDO;  Service: Endoscopy;  Laterality: N/A;    ESOPHAGOGASTRODUODENOSCOPY N/A 02/20/2019    Procedure: EGD (ESOPHAGOGASTRODUODENOSCOPY);  Surgeon: Carlos Roman MD;  Location: [facility] ENDO;  Service: Endoscopy;  Laterality: N/A;    ESOPHAGOGASTRODUODENOSCOPY N/A 3/9/2023    Procedure: EGD (ESOPHAGOGASTRODUODENOSCOPY);  Surgeon: Carlos Roman MD;  Location: [facility] ENDO;  Service: Endoscopy;  Laterality: N/A;    LAPAROSCOPIC CHOLECYSTECTOMY Right 03/25/2019    Procedure:  CHOLECYSTECTOMY, LAPAROSCOPIC;  Surgeon: Mark Renee MD;  Location: [facility] OR;  Service: General;  Laterality: Right;    NASAL SEPTUM SURGERY      PROCTECTOMY      PROSTATE SURGERY  1990's    ROBOT-ASSISTED COLECTOMY Right 03/25/2019    Procedure: ROBOTIC COLECTOMY possible conversion to open;  Surgeon: Mark Renee MD;  Location: [facility] OR;  Service: General;  Laterality: Right;    SUBTOTAL COLECTOMY Right 03/25/2019    Procedure: COLECTOMY, PARTIAL;  Surgeon: Mark Renee MD;  Location: [facility] OR;  Service: General;  Laterality: Right;    UPPER GASTROINTESTINAL ENDOSCOPY  08/12/2016    Dr. Coleman     Family History   Problem Relation Age of Onset    Leukemia Mother     Leukemia Father     Colon cancer Neg Hx     Crohn's disease Neg Hx     Ulcerative colitis Neg Hx     Stomach cancer Neg Hx     Esophageal cancer Neg Hx     Celiac disease Neg Hx      Social History     Tobacco Use    Smoking status: Heavy Smoker     Packs/day: 1.50     Years: 52.00     Pack years: 78.00     Types: Cigarettes    Smokeless tobacco: Never   Substance Use Topics    Alcohol use: Yes     Comment: rarely- maybe once a year    Drug use: No     Review of Systems   Constitutional:  Positive for appetite change. Negative for fever.   HENT:  Negative for sore throat.    Respiratory:  Negative for shortness of breath.    Cardiovascular:  Negative for chest pain and leg swelling.   Gastrointestinal:  Positive for diarrhea. Negative for nausea and vomiting.   Genitourinary:  Negative for dysuria.   Musculoskeletal:  Negative for back pain.   Skin:  Negative for rash.   Neurological:  Positive for dizziness, weakness (chronic over the bilateral hands) and light-headedness.   Hematological:  Does not bruise/bleed easily.     Physical Exam     Initial Vitals [03/31/23 1221]   BP Pulse Resp Temp SpO2   134/88 74 18 97.6 °F (36.4 °C) 100 %      MAP       --         Physical Exam    Nursing note and vitals reviewed.  Constitutional: He appears  well-developed and well-nourished. He is not diaphoretic. No distress.   HENT:   Head: Normocephalic and atraumatic.   Mouth/Throat: Oropharynx is clear and moist. Mucous membranes are dry.   Eyes: Conjunctivae and EOM are normal.   Neck: Neck supple.   Cardiovascular:  Normal rate, regular rhythm, normal heart sounds and intact distal pulses.     Exam reveals no gallop and no friction rub.       No murmur heard.  Pulmonary/Chest: Breath sounds normal. He has no wheezes. He has no rhonchi. He has no rales.   Abdominal: Abdomen is soft. He exhibits no distension. There is no abdominal tenderness.   Musculoskeletal:         General: Normal range of motion.      Cervical back: Neck supple.     Neurological: He is alert and oriented to person, place, and time. He has normal strength. No cranial nerve deficit or sensory deficit. Gait normal.   Cranial nerves III through XII grossly intact. 5/5 strength with intact sensation to BUE's and BLE's.   Skin: No rash noted. No erythema.       ED Course   Procedures  Labs Reviewed   CBC W/ AUTO DIFFERENTIAL - Abnormal; Notable for the following components:       Result Value    RBC 3.36 (*)     Hemoglobin 9.5 (*)     Hematocrit 32.3 (*)     MCHC 29.4 (*)     RDW 16.0 (*)     MPV 8.5 (*)     All other components within normal limits   COMPREHENSIVE METABOLIC PANEL - Abnormal; Notable for the following components:    Sodium 133 (*)     CO2 17 (*)     Glucose 111 (*)     All other components within normal limits   URINALYSIS, REFLEX TO URINE CULTURE - Abnormal; Notable for the following components:    Protein, UA 1+ (*)     All other components within normal limits    Narrative:     Specimen Source->Urine   URINALYSIS MICROSCOPIC - Abnormal; Notable for the following components:    Hyaline Casts, UA 2 (*)     All other components within normal limits    Narrative:     Specimen Source->Urine   POCT GLUCOSE - Abnormal; Notable for the following components:    POCT Glucose 131 (*)      All other components within normal limits   MAGNESIUM    Narrative:     Release to patient->Immediate   HIV 1 / 2 ANTIBODY   HEPATITIS C ANTIBODY          Imaging Results    None          Medications   sodium chloride 0.9% bolus 1,000 mL 1,000 mL (0 mLs Intravenous Stopped 3/31/23 1708)     Medical Decision Making:   History:   Old Medical Records: I decided to obtain old medical records.  Independently Interpreted Test(s):   I have ordered and independently interpreted EKG Reading(s) - see prior notes  Clinical Tests:   Lab Tests: Ordered and Reviewed  Medical Tests: Ordered and Reviewed        Scribe Attestation:   Scribe #1: I performed the above scribed service and the documentation accurately describes the services I performed. I attest to the accuracy of the note.      ED Course as of 03/31/23 1726   Fri Mar 31, 2023   1511 EKG:  Sinus rhythm with primary AV block, rate of 68, other intervals are normal.  There are no acute ST or T wave changes suggestive of acute ischemia or infarction.  (Independently interpreted by me) [MR]      ED Course User Index  [MR] Nate Santiago MD          I, Dr. Nate Santiago, personally performed the services described in this documentation. All medical record entries made by the scribe were at my direction and in my presence.  I have reviewed the chart and agree that the record reflects my personal performance and is accurate and complete. Nate Santiago MD.  4:09 PM 03/31/2023    Senthil Chandler is a 77 y.o. male presenting with chronic diarrhea for which he sees GI, unclear etiology, with increase in chronic intermittent positional lightheadedness typically associated with poor oral intake and diarrhea.  Notably is ambulatory without assistance here without any sign of ataxia no other neurological concerning symptom or deficit.  I have very low suspicion for acute central neurologic process such as CVA.  I do not think brain imaging is indicated.  I doubt  primary cardiac etiology such as ischemia or arrhythmia.  I do not think cardiac biomarker or prolonged monitoring is indicated.  EKG reviewed here.  Laboratory sent to exclude electrolyte deficit, renal insult, or other abnormality meriting other emergent treatment.  IV fluids given here at his request.  Patient is appropriate for outpatient PCP and GI follow-up for reassessment as well as further workup of chronic diarrhea.  Oral hydration reviewed along with detailed return precautions.         Clinical Impression:   Final diagnoses:  [R42] Dizziness  [K52.9] Chronic diarrhea (Primary)  [R42] Positional lightheadedness        ED Disposition Condition    Discharge Stable          ED Prescriptions    None       Follow-up Information       Follow up With Specialties Details Why Contact Info    Don Hernandez III, MD Family Medicine  Next week [address]  [address]  [place] [address]  [phone]      Irina Ng MD Gastroenterology, Internal Medicine  or your regular GI doctor, 1-2 weeks, for chronic diarrhea [address]  [address]  [place] [address]  [phone]               Nate Santiago MD  03/31/23 5451

## 2023-03-31 NOTE — FIRST PROVIDER EVALUATION
" Emergency Department TeleTriage Encounter Note      CHIEF COMPLAINT    Chief Complaint   Patient presents with    Dizziness     Dizziness for one week..  dehydrated        VITAL SIGNS   Initial Vitals [03/31/23 1221]   BP Pulse Resp Temp SpO2   134/88 74 18 97.6 °F (36.4 °C) 100 %      MAP       --            ALLERGIES    Review of patient's allergies indicates:  No Known Allergies    PROVIDER TRIAGE NOTE  This is a teletriage evaluation of a 77 y.o. male presenting to the ED with c/o dizziness/"dehydration", h/o same. +diarrhea. No abd pain or fever.     PE:. Non-toxic/well-appearing. No respiratory distress, speaks in full sentences without issue. No active emesis nor cough. Normal eye contact and mentation.     Plan: labs, orthostatics, ekg. Further/augmented workup at discretion of examining provider.     All ED beds are full at present; patient notified of this status.  Patient seen and medically screened by VIVIAN via teletriage. Orders initiated at triage to expedite care.  Patient is stable and will be placed in an ED bed when available.  Care will be transferred to an alternate provider when patient has been placed in an Exam Room further exam, additional orders, and disposition.         ORDERS  Labs Reviewed - No data to display    ED Orders (720h ago, onward)      Start Ordered     Status Ordering Provider    03/31/23 1421 03/31/23 1420  Orthostatic vital signs  Once         Ordered JAVIER ALLEN    03/31/23 1421 03/31/23 1420  Urinalysis, Reflex to Urine Culture Urine, Clean Catch  STAT         Ordered JAVIER ALLEN    03/31/23 1420 03/31/23 1420  Complete Blood Count (CBC)  STAT         Ordered JAVIER ALLEN    03/31/23 1420 03/31/23 1420  Comprehensive Metabolic Panel (CMP)  STAT         Ordered JAVIER ALLEN    03/31/23 1420 03/31/23 1420  Lactic acid, plasma  STAT         Ordered JAVIER ALLEN    03/31/23 1420 03/31/23 1420  EKG 12-lead  Once         Ordered JAVIER ALLEN.    " 03/31/23 1420 03/31/23 1420  Troponin I  STAT         Ordered JAVIER ALLEN              Virtual Visit Note: The provider triage portion of this emergency department evaluation and documentation was performed via [employer], a HIPAA-compliant telemedicine application, in concert with a tele-presenter in the room. A face to face patient evaluation with one of my colleagues will occur once the patient is placed in an emergency department room.      DISCLAIMER: This note was prepared with [employer] voice recognition transcription software. Garbled syntax, mangled pronouns, and other bizarre constructions may be attributed to that software system.

## 2023-04-01 ENCOUNTER — PATIENT MESSAGE (OUTPATIENT)
Dept: ADMINISTRATIVE | Facility: HOSPITAL | Age: 78
End: 2023-04-01
Payer: MEDICARE

## 2023-04-05 ENCOUNTER — PATIENT MESSAGE (OUTPATIENT)
Dept: ADMINISTRATIVE | Facility: OTHER | Age: 78
End: 2023-04-05
Payer: MEDICARE

## 2023-04-05 DIAGNOSIS — E11.9 TYPE 2 DIABETES MELLITUS WITHOUT COMPLICATION, UNSPECIFIED WHETHER LONG TERM INSULIN USE: ICD-10-CM

## 2023-07-28 ENCOUNTER — TELEPHONE (OUTPATIENT)
Dept: FAMILY MEDICINE | Facility: CLINIC | Age: 78
End: 2023-07-28
Payer: MEDICARE

## 2023-08-08 NOTE — PLAN OF CARE
[facility]    The patient's safety is maintained with the call light within reach, bed locked and in low position and the side rails up. .Free of injuries. The care plans are monitored.

## 2023-10-24 NOTE — BRIEF OP NOTE
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  Robotic assisted mobilization of ascending colon and ligation of DARYL.  Unable to fully mobilize and release the hepatic flexure robotically.  Converted to open to fully release hepatic flexure allowing segmental resection with ileocolic anastomosis.     Description of the findings of the procedure: see above.      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  Gallbladder  2. Right Colon     Start Status     03/25/19 1230 Collected (03/25/19 1230) Order ID: [id number]       03/25/19 1230         Detail Level: Detailed Detail Level: Simple

## 2025-03-23 NOTE — NURSING
Transfer orders and bed assignment received. MRI order for C-spine pending from yesterday. Attempts made to coordinate MRI prior to transfer.   no

## (undated) DEVICE — NDL SAFETY 21G X 1 1/2 ECLPSE

## (undated) DEVICE — SUT 1 48IN PDS II VIO MONO

## (undated) DEVICE — SUT MONOCRYL 4-0 PS-2

## (undated) DEVICE — SOL 9P NACL IRR PIC IL

## (undated) DEVICE — APPLICATOR CHLORAPREP ORN 26ML

## (undated) DEVICE — PACK BASIC

## (undated) DEVICE — TROCAR ENDOPATH XCEL 12MM 15CM

## (undated) DEVICE — SEE MEDLINE ITEM 152680

## (undated) DEVICE — SUT 0 VICRYL / UR6 (J603)

## (undated) DEVICE — SHEATH ENDOWRIST 45MM

## (undated) DEVICE — CANNULA ENDOPATH XCEL 5X100MM

## (undated) DEVICE — KIT ANTIFOG

## (undated) DEVICE — SUT SA85H SILK 2-0

## (undated) DEVICE — ELECTRODE REM PLYHSV RETURN 9

## (undated) DEVICE — NDL INSUF ULTRA VERESS 120MM

## (undated) DEVICE — Device

## (undated) DEVICE — BANDAGE ADHESIVE

## (undated) DEVICE — SEE MEDLINE ITEM 152622

## (undated) DEVICE — SUT SILK 0 STRANDS 30IN BLK

## (undated) DEVICE — LINER SUCTION 3000CC

## (undated) DEVICE — SPONGE LAP 18X18 PREWASHED

## (undated) DEVICE — COVER TIP CURVED SCISSORS XI

## (undated) DEVICE — SEE MEDLINE ITEM 157116

## (undated) DEVICE — TROCAR ENDO Z THREAD KII 5X100

## (undated) DEVICE — STRIP STERI REIN CLSR 1/2X2IN

## (undated) DEVICE — SYR 0.9% NACL 10ML STERILE

## (undated) DEVICE — SUT 3-0 VICRYL SH CR/8 18

## (undated) DEVICE — TROCAR ENDOPATH XCEL 12X100MM

## (undated) DEVICE — GLOVE 7.0 PROTEXIS PI BLUE

## (undated) DEVICE — PACK CUSTOM UNIV BASIN SLI

## (undated) DEVICE — APPLIER CLIP EPIX UNIV 5X34

## (undated) DEVICE — SYR ONLY LUER LOCK 20CC

## (undated) DEVICE — SOL ELECTROLUBE ANTI-STIC

## (undated) DEVICE — TROCAR ENDOPATH XCEL 5X100MM

## (undated) DEVICE — GLOVE PROTEXIS PI CLASSIC 6.5

## (undated) DEVICE — OBTURATOR 8MM BLADELESS

## (undated) DEVICE — SEE MEDLINE ITEM 147518

## (undated) DEVICE — SPONGE SUPER KERLIX 6X6.75IN

## (undated) DEVICE — KIT ROBOTIC 4 ARM DA VINCI SI

## (undated) DEVICE — TUBING PNEUMO

## (undated) DEVICE — KIT WING PAD POSITIONING

## (undated) DEVICE — SUT VICRYL 0 CT-2 27 DYE

## (undated) DEVICE — SEE MEDLINE ITEM 157117

## (undated) DEVICE — GLOVE PROTEXIS PI CLASSIC 7.5

## (undated) DEVICE — BLADE ELECTRO EXTENDED.

## (undated) DEVICE — SYR 30CC LUER LOCK

## (undated) DEVICE — SOL WATER STRL IRR 1000ML

## (undated) DEVICE — CUTTER PROXIMATE BLUE 75MM

## (undated) DEVICE — SEALER ENDOWRIST ONE VESSEL

## (undated) DEVICE — CORD BIPOLAR 12 FOOT

## (undated) DEVICE — DRESSING MEPORE 3.6 X 10

## (undated) DEVICE — SEALS

## (undated) DEVICE — SOL CLEARIFY VISUALIZATION LAP

## (undated) DEVICE — CLOSURE SKIN STERI STRIP 1/2X4

## (undated) DEVICE — STRAP OR TABLE 5IN X 72IN

## (undated) DEVICE — SLEEVE SCD EXPRESS CALF MEDIUM

## (undated) DEVICE — CANNULA ENDOWRIST SEAL

## (undated) DEVICE — DISSECTOR CURVED 5DCD

## (undated) DEVICE — RELOAD PROXIMATE CUT BLUE 75MM

## (undated) DEVICE — BAG TISS RETRV MONARCH 10MM

## (undated) DEVICE — CLIPPER BLADE MOD 4406 (CAREF)

## (undated) DEVICE — BLADE SURG CARBON STEEL SZ11

## (undated) DEVICE — DRAPE ABDOMINAL TIBURON 14X11

## (undated) DEVICE — SYR 10CC LUER LOCK